# Patient Record
Sex: MALE | Race: WHITE | Employment: OTHER | URBAN - METROPOLITAN AREA
[De-identification: names, ages, dates, MRNs, and addresses within clinical notes are randomized per-mention and may not be internally consistent; named-entity substitution may affect disease eponyms.]

---

## 2017-04-15 ENCOUNTER — HOSPITAL ENCOUNTER (INPATIENT)
Age: 82
LOS: 2 days | Discharge: HOME | DRG: 066 | End: 2017-04-17
Attending: EMERGENCY MEDICINE | Admitting: INTERNAL MEDICINE
Payer: MEDICARE

## 2017-04-15 ENCOUNTER — APPOINTMENT (OUTPATIENT)
Dept: CT IMAGING | Age: 82
DRG: 066 | End: 2017-04-15
Attending: EMERGENCY MEDICINE
Payer: MEDICARE

## 2017-04-15 DIAGNOSIS — I65.23 STENOSIS OF BOTH INTERNAL CAROTID ARTERIES: ICD-10-CM

## 2017-04-15 DIAGNOSIS — I10 ESSENTIAL HYPERTENSION: ICD-10-CM

## 2017-04-15 DIAGNOSIS — R26.9 GAIT DISTURBANCE: ICD-10-CM

## 2017-04-15 DIAGNOSIS — E78.01 FAMILIAL HYPERCHOLESTEROLEMIA: ICD-10-CM

## 2017-04-15 DIAGNOSIS — H49.21 SIXTH NERVE PALSY OF RIGHT EYE: ICD-10-CM

## 2017-04-15 DIAGNOSIS — H53.2 DIPLOPIA: ICD-10-CM

## 2017-04-15 DIAGNOSIS — I63.341 CEREBROVASCULAR ACCIDENT (CVA) DUE TO THROMBOSIS OF RIGHT CEREBELLAR ARTERY (HCC): ICD-10-CM

## 2017-04-15 DIAGNOSIS — G45.9 TRANSIENT CEREBRAL ISCHEMIA, UNSPECIFIED TYPE: Primary | ICD-10-CM

## 2017-04-15 LAB
ALBUMIN SERPL BCP-MCNC: 3.6 G/DL (ref 3.5–5)
ALBUMIN/GLOB SERPL: 0.9 {RATIO} (ref 1.1–2.2)
ALP SERPL-CCNC: 92 U/L (ref 45–117)
ALT SERPL-CCNC: 25 U/L (ref 12–78)
ANION GAP BLD CALC-SCNC: 5 MMOL/L (ref 5–15)
APPEARANCE UR: CLEAR
AST SERPL W P-5'-P-CCNC: 17 U/L (ref 15–37)
BACTERIA URNS QL MICRO: NEGATIVE /HPF
BASOPHILS # BLD AUTO: 0.1 K/UL (ref 0–0.1)
BASOPHILS # BLD: 1 % (ref 0–1)
BILIRUB SERPL-MCNC: 0.6 MG/DL (ref 0.2–1)
BILIRUB UR QL: NEGATIVE
BUN SERPL-MCNC: 23 MG/DL (ref 6–20)
BUN/CREAT SERPL: 18 (ref 12–20)
CALCIUM SERPL-MCNC: 9 MG/DL (ref 8.5–10.1)
CHLORIDE SERPL-SCNC: 110 MMOL/L (ref 97–108)
CK MB CFR SERPL CALC: 2.8 % (ref 0–2.5)
CK MB SERPL-MCNC: 2.2 NG/ML (ref 5–25)
CK SERPL-CCNC: 79 U/L (ref 39–308)
CO2 SERPL-SCNC: 29 MMOL/L (ref 21–32)
COLOR UR: NORMAL
CREAT SERPL-MCNC: 1.28 MG/DL (ref 0.7–1.3)
EOSINOPHIL # BLD: 0.2 K/UL (ref 0–0.4)
EOSINOPHIL NFR BLD: 3 % (ref 0–7)
EPITH CASTS URNS QL MICRO: NORMAL /LPF
ERYTHROCYTE [DISTWIDTH] IN BLOOD BY AUTOMATED COUNT: 13.9 % (ref 11.5–14.5)
GLOBULIN SER CALC-MCNC: 3.9 G/DL (ref 2–4)
GLUCOSE SERPL-MCNC: 102 MG/DL (ref 65–100)
GLUCOSE UR STRIP.AUTO-MCNC: NEGATIVE MG/DL
HCT VFR BLD AUTO: 48.9 % (ref 36.6–50.3)
HGB BLD-MCNC: 16.5 G/DL (ref 12.1–17)
HGB UR QL STRIP: NEGATIVE
HYALINE CASTS URNS QL MICRO: NORMAL /LPF (ref 0–5)
KETONES UR QL STRIP.AUTO: NEGATIVE MG/DL
LEUKOCYTE ESTERASE UR QL STRIP.AUTO: NEGATIVE
LYMPHOCYTES # BLD AUTO: 21 % (ref 12–49)
LYMPHOCYTES # BLD: 1.3 K/UL (ref 0.8–3.5)
MCH RBC QN AUTO: 31.1 PG (ref 26–34)
MCHC RBC AUTO-ENTMCNC: 33.7 G/DL (ref 30–36.5)
MCV RBC AUTO: 92.1 FL (ref 80–99)
MONOCYTES # BLD: 0.5 K/UL (ref 0–1)
MONOCYTES NFR BLD AUTO: 8 % (ref 5–13)
NEUTS SEG # BLD: 4.1 K/UL (ref 1.8–8)
NEUTS SEG NFR BLD AUTO: 67 % (ref 32–75)
NITRITE UR QL STRIP.AUTO: NEGATIVE
PH UR STRIP: 5 [PH] (ref 5–8)
PLATELET # BLD AUTO: 132 K/UL (ref 150–400)
POTASSIUM SERPL-SCNC: 4.3 MMOL/L (ref 3.5–5.1)
PROT SERPL-MCNC: 7.5 G/DL (ref 6.4–8.2)
PROT UR STRIP-MCNC: NEGATIVE MG/DL
RBC # BLD AUTO: 5.31 M/UL (ref 4.1–5.7)
RBC #/AREA URNS HPF: NORMAL /HPF (ref 0–5)
SODIUM SERPL-SCNC: 144 MMOL/L (ref 136–145)
SP GR UR REFRACTOMETRY: 1.01 (ref 1–1.03)
TROPONIN I SERPL-MCNC: <0.04 NG/ML
UA: UC IF INDICATED,UAUC: NORMAL
UROBILINOGEN UR QL STRIP.AUTO: 0.2 EU/DL (ref 0.2–1)
WBC # BLD AUTO: 6.1 K/UL (ref 4.1–11.1)
WBC URNS QL MICRO: NORMAL /HPF (ref 0–4)

## 2017-04-15 PROCEDURE — 70450 CT HEAD/BRAIN W/O DYE: CPT

## 2017-04-15 PROCEDURE — 82550 ASSAY OF CK (CPK): CPT | Performed by: EMERGENCY MEDICINE

## 2017-04-15 PROCEDURE — 84484 ASSAY OF TROPONIN QUANT: CPT | Performed by: EMERGENCY MEDICINE

## 2017-04-15 PROCEDURE — 74011250637 HC RX REV CODE- 250/637: Performed by: INTERNAL MEDICINE

## 2017-04-15 PROCEDURE — 65660000000 HC RM CCU STEPDOWN

## 2017-04-15 PROCEDURE — 94762 N-INVAS EAR/PLS OXIMTRY CONT: CPT

## 2017-04-15 PROCEDURE — 36415 COLL VENOUS BLD VENIPUNCTURE: CPT | Performed by: EMERGENCY MEDICINE

## 2017-04-15 PROCEDURE — 80053 COMPREHEN METABOLIC PANEL: CPT | Performed by: EMERGENCY MEDICINE

## 2017-04-15 PROCEDURE — 85025 COMPLETE CBC W/AUTO DIFF WBC: CPT | Performed by: EMERGENCY MEDICINE

## 2017-04-15 PROCEDURE — 81001 URINALYSIS AUTO W/SCOPE: CPT | Performed by: EMERGENCY MEDICINE

## 2017-04-15 PROCEDURE — 99284 EMERGENCY DEPT VISIT MOD MDM: CPT

## 2017-04-15 PROCEDURE — 93005 ELECTROCARDIOGRAM TRACING: CPT

## 2017-04-15 RX ORDER — ACETAMINOPHEN 650 MG/1
650 SUPPOSITORY RECTAL
Status: DISCONTINUED | OUTPATIENT
Start: 2017-04-15 | End: 2017-04-17 | Stop reason: HOSPADM

## 2017-04-15 RX ORDER — PANTOPRAZOLE SODIUM 40 MG/1
40 TABLET, DELAYED RELEASE ORAL
Status: DISCONTINUED | OUTPATIENT
Start: 2017-04-16 | End: 2017-04-17 | Stop reason: HOSPADM

## 2017-04-15 RX ORDER — LANOLIN ALCOHOL/MO/W.PET/CERES
100 CREAM (GRAM) TOPICAL DAILY
Status: DISCONTINUED | OUTPATIENT
Start: 2017-04-16 | End: 2017-04-17 | Stop reason: HOSPADM

## 2017-04-15 RX ORDER — ATORVASTATIN CALCIUM 20 MG/1
20 TABLET, FILM COATED ORAL DAILY
Status: DISCONTINUED | OUTPATIENT
Start: 2017-04-16 | End: 2017-04-16

## 2017-04-15 RX ORDER — LANOLIN ALCOHOL/MO/W.PET/CERES
1000 CREAM (GRAM) TOPICAL DAILY
Status: DISCONTINUED | OUTPATIENT
Start: 2017-04-16 | End: 2017-04-17 | Stop reason: HOSPADM

## 2017-04-15 RX ORDER — DICYCLOMINE HYDROCHLORIDE 10 MG/1
10 CAPSULE ORAL 2 TIMES DAILY
Status: DISCONTINUED | OUTPATIENT
Start: 2017-04-15 | End: 2017-04-17 | Stop reason: HOSPADM

## 2017-04-15 RX ORDER — GUAIFENESIN 100 MG/5ML
81 LIQUID (ML) ORAL DAILY
Status: DISCONTINUED | OUTPATIENT
Start: 2017-04-15 | End: 2017-04-16

## 2017-04-15 RX ORDER — SODIUM CHLORIDE 0.9 % (FLUSH) 0.9 %
5-10 SYRINGE (ML) INJECTION EVERY 8 HOURS
Status: DISCONTINUED | OUTPATIENT
Start: 2017-04-15 | End: 2017-04-17 | Stop reason: HOSPADM

## 2017-04-15 RX ORDER — SODIUM CHLORIDE 0.9 % (FLUSH) 0.9 %
5-10 SYRINGE (ML) INJECTION AS NEEDED
Status: DISCONTINUED | OUTPATIENT
Start: 2017-04-15 | End: 2017-04-17 | Stop reason: HOSPADM

## 2017-04-15 RX ORDER — ACETAMINOPHEN 325 MG/1
650 TABLET ORAL
Status: DISCONTINUED | OUTPATIENT
Start: 2017-04-15 | End: 2017-04-17 | Stop reason: HOSPADM

## 2017-04-15 RX ADMIN — ASPIRIN 81 MG CHEWABLE TABLET 81 MG: 81 TABLET CHEWABLE at 17:49

## 2017-04-15 RX ADMIN — DICYCLOMINE HYDROCHLORIDE 10 MG: 10 CAPSULE ORAL at 20:23

## 2017-04-15 RX ADMIN — Medication 5 ML: at 22:00

## 2017-04-15 NOTE — ROUTINE PROCESS
TRANSFER - OUT REPORT:    Verbal report given to Eli LOPEZ(name) on Tl Levy  being transferred to Neuro 3116(unit) for routine progression of care       Report consisted of patients Situation, Background, Assessment and   Recommendations(SBAR). Information from the following report(s) SBAR, Kardex, ED Summary, Procedure Summary, Intake/Output, MAR, Recent Results, Med Rec Status and Cardiac Rhythm NSR was reviewed with the receiving nurse. Lines:   Peripheral IV 04/15/17 Left Antecubital (Active)   Site Assessment Clean, dry, & intact 4/15/2017  5:32 PM   Phlebitis Assessment 0 4/15/2017  5:32 PM   Infiltration Assessment 0 4/15/2017  5:32 PM   Dressing Status Clean, dry, & intact 4/15/2017  5:32 PM   Hub Color/Line Status Pink 4/15/2017  5:32 PM        Opportunity for questions and clarification was provided.

## 2017-04-15 NOTE — IP AVS SNAPSHOT
Current Discharge Medication List  
  
START taking these medications Dose & Instructions Dispensing Information Comments Morning Noon Evening Bedtime  
 clopidogrel 75 mg Tab Commonly known as:  PLAVIX Your last dose was: Your next dose is:    
   
   
 Dose:  75 mg Take 1 Tab by mouth daily. Quantity:  30 Tab Refills:  6 CONTINUE these medications which have CHANGED Dose & Instructions Dispensing Information Comments Morning Noon Evening Bedtime  
 atorvastatin 40 mg tablet Commonly known as:  LIPITOR What changed:   
- medication strength 
- how much to take Your last dose was: Your next dose is:    
   
   
 Dose:  40 mg Take 1 Tab by mouth daily. Quantity:  30 Tab Refills:  6 CONTINUE these medications which have NOT CHANGED Dose & Instructions Dispensing Information Comments Morning Noon Evening Bedtime  
 dicyclomine 10 mg capsule Commonly known as:  BENTYL Your last dose was: Your next dose is:    
   
   
 Dose:  10 mg Take 10 mg by mouth two (2) times a day. Refills:  0  
     
   
   
   
  
 FISH OIL 1,000 mg Cap Generic drug:  omega-3 fatty acids-vitamin e Your last dose was: Your next dose is:    
   
   
 Dose:  1 Cap Take 1 Cap by mouth. Refills:  0  
     
   
   
   
  
 multivitamin tablet Commonly known as:  ONE A DAY Your last dose was: Your next dose is:    
   
   
 Dose:  1 Tab Take 1 Tab by mouth daily. Refills:  0  
     
   
   
   
  
 omeprazole 20 mg capsule Commonly known as:  PRILOSEC Your last dose was: Your next dose is:    
   
   
 Dose:  20 mg Take 20 mg by mouth daily. Refills:  0  
     
   
   
   
  
 VITAMIN B-6 100 mg tablet Generic drug:  pyridoxine (vitamin B6) Your last dose was:     
   
Your next dose is:    
   
   
 Dose:  100 mg  
 Take 100 mg by mouth daily. Refills:  0 STOP taking these medications   
 aspirin 81 mg tablet Where to Get Your Medications Information on where to get these meds will be given to you by the nurse or doctor. ! Ask your nurse or doctor about these medications  
  atorvastatin 40 mg tablet  
 clopidogrel 75 mg Tab

## 2017-04-15 NOTE — IP AVS SNAPSHOT
[address] [facility] 
[phone] Patient: Margart Galeazzi MRN: [id number] You are allergic to the following No active allergies Recent Documentation Height Weight BMI Smoking Status 1.829 m 86.6 kg 25.89 kg/m2 Current Every Day Smoker Emergency Contacts Name Discharge Info Relation Home Work Mobile Francisco Boss  Spouse [3] [phone] About your hospitalization You were admitted on:  April 15, 2017 You last received care in the:  [facility] NEUROSCIENCE TELEMETRY You were discharged on:  April 17, 2017 Unit phone number:  [phone] Why you were hospitalized Your primary diagnosis was:  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  
  
  
 
  
  
Providers Seen During Your Hospitalizations Provider Role Specialty Primary office phone Jhonatan Bennett MD Attending Provider Emergency Medicine [phone] Lina Mercado MD Attending Provider Internal Medicine [phone] Your Primary Care Physician (PCP) Primary Care Physician Office Phone Office Fax Marcelino Grimessarah [phone] Follow-up Information Follow up With Details Comments Contact Info Lina Mercado MD In 2 weeks Please schedule an appointment to be seen in 2 weeks Wale Jarrett [facility] 
[phone] Current Discharge Medication List  
  
START taking these medications Dose & Instructions Dispensing Information Comments Morning Noon Evening Bedtime  
 clopidogrel 75 mg Tab Commonly known as:  PLAVIX Your last dose was: Your next dose is:    
   
   
 Dose:  75 mg Take 1 Tab by mouth daily. Quantity:  30 Tab Refills:  6 CONTINUE these medications which have CHANGED Dose & Instructions Dispensing Information Comments Morning Noon Evening Bedtime  
 atorvastatin 40 mg tablet Commonly known as:  LIPITOR What changed:   
- medication strength 
- how much to take Your last dose was: Your next dose is:    
   
   
 Dose:  40 mg Take 1 Tab by mouth daily. Quantity:  30 Tab Refills:  6 CONTINUE these medications which have NOT CHANGED Dose & Instructions Dispensing Information Comments Morning Noon Evening Bedtime  
 dicyclomine 10 mg capsule Commonly known as:  BENTYL Your last dose was: Your next dose is:    
   
   
 Dose:  10 mg Take 10 mg by mouth two (2) times a day. Refills:  0  
     
   
   
   
  
 FISH OIL 1,000 mg Cap Generic drug:  omega-3 fatty acids-vitamin e Your last dose was: Your next dose is:    
   
   
 Dose:  1 Cap Take 1 Cap by mouth. Refills:  0  
     
   
   
   
  
 multivitamin tablet Commonly known as:  ONE A DAY Your last dose was: Your next dose is:    
   
   
 Dose:  1 Tab Take 1 Tab by mouth daily. Refills:  0  
     
   
   
   
  
 omeprazole 20 mg capsule Commonly known as:  PRILOSEC Your last dose was: Your next dose is:    
   
   
 Dose:  20 mg Take 20 mg by mouth daily. Refills:  0  
     
   
   
   
  
 VITAMIN B-6 100 mg tablet Generic drug:  pyridoxine (vitamin B6) Your last dose was: Your next dose is:    
   
   
 Dose:  100 mg Take 100 mg by mouth daily. Refills:  0 STOP taking these medications   
 aspirin 81 mg tablet Where to Get Your Medications Information on where to get these meds will be given to you by the nurse or doctor. ! Ask your nurse or doctor about these medications  
  atorvastatin 40 mg tablet  
 clopidogrel 75 mg Tab Discharge Instructions Adolfo Duong 
[address] [place] [address]. [address] 
[phone] Patient Discharge Instructions Jose Logan / [id number] : 1935 Admitted 4/15/2017 Discharged: 17 Take Home Medications · It is important that you take the medication exactly as they are prescribed. · Keep your medication in the bottles provided by the pharmacist and keep a list of the medication names, dosages, and times to be taken in your wallet. · Do not take other medications without consulting your doctor. What to do at [facility] Recommended diet: Cardiac Diet, Recommended activity: Activity as tolerated, Follow-up with Dr. Bettie Simon in 2 weeks. Call [phone] for your appointment. Information obtained by : 
I understand that if any problems occur once I am at home I am to contact my physician. I understand and acknowledge receipt of the instructions indicated above. Physician's or R.N.'s Signature                                                                  Date/Time Patient or Representative Signature                                                          Date/Time Discharge Orders None [employer] Announcement We are excited to announce that we are making your provider's discharge notes available to you in CX.   You will see these notes when they are completed and signed by the physician that discharged you from your recent hospital stay. If you have any questions or concerns about any information you see in [employer], please call the Health Information Department where you were seen or reach out to your Primary Care Provider for more information about your plan of care. Introducing [facility]! [employer] introduces [employer] patient portal. Now you can access parts of your medical record, email your doctor's office, and request medication refills online. 1. In your internet browser, go to https://[employer]. [employer] 2. Click on the First Time User? Click Here link in the Sign In box. You will see the New Member Sign Up page. 3. Enter your App[employer] Access Code exactly as it appears below. You will not need to use this code after youve completed the sign-up process. If you do not sign up before the expiration date, you must request a new code. · [employer] Access Code: [email or web address] Expires: 7/14/2017 11:46 AM 
 
4. Enter the last four digits of your Social Security Number (xxxx) and Date of Birth (mm/dd/yyyy) as indicated and click Submit. You will be taken to the next sign-up page. 5. Create a App[employer]ify ID. This will be your App[employer] login ID and cannot be changed, so think of one that is secure and easy to remember. 6. Create a [employer] password. You can change your password at any time. 7. Enter your Password Reset Question and Answer. This can be used at a later time if you forget your password. 8. Enter your e-mail address. You will receive e-mail notification when new information is available in [address] 9. Click Sign Up. You can now view and download portions of your medical record. 10. Click the Download Summary menu link to download a portable copy of your medical information.  
 
If you have questions, please visit the Frequently Asked Questions section of the [employer]. Remember, MyChart is NOT to be used for urgent needs. For medical emergencies, dial 911. Now available from your iPhone and Android! General Information Please provide this summary of care documentation to your next provider. Patient Signature:  ____________________________________________________________ Date:  ____________________________________________________________  
  
Nehemiah Anastasia Provider Signature:  ____________________________________________________________ Date:  ____________________________________________________________

## 2017-04-15 NOTE — ED PROVIDER NOTES
HPI Comments: Avel Villa is a 80 y.o. male with PMHx of high cholesterol presenting ambulatory to [facility] c/o dizziness and horizontal double vision since yesterday morning. Pt notes additional symptoms of intermittent slurred speech, some SOB, and left leg numbness and tingling last night which seems to have resolved. He reports that he also has some chronic right arm pain with movement that seems to be worsening. Pt notes that his dizziness is causing him to lean to one side, and is causing him trouble when he walks. He reports that he is not having \"room spinning\" dizziness. Pt notes that is double vision occurs when he has both eyes open, however, if he only has one eye open the double vision is resolved. Pt denies nausea, vomiting, chest pain, lightheadedness, hx of stroke, hx of DM, or hx of seizures. PCP: Kusum Castro MD    There are no other complaints, changes, or physical findings at this time. The history is provided by the patient. No  was used. Past Medical History:   Diagnosis Date    Abdominal pain     Arthritis     shoulders, arms    Cancer ([address]     prostate    Cough     Dyspepsia and other specified disorders of function of stomach     Easy bruising     Fatigue     Frequent urination     GERD (gastroesophageal reflux disease)     Other ill-defined conditions     high cholesterol    Shortness of breath        Past Surgical History:   Procedure Laterality Date    HX HERNIA REPAIR  1996    inguinal R&L    HX HERNIA REPAIR  2013    abd hernia     HX HERNIA REPAIR  2014    Laparoscopic recurrent incisional hernia repair with mesh.   Robot assisted    HX PROSTATECTOMY      HX SMALL BOWEL RESECTION  1996    HX UROLOGICAL      prostate  removed    HX UROLOGICAL      adhesion repair    [place] EGD TRANSORAL BIOPSY SINGLE/MULTIPLE  12/5/2012              Family History:   Problem Relation Age of Onset    Heart Disease Mother     Heart Disease Father  Heart Disease Brother        Social History     Social History    Marital status:      Spouse name: N/A    Number of children: N/A    Years of education: N/A     Occupational History    Not on file. Social History Main Topics    Smoking status: Current Every Day Smoker     Years: 60.00    Smokeless tobacco: Never Used      Comment: smokes pipe daily    Alcohol use Yes      Comment: socially, smokes pipe    Drug use: No    Sexual activity: Not on file     Other Topics Concern    Not on file     Social History Narrative         ALLERGIES: Review of patient's allergies indicates no known allergies. Review of Systems   Constitutional: Negative for chills, diaphoresis, fever and unexpected weight change. HENT: Negative for rhinorrhea and sore throat. Eyes: Positive for visual disturbance (double vision). Negative for pain. Respiratory: Positive for shortness of breath. Negative for wheezing and stridor. Cardiovascular: Negative for chest pain and leg swelling. Gastrointestinal: Negative for abdominal pain, blood in stool, diarrhea, nausea and vomiting. Genitourinary: Negative for difficulty urinating, dysuria and flank pain. Musculoskeletal: Positive for myalgias (right arm). Negative for back pain and neck stiffness. Skin: Negative for rash. Neurological: Positive for dizziness and speech difficulty (slurred). Negative for seizures, syncope, weakness, light-headedness and headaches. Psychiatric/Behavioral: Negative for confusion. Patient Vitals for the past 12 hrs:   Temp Pulse Resp BP SpO2   04/15/17 1552 - 72 19 125/81 98 %   04/15/17 1224 98.3 °F (36.8 °C) 70 18 115/58 96 %            Physical Exam   Constitutional: He is oriented to person, place, and time. He appears well-developed and well-nourished. No distress. HENT:   Nose: Nose normal.   Mouth/Throat: Oropharynx is clear and moist. No oropharyngeal exudate.    Eyes: Conjunctivae and EOM are normal. Pupils are equal, round, and reactive to light. No scleral icterus. Neck: Normal range of motion. Neck supple. No JVD present. Cardiovascular: Normal rate, regular rhythm, normal heart sounds and intact distal pulses. No murmur heard. Pulmonary/Chest: Effort normal and breath sounds normal. No stridor. No respiratory distress. He has no wheezes. He has no rales. Abdominal: Soft. Bowel sounds are normal. He exhibits no distension. There is no tenderness. There is no rebound and no guarding. Musculoskeletal: He exhibits no edema or tenderness. Mild dysmetria with right upper extremity   Neurological: He is alert and oriented to person, place, and time. He has normal strength. He displays no atrophy and no tremor. No cranial nerve deficit or sensory deficit. He exhibits normal muscle tone. He displays a negative Romberg sign. Coordination and gait normal.   Reflex Scores:       Bicep reflexes are 2+ on the right side and 2+ on the left side. Patellar reflexes are 2+ on the right side and 2+ on the left side. Skin: Skin is warm and dry. He is not diaphoretic. Psychiatric: He has a normal mood and affect. Nursing note and vitals reviewed. MDM  Number of Diagnoses or Management Options  Diplopia:   Transient cerebral ischemia, unspecified type:      Amount and/or Complexity of Data Reviewed  Clinical lab tests: ordered and reviewed  Tests in the radiology section of CPT®: ordered and reviewed  Tests in the medicine section of CPT®: ordered and reviewed  Review and summarize past medical records: yes  Discuss the patient with other providers: yes (Hospitalist)  Independent visualization of images, tracings, or specimens: yes    Patient Progress  Patient progress: stable    ED Course       Procedures    EKG interpretation: (Preliminary)  11:29 AM  Rhythm: normal sinus rhythm; and regular .  Rate (approx.): 86; Axis: left axis deviation; ID interval: normal; QRS interval: normal ; ST/T wave: normal; Other findings: left ventricular hypertrophy, and possible left atrial enlargement. CONSULT NOTE:   2:07 PM  Claudia Madsen MD spoke with Dr. Hema Rucker,   Specialty: Hospitalist  Discussed pt's hx, disposition, and available diagnostic and imaging results. Reviewed care plans. Consultant will evaluate pt for admission. Hospitalist requests to examine patient before he will commit to admission. Written by Nichole Brown ED Scribe, as dictated by Claudia Madsen MD.    LABORATORY TESTS:  Recent Results (from the past 12 hour(s))   EKG, 12 LEAD, INITIAL    Collection Time: 04/15/17 11:29 AM   Result Value Ref Range    Ventricular Rate 86 BPM    Atrial Rate 86 BPM    P-R Interval 168 ms    QRS Duration 88 ms    Q-T Interval 370 ms    QTC Calculation (Bezet) 442 ms    Calculated P Axis 64 degrees    Calculated R Axis -33 degrees    Calculated T Axis 51 degrees    Diagnosis       Normal sinus rhythm  Possible Left atrial enlargement  Left axis deviation  Left ventricular hypertrophy  Abnormal ECG  When compared with ECG of 12-JUN-2014 08:13,  No significant change was found     CBC WITH AUTOMATED DIFF    Collection Time: 04/15/17 11:38 AM   Result Value Ref Range    WBC 6.1 4.1 - 11.1 K/uL    RBC 5.31 4.10 - 5.70 M/uL    HGB 16.5 12.1 - 17.0 g/dL    HCT 48.9 36.6 - 50.3 %    MCV 92.1 80.0 - 99.0 FL    MCH 31.1 26.0 - 34.0 PG    MCHC 33.7 30.0 - 36.5 g/dL    RDW 13.9 11.5 - 14.5 %    PLATELET 465 (L) 230 - 400 K/uL    NEUTROPHILS 67 32 - 75 %    LYMPHOCYTES 21 12 - 49 %    MONOCYTES 8 5 - 13 %    EOSINOPHILS 3 0 - 7 %    BASOPHILS 1 0 - 1 %    ABS. NEUTROPHILS 4.1 1.8 - 8.0 K/UL    ABS. LYMPHOCYTES 1.3 0.8 - 3.5 K/UL    ABS. MONOCYTES 0.5 0.0 - 1.0 K/UL    ABS. EOSINOPHILS 0.2 0.0 - 0.4 K/UL    ABS.  BASOPHILS 0.1 0.0 - 0.1 K/UL   CK W/ CKMB & INDEX    Collection Time: 04/15/17 11:38 AM   Result Value Ref Range    CK 79 39 - 308 U/L    CK - MB 2.2 <3.6 NG/ML    CK-MB Index 2.8 (H) 0 - 2.5     METABOLIC PANEL, COMPREHENSIVE    Collection Time: 04/15/17 11:38 AM   Result Value Ref Range    Sodium 144 136 - 145 mmol/L    Potassium 4.3 3.5 - 5.1 mmol/L    Chloride 110 (H) 97 - 108 mmol/L    CO2 29 21 - 32 mmol/L    Anion gap 5 5 - 15 mmol/L    Glucose 102 (H) 65 - 100 mg/dL    BUN 23 (H) 6 - 20 MG/DL    Creatinine 1.28 0.70 - 1.30 MG/DL    BUN/Creatinine ratio 18 12 - 20      GFR est AA >60 >60 ml/min/1.73m2    GFR est non-AA 54 (L) >60 ml/min/1.73m2    Calcium 9.0 8.5 - 10.1 MG/DL    Bilirubin, total 0.6 0.2 - 1.0 MG/DL    ALT (SGPT) 25 12 - 78 U/L    AST (SGOT) 17 15 - 37 U/L    Alk. phosphatase 92 45 - 117 U/L    Protein, total 7.5 6.4 - 8.2 g/dL    Albumin 3.6 3.5 - 5.0 g/dL    Globulin 3.9 2.0 - 4.0 g/dL    A-G Ratio 0.9 (L) 1.1 - 2.2     TROPONIN I    Collection Time: 04/15/17 11:38 AM   Result Value Ref Range    Troponin-I, Qt. <0.04 <0.05 ng/mL   URINALYSIS W/ REFLEX CULTURE    Collection Time: 04/15/17  1:11 PM   Result Value Ref Range    Color YELLOW/STRAW      Appearance CLEAR CLEAR      Specific gravity 1.015 1.003 - 1.030      pH (UA) 5.0 5.0 - 8.0      Protein NEGATIVE  NEG mg/dL    Glucose NEGATIVE  NEG mg/dL    Ketone NEGATIVE  NEG mg/dL    Bilirubin NEGATIVE  NEG      Blood NEGATIVE  NEG      Urobilinogen 0.2 0.2 - 1.0 EU/dL    Nitrites NEGATIVE  NEG      Leukocyte Esterase NEGATIVE  NEG      WBC 0-4 0 - 4 /hpf    RBC 0-5 0 - 5 /hpf    Epithelial cells FEW FEW /lpf    Bacteria NEGATIVE  NEG /hpf    UA:UC IF INDICATED CULTURE NOT INDICATED BY UA RESULT CNI      Hyaline cast 0-2 0 - 5 /lpf       IMAGING RESULTS:    EXAM: CT HEAD WO CONT     INDICATION: dizziness, blurred vision     COMPARISON: None.     TECHNIQUE: Unenhanced CT of the head was performed using 5 mm images. Brain and  bone windows were generated.  CT dose reduction was achieved through use of a  standardized protocol tailored for this examination and automatic exposure  control for dose modulation.      FINDINGS:  There is slight prominence of the ventricles and sulci. No hemorrhage mass or  acute infarction identified. Bony structures are intact.           IMPRESSION  IMPRESSION: No acute abnormality identified     MEDICATIONS GIVEN:  Medications   atorvastatin (LIPITOR) tablet 20 mg (not administered)   cyanocobalamin (VITAMIN B12) tablet 1,000 mcg (not administered)   dicyclomine (BENTYL) capsule 10 mg (not administered)   fish oil-omega-3 fatty acids 340-1,000 mg capsule 1 Cap (not administered)   pantoprazole (PROTONIX) tablet 20 mg (not administered)   pyridoxine (vitamin B6) (VITAMIN B-6) tablet 100 mg (not administered)   sodium chloride (NS) flush 5-10 mL (not administered)   sodium chloride (NS) flush 5-10 mL (not administered)   acetaminophen (TYLENOL) tablet 650 mg (not administered)     Or   acetaminophen (TYLENOL) solution 650 mg (not administered)     Or   acetaminophen (TYLENOL) suppository 650 mg (not administered)   aspirin chewable tablet 81 mg (not administered)       IMPRESSION:  1. Transient cerebral ischemia, unspecified type    2. Diplopia        PLAN:  1. Admit to hospitalist    ADMIT NOTE:  2:07 PM  Patient is being admitted to the hospital by Dr. Bozena Lovell. The results of their tests and reasons for their admission have been discussed with the patient and/or available family. They convey agreement and understanding for the need to be admitted and for their admission diagnosis. This note is prepared by Martin President, acting as Scribe for MD Calli Kim MD: The scribe's documentation has been prepared under my direction and personally reviewed by me in its entirety. I confirm that the note above accurately reflects all work, treatment, procedures, and medical decision making performed by me.

## 2017-04-15 NOTE — H&P
Hospitalist Admission Note    NAME: Gege Thorne   :  1935   MRN:  [id number]     Date/Time:  4/15/2017 3:52 PM    Patient PCP: Diandra Kennedy MD  ________________________________________________________________________    My assessment of this patient's clinical condition and my plan of care is as follows. Assessment / Plan:  Slurred speech, double vision concern for CVA  -aspirin 81 mg  -neurology consult  -permissive HTN, labetalol PRN SBP >220  -MRI ordered as CT head negative  -carotid doppler  -echo  -check lipid panel and HbA1c  -swallow screen  -Speech/OT/PT consults    GERD  Dyspepsia  -protonix, dicyclomine    Hyperlipidemia  -statin        Code Status: full  Surrogate Decision Maker: wife    DVT Prophylaxis: scd  GI Prophylaxis: not indicated    Baseline: independent         Subjective:   CHIEF COMPLAINT: slurred speech, double vision    HISTORY OF PRESENT ILLNESS:     Mark Arriaga is a 80 y.o.  male with history of hyperlipidemia, GERD, prostate cancer who presented with 1 day history of double vision and \"dizziness. \" Patient says the dizziness is more of an imbalance when he walks, he feels like he is leaning towards the left. The double vision is intermittent. Patient also feels like he is having some trouble getting words out and his speech is slow and slurred. We were asked to admit for work up and evaluation of the above problems.      Past Medical History:   Diagnosis Date    Abdominal pain     Arthritis     shoulders, arms    Cancer ([facility] [address]     prostate    Cough     Dyspepsia and other specified disorders of function of stomach     Easy bruising     Fatigue     Frequent urination     GERD (gastroesophageal reflux disease)     Other ill-defined conditions     high cholesterol    Shortness of breath         Past Surgical History:   Procedure Laterality Date    HX HERNIA REPAIR      inguinal R&L    HX HERNIA REPAIR      abd hernia     HX HERNIA REPAIR  2014    Laparoscopic recurrent incisional hernia repair with mesh. Robot assisted    HX PROSTATECTOMY      HX SMALL BOWEL RESECTION  1996    HX UROLOGICAL      prostate  removed    HX UROLOGICAL      adhesion repair    MD EGD TRANSORAL BIOPSY SINGLE/MULTIPLE  12/5/2012            Social History   Substance Use Topics    Smoking status: Current Every Day Smoker     Years: 60.00    Smokeless tobacco: Never Used      Comment: smokes pipe daily    Alcohol use Yes      Comment: socially, smokes pipe        Family History   Problem Relation Age of Onset    Heart Disease Mother     Heart Disease Father     Heart Disease Brother      No Known Allergies     Prior to Admission medications    Medication Sig Start Date End Date Taking? Authorizing Provider   oxyCODONE-acetaminophen (PERCOCET) 5-325 mg per tablet Take 1-2 Tabs by mouth every four (4) hours as needed for Pain. 6/18/14   Luz Young MD   atorvastatin (LIPITOR) 20 mg tablet Take  by mouth daily. Historical Provider   omeprazole (PRILOSEC) 20 mg capsule Take 20 mg by mouth daily. Historical Provider   pyridoxine (VITAMIN B-6) 100 mg tablet Take 100 mg by mouth daily. Historical Provider   dicyclomine (BENTYL) 10 mg capsule Take 10 mg by mouth two (2) times a day. Historical Provider   omega-3 fatty acids-vitamin e (FISH OIL) 1,000 mg cap Take 1 Cap by mouth. Historical Provider   multivitamin (ONE A DAY) tablet Take 1 Tab by mouth daily. Historical Provider   cyanocobalamin (VITAMIN B-12) 1,000 mcg tablet Take 1,000 mcg by mouth daily. Historical Provider   aspirin 81 mg tablet Take 81 mg by mouth daily.       Historical Provider       REVIEW OF SYSTEMS:       Total of 12 systems reviewed as follows:         General: no fever, no chills, no sweats, no generalized weakness, no weight loss, no weight gain, no loss of appetite   Eyes: no blurred vision, no eye pain, no loss of vision, no double vision  ENT: no rhinorrhea, no pharyngitis   Respiratory: no cough, no sputum production, no SOB, no THOMAS, no wheezing, no pleuritic pain   Cardiology: no chest pain, no palpitations, no orthopnea, no PND, no edema, no syncope   Gastrointestinal: no abdominal pain, no N/V, no diarrhea, no dysphagia, no constipation, no bleeding   Genitourinary: no frequency, no urgency, no dysuria, no hematuria, no incontinence   Muskuloskeletal : no arthralgia, no myalgia, no back pain  Hematology: no easy bruising, no nose or gum bleeding, no lymphadenopathy   Dermatological: no rash, no ulceration, no pruritis, no color change / jaundice  Endocrine: no hot flashes, no polydipsia   Neurological: no headache, no dizziness, no confusion, no focal weakness, no paresthesia, no speech difficulties, no memory loss, no gait difficulty  Psychological: no anxiety, no depression, no agitation      Objective:   VITALS:    Patient Vitals for the past 12 hrs:   Temp Pulse Resp BP SpO2   04/15/17 1224 98.3 °F (36.8 °C) 70 18 115/58 96 %         PHYSICAL EXAM:    General:    Alert, cooperative, no distress, appears stated age. HEENT: Atraumatic, anicteric sclerae, pink conjunctivae     No oral ulcers, oral mucosa moist, throat clear, dentition fair  Neck:  Supple, symmetrical  Lungs:   Clear to auscultation bilaterally, no wheezing, rhonchi, or rales. Chest wall:  No tenderness, no accessory muscle use. Heart:   Regular rhythm, no murmur, no edema  Abdomen:   Soft, non-tender, not distended, bowel sounds normal  Extremities: No cyanosis, no clubbing, warm, well perfused, radial pulse 2+  Skin:     Not pale, not jaundiced, no rashes   Psych:  Good insight, not depressed, not anxious or agitated.   Neurologic: Speech slow mildly slurred, some word finding issues, EOMs intact, face symmetric, strength 5/5 in BUE, 5/5 in BLE, sensation grossly intact, alert and oriented x 4.     _______________________________________________________________________  Care Plan discussed with:    Comments   Patient x    Family      RN     Care Manager                    Consultant:      _______________________________________________________________________  Expected  Disposition:   Home with Family x   HH/PT/OT/RN    SNF/LTC    EDILMA    ________________________________________________________________________  TOTAL TIME:  61 Minutes    Critical Care Provided     Minutes non procedure based      Comments     Reviewed previous records   >50% of visit spent in counseling and coordination of care  Discussion with patient and/or family and questions answered       ________________________________________________________________________  Addi Rodriguez MD    Procedures: see electronic medical records for all procedures/Xrays and details which were not copied into this note but were reviewed prior to creation of Plan.     LAB DATA REVIEWED:    Recent Results (from the past 24 hour(s))   EKG, 12 LEAD, INITIAL    Collection Time: 04/15/17 11:29 AM   Result Value Ref Range    Ventricular Rate 86 BPM    Atrial Rate 86 BPM    P-R Interval 168 ms    QRS Duration 88 ms    Q-T Interval 370 ms    QTC Calculation (Bezet) 442 ms    Calculated P Axis 64 degrees    Calculated R Axis -33 degrees    Calculated T Axis 51 degrees    Diagnosis       Normal sinus rhythm  Possible Left atrial enlargement  Left axis deviation  Left ventricular hypertrophy  Abnormal ECG  When compared with ECG of 12-JUN-2014 08:13,  No significant change was found     CBC WITH AUTOMATED DIFF    Collection Time: 04/15/17 11:38 AM   Result Value Ref Range    WBC 6.1 4.1 - 11.1 K/uL    RBC 5.31 4.10 - 5.70 M/uL    HGB 16.5 12.1 - 17.0 g/dL    HCT 48.9 36.6 - 50.3 %    MCV 92.1 80.0 - 99.0 FL    MCH 31.1 26.0 - 34.0 PG    MCHC 33.7 30.0 - 36.5 g/dL    RDW 13.9 11.5 - 14.5 %    PLATELET 029 (L) 556 - 400 K/uL    NEUTROPHILS 67 32 - 75 %    LYMPHOCYTES 21 12 - 49 %    MONOCYTES 8 5 - 13 %    EOSINOPHILS 3 0 - 7 %    BASOPHILS 1 0 - 1 % ABS. NEUTROPHILS 4.1 1.8 - 8.0 K/UL    ABS. LYMPHOCYTES 1.3 0.8 - 3.5 K/UL    ABS. MONOCYTES 0.5 0.0 - 1.0 K/UL    ABS. EOSINOPHILS 0.2 0.0 - 0.4 K/UL    ABS. BASOPHILS 0.1 0.0 - 0.1 K/UL   CK W/ CKMB & INDEX    Collection Time: 04/15/17 11:38 AM   Result Value Ref Range    CK 79 39 - 308 U/L    CK - MB 2.2 <3.6 NG/ML    CK-MB Index 2.8 (H) 0 - 2.5     METABOLIC PANEL, COMPREHENSIVE    Collection Time: 04/15/17 11:38 AM   Result Value Ref Range    Sodium 144 136 - 145 mmol/L    Potassium 4.3 3.5 - 5.1 mmol/L    Chloride 110 (H) 97 - 108 mmol/L    CO2 29 21 - 32 mmol/L    Anion gap 5 5 - 15 mmol/L    Glucose 102 (H) 65 - 100 mg/dL    BUN 23 (H) 6 - 20 MG/DL    Creatinine 1.28 0.70 - 1.30 MG/DL    BUN/Creatinine ratio 18 12 - 20      GFR est AA >60 >60 ml/min/1.73m2    GFR est non-AA 54 (L) >60 ml/min/1.73m2    Calcium 9.0 8.5 - 10.1 MG/DL    Bilirubin, total 0.6 0.2 - 1.0 MG/DL    ALT (SGPT) 25 12 - 78 U/L    AST (SGOT) 17 15 - 37 U/L    Alk.  phosphatase 92 45 - 117 U/L    Protein, total 7.5 6.4 - 8.2 g/dL    Albumin 3.6 3.5 - 5.0 g/dL    Globulin 3.9 2.0 - 4.0 g/dL    A-G Ratio 0.9 (L) 1.1 - 2.2     TROPONIN I    Collection Time: 04/15/17 11:38 AM   Result Value Ref Range    Troponin-I, Qt. <0.04 <0.05 ng/mL   URINALYSIS W/ REFLEX CULTURE    Collection Time: 04/15/17  1:11 PM   Result Value Ref Range    Color YELLOW/STRAW      Appearance CLEAR CLEAR      Specific gravity 1.015 1.003 - 1.030      pH (UA) 5.0 5.0 - 8.0      Protein NEGATIVE  NEG mg/dL    Glucose NEGATIVE  NEG mg/dL    Ketone NEGATIVE  NEG mg/dL    Bilirubin NEGATIVE  NEG      Blood NEGATIVE  NEG      Urobilinogen 0.2 0.2 - 1.0 EU/dL    Nitrites NEGATIVE  NEG      Leukocyte Esterase NEGATIVE  NEG      WBC 0-4 0 - 4 /hpf    RBC 0-5 0 - 5 /hpf    Epithelial cells FEW FEW /lpf    Bacteria NEGATIVE  NEG /hpf    UA:UC IF INDICATED CULTURE NOT INDICATED BY UA RESULT CNI      Hyaline cast 0-2 0 - 5 /lpf

## 2017-04-16 ENCOUNTER — APPOINTMENT (OUTPATIENT)
Dept: CT IMAGING | Age: 82
DRG: 066 | End: 2017-04-16
Attending: PSYCHIATRY & NEUROLOGY
Payer: MEDICARE

## 2017-04-16 ENCOUNTER — APPOINTMENT (OUTPATIENT)
Dept: MRI IMAGING | Age: 82
DRG: 066 | End: 2017-04-16
Attending: INTERNAL MEDICINE
Payer: MEDICARE

## 2017-04-16 PROBLEM — H49.21 SIXTH NERVE PALSY OF RIGHT EYE: Status: ACTIVE | Noted: 2017-04-16

## 2017-04-16 PROBLEM — R26.9 GAIT DISTURBANCE: Status: ACTIVE | Noted: 2017-04-16

## 2017-04-16 PROBLEM — I63.9 CVA (CEREBRAL VASCULAR ACCIDENT) (HCC): Status: ACTIVE | Noted: 2017-04-16

## 2017-04-16 PROBLEM — C61 PROSTATE CARCINOMA (HCC): Status: ACTIVE | Noted: 2017-04-16

## 2017-04-16 PROBLEM — E78.5 HYPERLIPIDEMIA: Status: ACTIVE | Noted: 2017-04-16

## 2017-04-16 PROBLEM — I10 ESSENTIAL HYPERTENSION: Status: ACTIVE | Noted: 2017-04-16

## 2017-04-16 PROBLEM — I63.341 CEREBROVASCULAR ACCIDENT (CVA) DUE TO THROMBOSIS OF RIGHT CEREBELLAR ARTERY (HCC): Status: ACTIVE | Noted: 2017-04-16

## 2017-04-16 PROBLEM — G45.9 TIA (TRANSIENT ISCHEMIC ATTACK): Status: RESOLVED | Noted: 2017-04-15 | Resolved: 2017-04-16

## 2017-04-16 LAB
ATRIAL RATE: 86 BPM
CALCULATED P AXIS, ECG09: 64 DEGREES
CALCULATED R AXIS, ECG10: -33 DEGREES
CALCULATED T AXIS, ECG11: 51 DEGREES
CHOLEST SERPL-MCNC: 156 MG/DL
DIAGNOSIS, 93000: NORMAL
EST. AVERAGE GLUCOSE BLD GHB EST-MCNC: 108 MG/DL
FOLATE SERPL-MCNC: 30.5 NG/ML (ref 5–21)
HBA1C MFR BLD: 5.4 % (ref 4.2–6.3)
HCYS SERPL-SCNC: 8.3 UMOL/L (ref 3.7–13.9)
HDLC SERPL-MCNC: 42 MG/DL
HDLC SERPL: 3.7 {RATIO} (ref 0–5)
LDLC SERPL CALC-MCNC: 85.2 MG/DL (ref 0–100)
LIPID PROFILE,FLP: NORMAL
P-R INTERVAL, ECG05: 168 MS
Q-T INTERVAL, ECG07: 370 MS
QRS DURATION, ECG06: 88 MS
QTC CALCULATION (BEZET), ECG08: 442 MS
TRIGL SERPL-MCNC: 144 MG/DL (ref ?–150)
VENTRICULAR RATE, ECG03: 86 BPM
VLDLC SERPL CALC-MCNC: 28.8 MG/DL

## 2017-04-16 PROCEDURE — 70450 CT HEAD/BRAIN W/O DYE: CPT

## 2017-04-16 PROCEDURE — 82607 VITAMIN B-12: CPT | Performed by: PSYCHIATRY & NEUROLOGY

## 2017-04-16 PROCEDURE — 36415 COLL VENOUS BLD VENIPUNCTURE: CPT | Performed by: INTERNAL MEDICINE

## 2017-04-16 PROCEDURE — 74011250637 HC RX REV CODE- 250/637: Performed by: INTERNAL MEDICINE

## 2017-04-16 PROCEDURE — 65660000000 HC RM CCU STEPDOWN

## 2017-04-16 PROCEDURE — 70551 MRI BRAIN STEM W/O DYE: CPT

## 2017-04-16 PROCEDURE — 82746 ASSAY OF FOLIC ACID SERUM: CPT | Performed by: PSYCHIATRY & NEUROLOGY

## 2017-04-16 PROCEDURE — 74011250636 HC RX REV CODE- 250/636: Performed by: PSYCHIATRY & NEUROLOGY

## 2017-04-16 PROCEDURE — 80061 LIPID PANEL: CPT | Performed by: INTERNAL MEDICINE

## 2017-04-16 PROCEDURE — 83090 ASSAY OF HOMOCYSTEINE: CPT | Performed by: PSYCHIATRY & NEUROLOGY

## 2017-04-16 PROCEDURE — 83036 HEMOGLOBIN GLYCOSYLATED A1C: CPT | Performed by: INTERNAL MEDICINE

## 2017-04-16 RX ORDER — ATORVASTATIN CALCIUM 40 MG/1
40 TABLET, FILM COATED ORAL DAILY
Status: DISCONTINUED | OUTPATIENT
Start: 2017-04-17 | End: 2017-04-17 | Stop reason: HOSPADM

## 2017-04-16 RX ORDER — CLOPIDOGREL BISULFATE 75 MG/1
75 TABLET ORAL DAILY
Status: DISCONTINUED | OUTPATIENT
Start: 2017-04-16 | End: 2017-04-17 | Stop reason: HOSPADM

## 2017-04-16 RX ORDER — SODIUM CHLORIDE AND POTASSIUM CHLORIDE .9; .15 G/100ML; G/100ML
SOLUTION INTRAVENOUS CONTINUOUS
Status: DISCONTINUED | OUTPATIENT
Start: 2017-04-16 | End: 2017-04-17 | Stop reason: HOSPADM

## 2017-04-16 RX ADMIN — DICYCLOMINE HYDROCHLORIDE 10 MG: 10 CAPSULE ORAL at 10:17

## 2017-04-16 RX ADMIN — CYANOCOBALAMIN TAB 500 MCG 1000 MCG: 500 TAB at 10:17

## 2017-04-16 RX ADMIN — ASPIRIN 81 MG CHEWABLE TABLET 81 MG: 81 TABLET CHEWABLE at 10:17

## 2017-04-16 RX ADMIN — Medication 10 ML: at 03:07

## 2017-04-16 RX ADMIN — OMEGA-3 FATTY ACIDS CAP DELAYED RELEASE 1000 MG 1 CAPSULE: 1000 CAPSULE DELAYED RELEASE at 10:17

## 2017-04-16 RX ADMIN — SODIUM CHLORIDE AND POTASSIUM CHLORIDE: 9; 1.49 INJECTION, SOLUTION INTRAVENOUS at 15:50

## 2017-04-16 RX ADMIN — Medication 100 MG: at 10:17

## 2017-04-16 RX ADMIN — CLOPIDOGREL BISULFATE 75 MG: 75 TABLET, FILM COATED ORAL at 13:20

## 2017-04-16 RX ADMIN — DICYCLOMINE HYDROCHLORIDE 10 MG: 10 CAPSULE ORAL at 17:40

## 2017-04-16 RX ADMIN — Medication 10 ML: at 15:50

## 2017-04-16 RX ADMIN — PANTOPRAZOLE SODIUM 40 MG: 40 TABLET, DELAYED RELEASE ORAL at 10:17

## 2017-04-16 RX ADMIN — ATORVASTATIN CALCIUM 20 MG: 20 TABLET, FILM COATED ORAL at 10:17

## 2017-04-16 NOTE — PROGRESS NOTES
Acute worsening of vision. More double vision. Patient light headed. Vitals stable. Cardiac rhythm sinus. Exam mostly unchanged    Assessment  1. Stroke   May be extending will order head ct   Start IV fluids  Neurochecks q30 min x4    30 minutes spent on exam and chart.  I  Critically ill with chance for deterioration

## 2017-04-16 NOTE — PROGRESS NOTES
PROGRESS NOTE    NAME:  Simon Putnam   :   1935   MRN:   [id number]     Date/Time:  2017 10:00 AM  Subjective:   History:  Chart reviewed and patient seen and examined and D/W his nurse and his family this AM and all events noted. He was admitted yesterday with a day history of unsteady gait, double vision and slurred speech with Head CT negative. He still has speech difficulty and remains unsteady. His vision remains a little blurry w/o true double vision. He has no other neurologic c/o. He denies palpitations or cardio/respiratory c/o. He denies GI/ c/o. There are no other c/o on complete ROS.       Medications reviewed:  Current Facility-Administered Medications   Medication Dose Route Frequency    atorvastatin (LIPITOR) tablet 20 mg  20 mg Oral DAILY    cyanocobalamin (VITAMIN B12) tablet 1,000 mcg  1,000 mcg Oral DAILY    dicyclomine (BENTYL) capsule 10 mg  10 mg Oral BID    fish oil-omega-3 fatty acids 340-1,000 mg capsule 1 Cap  1 Cap Oral DAILY    pantoprazole (PROTONIX) tablet 40 mg  40 mg Oral ACB    pyridoxine (vitamin B6) (VITAMIN B-6) tablet 100 mg  100 mg Oral DAILY    sodium chloride (NS) flush 5-10 mL  5-10 mL IntraVENous Q8H    sodium chloride (NS) flush 5-10 mL  5-10 mL IntraVENous PRN    acetaminophen (TYLENOL) tablet 650 mg  650 mg Oral Q4H PRN    Or    acetaminophen (TYLENOL) solution 650 mg  650 mg Per NG tube Q4H PRN    Or    acetaminophen (TYLENOL) suppository 650 mg  650 mg Rectal Q4H PRN    aspirin chewable tablet 81 mg  81 mg Oral DAILY        Objective:   Vitals:  Visit Vitals    /70 (BP 1 Location: Right arm, BP Patient Position: At rest)    Pulse 62    Temp 97.9 °F (36.6 °C)    Resp 18    Ht 6' (1.829 m)    Wt 86.6 kg (190 lb 14.7 oz)    SpO2 95%    BMI 25.89 kg/m2      O2 Device: Room air Temp (24hrs), Av °F (36.7 °C), Min:97.7 °F (36.5 °C), Max:98.3 °F (36.8 °C)      Last 24hr Input/Output:    Intake/Output Summary (Last 24 hours) at 04/16/17 1000  Last data filed at 04/15/17 1305   Gross per 24 hour   Intake                0 ml   Output              200 ml   Net             -200 ml        PHYSICAL EXAM:  General:     Alert, cooperative, no distress, appears stated age. Head:    Normocephalic, without obvious abnormality, atraumatic. Eyes:    Conjunctivae/corneas clear. PERRLA  Nose:   Nares normal. No drainage or sinus tenderness. Throat:     Lips, mucosa, and tongue normal.  No Thrush  Neck:   Supple, symmetrical,  no adenopathy, thyroid: non tender     no carotid bruit and no JVD. Back:     Symmetric,  No CVA tenderness. Lungs:    Clear to auscultation bilaterally. No Wheezing or Rhonchi. No rales. Heart:    Regular rate and rhythm,  no murmur, rub or gallop. Abdomen:    Soft, non-tender. Not distended. Bowel sounds normal. No masses  Extremities:  Extremities normal, atraumatic, No cyanosis. No edema. No clubbing  Lymph nodes:  Cervical, supraclavicular normal.  Neurologic:  Normal strength, Alert and oriented X 3.        Lab Data Reviewed:    Recent Results (from the past 24 hour(s))   EKG, 12 LEAD, INITIAL    Collection Time: 04/15/17 11:29 AM   Result Value Ref Range    Ventricular Rate 86 BPM    Atrial Rate 86 BPM    P-R Interval 168 ms    QRS Duration 88 ms    Q-T Interval 370 ms    QTC Calculation (Bezet) 442 ms    Calculated P Axis 64 degrees    Calculated R Axis -33 degrees    Calculated T Axis 51 degrees    Diagnosis       Normal sinus rhythm  Possible Left atrial enlargement  Left axis deviation  Left ventricular hypertrophy  Abnormal ECG  When compared with ECG of 12-JUN-2014 08:13,  No significant change was found     CBC WITH AUTOMATED DIFF    Collection Time: 04/15/17 11:38 AM   Result Value Ref Range    WBC 6.1 4.1 - 11.1 K/uL    RBC 5.31 4.10 - 5.70 M/uL    HGB 16.5 12.1 - 17.0 g/dL    HCT 48.9 36.6 - 50.3 %    MCV 92.1 80.0 - 99.0 FL    MCH 31.1 26.0 - 34.0 PG    MCHC 33.7 30.0 - 36.5 g/dL    RDW 13.9 11.5 - 14.5 %    PLATELET 184 (L) 664 - 400 K/uL    NEUTROPHILS 67 32 - 75 %    LYMPHOCYTES 21 12 - 49 %    MONOCYTES 8 5 - 13 %    EOSINOPHILS 3 0 - 7 %    BASOPHILS 1 0 - 1 %    ABS. NEUTROPHILS 4.1 1.8 - 8.0 K/UL    ABS. LYMPHOCYTES 1.3 0.8 - 3.5 K/UL    ABS. MONOCYTES 0.5 0.0 - 1.0 K/UL    ABS. EOSINOPHILS 0.2 0.0 - 0.4 K/UL    ABS. BASOPHILS 0.1 0.0 - 0.1 K/UL   CK W/ CKMB & INDEX    Collection Time: 04/15/17 11:38 AM   Result Value Ref Range    CK 79 39 - 308 U/L    CK - MB 2.2 <3.6 NG/ML    CK-MB Index 2.8 (H) 0 - 2.5     METABOLIC PANEL, COMPREHENSIVE    Collection Time: 04/15/17 11:38 AM   Result Value Ref Range    Sodium 144 136 - 145 mmol/L    Potassium 4.3 3.5 - 5.1 mmol/L    Chloride 110 (H) 97 - 108 mmol/L    CO2 29 21 - 32 mmol/L    Anion gap 5 5 - 15 mmol/L    Glucose 102 (H) 65 - 100 mg/dL    BUN 23 (H) 6 - 20 MG/DL    Creatinine 1.28 0.70 - 1.30 MG/DL    BUN/Creatinine ratio 18 12 - 20      GFR est AA >60 >60 ml/min/1.73m2    GFR est non-AA 54 (L) >60 ml/min/1.73m2    Calcium 9.0 8.5 - 10.1 MG/DL    Bilirubin, total 0.6 0.2 - 1.0 MG/DL    ALT (SGPT) 25 12 - 78 U/L    AST (SGOT) 17 15 - 37 U/L    Alk.  phosphatase 92 45 - 117 U/L    Protein, total 7.5 6.4 - 8.2 g/dL    Albumin 3.6 3.5 - 5.0 g/dL    Globulin 3.9 2.0 - 4.0 g/dL    A-G Ratio 0.9 (L) 1.1 - 2.2     TROPONIN I    Collection Time: 04/15/17 11:38 AM   Result Value Ref Range    Troponin-I, Qt. <0.04 <0.05 ng/mL   URINALYSIS W/ REFLEX CULTURE    Collection Time: 04/15/17  1:11 PM   Result Value Ref Range    Color YELLOW/STRAW      Appearance CLEAR CLEAR      Specific gravity 1.015 1.003 - 1.030      pH (UA) 5.0 5.0 - 8.0      Protein NEGATIVE  NEG mg/dL    Glucose NEGATIVE  NEG mg/dL    Ketone NEGATIVE  NEG mg/dL    Bilirubin NEGATIVE  NEG      Blood NEGATIVE  NEG      Urobilinogen 0.2 0.2 - 1.0 EU/dL    Nitrites NEGATIVE  NEG      Leukocyte Esterase NEGATIVE  NEG      WBC 0-4 0 - 4 /hpf    RBC 0-5 0 - 5 /hpf    Epithelial cells FEW FEW /lpf Bacteria NEGATIVE  NEG /hpf    UA:UC IF INDICATED CULTURE NOT INDICATED BY UA RESULT CNI      Hyaline cast 0-2 0 - 5 /lpf   LIPID PANEL    Collection Time: 04/16/17  3:03 AM   Result Value Ref Range    LIPID PROFILE          Cholesterol, total 156 <200 MG/DL    Triglyceride 144 <150 MG/DL    HDL Cholesterol 42 MG/DL    LDL, calculated 85.2 0 - 100 MG/DL    VLDL, calculated 28.8 MG/DL    CHOL/HDL Ratio 3.7 0 - 5.0     HEMOGLOBIN A1C WITH EAG    Collection Time: 04/16/17  3:03 AM   Result Value Ref Range    Hemoglobin A1c 5.4 4.2 - 6.3 %    Est. average glucose 108 mg/dL         Assessment/Plan:     Principal Problem:    CVA (cerebral vascular accident) ([facility] (4/16/2017)      Overview: Acute Dorsal BENIGNO    Active Problems:    Hyperlipidemia (4/16/2017)      Prostate carcinoma ([facility] (4/16/2017)       ___________________________________________________  PLAN:    1.  ASA for CVA and since on ASA when this occurred will add Plavix  2. MRI head to define if CVA (+ R dorsal Benigno small acute infarct)  3. Carotid Dopplers  4.  Echocardiogram  5. Continue Lipitor for Hyperlipidemia  6. OT/ PT evaluation and treatment  7.   Speech eval for his slurred speech    35 minutes spent in direct care of this patient today        ___________________________________________________    Attending Physician: Emma Ramirez MD

## 2017-04-16 NOTE — PROGRESS NOTES
PT note:    Orders received and acknowledged. Chart reviewed and spoke with nursing. Patient currently off the floor for MRI. Will continue to follow.      Adithya Vail, PT, DPT

## 2017-04-16 NOTE — PROGRESS NOTES
Bedside and Verbal shift change report given to [address] [place] [address] (oncoming nurse) by [facility] RN (offgoing nurse). Report included the following information SBAR, Kardex, MAR and Recent Results.     Zone Phone: [phone]        Significant changes during shift: Had a complaint of worsening of slurring of speech and double vision performed neuro checks X 30 minutes for two hours and had a repeat CT            Patient Information     Gege Thorne  80 y.o.  4/15/2017 11:23 AM by Cornelio Goddard MD. Gege Thorne was admitted from Home     Problem List          Patient Active Problem List     Diagnosis Date Noted    TIA (transient ischemic attack) 04/15/2017    Incisional hernia 09/25/2013           Past Medical History:   Diagnosis Date    Abdominal pain      Arthritis       shoulders, arms    Cancer ([address]       prostate    Cough      Dyspepsia and other specified disorders of function of stomach      Easy bruising      Fatigue      Frequent urination      GERD (gastroesophageal reflux disease)      Other ill-defined conditions       high cholesterol    Shortness of breath              Core Measures:     CVA: Yes Yes  CHF:No No  PNA:No No     Activity Status:     OOB to Chair Yes  Ambulated this shift Yes   Bed Rest No     Supplemental O2: (If Applicable)     NC No  NRB No  Venti-mask No  On 0 Liters/min        LINES AND DRAINS:     PIV only     DVT prophylaxis:     DVT prophylaxis Med- No  DVT prophylaxis SCD or JAY- Yes      Wounds: (If Applicable)     Wounds- No     Location      Patient Safety:     Falls Score Total Score: 1  Safety Level_______  Bed Alarm On? No  Sitter?  No     Plan for upcoming shift: continue to do neuro checks, PT/OT consults, ECHO tomorrow am            Discharge Plan: No      Active Consults:  IP CONSULT TO NEUROLOGY  IP CONSULT TO PRIMARY CARE PROVIDER

## 2017-04-16 NOTE — ROUTINE PROCESS
* No surgery found *  Bedside and Verbal shift change report given to [address] (oncoming nurse) by Genaro Wall RN (offgoing nurse). Report included the following information SBAR, Kardex, MAR and Recent Results. Zone Phone:   [phone]      Significant changes during shift:  Family request MRA with MRI        Patient Information    Disha Edward  80 y.o.  4/15/2017 11:23 AM by Daina Rico MD. Disha Edward was admitted from Home    Problem List    Patient Active Problem List    Diagnosis Date Noted    TIA (transient ischemic attack) 04/15/2017    Incisional hernia 09/25/2013     Past Medical History:   Diagnosis Date    Abdominal pain     Arthritis     shoulders, arms    Cancer ([facility] [address]     prostate    Cough     Dyspepsia and other specified disorders of function of stomach     Easy bruising     Fatigue     Frequent urination     GERD (gastroesophageal reflux disease)     Other ill-defined conditions     high cholesterol    Shortness of breath          Core Measures:    CVA: Yes Yes  CHF:No No  PNA:No No    Activity Status:    OOB to Chair Yes  Ambulated this shift Yes   Bed Rest No    Supplemental O2: (If Applicable)    NC No  NRB No  Venti-mask No  On 0 Liters/min      LINES AND DRAINS:    PIV only    DVT prophylaxis:    DVT prophylaxis Med- No  DVT prophylaxis SCD or JAY- Yes     Wounds: (If Applicable)    Wounds- No    Location     Patient Safety:    Falls Score Total Score: 1  Safety Level_______  Bed Alarm On? No  Sitter?  No    Plan for upcoming shift: Neuro consult, MRI        Discharge Plan: No     Active Consults:  IP CONSULT TO NEUROLOGY  IP CONSULT TO PRIMARY CARE PROVIDER

## 2017-04-16 NOTE — CONSULTS
IP CONSULT TO NEUROLOGY  Consult performed by: Monique Gonzalez  Consult ordered by: [facility], [address] HISTORY AND PHYSICAL    Name Devaughn Ordoñez Age 80 y.o. MRN [id number]  1935     Consulting Physician: Vern Dyer MD      Chief Complaint:  Vision change     This is a 80 y.o. right handed male with medical history of hyperlipidemia. Developed slurred speech and double vision on 4/15/2017 that did not resolve or improve. His double vision does not fluctuate and is characterized more as blurring. Slurred speech does not affect swallowing and he is not aphasic. Onset was noted in the morning of the  and it did not fluctuate and there were no modifying factors noted. Other symptoms are poor balance    I have personally reviewed the chart   I have personally reviewed available imaging   I have the reviewed the available laboratory results. Assessment and Plan   1. Stroke  Pontine stroke  · Goal systolic blood pressure 767 or below  · Imaging: MRI, carotid dopplers, transthoracic echocardiogram  · Labs:B12 and folate, HGBA1C, homocysteine, TSH and lipid profile   · Consults: speech therapy, physical therapy and occupational therapy. · Start:75mg Plavix daily No need for aspirin unless he had a recent stent  · Educated on stroke risk factors, treatment and prevention    2. Hyperlipidemia  Increase atorvastatin to 40mg    3. Dysarthria  Speech therapy    4. Gait disturbance  Physical therapy    5. Hemisensory loss  Occupational therapy    6. Diplopia (right 6th nerve)  Occupational therapy should resolve with time    Patient Allergies    Review of patient's allergies indicates no known allergies.      Current Facility-Administered Medications   Medication Dose Route Frequency    clopidogrel (PLAVIX) tablet 75 mg  75 mg Oral DAILY    atorvastatin (LIPITOR) tablet 20 mg  20 mg Oral DAILY    cyanocobalamin (VITAMIN B12) tablet 1,000 mcg  1,000 mcg Oral DAILY    dicyclomine (BENTYL) capsule 10 mg  10 mg Oral BID    fish oil-omega-3 fatty acids 340-1,000 mg capsule 1 Cap  1 Cap Oral DAILY    pantoprazole (PROTONIX) tablet 40 mg  40 mg Oral ACB    pyridoxine (vitamin B6) (VITAMIN B-6) tablet 100 mg  100 mg Oral DAILY    sodium chloride (NS) flush 5-10 mL  5-10 mL IntraVENous Q8H    sodium chloride (NS) flush 5-10 mL  5-10 mL IntraVENous PRN    acetaminophen (TYLENOL) tablet 650 mg  650 mg Oral Q4H PRN    Or    acetaminophen (TYLENOL) solution 650 mg  650 mg Per NG tube Q4H PRN    Or    acetaminophen (TYLENOL) suppository 650 mg  650 mg Rectal Q4H PRN    aspirin chewable tablet 81 mg  81 mg Oral DAILY       Past Medical History:   Diagnosis Date    Abdominal pain     Arthritis     shoulders, arms    Cancer (HCC)     prostate    Cough     Dyspepsia and other specified disorders of function of stomach     Easy bruising     Fatigue     Frequent urination     GERD (gastroesophageal reflux disease)     Other ill-defined conditions     high cholesterol    Shortness of breath        Social History   Substance Use Topics    Smoking status: Current Every Day Smoker     Years: 60.00    Smokeless tobacco: Never Used      Comment: smokes pipe daily    Alcohol use Yes      Comment: socially, smokes pipe       Family History   Problem Relation Age of Onset    Heart Disease Mother     Heart Disease Father     Heart Disease Brother      Review of Systems   Constitutional: Negative for chills and fever. HENT: Negative for ear pain. Eyes: Positive for blurred vision. Negative for pain and discharge. Respiratory: Negative for cough and hemoptysis. Cardiovascular: Negative for chest pain and claudication. Gastrointestinal: Negative for constipation and diarrhea. Genitourinary: Negative for flank pain and hematuria. Musculoskeletal: Negative for back pain and myalgias. Skin: Negative for itching and rash.    Neurological: Positive for sensory change and speech change. Negative for headaches. Endo/Heme/Allergies: Negative for environmental allergies. Does not bruise/bleed easily. Psychiatric/Behavioral: Negative for depression and hallucinations. Visit Vitals    /70 (BP 1 Location: Right arm, BP Patient Position: At rest)    Pulse 62    Temp 97.9 °F (36.6 °C)    Resp 18    Ht 6' (1.829 m)    Wt 190 lb 14.7 oz (86.6 kg)    SpO2 95%    BMI 25.89 kg/m2      Physical Exam   Constitutional: He is oriented to person, place, and time and well-developed, well-nourished, and in no distress. HENT:   Head: Normocephalic and atraumatic. Eyes: Conjunctivae and EOM are normal. Pupils are equal, round, and reactive to light. Neck: Neck supple. No JVD present. Carotid bruit is not present. No thyromegaly present. Cardiovascular: Normal rate, regular rhythm and normal heart sounds. Pulmonary/Chest: Effort normal and breath sounds normal. No respiratory distress. He has no wheezes. He has no rales. Abdominal: Soft. Bowel sounds are normal. He exhibits no distension. There is no tenderness. Neurological: He is alert and oriented to person, place, and time. He has normal strength. Reflex Scores:       Tricep reflexes are 2+ on the right side. Bicep reflexes are 2+ on the right side and 2+ on the left side. Brachioradialis reflexes are 2+ on the right side and 2+ on the left side. Patellar reflexes are 2+ on the right side and 2+ on the left side. Achilles reflexes are 1+ on the right side and 1+ on the left side. Right sixth nerve palsy  Right hemisensory loss of the face  Left hemisensory loss body   Skin: No rash noted. No erythema. Nursing note and vitals reviewed.     Imaging    MRI Results (most recent):    Results from Hospital Encounter encounter on 04/15/17   MRI BRAIN WO CONT   Narrative HISTORY:  Slurred speech and double vision     COMPARISON:  None    TECHNIQUE:  MR imaging of the brain was performed with sagittal T1, axial T1,  T2, FLAIR, GRE, DWI/ADC, coronal T2.    FINDINGS:      The ventricles and cisterns are of normal size and configuration. There are no  extra-axial fluid collections, mass lesions or mass effect. There is mild  hyperintensity within the periventricular white matter. There is a small acute  infarcts of the dorsal right ariel. There is no acute or chronic intracranial  hemorrhage. The major intracranial vascular flow-voids are patent. Marrow signal  is normal.         Impression IMPRESSION:  1. Small acute infarct of the dorsal right ariel          CT Results (most recent):    Results from Hospital Encounter encounter on 04/15/17   CT HEAD WO CONT   Narrative EXAM:  CT HEAD WO CONT    INDICATION:   dizziness, blurred vision    COMPARISON: None. TECHNIQUE: Unenhanced CT of the head was performed using 5 mm images. Brain and  bone windows were generated. CT dose reduction was achieved through use of a  standardized protocol tailored for this examination and automatic exposure  control for dose modulation. FINDINGS:  There is slight prominence of the ventricles and sulci. No hemorrhage mass or  acute infarction identified. Bony structures are intact.              Impression IMPRESSION: No acute abnormality identified            Lab Review  Lab Results   Component Value Date/Time    WBC 6.1 04/15/2017 11:38 AM    HCT 48.9 04/15/2017 11:38 AM    HGB 16.5 04/15/2017 11:38 AM    PLATELET 579 81/73/5908 11:38 AM     Lab Results   Component Value Date/Time    Sodium 144 04/15/2017 11:38 AM    Potassium 4.3 04/15/2017 11:38 AM    Chloride 110 04/15/2017 11:38 AM    CO2 29 04/15/2017 11:38 AM    Glucose 102 04/15/2017 11:38 AM    BUN 23 04/15/2017 11:38 AM    Creatinine 1.28 04/15/2017 11:38 AM    Calcium 9.0 04/15/2017 11:38 AM     No results found for: B12LT, FOL, RBCF  Lab Results   Component Value Date/Time    LDL, calculated 85.2 04/16/2017 03:03 AM     Lab Results   Component Value Date/Time Hemoglobin A1c 5.4 04/16/2017 03:03 AM

## 2017-04-16 NOTE — PROGRESS NOTES
[facility] Stroke Education Adolph Garner and Stroke Education provided to patient and the following topics were discussed    1. Patients personal risk factors for stroke are smoking    2. Warning signs of Stroke:        * Sudden numbness or weakness of the face, arm or leg, especially on one side of          The body            * Sudden confusion, trouble speaking or understanding        * Sudden trouble seeing in one or both eyes        * Sudden trouble walking, dizziness, loss of balance or coordination        * Sudden severe headache with no known cause      3. Importance of activation Emergency Medical Services ( 9-1-1 ) immediately if                       experience any warning signs of stroke. 4. Be sure and schedule a follow-up appointment with your primary care doctor or any                  specialists as instructed. 5. You must take medicine every day to treat your risk factors for stroke. Be sure to take your medicines exactly as your doctor tells you: no more, no less. Know what your medicines are for , what they do. Anti-thrombotics /anticoagulants can help prevent strokes. You are taking the following medicine(s)  asa     6. Smoking and second-hand smoke greatly increase your risk of stroke, cardiovascular disease and death.  Smoking history current everyday pipe

## 2017-04-17 VITALS
BODY MASS INDEX: 25.86 KG/M2 | DIASTOLIC BLOOD PRESSURE: 73 MMHG | HEART RATE: 66 BPM | TEMPERATURE: 98 F | HEIGHT: 72 IN | OXYGEN SATURATION: 91 % | WEIGHT: 190.92 LBS | RESPIRATION RATE: 18 BRPM | SYSTOLIC BLOOD PRESSURE: 128 MMHG

## 2017-04-17 LAB
ANION GAP BLD CALC-SCNC: 10 MMOL/L (ref 5–15)
BASOPHILS # BLD AUTO: 0.1 K/UL (ref 0–0.1)
BASOPHILS # BLD: 1 % (ref 0–1)
BUN SERPL-MCNC: 17 MG/DL (ref 6–20)
BUN/CREAT SERPL: 15 (ref 12–20)
CALCIUM SERPL-MCNC: 8.2 MG/DL (ref 8.5–10.1)
CHLORIDE SERPL-SCNC: 109 MMOL/L (ref 97–108)
CO2 SERPL-SCNC: 23 MMOL/L (ref 21–32)
CREAT SERPL-MCNC: 1.15 MG/DL (ref 0.7–1.3)
EOSINOPHIL # BLD: 0.2 K/UL (ref 0–0.4)
EOSINOPHIL NFR BLD: 3 % (ref 0–7)
ERYTHROCYTE [DISTWIDTH] IN BLOOD BY AUTOMATED COUNT: 13.6 % (ref 11.5–14.5)
GLUCOSE SERPL-MCNC: 99 MG/DL (ref 65–100)
HCT VFR BLD AUTO: 45.4 % (ref 36.6–50.3)
HGB BLD-MCNC: 15.3 G/DL (ref 12.1–17)
LYMPHOCYTES # BLD AUTO: 29 % (ref 12–49)
LYMPHOCYTES # BLD: 1.9 K/UL (ref 0.8–3.5)
MCH RBC QN AUTO: 31.2 PG (ref 26–34)
MCHC RBC AUTO-ENTMCNC: 33.7 G/DL (ref 30–36.5)
MCV RBC AUTO: 92.5 FL (ref 80–99)
MONOCYTES # BLD: 0.4 K/UL (ref 0–1)
MONOCYTES NFR BLD AUTO: 7 % (ref 5–13)
NEUTS SEG # BLD: 4.1 K/UL (ref 1.8–8)
NEUTS SEG NFR BLD AUTO: 60 % (ref 32–75)
PLATELET # BLD AUTO: 144 K/UL (ref 150–400)
POTASSIUM SERPL-SCNC: 4.1 MMOL/L (ref 3.5–5.1)
RBC # BLD AUTO: 4.91 M/UL (ref 4.1–5.7)
SODIUM SERPL-SCNC: 142 MMOL/L (ref 136–145)
TSH SERPL DL<=0.05 MIU/L-ACNC: 1.06 UIU/ML (ref 0.36–3.74)
VIT B12 SERPL-MCNC: 1511 PG/ML (ref 211–911)
WBC # BLD AUTO: 6.8 K/UL (ref 4.1–11.1)

## 2017-04-17 PROCEDURE — 93880 EXTRACRANIAL BILAT STUDY: CPT

## 2017-04-17 PROCEDURE — 97165 OT EVAL LOW COMPLEX 30 MIN: CPT | Performed by: OCCUPATIONAL THERAPIST

## 2017-04-17 PROCEDURE — G8987 SELF CARE CURRENT STATUS: HCPCS | Performed by: OCCUPATIONAL THERAPIST

## 2017-04-17 PROCEDURE — 97530 THERAPEUTIC ACTIVITIES: CPT

## 2017-04-17 PROCEDURE — 74011250637 HC RX REV CODE- 250/637: Performed by: PSYCHIATRY & NEUROLOGY

## 2017-04-17 PROCEDURE — 97161 PT EVAL LOW COMPLEX 20 MIN: CPT

## 2017-04-17 PROCEDURE — 92522 EVALUATE SPEECH PRODUCTION: CPT

## 2017-04-17 PROCEDURE — G8989 SELF CARE D/C STATUS: HCPCS | Performed by: OCCUPATIONAL THERAPIST

## 2017-04-17 PROCEDURE — 74011250636 HC RX REV CODE- 250/636: Performed by: PSYCHIATRY & NEUROLOGY

## 2017-04-17 PROCEDURE — 80048 BASIC METABOLIC PNL TOTAL CA: CPT | Performed by: INTERNAL MEDICINE

## 2017-04-17 PROCEDURE — 93306 TTE W/DOPPLER COMPLETE: CPT

## 2017-04-17 PROCEDURE — 85025 COMPLETE CBC W/AUTO DIFF WBC: CPT | Performed by: INTERNAL MEDICINE

## 2017-04-17 PROCEDURE — 74011250637 HC RX REV CODE- 250/637: Performed by: INTERNAL MEDICINE

## 2017-04-17 PROCEDURE — 36415 COLL VENOUS BLD VENIPUNCTURE: CPT | Performed by: INTERNAL MEDICINE

## 2017-04-17 PROCEDURE — G8988 SELF CARE GOAL STATUS: HCPCS | Performed by: OCCUPATIONAL THERAPIST

## 2017-04-17 PROCEDURE — 84443 ASSAY THYROID STIM HORMONE: CPT | Performed by: INTERNAL MEDICINE

## 2017-04-17 RX ORDER — CLOPIDOGREL BISULFATE 75 MG/1
75 TABLET ORAL DAILY
Qty: 30 TAB | Refills: 6 | Status: SHIPPED | OUTPATIENT
Start: 2017-04-17 | End: 2017-10-05 | Stop reason: SDUPTHER

## 2017-04-17 RX ORDER — ATORVASTATIN CALCIUM 40 MG/1
40 TABLET, FILM COATED ORAL DAILY
Qty: 30 TAB | Refills: 6 | Status: SHIPPED | OUTPATIENT
Start: 2017-04-17 | End: 2017-11-03 | Stop reason: SDUPTHER

## 2017-04-17 RX ADMIN — OMEGA-3 FATTY ACIDS CAP DELAYED RELEASE 1000 MG 1 CAPSULE: 1000 CAPSULE DELAYED RELEASE at 08:36

## 2017-04-17 RX ADMIN — CYANOCOBALAMIN TAB 500 MCG 1000 MCG: 500 TAB at 08:36

## 2017-04-17 RX ADMIN — CLOPIDOGREL BISULFATE 75 MG: 75 TABLET, FILM COATED ORAL at 08:36

## 2017-04-17 RX ADMIN — Medication 100 MG: at 08:36

## 2017-04-17 RX ADMIN — Medication 10 ML: at 14:01

## 2017-04-17 RX ADMIN — SODIUM CHLORIDE AND POTASSIUM CHLORIDE: 9; 1.49 INJECTION, SOLUTION INTRAVENOUS at 03:59

## 2017-04-17 RX ADMIN — DICYCLOMINE HYDROCHLORIDE 10 MG: 10 CAPSULE ORAL at 17:52

## 2017-04-17 RX ADMIN — ATORVASTATIN CALCIUM 40 MG: 40 TABLET, FILM COATED ORAL at 08:36

## 2017-04-17 RX ADMIN — DICYCLOMINE HYDROCHLORIDE 10 MG: 10 CAPSULE ORAL at 08:36

## 2017-04-17 RX ADMIN — PANTOPRAZOLE SODIUM 40 MG: 40 TABLET, DELAYED RELEASE ORAL at 08:36

## 2017-04-17 RX ADMIN — Medication 10 ML: at 04:00

## 2017-04-17 NOTE — PROGRESS NOTES
Occupational Therapy EVALUATION/discharge  Patient: Lord Jacobs (69 y.o. male)  Date: 4/17/2017  Primary Diagnosis: TIA (transient ischemic attack)        Precautions: none       ASSESSMENT:   Based on the objective data described below, the patient presents at his independent functional baseline for ADLs and functional mobility. No focal neurological deficits noted with formal testing. Further skilled acute occupational therapy is not indicated at this time. Discharge Recommendations: None  Further Equipment Recommendations for Discharge: none          OBJECTIVE DATA SUMMARY:   HISTORY:   Past Medical History:   Diagnosis Date    Abdominal pain     Arthritis     shoulders, arms    Cancer ([address]     prostate    Cough     Dyspepsia and other specified disorders of function of stomach     Easy bruising     Fatigue     Frequent urination     GERD (gastroesophageal reflux disease)     Other ill-defined conditions     high cholesterol    Shortness of breath      Past Surgical History:   Procedure Laterality Date    HX HERNIA REPAIR  1996    inguinal R&L    HX HERNIA REPAIR  2013    abd hernia     HX HERNIA REPAIR  2014    Laparoscopic recurrent incisional hernia repair with mesh.   Robot assisted    HX PROSTATECTOMY      HX SMALL BOWEL RESECTION  1996    HX UROLOGICAL      prostate  removed    HX UROLOGICAL      adhesion repair    WA EGD TRANSORAL BIOPSY SINGLE/MULTIPLE  12/5/2012            Prior Level of Function/Home Situation: independent with ADLs, ambulation and doing yard work  Expanded or extensive additional review of patient history:     Home Situation  Home Environment: Private residence  # Steps to Enter: 2  Rails to Enter: Yes  Hand Rails : Bilateral  Wheelchair Ramp: No  One/Two Story Residence: Two story  # of Interior Steps: 12  Height of Each Step (in): 5 inches  Interior Rails: Both  Lift Chair Available: No  Living Alone: No  Support Systems: None  Patient Expects to be Discharged to[de-identified] Private residence  Current DME Used/Available at Home: Jordan Moll, straight, Walker, rollator, Walker, rolling, Raised toilet seat  Tub or Shower Type: Shower  [x]  Right hand dominant   []  Left hand dominant    EXAMINATION OF PERFORMANCE DEFICITS:  Cognitive/Behavioral Status:  Neurologic State: Alert  Orientation Level: Oriented X4  Cognition: Appropriate decision making; Appropriate for age attention/concentration; Appropriate safety awareness; Follows commands  Perception: Appears intact  Perseveration: No perseveration noted  Safety/Judgement: Awareness of environment    Hearing: Auditory  Auditory Impairment: None    Vision/Perceptual:    Tracking: Able to track stimulus in all quadrants w/o difficulty    Saccades: Within Defined Limits    Convergence: Normal (within 6 inches from nose)    Visual Fields:  (WNL)  Diplopia: No (no further vertical double vision. )    Acuity: Within Defined Limits    Corrective Lenses: Glasses    Range of Motion:  AROM: Within functional limits (mildly decrased R shoulder ROM 2/2 RTC tear. )                         Strength:  Strength: Within functional limits (mildly decrased in R shoulder 2/2 RTC tear. )                Coordination:  Coordination: Within functional limits  Fine Motor Skills-Upper: Left Intact; Right Intact    Gross Motor Skills-Upper: Left Intact; Right Intact    Tone & Sensation:  Tone: Normal  Sensation: Intact                      Balance:  Sitting: Intact  Standing: Intact    Functional Mobility and Transfers for ADLs:  Bed Mobility:  Scooting: Independent    Transfers:  Sit to Stand: Independent  Stand to Sit: Independent  Bed to Chair: Independent  Toilet Transfer : Independent    ADL Assessment:  Feeding: Independent    Oral Facial Hygiene/Grooming: Independent    Bathing: Independent    Upper Body Dressing: Independent    Lower Body Dressing: Independent    Toileting: Independent            BEFAST education regarding signs and symptoms of CVA deferred 2/2 this being provided by PT today. Functional Measure:  Fugl-Ontiveros Assessment of Motor Recovery after Stroke:     Reflex Activity  Flexors/Biceps/Fingers: Can be elicited  Extensors/Triceps: Can be elicited  Reflex Subtotal: 4    Volitional Movement Within Synergies  Shoulder Retraction: Full  Shoulder Elevation: Full  Shoulder Abduction (90 degrees): Full  Shoulder External Rotation: Full  Elbow Flexion: Full  Forearm Supination: Full  Shoulder Adduction/Internal Rotation: Full  Elbow Extension: Full  Forearm Pronation: Full  Subtotal: 18    Volitional Movement Mixing Synergies  Hand to Lumbar Spine: Full  Shoulder Flexion (0-90 degrees): Full  Pronation-Supination: Full  Subtotal: 6    Volitional Movement With Little or No Synergy  Shoulder Abduction (0-90 degrees): Full  Shoulder Flexion ( degrees): Full  Pronation/Supination: Full  Subtotal : 6    Normal Reflex Activity  Biceps, Triceps, Finger Flexors: Full  Subtotal : 2    Upper Extremity Total   Upper Extremity Total: 36    Wrist  Stability at 15 Degree Dorsiflexion: Full  Repeated Dorsiflexion/ Volar Flexion: Full  Stability at 15 Degree Dorsiflexion: Full  Repeated Dorsiflexion/ Volar Flexion: Full  Circumduction: Full  Wrist Total: 10    Hand  Mass Flexion: Full  Mass Extension: Full  Grasp A: Full  Grasp B: Full  Grasp C: Full  Grasp D: Full  Grasp E: Full  Hand Total: 14    Coordination/Speed  Tremor: None  Dysmetria: None  Time: <1s  Coordination/Speed Total : 6    Total A-D  Total A-D (Motor Function): 66/66       Percentage of impairment CH  0% CI  1-19% CJ  20-39% CK  40-59% CL  60-79% CM  80-99% CN  100%   Fugl-Ontiveros score: 0-66 66 53-65 39-52 26-38 13-25 1-12   0      This is a reliable/valid measure of arm function after a neurological event. It has established value to characterize functional status and for measuring spontaneous and therapy-induced recovery; tests proximal and distal motor functions.  Fugl-Ontiveros Assessment  UE scores recorded between five and 30 days post neurologic event can be used to predict UE recovery at six months post neurologic event. Severe = 0-21 points   Moderately Severe = 22-33 points   Moderate = 34-47 points   Mild = 48-66 points  PERI Garcia, ROSAS Wall, & KENDALL Ba (1992). Measurement of motor recovery after stroke: Outcome assessment and sample size requirements. Stroke, 23, pp. 7773-6185.   --------------------------------------------------------------------------------------------------------------------------------------------------------------------  MCID:  Stroke:   Otilia Vogt et al, 2001; n = 171; mean age 79 (5) years; assessed within 16 (12) days of stroke, Acute Stroke)  FMA Motor Scores from Admission to Discharge   10 point increase in FMA Upper Extremity = 1.5 change in discharge FIM   10 point increase in FMA Lower Extremity = 1.9 change in discharge FIM  MDC:   Stroke:   Tarun Schofield et al, 2008, n = 14, mean age = 59.9 (14.6) years, assessed on average 14 (6.5) months post stroke, Chronic Stroke)   FMA = 5.2 points for the Upper Extremity portion of the assessment       G codes: In compliance with CMSs Claims Based Outcome Reporting, the following G-code set was chosen for this patient based on their primary functional limitation being treated: The outcome measure chosen to determine the severity of the functional limitation was the Fugl-Ontiveros with a score of 66/66 which was correlated with the impairment scale. ?  Self Care:     - CURRENT STATUS: CH - 0% impaired, limited or restricted    - GOAL STATUS: CH - 0% impaired, limited or restricted    - D/C STATUS:  CH - 0% impaired, limited or restricted     Occupational Therapy Evaluation Charge Determination   History Examination Decision-Making   LOW Complexity : Brief history review  LOW Complexity : 1-3 performance deficits relating to physical, cognitive , or psychosocial skils that result in activity limitations and / or participation restrictions  LOW Complexity : No comorbidities that affect functional and no verbal or physical assistance needed to complete eval tasks       Based on the above components, the patient evaluation is determined to be of the following complexity level: LOW   Pain:  Pain Scale 1: Numeric (0 - 10)  Pain Intensity 1: 0            After treatment:   [x]  Patient left in no apparent distress sitting up in chair  []  Patient left in no apparent distress in bed  [x]  Call bell left within reach  [x]  Nursing notified  []  Caregiver present  []  Bed alarm activated    COMMUNICATION/EDUCATION:   Communication/Collaboration:  [x]      Home safety education was provided and the patient/caregiver indicated understanding. [x]      Patient/family have participated as able and agree with findings and recommendations. []      Patient is unable to participate in plan of care at this time.       Obdulio Alejandro, OTR/L  Time Calculation: 25 mins

## 2017-04-17 NOTE — ROUTINE PROCESS
Bedside and Verbal shift change report given to [address] [place] [address] [address] (oncoming nurse) by Austen Olivas RN (offgoing nurse). Report included the following information SBAR, Kardex, MAR and Recent Results.      [facility] Phone: [phone]          Significant changes during shift: Denied blurry vision, this shift until shift change when handoff report was being given at bedside, patient sitting in chair at bedside eating breakfast, reported that he has some blurry vision this morning. It seems this symptom of blurry vision is not continuous. [place]          Patient Information      Dinesh Beavers  80 y.o.  4/15/2017 11:23 AM by Bo Khanna MD. Dinesh Beavers was admitted from [facility]  Problem List              Patient Active Problem List      Diagnosis Date Noted    TIA (transient ischemic attack) 04/15/2017    Incisional hernia 09/25/2013               Past Medical History:   Diagnosis Date    Abdominal pain       Arthritis         shoulders, arms    Cancer ([address]         prostate    Cough       Dyspepsia and other specified disorders of function of stomach       Easy bruising       Fatigue       Frequent urination       GERD (gastroesophageal reflux disease)       Other ill-defined conditions         high cholesterol    Shortness of breath                  Core Measures:      CVA: Yes Yes  CHF:No No  PNA:No No      Activity Status:      OOB to Chair Yes  Ambulated this shift to bathroom  Bed Rest No      Supplemental O2: (If Applicable)      NC No  NRB No  Venti-mask No  On 0 Liters/min          LINES AND DRAINS:      PIV only      DVT prophylaxis:      DVT prophylaxis Med- No  DVT prophylaxis SCD or JAY- Yes       Wounds: (If Applicable)      Wounds- No      Location       Patient Safety:      Falls Score Total Score: 1  Safety Level_______  Bed Alarm On? No  Sitter?  No      Plan for upcoming shift: continue to do neuro checks, PT/OT consults, ECHO, carotid duplex              Discharge Plan: Yes in progress      Active Consults:  IP CONSULT TO NEUROLOGY  IP CONSULT TO PRIMARY CARE PROVIDER

## 2017-04-17 NOTE — PROGRESS NOTES
Pt is an 79 y/o  male admitted for TIA. Pt lives with his spouse in a two story home. Pt has 17-steps to bedroom and two steps to entrance of residence. Pt is independent to include driving. Pt has no previous HH or SNF. Pt has access to rollator and cane. [facility] [employer] [facility]  [facility]. Pt will be transported at discharge by spouse via private vehicle. CM met with pt to complete initial assessment. Pt is alert and oriented to person, place, time and situation. CM will continue to follow to assist with discharge plans as needed. Care Management Interventions  PCP Verified by CM: Yes (Dr. Karri Morel )  Mode of Transport at Discharge:  Other (see comment) (private vehicle )  Transition of Care Consult (CM Consult): Discharge Planning (CM to assist )  Discharge Durable Medical Equipment: No  Health Maintenance Reviewed: Yes  Physical Therapy Consult: Yes  Occupational Therapy Consult: Yes  Speech Therapy Consult: Yes  Current Support Network: Lives with Spouse  Confirm Follow Up Transport: Self  Plan discussed with Pt/Family/Caregiver: Yes  Discharge Location  Discharge Placement: Home    STAN Joyce, [address] [place] [address]   [phone]

## 2017-04-17 NOTE — PROGRESS NOTES
physical Therapy neuro EVALUATION/discharge     Patient: Gege Thorne (89 y.o. male)  Date: 4/17/2017  Primary Diagnosis: TIA (transient ischemic attack)        Precautions:        ASSESSMENT :  Based on the objective data described below, the patient presents with baseline independent functional mobility skills. Patient does have kyphotic, L trunk shift posture at baseline which he states is long standing. He also has residual R shoulder AROM deficits which would likely benefit from referral to OP orthopedic PT ahead given its impact on his UE mobility and ADL's, pending OT evaluation this admission. He is at a low fall risk per all testing including Nicole and FGA and above average age matched scoring for 5x sit<>stand. In addition to BEFAST principle, patient educated on their specifiic CVA/TIA risk factors (including non-modifiable, and modifiable), as well as role of neuroplasticity principles on recovery via activity. Patient educated on CVA/TIA effects to neurological, musculoskeletal systems with metaphor of construction site and detour around in order to emphasize importance of neuroplasticity on recovery in terms that patient is able to easily understand. No further PT needs or concerns; encouraged patient to mobilize at least 3 times daily throughout remainder of admission in order to maintain current strength and functioning with RN notified of such. Skilled physical therapy is not indicated at this time. PLAN :  Discharge Recommendations: Outpatient orthopedic PT  Further Equipment Recommendations for Discharge: none       SUBJECTIVE:   Patient stated I lift that arm with my left one.     OBJECTIVE DATA SUMMARY:   HISTORY:    Past Medical History:   Diagnosis Date    Abdominal pain     Arthritis     shoulders, arms    Cancer ([address]     prostate    Cough     Dyspepsia and other specified disorders of function of stomach     Easy bruising     Fatigue     Frequent urination     GERD (gastroesophageal reflux disease)     Other ill-defined conditions     high cholesterol    Shortness of breath      Past Surgical History:   Procedure Laterality Date    HX HERNIA REPAIR  1996    inguinal R&L    HX HERNIA REPAIR  2013    abd hernia     HX HERNIA REPAIR  2014    Laparoscopic recurrent incisional hernia repair with mesh. Robot assisted    HX PROSTATECTOMY      HX SMALL BOWEL RESECTION  1996    HX UROLOGICAL      prostate  removed    HX UROLOGICAL      adhesion repair    ID EGD TRANSORAL BIOPSY SINGLE/MULTIPLE  12/5/2012          Prior Level of Function/Home Situation: Independent, no falls in the past year, drives, active around the yard. Spouse at home. Personal factors and/or comorbidities impacting plan of care:     Home Situation  Home Environment: Private residence  # Steps to Enter: 2  Rails to Enter: Yes  Hand Rails : Bilateral  Wheelchair Ramp: No  One/Two Story Residence: Two story  # of Interior Steps: 12  Height of Each Step (in): 5 inches  Interior Rails: Both  Lift Chair Available: No  Living Alone: No  Support Systems: None  Patient Expects to be Discharged to[de-identified] Private residence  Current DME Used/Available at Home: [employer] [place], straight, Walker, rollator, Walker, rolling, Raised toilet seat  Tub or Shower Type: Shower    EXAMINATION/PRESENTATION/DECISION MAKING:   Critical Behavior:  Neurologic State: Alert  Orientation Level: Oriented X4  Cognition: Appropriate decision making  Safety/Judgement: Awareness of environment  Hearing: Auditory  Auditory Impairment: None  Range Of Motion:  AROM: Within functional limits (gross dec R shoulder )                       Strength:    Strength:  Within functional limits                    Tone & Sensation:   Tone: Normal              Sensation: Intact               Coordination:  Coordination: Within functional limits  Vision:      Functional Mobility:  Bed Mobility:              Transfers:  Sit to Stand: Independent  Stand to Sit: Independent                       Balance:   Sitting: Intact; Without support  Standing: Intact; Without support  Ambulation/Gait Training:  Distance (ft): 300 Feet (ft)  Assistive Device: Gait belt  Ambulation - Level of Assistance: Independent     Gait Description (WDL): Exceptions to WDL  Gait Abnormalities: Other (kyphotic posture)                                 No concerns. Functional Measure:  Five times sit to stand:    Five Times Sit to Stand: 11 Seconds         Normative averages:  Clients younger than 61years old  £  10 seconds = Normal   Clients older than 61years old £ 14.2 seconds = Normal   Change of ³ 2.3 seconds shows a significant clinical improvement    Stephane HERNANDEZ. Reference values for the five repetition sit to stand test: a descriptive metaanalysis of data from elders. Percept Mot Skills 2006; 103(1):215-222. Functional Gait Assessment:    Gait Level Surface: Normal  Change In Gait Speed: Normal  Gait With Horizontal Head Turns: Mild impairment  Gait With Vertical Head Turns: Mild impairment  Gait And Pivot Turn: Normal  Step Over Obstacle: Mild impairment  Gait With Narrow Base Of Support: Mild impairment  Gait With Eyes Closed: Normal  Ambulating Backwards: Normal  Steps: Mild impairment  Score: 25     Functional Gait Assessment and G-code impairment scale:  Percentage of Impairment CH    0%   CI    1-19% CJ    20-39% CK    40-59% CL    60-79% CM    80-99% CN     100%   Functional Gait  Score 0-30 30 25-29 19-24 13-18 7-12 1-6 0       Maximum Score 30   £ 22/30 classifies fall risk in older adults and predicts unexplained falls in community-dwelling older adults  Age: \"Normal\" score:   Up to 60 >27/30   60-80 >24/30   Over [de-identified] >19/30   JOVAN Hameed. \"Functional Gait Assessment: concurrent discriminative, and predictive validity in community-dwelling older adults. \" Physical Therapy 90 (5) 2010.        Nicole Balance Test:    Sitting to Standing: 3  Standing Unsupported: 4  Sitting with Back Unsupported: 4  Standing to Sitting: 3  Transfers: 3  Standing Unsupported with Eyes Closed: 4  Standing Unsupported with Feet Together: 4  Reach Forward with Outstretched Arm: 2   Object: 4  Turn to Look Over Shoulders: 4  Turn 360 Degrees: 4  Alternate Foot on Step/Stool: 1  Standing Unsupported One Foot in Front: 3  Stand on One Le  Total: 45         56=Maximum possible score;   0-20=High fall risk  21-40=Moderate fall risk   41-56=Low fall risk     Gallardo Balance Test and G-code impairment scale:  Percentage of Impairment CH    0%   CI    1-19% CJ    20-39% CK    40-59% CL    60-79% CM    80-99% CN     100%   Gallardo   Score 0-56 56 45-55 34-44 23-33 12-22 1-11 0     G codes: In compliance with CMSs Claims Based Outcome Reporting, the following G-code set was chosen for this patient based on their primary functional limitation being treated: The outcome measure chosen to determine the severity of the functional limitation was the gallardo with a score of 45/56 which was correlated with the impairment scale. ? Mobility - Walking and Moving Around:     - CURRENT STATUS: CI - 1%-19% impaired, limited or restricted    - GOAL STATUS: CI - 1%-19% impaired, limited or restricted    - D/C STATUS:  CI - 1%-19% impaired, limited or restricted      Pain:  Pain Scale 1: Numeric (0 - 10)  Pain Intensity 1: 0              Activity Tolerance: WNL    Please refer to the flowsheet for vital signs taken during this treatment. After treatment:   [x]         Patient left in no apparent distress sitting up in chair  []         Patient left in no apparent distress in bed  [x]         Call bell left within reach  [x]         Nursing notified  []         Caregiver present  []         Bed alarm activated    COMMUNICATION/EDUCATION:       Patient and/or family was verbally educated on the BE FAST acronym for signs/symptoms of CVA and TIA.  BE FAST was written on patient's communication board  for visual education and reinforcement. All questions answered with patient indicating full understanding. [x]   Fall prevention education was provided and the patient/caregiver indicated understanding. [x]   Patient/family have participated as able and agree with findings and recommendations. []   Patient is unable to participate in plan of care at this time.     Findings and recommendations were discussed with: Registered Nurse    Thank you for this referral.  Courtney Martin, PT , DPT    Time Calculation: 25 mins

## 2017-04-17 NOTE — PROGRESS NOTES
Attempted to see patient for OT evaluation, but he is presently off floor for vascular testing. Will follow back as able for OT evaluation.  Anticipate need for eye patching as a way to manage double vision, follow up with  neuro-opthalmology after discharge and the need for Out patient OT vision specialist.

## 2017-04-17 NOTE — DISCHARGE SUMMARY
Physician Discharge Summary     Patient ID:  Jose Logan  [id number]  80 y.o.  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 He denied any weakness or loss of strength. He was found to have had an acute infarct of his right ariel on MRI. Carotids and an ECHO were normal. He was placed on Plavix and his atorvastatin was increased. His blood pressure remained stable. He was seen by neurology. He is improved and his symptoms have mostly resolved with some residual dysarthria. PCP: Devin Willson MD  Consults: Neurology    Significant Diagnostic Studies:   Transthoracic Echocardiogram    Patient: Carmencita Moncada  MRN: [id number]  [id number]  [facility] #: [de-identified]  : 1935  Age: 80 years  Gender: Male  Height:  Weight:  BSA:  BP: 121 / 81 mmHg  Study date: 2017  Status: Routine  Location: [address] [place] [address] #: [id number]    Allergies: NO KNOWN ALLERGIES    Ordering Physician:  Alex Roman MD  Reading Group:  [facility]  Technologist:  Neel Herrmann  Reading Physician:  Jeffy Palmer MD    SUMMARY:  Left ventricle: Systolic function was normal. Ejection fraction was  estimated to be 65 %. There were no regional wall motion abnormalities. Left atrium: The atrium was mildly dilated. Mitral valve: There was mild regurgitation. Aortic valve: There was mild stenosis. Valve mean gradient was 20 mmHg. Tricuspid valve: There was mild regurgitation. Pulmonary artery systolic  pressure: 35 mmHg. INDICATIONS: TIA/CVA.   MRI BRAIN WO CONT (Accession [id number]) (Order [id number])   Allergies No Known Allergies   Result Information   Status Provider Status      Final result (Exam End: 2017  9:21 AM) Open    Study Result   HISTORY:  Slurred speech and double vision      COMPARISON:  None     TECHNIQUE:  MR imaging of the brain was performed with sagittal T1, axial T1,  T2, FLAIR, GRE, DWI/ADC, coronal T2.     FINDINGS:       The ventricles and cisterns are of normal size and configuration. There are no  extra-axial fluid collections, mass lesions or mass effect. There is mild  hyperintensity within the periventricular white matter. There is a small acute  infarcts of the dorsal right ariel. There is no acute or chronic intracranial  hemorrhage. The major intracranial vascular flow-voids are patent. Marrow signal  is normal.     IMPRESSION  IMPRESSION:  1.   Small acute infarct of the dorsal right ariel        Name: Corrine Haile  MRN: [id number]    Inpatient  : 07 Aug 1935  HIS Order #: [id number]  [address] [facility] Visit #: [id number]  Date: 2017     TYPE OF TEST: Cerebrovascular Duplex     REASON FOR TEST  Stroke     Right Carotid:-             Proximal               Mid                 Distal  cm/s  Systolic  Diastolic  Systolic  Diastolic  Systolic  Diastolic  CCA:    674.7      16.0                            93.0  Bulb:  ECA:     65.0  ICA:     63.0                 47.0                 46.0  ICA/CCA:  0.7     ICA Stenosis: Normal     Right Vertebral:-  Finding: Antegrade  Sys:       38.0  Symone:     Right Subclavian: Normal     Left Carotid:-            Proximal                Mid                 Distal  cm/s  Systolic  Diastolic  Systolic  Diastolic  Systolic  Diastolic  CCA:    384.2      20.0                            96.0  Bulb:  ECA:     82.0  ICA:     46.0                 49.0                 54.0  ICA/CCA:  0.5     ICA Stenosis: Normal     Left Vertebral:-  Finding: Antegrade  Sys:       53.0  Symone:        0.0     Left Subclavian: Normal     INTERPRETATION/FINDINGS  PROCEDURE:  Carotid Duplex Examination using B-mode, color and  spectral Doppler of the extracranial cerebrovascular arteries. 1. No evidence of significant arterial occlusive disease in the  internal carotid arteries. 2. No significant stenosis in the external carotid arteries  bilaterally. 3. Antegrade flow in both vertebral arteries. 4. Normal flow in both subclavian arteries. [unfilled]    @[facility]@      Discharge Exam:  Visit Vitals    /73 (BP 1 Location: Left arm, BP Patient Position: Sitting)    Pulse 66    Temp 98 °F (36.7 °C)    Resp 18    Ht 6' (1.829 m)    Wt 86.6 kg (190 lb 14.7 oz)    SpO2 91%    BMI 25.89 kg/m2     General:  Alert, cooperative, no distress, appears stated age. Head:  Normocephalic, without obvious abnormality, atraumatic. Eyes:  Conjunctivae/corneas clear. PERRL, EOMs intact. Fundi benign   Ears:  Normal TMs and external ear canals both ears. Nose: Nares normal. Septum midline. Mucosa normal. No drainage or sinus tenderness. Throat: Lips, mucosa, and tongue normal. Teeth and gums normal.   Neck: Supple, symmetrical, trachea midline, no adenopathy, thyroid: no enlargement/tenderness/nodules, no carotid bruit and no JVD. Back:   Symmetric, no curvature. ROM normal. No CVA tenderness. Lungs:   Clear to auscultation bilaterally. Chest wall:  No tenderness or deformity. Heart:  Regular rate and rhythm, S1, S2 normal, no murmur, click, rub or gallop. Abdomen:   Soft, non-tender. Bowel sounds normal. No masses,  No organomegaly. Genitalia:  Normal male without lesion, discharge or tenderness. Rectal:  Normal tone, normal prostate, no masses or tenderness  Guaiac negative stool. Extremities: Extremities normal, atraumatic, no cyanosis or edema. Pulses: 2+ and symmetric all extremities. Skin: Skin color, texture, turgor normal. No rashes or lesions   Lymph nodes: Cervical, supraclavicular, and axillary nodes normal.   Neurologic: CNII-XII intact.  Normal strength, sensation and reflexes throughout. Disposition: home    Patient Instructions:   Current Discharge Medication List      START taking these medications    Details   clopidogrel (PLAVIX) 75 mg tab Take 1 Tab by mouth daily. Qty: 30 Tab, Refills: 6         CONTINUE these medications which have CHANGED    Details   atorvastatin (LIPITOR) 40 mg tablet Take 1 Tab by mouth daily. Qty: 30 Tab, Refills: 6         CONTINUE these medications which have NOT CHANGED    Details   omeprazole (PRILOSEC) 20 mg capsule Take 20 mg by mouth daily. pyridoxine (VITAMIN B-6) 100 mg tablet Take 100 mg by mouth daily. dicyclomine (BENTYL) 10 mg capsule Take 10 mg by mouth two (2) times a day. omega-3 fatty acids-vitamin e (FISH OIL) 1,000 mg cap Take 1 Cap by mouth.        multivitamin (ONE A DAY) tablet Take 1 Tab by mouth daily.            STOP taking these medications       aspirin 81 mg tablet Comments:   Reason for Stopping:         cyanocobalamin (VITAMIN B-12) 1,000 mcg tablet Comments:   Reason for Stopping:                 Signed:  Negrita Amador MD  4/17/2017  6:25 PM

## 2017-04-17 NOTE — PROGRESS NOTES
Problem: Motor Speech Impaired (Adult)  Goal: *Acute Goals and Plan of Care (Insert Text)  4/17/2017  Speech path goals:  1. Pt will produce tongue twisters with 90% acc with min cues. 2. Pt will state two motor speech strategies with no cues with 100% acc. SPEECH LANGUAGE PATHOLOGY EVALUATION  Patient: Emani Reeves (89 y.o. 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 He had mild difficulty repeating words of increasing length and complexity. In conversation he had mildly imprecise artic but also complained of difficulty with getting words out which is consistent with verbal apraxia. Pt coughed after drinking from the water pitcher (has a large straw). He tolerated cup sips well. Pt instructed to drink via cup. He reported no difficulty with solids. Pt reported he has word finding which has increased with age. Reported no memory difficulty. .     Patient will benefit from skilled intervention to address the above impairments. Patients rehabilitation potential is considered to be Good  Factors which may influence rehabilitation potential include:   [ ]              None noted  [X]              Mental ability/status  [X]              Medical condition  [X]              Home/family situation and support systems  [X]              Safety awareness  [ ]              Pain tolerance/management  [ ]              Other:        PLAN :  Recommendations and Planned Interventions:  Intensive speech therapy while an inpt. May not need speech follow up at discharge  Frequency/Duration: Patient will be followed by speech-language pathology 3 times a week to address goals. Discharge Recommendations: None       SUBJECTIVE:   Patient stated there were words he just can't get out. OBJECTIVE:       Past Medical History:   Diagnosis Date    Abdominal pain      Arthritis       shoulders, arms    Cancer ([address]       prostate    Cough      Dyspepsia and other specified disorders of function of stomach      Easy bruising      Fatigue      Frequent urination      GERD (gastroesophageal reflux disease)      Other ill-defined conditions       high cholesterol    Shortness of breath       Past Surgical History:   Procedure Laterality Date    HX HERNIA REPAIR   1996     inguinal R&L    HX HERNIA REPAIR   2013     abd hernia     HX HERNIA REPAIR   2014     Laparoscopic recurrent incisional hernia repair with mesh. Robot assisted    HX PROSTATECTOMY        HX SMALL BOWEL RESECTION   1996    HX UROLOGICAL         prostate  removed    HX UROLOGICAL         adhesion repair    OR EGD TRANSORAL BIOPSY SINGLE/MULTIPLE   12/5/2012           Prior Level of Function/Home Situation:   Home Situation  Home Environment: Private residence  # Steps to Enter: 2  One/Two Story Residence: Two story  # of Interior Steps: 5  Height of Each Step (in): 5 inches  Interior Rails: Both  Lift Chair Available: No  Living Alone: No  Support Systems: None  Patient Expects to be Discharged to[de-identified] Private residence  Current DME Used/Available at Home: None  Mental Status:  Neurologic State: Alert  Orientation Level: Oriented X4  Cognition: Appropriate decision making, Appropriate for age attention/concentration, Appropriate safety awareness  Perception: Appears intact  Perseveration: No perseveration noted     Motor Speech:  Oral-Motor Structure/Motor Speech  Labial: No impairment  Lingual: No impairment  Mandible: No impairment  Apraxic Characteristics: None  Dysarthric Characteristics: Imprecise;Blended word boundaries  Intelligibility: No impairment  Overall Impairment Severity: Minimal  Language Comprehension and Expression:   wnl in conversation        Pragmatics:   wnl           G Codes:   In compliance with CMSs Claims Based Outcome Reporting, the following G-code set was chosen for this patient based the use of the NOMS functional outcome to quantify this patient's level of 6 impairment. Using the NOMS, the patient was determined to be at level 6 for motor speech which correlates with the CI= 1-19% level of severity. Based on the objective assessment provided within this note, the current, goal, and discharge g-codes are as follows: Motor   Current  CI= 1-19%   Goal  CI= 1-19%        Pain:  Pain Scale 1: Numeric (0 - 10)        After treatment:   [X]              Patient left in no apparent distress sitting up in chair  [ ]              Patient left in no apparent distress in bed  [X]              Call bell left within reach  [X]              Nursing notified  [ ]              Caregiver present  [ ]              Bed alarm activated      COMMUNICATION/EDUCATION:   The patients plan of care including recommendations and planned interventions was discussed with: Registered Nurse. Patient was educated regarding His deficit(s) of dysarthria as this relates to His diagnosis of CVA. He demonstrated Excellent understanding as evidenced by repeating info back. .  [X]  Patient/family have participated as able in goal setting and plan of care. [ ]  Patient/family agree to work toward stated goals and plan of care. [ ]  Patient understands intent and goals of therapy, but is neutral about his/her participation. [ ]  Patient is unable to participate in goal setting and plan of care.      Thank you for this referral.  Sherri Conde, SLP  Time Calculation: 20 mins

## 2017-04-17 NOTE — PROGRESS NOTES
Initial Nutrition Assessment:    INTERVENTIONS/RECOMMENDATIONS:   · Meals/Snacks: General/healthful diet: Continue Regular diet    ASSESSMENT:   Patient medically noted for stroke, hyperlipidemia, and HTN. Patient reports a good appetite and eating well. He states the food is very good so far. No recent weight loss reported. Menu provided to help with meal selections. Continue current nutrition plan of care. Diet Order: Regular  % Eaten:  No data found. Pertinent Medications: [x]Reviewed []Other: atorvastatin, plavix, vitamin B12, fish oil, Protonix, Vitamin B6  Pertinent Labs: [x]Reviewed []Other:   Food Allergies: [x]None []Other   Last BM: 4/15   [x]Active     []Hyperactive  []Hypoactive       [] Absent BS  Skin:    [x] Intact   [] Incision  [] Breakdown  [] Other:    Anthropometrics:   Height: 6' (182.9 cm) Weight: 86.6 kg (190 lb 14.7 oz)   IBW (%IBW):   ( ) UBW (%UBW):   (  %)   Last Weight Metrics:  Weight Loss Metrics 4/15/2017 7/23/2014 7/2/2014 6/17/2014 6/12/2014 6/2/2014 11/4/2013   Today's Wt 190 lb 14.7 oz 191 lb 188 lb 187 lb 192 lb 4 oz 192 lb 8 oz 192 lb 8 oz   BMI 25.89 kg/m2 25.9 kg/m2 25.49 kg/m2 25.36 kg/m2 26.07 kg/m2 26.1 kg/m2 25.4 kg/m2       BMI: Body mass index is 25.89 kg/(m^2). This BMI is indicative of:   []Underweight    [x]Normal (for patient's age)    []Overweight    [] Obesity   [] Extreme Obesity (BMI>40)     Estimated Nutrition Needs (Based on):   2097 Kcals/day (BMR (1613) x 1. 3AF) , 70 g (-87g (0.8-1.0 g/kg bw)) Protein  Carbohydrate:  At Least 130 g/day  Fluids: 2100 mL/day (1ml/kcal)    Pt expected to meet estimated nutrient needs: [x]Yes []No    NUTRITION DIAGNOSES:   Problem:   (No nutrition diagnosis at this time)      Etiology: related to       Signs/Symptoms: as evidenced by        NUTRITION INTERVENTIONS:  Meals/Snacks: General/healthful diet                  GOAL:   PO intake >70% of meals next 5-7 days    LEARNING NEEDS (Diet, Food/Nutrient-Drug Interaction): [x] None Identified   [] Identified and Education Provided/Documented   [] Identified and Pt declined/was not appropriate     Cultureal, Zoroastrian, OR Ethnic Dietary Needs:    [x] None Identified   [] Identified and Addressed     [x] Interdisciplinary Care Plan Reviewed/Documented    [x] Discharge Planning: Continue Regular diet       MONITORING /EVALUATION:   Food/Nutrient Intake Outcomes:  Total energy intake  Physical Signs/Symptoms Outcomes: Weight/weight change    NUTRITION RISK:    [] High              [] Moderate           [x]  Low  []  Minimal/Uncompromised    PT SEEN FOR:    []  MD Consult: []Calorie Count      []Diabetic Diet Education        []Diet Education     []Electrolyte Management     []General Nutrition Management and Supplements     []Management of Tube Feeding     []TPN Recommendations    [x]  RN Referral:  [x]MST score >=2     []Enteral/Parenteral Nutrition PTA     []Pregnant: Gestational DM or Multigestation     []Pressure Ulcer/Wound Care needs        []  Low BMI  []  DTR Referral       Tabby Thomas  Pager [phone]                 Weekend Pager [phone]

## 2017-04-17 NOTE — PROGRESS NOTES
Chief Complaint: diplopia    Uneventful night. Discussed test results. No more diplopia. Discussed elevated LDL. Apparently was not taking atovastatin regularly. Discussed the importance of a low LDL and stroke prevention. No skilled PT needed    Assesment and Plan    1. Stroke  Pontine stroke  Homocysteine 8.3  LDL  85.2, tsh 1.06\  Carotids no significant disease    2. Hyperlipidemia  Increase atorvastatin to 40mg     3. Dysarthria  Speech therapy     4. Gait disturbance  Improving no skilled care needed     5. Diplopia (right 6th nerve)  improving      Allergies  Review of patient's allergies indicates no known allergies.      Medications  Current Facility-Administered Medications   Medication Dose Route Frequency    clopidogrel (PLAVIX) tablet 75 mg  75 mg Oral DAILY    atorvastatin (LIPITOR) tablet 40 mg  40 mg Oral DAILY    0.9% sodium chloride with KCl 20 mEq/L infusion   IntraVENous CONTINUOUS    cyanocobalamin (VITAMIN B12) tablet 1,000 mcg  1,000 mcg Oral DAILY    dicyclomine (BENTYL) capsule 10 mg  10 mg Oral BID    fish oil-omega-3 fatty acids 340-1,000 mg capsule 1 Cap  1 Cap Oral DAILY    pantoprazole (PROTONIX) tablet 40 mg  40 mg Oral ACB    pyridoxine (vitamin B6) (VITAMIN B-6) tablet 100 mg  100 mg Oral DAILY    sodium chloride (NS) flush 5-10 mL  5-10 mL IntraVENous Q8H    sodium chloride (NS) flush 5-10 mL  5-10 mL IntraVENous PRN    acetaminophen (TYLENOL) tablet 650 mg  650 mg Oral Q4H PRN    Or    acetaminophen (TYLENOL) solution 650 mg  650 mg Per NG tube Q4H PRN    Or    acetaminophen (TYLENOL) suppository 650 mg  650 mg Rectal Q4H PRN        Medical History  Past Medical History:   Diagnosis Date    Abdominal pain     Arthritis     shoulders, arms    Cancer (HCC)     prostate    Cough     Dyspepsia and other specified disorders of function of stomach     Easy bruising     Fatigue     Frequent urination     GERD (gastroesophageal reflux disease)     Other ill-defined conditions     high cholesterol    Shortness of breath      Review of Systems   Constitutional: Negative for chills and fever. HENT: Negative for ear pain. Eyes: . Negative for pain and discharge. Respiratory: Negative for cough and hemoptysis. Cardiovascular: Negative for chest pain and claudication. Gastrointestinal: Negative for constipation and diarrhea. Genitourinary: Negative for flank pain and hematuria. Musculoskeletal: Negative for back pain and myalgias. Skin: Negative for itching and rash. Neurological: Positive for sensory change and speech change. Negative for headaches. Endo/Heme/Allergies: Negative for environmental allergies. Does not bruise/bleed easily. Psychiatric/Behavioral: Negative for depression and hallucinations. Exam:    Visit Vitals    /73 (BP 1 Location: Left arm, BP Patient Position: Sitting)    Pulse 66    Temp 98 °F (36.7 °C)    Resp 18    Ht 6' (1.829 m)    Wt 190 lb 14.7 oz (86.6 kg)    SpO2 91%    BMI 25.89 kg/m2        Physical Exam   Constitutional: He is oriented to person, place, and time and well-developed, well-nourished, and in no distress. HENT:   Head: Normocephalic and atraumatic. Eyes: Conjunctivae and EOM are normal. Pupils are equal, round, and reactive to light. Neck: Neck supple. No JVD present. Carotid bruit is not present. No thyromegaly present. Cardiovascular: Normal rate, regular rhythm and normal heart sounds. Pulmonary/Chest: Effort normal and breath sounds normal. No respiratory distress. He has no wheezes. He has no rales. Abdominal: Soft. Bowel sounds are normal. He exhibits no distension. There is no tenderness. Neurological: He is alert and oriented to person, place, and time. He has normal strength. Tricep reflexes are 2+ on the right side. Bicep reflexes are 2+ on the right side and 2+ on the left side. Brachioradialis reflexes are 2+ on the right side and 2+ on the left side.   Patellar reflexes are 2+ on the right side and 2+ on the left side. Achilles reflexes are 1+ on the right side and 1+ on the left side. Right hemisensory loss of the face  Left hemisensory loss body   Skin: No rash noted. No erythema. Nursing note and vitals reviewed. Imaging    CT Results (most recent):    Results from Hospital Encounter encounter on 04/15/17   CT HEAD WO CONT   Narrative EXAM:  CT HEAD WO CONT    INDICATION:   worsening of presenting symptoms with slurred speech and double  vision    COMPARISON: Earlier same day. TECHNIQUE: Unenhanced CT of the head was performed using 5 mm images. Brain and  bone windows were generated. CT dose reduction was achieved through use of a  standardized protocol tailored for this examination and automatic exposure  control for dose modulation. FINDINGS:  The ventricles and sulci are normal in size, shape and configuration and  midline. There is no significant white matter disease. There is no intracranial  hemorrhage, extra-axial collection, mass, mass effect or midline shift. The  basilar cisterns are open. Known right pontine infarct is not visualized. . The  bone windows demonstrate no abnormalities. The visualized portions of the  paranasal sinuses and mastoid air cells are clear. Impression IMPRESSION:    No interval change        MRI Results (most recent):    Results from Hospital Encounter encounter on 04/15/17   MRI BRAIN WO CONT   Narrative HISTORY:  Slurred speech and double vision     COMPARISON:  None    TECHNIQUE:  MR imaging of the brain was performed with sagittal T1, axial T1,  T2, FLAIR, GRE, DWI/ADC, coronal T2.    FINDINGS:      The ventricles and cisterns are of normal size and configuration. There are no  extra-axial fluid collections, mass lesions or mass effect. There is mild  hyperintensity within the periventricular white matter. There is a small acute  infarcts of the dorsal right ariel.  There is no acute or chronic intracranial  hemorrhage. The major intracranial vascular flow-voids are patent. Marrow signal  is normal.         Impression IMPRESSION:  1. Small acute infarct of the dorsal right ariel          .   Lab Review    Lab Results   Component Value Date/Time    WBC 6.8 04/17/2017 03:57 AM    HCT 45.4 04/17/2017 03:57 AM    HGB 15.3 04/17/2017 03:57 AM    PLATELET 489 17/89/1855 03:57 AM       Lab Results   Component Value Date/Time    Sodium 142 04/17/2017 03:57 AM    Potassium 4.1 04/17/2017 03:57 AM    Chloride 109 04/17/2017 03:57 AM    CO2 23 04/17/2017 03:57 AM    Glucose 99 04/17/2017 03:57 AM    BUN 17 04/17/2017 03:57 AM    Creatinine 1.15 04/17/2017 03:57 AM    Calcium 8.2 04/17/2017 03:57 AM         Lab Results   Component Value Date/Time    Hemoglobin A1c 5.4 04/16/2017 03:03 AM        Lab Results   Component Value Date/Time    Vitamin B12 1511 04/16/2017 03:55 PM    Folate 30.5 04/16/2017 03:55 PM       Lab Results   Component Value Date/Time    Cholesterol, total 156 04/16/2017 03:03 AM    HDL Cholesterol 42 04/16/2017 03:03 AM    LDL, calculated 85.2 04/16/2017 03:03 AM    VLDL, calculated 28.8 04/16/2017 03:03 AM    Triglyceride 144 04/16/2017 03:03 AM    CHOL/HDL Ratio 3.7 04/16/2017 03:03 AM

## 2017-04-17 NOTE — PROGRESS NOTES
PROGRESS NOTE    NAME:  Roni Moser   :   1935   MRN:   [id number]     Date/Time:  2017 18:17 AM  Subjective:   History:  Chart reviewed and patient seen and examined and D/W his nurse and his family this AM and all events noted. He was admitted with a day history of unsteady gait, double vision and slurred speech with Head CT negative. He still has speech difficulty and remains unsteady. His vision remains a little blurry w/o true double vision. He has no other neurologic c/o. He denies palpitations or cardio/respiratory c/o. He denies GI/ c/o. There are no other c/o on complete ROS.       Medications reviewed:  Current Facility-Administered Medications   Medication Dose Route Frequency    clopidogrel (PLAVIX) tablet 75 mg  75 mg Oral DAILY    atorvastatin (LIPITOR) tablet 40 mg  40 mg Oral DAILY    0.9% sodium chloride with KCl 20 mEq/L infusion   IntraVENous CONTINUOUS    cyanocobalamin (VITAMIN B12) tablet 1,000 mcg  1,000 mcg Oral DAILY    dicyclomine (BENTYL) capsule 10 mg  10 mg Oral BID    fish oil-omega-3 fatty acids 340-1,000 mg capsule 1 Cap  1 Cap Oral DAILY    pantoprazole (PROTONIX) tablet 40 mg  40 mg Oral ACB    pyridoxine (vitamin B6) (VITAMIN B-6) tablet 100 mg  100 mg Oral DAILY    sodium chloride (NS) flush 5-10 mL  5-10 mL IntraVENous Q8H    sodium chloride (NS) flush 5-10 mL  5-10 mL IntraVENous PRN    acetaminophen (TYLENOL) tablet 650 mg  650 mg Oral Q4H PRN    Or    acetaminophen (TYLENOL) solution 650 mg  650 mg Per NG tube Q4H PRN    Or    acetaminophen (TYLENOL) suppository 650 mg  650 mg Rectal Q4H PRN        Objective:   Vitals:  Visit Vitals    /69    Pulse 67    Temp 97.5 °F (36.4 °C)    Resp 18    Ht 6' (1.829 m)    Wt 86.6 kg (190 lb 14.7 oz)    SpO2 94%    BMI 25.89 kg/m2      O2 Device: Room air Temp (24hrs), Av.8 °F (36.6 °C), Min:97.5 °F (36.4 °C), Max:98 °F (36.7 °C)      Last 24hr Input/Output:  No intake or output data in the 24 hours ending 04/17/17 0816     PHYSICAL EXAM:  General:     Alert, cooperative, no distress, appears stated age. Head:    Normocephalic, without obvious abnormality, atraumatic. Eyes:    Conjunctivae/corneas clear. PERRLA  Nose:   Nares normal. No drainage or sinus tenderness. Throat:     Lips, mucosa, and tongue normal.  No Thrush  Neck:   Supple, symmetrical,  no adenopathy, thyroid: non tender     no carotid bruit and no JVD. Back:     Symmetric,  No CVA tenderness. Lungs:    Clear to auscultation bilaterally. No Wheezing or Rhonchi. No rales. Heart:    Regular rate and rhythm,  no murmur, rub or gallop. Abdomen:    Soft, non-tender. Not distended. Bowel sounds normal. No masses  Extremities:  Extremities normal, atraumatic, No cyanosis. No edema. No clubbing  Lymph nodes:  Cervical, supraclavicular normal.  Neurologic:  Normal strength, Alert and oriented X 3. Lab Data Reviewed:    Recent Results (from the past 24 hour(s))   HOMOCYST(E)INE, PLASMA    Collection Time: 04/16/17  3:55 PM   Result Value Ref Range    Homocysteine, plasma 8.3 3.7 - 13.9 umol/L   VITAMIN B12    Collection Time: 04/16/17  3:55 PM   Result Value Ref Range    Vitamin B12 1511 (H) 211 - 911 pg/mL   FOLATE    Collection Time: 04/16/17  3:55 PM   Result Value Ref Range    Folate 30.5 (H) 5.0 - 21.0 ng/mL   CBC WITH AUTOMATED DIFF    Collection Time: 04/17/17  3:57 AM   Result Value Ref Range    WBC 6.8 4.1 - 11.1 K/uL    RBC 4.91 4.10 - 5.70 M/uL    HGB 15.3 12.1 - 17.0 g/dL    HCT 45.4 36.6 - 50.3 %    MCV 92.5 80.0 - 99.0 FL    MCH 31.2 26.0 - 34.0 PG    MCHC 33.7 30.0 - 36.5 g/dL    RDW 13.6 11.5 - 14.5 %    PLATELET 045 (L) 258 - 400 K/uL    NEUTROPHILS 60 32 - 75 %    LYMPHOCYTES 29 12 - 49 %    MONOCYTES 7 5 - 13 %    EOSINOPHILS 3 0 - 7 %    BASOPHILS 1 0 - 1 %    ABS. NEUTROPHILS 4.1 1.8 - 8.0 K/UL    ABS. LYMPHOCYTES 1.9 0.8 - 3.5 K/UL    ABS. MONOCYTES 0.4 0.0 - 1.0 K/UL    ABS.  EOSINOPHILS 0.2 0.0 - 0.4 K/UL ABS. BASOPHILS 0.1 0.0 - 0.1 K/UL   METABOLIC PANEL, BASIC    Collection Time: 04/17/17  3:57 AM   Result Value Ref Range    Sodium 142 136 - 145 mmol/L    Potassium 4.1 3.5 - 5.1 mmol/L    Chloride 109 (H) 97 - 108 mmol/L    CO2 23 21 - 32 mmol/L    Anion gap 10 5 - 15 mmol/L    Glucose 99 65 - 100 mg/dL    BUN 17 6 - 20 MG/DL    Creatinine 1.15 0.70 - 1.30 MG/DL    BUN/Creatinine ratio 15 12 - 20      GFR est AA >60 >60 ml/min/1.73m2    GFR est non-AA >60 >60 ml/min/1.73m2    Calcium 8.2 (L) 8.5 - 10.1 MG/DL   TSH 3RD GENERATION    Collection Time: 04/17/17  3:57 AM   Result Value Ref Range    TSH 1.06 0.36 - 3.74 uIU/mL         Assessment/Plan:     Principal Problem:    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 Plavix, increased atorvastatin to 40 mg  2. MRI head+ R dorsal Benigno small acute infarct  3. Carotid Dopplers today  4.  Echocardiogram today  5. Continue Lipitor for Hyperlipidemia  6. OT/ PT evaluation and treatment  7. Speech eval for his slurred speech  8.  Home this evening is stable and tests stable    35 minutes spent in direct care of this patient today        ___________________________________________________    Attending Physician: Nader Wu MD

## 2017-04-17 NOTE — PROCEDURES
[facility]  *** FINAL REPORT ***    Name: Olaf Bourgeois  MRN: [id number]    Inpatient  : 07 Aug 1935  HIS Order #: [id number]  [address] [facility] Visit #: [id number]  Date: 2017    TYPE OF TEST: Cerebrovascular Duplex    REASON FOR TEST  Stroke    Right Carotid:-             Proximal               Mid                 Distal  cm/s  Systolic  Diastolic  Systolic  Diastolic  Systolic  Diastolic  CCA:    568.5      16.0                            93.0  Bulb:  ECA:     65.0  ICA:     63.0                 47.0                 46.0  ICA/CCA:  0.7    ICA Stenosis: Normal    Right Vertebral:-  Finding: Antegrade  Sys:       38.0  Symone:    Right Subclavian: Normal    Left Carotid:-            Proximal                Mid                 Distal  cm/s  Systolic  Diastolic  Systolic  Diastolic  Systolic  Diastolic  CCA:    116.0      20.0                            96.0  Bulb:  ECA:     82.0  ICA:     46.0                 49.0                 54.0  ICA/CCA:  0.5    ICA Stenosis: Normal    Left Vertebral:-  Finding: Antegrade  Sys:       53.0  Symone:        0.0    Left Subclavian: Normal    INTERPRETATION/FINDINGS  PROCEDURE:  Carotid Duplex Examination using B-mode, color and  spectral Doppler of the extracranial cerebrovascular arteries. 1. No evidence of significant arterial occlusive disease in the  internal carotid arteries. 2. No significant stenosis in the external carotid arteries  bilaterally. 3. Antegrade flow in both vertebral arteries. 4. Normal flow in both subclavian arteries. ADDITIONAL COMMENTS    I have personally reviewed the data relevant to the interpretation of  this  study. TECHNOLOGIST: Berna Holstein. SHERITA Blanc, RDMS  Signed: 2017 11:58 AM    PHYSICIAN: Nora Llamas MD  Signed: 2017 08:56 AM

## 2017-04-17 NOTE — PROGRESS NOTES
Discharge instructions/new prescriptions discussed with pt. All questions answered. Tele removed and returned. Discharge NIH 0. Pt currently waiting on his ride home. Oncoming RN notified that IV is still in.

## 2017-04-17 NOTE — PROGRESS NOTES
[facility]  Preliminary Report:  Carotid Duplex Scan    Right:  No plaque noted in the right carotid system. Right ICA velocities suggest 0% diameter reduction. Right vertebral artery flow is antegrade. Left:  No plaque noted in the left carotid system. Left ICA velocities suggest 0% diameter reduction. Left vertebral artery flow is antegrade. Final report to follow.

## 2017-04-17 NOTE — DISCHARGE INSTRUCTIONS
[address]  [phone]      Patient Discharge Instructions    Sofiya Jones / [id number] : 1935    Admitted 4/15/2017 Discharged: 17     Take Home Medications            · It is important that you take the medication exactly as they are prescribed. · Keep your medication in the bottles provided by the pharmacist and keep a list of the medication names, dosages, and times to be taken in your wallet. · Do not take other medications without consulting your doctor. What to do at Home    Recommended diet: Cardiac Diet,     Recommended activity: Activity as tolerated,       Follow-up with Dr. Malcolm Truong in 2 weeks. Call [phone] for your appointment. Information obtained by :  I understand that if any problems occur once I am at home I am to contact my physician. I understand and acknowledge receipt of the instructions indicated above.                                                                                                                                            Physician's or R.N.'s Signature                                                                  Date/Time                                                                                                                                              Patient or Representative Signature                                                          Date/Time

## 2017-04-19 PROBLEM — I65.23 STENOSIS OF BOTH INTERNAL CAROTID ARTERIES: Status: ACTIVE | Noted: 2017-04-19

## 2017-07-13 DIAGNOSIS — R01.1 HEART MURMUR: ICD-10-CM

## 2017-07-13 DIAGNOSIS — E78.5 DYSLIPIDEMIA: ICD-10-CM

## 2017-07-13 PROBLEM — M25.519 SHOULDER PAIN: Status: ACTIVE | Noted: 2017-07-13

## 2017-07-13 PROBLEM — K43.9 VENTRAL HERNIA: Status: ACTIVE | Noted: 2017-07-13

## 2017-07-13 PROBLEM — R00.2 PALPITATION: Status: ACTIVE | Noted: 2017-07-13

## 2017-07-13 PROBLEM — M47.816 DEGENERATIVE ARTHRITIS OF LUMBAR SPINE: Status: ACTIVE | Noted: 2017-07-13

## 2017-07-13 PROBLEM — H26.9 CATARACT: Status: ACTIVE | Noted: 2017-07-13

## 2017-07-13 PROBLEM — Z72.0 TOBACCO USE: Status: ACTIVE | Noted: 2017-07-13

## 2017-07-13 PROBLEM — K21.9 GASTROESOPHAGEAL REFLUX DISEASE: Status: ACTIVE | Noted: 2017-07-13

## 2017-07-13 PROBLEM — C61 PROSTATE CANCER (HCC): Status: ACTIVE | Noted: 2017-07-13

## 2017-07-13 PROBLEM — Z01.818 PREOP EXAMINATION: Status: ACTIVE | Noted: 2017-07-13

## 2017-07-13 PROBLEM — Z85.46 HISTORY OF PROSTATE CANCER: Status: ACTIVE | Noted: 2017-07-13

## 2017-07-13 RX ORDER — UREA 10 %
100 LOTION (ML) TOPICAL DAILY
COMMUNITY

## 2017-07-13 RX ORDER — TRAMADOL HYDROCHLORIDE 50 MG/1
50 TABLET ORAL
COMMUNITY
End: 2018-03-01

## 2017-07-13 RX ORDER — PANTOPRAZOLE SODIUM 20 MG/1
20 TABLET, DELAYED RELEASE ORAL DAILY
COMMUNITY
End: 2017-08-11 | Stop reason: SDUPTHER

## 2017-07-14 ENCOUNTER — OFFICE VISIT (OUTPATIENT)
Dept: INTERNAL MEDICINE CLINIC | Age: 82
End: 2017-07-14

## 2017-07-14 VITALS
SYSTOLIC BLOOD PRESSURE: 144 MMHG | HEIGHT: 72 IN | DIASTOLIC BLOOD PRESSURE: 82 MMHG | BODY MASS INDEX: 26.55 KG/M2 | WEIGHT: 196 LBS

## 2017-07-14 DIAGNOSIS — D49.2 ATYPICAL SQUAMOPROLIFERATIVE SKIN LESION: ICD-10-CM

## 2017-07-14 DIAGNOSIS — Z79.899 ON STATIN THERAPY: ICD-10-CM

## 2017-07-14 DIAGNOSIS — E78.5 DYSLIPIDEMIA: ICD-10-CM

## 2017-07-14 DIAGNOSIS — I10 ESSENTIAL HYPERTENSION: ICD-10-CM

## 2017-07-14 DIAGNOSIS — I63.341 CEREBROVASCULAR ACCIDENT (CVA) DUE TO THROMBOSIS OF RIGHT CEREBELLAR ARTERY (HCC): Primary | ICD-10-CM

## 2017-07-14 LAB
ALBUMIN SERPL-MCNC: 4.2 G/DL (ref 3.9–5.4)
ALKALINE PHOS POC: 105 U/L (ref 38–126)
ALT SERPL-CCNC: 44 U/L (ref 9–52)
AST SERPL-CCNC: 33 U/L (ref 14–36)
BUN BLD-MCNC: 24 MG/DL (ref 9–20)
CALCIUM BLD-MCNC: 9.4 MG/DL (ref 8.4–10.2)
CHLORIDE BLD-SCNC: 103 MMOL/L (ref 98–107)
CHOLEST SERPL-MCNC: 114 MG/DL (ref 0–200)
CK (CPK) POC: 50 U/L (ref 30–135)
CO2 POC: 28 MMOL/L (ref 22–32)
CREAT BLD-MCNC: 1 MG/DL (ref 0.8–1.5)
EGFR (POC): 70.3
GLUCOSE POC: 96 MG/DL (ref 75–110)
HDLC SERPL-MCNC: 47 MG/DL (ref 35–130)
LDL CHOLESTEROL POC: 47.8 MG/DL (ref 0–130)
POTASSIUM SERPL-SCNC: 4.2 MMOL/L (ref 3.6–5)
PROT SERPL-MCNC: 7.4 G/DL (ref 6.3–8.2)
SODIUM SERPL-SCNC: 144 MMOL/L (ref 137–145)
TCHOL/HDL RATIO (POC): 2.4 (ref 0–4)
TOTAL BILIRUBIN POC: 1.7 MG/DL (ref 0.2–1.3)
TRIGL SERPL-MCNC: 96 MG/DL (ref 0–200)
VLDLC SERPL CALC-MCNC: 19.2 MG/DL

## 2017-07-14 RX ORDER — ERYTHROMYCIN 5 MG/G
OINTMENT OPHTHALMIC
Refills: 3 | COMMUNITY
Start: 2017-07-10 | End: 2018-04-20 | Stop reason: ALTCHOICE

## 2017-07-14 NOTE — PATIENT INSTRUCTIONS

## 2017-07-14 NOTE — PROGRESS NOTES
Mino Diaz is a 80 y.o. male presenting for Cerebrovascular Accident (2 mo follow up)      Meds not taking/discontinued: Prilosec    1. Have you been to the ER, urgent care clinic since your last visit? Hospitalized since your last visit? No    2. Have you seen or consulted any other health care providers outside of the [address] [place] [address] since your last visit? Include any pap smears or colon screening.  No       Has question regarding if he should be on fish oil if he is taking statin drug    No refills needed

## 2017-07-14 NOTE — MR AVS SNAPSHOT
Visit Information Date & Time Provider Department Dept. Phone Encounter #  
 7/14/2017  9:00 AM MARLY Bach MD [address] [place] [address] [phone] [id number] Follow-up Instructions Return in about 4 months (around 11/14/2017) for follow up. Upcoming Health Maintenance Date Due DTaP/Tdap/Td series (1 - Tdap) 8/7/1956 ZOSTER VACCINE AGE 60> 8/7/1995 GLAUCOMA SCREENING Q2Y 8/7/2000 Pneumococcal 65+ High/Highest Risk (1 of 2 - PCV13) 8/7/2000 MEDICARE YEARLY EXAM 8/7/2000 INFLUENZA AGE 9 TO ADULT 8/1/2017 Allergies as of 7/14/2017  Review Complete On: 7/14/2017 By: Roscoe Villanueva MD  
 No Known Allergies Current Immunizations  Never Reviewed No immunizations on file. Not reviewed this visit You Were Diagnosed With   
  
 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 
ICD-9-CM: 401.9 Dyslipidemia     ICD-10-CM: E78.5 ICD-9-CM: 272.4 Atypical squamoproliferative skin lesion     ICD-10-CM: D49.2 ICD-9-CM: 239.2 On statin therapy     ICD-10-CM: Z79.899 ICD-9-CM: V58.69 Vitals BP Height(growth percentile) Weight(growth percentile) BMI Smoking Status 144/82 6' (1.829 m) 196 lb (88.9 kg) 26.58 kg/m2 Current Every Day Smoker BMI and BSA Data Body Mass Index Body Surface Area  
 26.58 kg/m 2 2.13 m 2 Preferred Pharmacy Pharmacy Name Phone [facility] [address] [facility] [address] [facility], [place] [address] [phone] Your Updated Medication List  
  
   
This list is accurate as of: 7/14/17 10:07 AM.  Always use your most recent med list.  
  
  
  
  
 atorvastatin 40 mg tablet Commonly known as:  LIPITOR Take 1 Tab by mouth daily. clopidogrel 75 mg Tab Commonly known as:  PLAVIX Take 1 Tab by mouth daily. dicyclomine 10 mg capsule Commonly known as:  BENTYL Take 10 mg by mouth two (2) times a day. erythromycin ophthalmic ointment Commonly known as:  ILOTYCIN  
APPLY ONE APPLICATION IN BOTH EYES NIGHTLY FISH OIL 1,000 mg Cap Generic drug:  omega-3 fatty acids-vitamin e Take 1 Cap by mouth daily. Indications: 1200mg capsule  
  
 multivitamin tablet Commonly known as:  ONE A DAY Take 1 Tab by mouth daily. pantoprazole 20 mg tablet Commonly known as:  PROTONIX Take 20 mg by mouth daily. traMADol 50 mg tablet Commonly known as:  ULTRAM  
Take 50 mg by mouth every six (6) hours as needed for Pain. VITAMIN B-12 100 mcg tablet Generic drug:  cyanocobalamin Take 100 mcg by mouth daily. VITAMIN B-6 100 mg tablet Generic drug:  pyridoxine (vitamin B6) Take 100 mg by mouth daily. We Performed the Following AMB POC CK (CPK) [75133 CPT(R)] AMB POC COMPREHENSIVE METABOLIC PANEL [73202 CPT(R)] AMB POC LIPID PROFILE [14857 CPT(R)] REFERRAL TO PLASTIC SURGERY [REF89 Custom] Comments:  
 Atypical squamoproliferative lesion of right forearm Follow-up Instructions Return in about 4 months (around 11/14/2017) for follow up. Referral Information Referral ID Referred By Referred To  
  
 [id number] [employer] [facility] Campbell Hernandez MD, [address] [facility], [address] Visits Status Start Date End Date 1 New Request 7/14/17 7/14/18 If your referral has a status of pending review or denied, additional information will be sent to support the outcome of this decision. Patient Instructions High Cholesterol: Care Instructions Your Care Instructions Cholesterol is a type of fat in your blood.  It is needed for many body functions, such as making new cells. Cholesterol is made by your body. It also comes from food you eat. High cholesterol means that you have too much of the fat in your blood. This raises your risk of a heart attack and stroke. LDL and HDL are part of your total cholesterol. LDL is the \"bad\" cholesterol. High LDL can raise your risk for heart disease, heart attack, and stroke. HDL is the \"good\" cholesterol. It helps clear bad cholesterol from the body. High HDL is linked with a lower risk of heart disease, heart attack, and stroke. Your cholesterol levels help your doctor find out your risk for having a heart attack or stroke. You and your doctor can talk about whether you need to lower your risk and what treatment is best for you. A heart-healthy lifestyle along with medicines can help lower your cholesterol and your risk. The way you choose to lower your risk will depend on how high your risk is for heart attack and stroke. It will also depend on how you feel about taking medicines. Follow-up care is a key part of your treatment and safety. Be sure to make and go to all appointments, and call your doctor if you are having problems. It's also a good idea to know your test results and keep a list of the medicines you take. How can you care for yourself at home? · Eat a variety of foods every day. Good choices include fruits, vegetables, whole grains (like oatmeal), dried beans and peas, nuts and seeds, soy products (like tofu), and fat-free or low-fat dairy products. · Replace butter, margarine, and hydrogenated or partially hydrogenated oils with olive and canola oils. (Canola oil margarine without trans fat is fine.) · Replace red meat with fish, poultry, and soy protein (like tofu). · Limit processed and packaged foods like chips, crackers, and cookies. · Bake, broil, or steam foods. Don't hooper them. · Be physically active.  Get at least 30 minutes of exercise on most days of the week. Walking is a good choice. You also may want to do other activities, such as running, swimming, cycling, or playing tennis or team sports. · Stay at a healthy weight or lose weight by making the changes in eating and physical activity listed above. Losing just a small amount of weight, even 5 to 10 pounds, can reduce your risk for having a heart attack or stroke. · Do not smoke. When should you call for help? Watch closely for changes in your health, and be sure to contact your doctor if: 
· You need help making lifestyle changes. · You have questions about your medicine. Where can you learn more? Go to http://annaOANDAjaime.info/. Enter O363 in the search box to learn more about \"High Cholesterol: Care Instructions. \" Current as of: April 3, 2017 Content Version: 11.3 © 0925-6283 [employer]. Care instructions adapted under license by [employer] (which disclaims liability or warranty for this information). If you have questions about a medical condition or this instruction, always ask your healthcare professional. [address] any warranty or liability for your use of this information. Introducing [facility]! Julianna Gutierrez introduces [employer] patient portal. Now you can access parts of your medical record, email your doctor's office, and request medication refills online. 1. In your internet browser, go to [email or web address] 2. Click on the First Time User? Click Here link in the Sign In box. You will see the New Member Sign Up page. 3. Enter your [employer] Access Code exactly as it appears below. You will not need to use this code after youve completed the sign-up process. If you do not sign up before the expiration date, you must request a new code. · [employer] Access Code: [id number] Expires: 7/14/2017 11:46 AM 
 
 4. Enter the last four digits of your Social Security Number (xxxx) and Date of Birth (mm/dd/yyyy) as indicated and click Submit. You will be taken to the next sign-up page. 5. Create a [employer] ID. This will be your [employer] login ID and cannot be changed, so think of one that is secure and easy to remember. 6. Create a [employer] password. You can change your password at any time. 7. Enter your Password Reset Question and Answer. This can be used at a later time if you forget your password. 8. Enter your e-mail address. You will receive e-mail notification when new information is available in [address] 9. Click Sign Up. You can now view and download portions of your medical record. 10. Click the Download Summary menu link to download a portable copy of your medical information. If you have questions, please visit the Frequently Asked Questions section of the [employer] website. Remember, [employer] is NOT to be used for urgent needs. For medical emergencies, dial 911. Now available from your iPhone and Android! Please provide this summary of care documentation to your next provider. Your primary care clinician is listed as MARLY Farooq. If you have any questions after today's visit, please call [phone].

## 2017-07-14 NOTE — PROGRESS NOTES
Philly Oakes is a 80 y.o. male and presents with Cerebrovascular Accident (2 mo follow up)  . Subjective:    Mr. Mer Oneal feels well with minor complaints today. He has had no recurring symptoms related to his CVA. He is doing well with his medicine. He notes a skin change on his right forearm that has been present for several weeks. His blood pressure has been stable and we are getting a follow up cholesterol on lipitor today. Review of Systems  Constitutional: negative for fevers, chills, anorexia and weight loss  Eyes:   negative for visual disturbance and irritation  ENT:   negative for tinnitus,sore throat,nasal congestion,ear pains. hoarseness  Respiratory:  negative for cough, hemoptysis, dyspnea,wheezing  CV:   negative for chest pain, palpitations, lower extremity edema  GI:   negative for nausea, vomiting, diarrhea, abdominal pain,melena  Endo:               negative for polyuria,polydipsia,polyphagia,heat intolerance  Genitourinary: negative for frequency, dysuria and hematuria  Integumentary: negative for rash and pruritus  Hematologic:  negative for easy bruising and gum/nose bleeding  Musculoskel: negative for myalgias, arthralgias, back pain, muscle weakness, joint pain  Neurological:  negative for headaches, dizziness, vertigo, memory problems and gait   Behavl/Psych: negative for feelings of anxiety, depression, mood changes    Past Medical History:   Diagnosis Date    Abdominal pain     Arthritis     shoulders, arms    Cancer ([facility] [address]     prostate    Cataract 7/13/2017    Low perioperative risk for elective procedure, no further risk stratification needed    Cough     Degenerative arthritis of lumbar spine 7/13/2017    Dyslipidemia 7/13/2017    Holding livalo secondary to weakness, follow up FLP    Dyspepsia and other specified disorders of function of stomach     Easy bruising     Fatigue     Frequent urination     Gastroesophageal reflux disease 7/13/2017    GERD (gastroesophageal reflux disease)     Heart murmur 7/13/2017    mitral murmur, hx of dyspnea,get ECHO for evaluation    History of prostate cancer 7/13/2017    Other ill-defined conditions     high cholesterol    Palpitation 7/13/2017    Preop examination 7/13/2017    Prostate cancer ([facility] [address] 7/13/2017    Shortness of breath     Shoulder pain 7/13/2017    Tobacco use 7/13/2017    Ventral hernia 7/13/2017    Concern of course is possibility that he could develop a strangulated small bowel wit this ventral hernia, especially now that it is sympotmatic, will refer to surgery for evaluation     Past Surgical History:   Procedure Laterality Date    HX HERNIA REPAIR  1996    inguinal R&L    HX HERNIA REPAIR  2013    abd hernia     HX HERNIA REPAIR  2014    Laparoscopic recurrent incisional hernia repair with mesh. Robot assisted    HX PROSTATECTOMY      HX SMALL BOWEL RESECTION  1996    HX UROLOGICAL      prostate  removed    HX UROLOGICAL      adhesion repair    ID EGD TRANSORAL BIOPSY SINGLE/MULTIPLE  12/5/2012          Social History     Social History    Marital status:      Spouse name: N/A    Number of children: N/A    Years of education: N/A     Social History Main Topics    Smoking status: Current Every Day Smoker     Packs/day: 0.25     Years: 60.00    Smokeless tobacco: Never Used      Comment: smokes pipe daily    Alcohol use Yes      Comment: socially    Drug use: No    Sexual activity: Not Asked     Other Topics Concern    None     Social History Narrative     Family History   Problem Relation Age of Onset    Heart Disease Mother     Heart Disease Father     Heart Disease Brother      Current Outpatient Prescriptions   Medication Sig Dispense Refill    erythromycin (ILOTYCIN) ophthalmic ointment APPLY ONE APPLICATION IN BOTH EYES NIGHTLY  3    traMADol (ULTRAM) 50 mg tablet Take 50 mg by mouth every six (6) hours as needed for Pain.       pantoprazole (PROTONIX) 20 mg tablet Take 20 mg by mouth daily.  cyanocobalamin (VITAMIN B-12) 100 mcg tablet Take 100 mcg by mouth daily.  atorvastatin (LIPITOR) 40 mg tablet Take 1 Tab by mouth daily. 30 Tab 6    clopidogrel (PLAVIX) 75 mg tab Take 1 Tab by mouth daily. 30 Tab 6    pyridoxine (VITAMIN B-6) 100 mg tablet Take 100 mg by mouth daily.  dicyclomine (BENTYL) 10 mg capsule Take 10 mg by mouth two (2) times a day.  omega-3 fatty acids-vitamin e (FISH OIL) 1,000 mg cap Take 1 Cap by mouth daily. Indications: 1200mg capsule      multivitamin (ONE A DAY) tablet Take 1 Tab by mouth daily. No Known Allergies    Objective:  Visit Vitals    /82    Ht 6' (1.829 m)    Wt 196 lb (88.9 kg)    BMI 26.58 kg/m2     Physical Exam:   General appearance - alert, well appearing, and in no distress  Mental status - alert, oriented to person, place, and time  EYE-JANI, EOMI, fundi normal, corneas normal, no foreign bodies  ENT-ENT exam normal, no neck nodes or sinus tenderness  Nose - normal and patent, no erythema, discharge or polyps  Mouth - mucous membranes moist, pharynx normal without lesions  Neck - supple, no significant adenopathy   Chest - clear to auscultation, no wheezes, rales or rhonchi, symmetric air entry   Heart - normal rate, regular rhythm, normal S1, S2, no murmurs, rubs, clicks or gallops   Abdomen - soft, nontender, nondistended, no masses or organomegaly  Lymph- no adenopathy palpable  Ext-peripheral pulses normal, no pedal edema, no clubbing or cyanosis  Skin-Warm and dry.  no hyperpigmentation, vitiligo, right forearm raise pearly lesion, 7mm  Neuro -alert, oriented, normal speech, no focal findings or movement disorder noted  Musculoskeletal- FROM, no bony abnormalities, no point tenderness    Results for orders placed or performed in visit on 07/14/17   AMB POC CK (CPK)   Result Value Ref Range    CK (CPK) (POC)  30 - 135 U/L   AMB POC COMPREHENSIVE METABOLIC PANEL   Result Value Ref Range    GLUCOSE  75 - 110 mg/dL    BUN  9 - 20 mg/dL    Creatinine (POC)  0.8 - 1.5 mg/dL    Sodium (POC)  137 - 145 MMOL/L    Potassium (POC)  3.6 - 5.0 MMOL/L    CHLORIDE  98 - 107 MMOL/L    CO2  22 - 32 MMOL/L    CALCIUM  8.4 - 10.2 mg/dL    TOTAL PROTEIN  6.3 - 8.2 g/dL    ALBUMIN  3.9 - 5.4 g/dL    AST (POC)  14 - 36 U/L    ALT (POC)  9 - 52 U/L    ALKALINE PHOS  38 - 126 U/L    TOTAL BILIRUBIN  0.2 - 1.3 mg/dL    eGFR (POC)     AMB POC LIPID PROFILE   Result Value Ref Range    Cholesterol (POC)  0 - 200 mg/dL    Triglycerides (POC)  0 - 200 mg/dL    HDL Cholesterol (POC)  35 - 130 mg/dL    VLDL (POC)  MG/DL    LDL Cholesterol (POC)  0.0 - 130.0 MG/DL    TChol/HDL Ratio (POC)  0.0 - 4.0       Assessment/Plan:        Problem List Items Addressed This Visit     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 Will review labs. He is currently NOT on fish oil. Patient Instructions        High Cholesterol: Care Instructions  Your Care Instructions  Cholesterol is a type of fat in your blood. It is needed for many body functions, such as making new cells. Cholesterol is made by your body. It also comes from food you eat. High cholesterol means that you have too much of the fat in your blood. This raises your risk of a heart attack and stroke. LDL and HDL are part of your total cholesterol. LDL is the \"bad\" cholesterol. High LDL can raise your risk for heart disease, heart attack, and stroke. HDL is the \"good\" cholesterol. It helps clear bad cholesterol from the body. High HDL is linked with a lower risk of heart disease, heart attack, and stroke. Your cholesterol levels help your doctor find out your risk for having a heart attack or stroke. You and your doctor can talk about whether you need to lower your risk and what treatment is best for you. A heart-healthy lifestyle along with medicines can help lower your cholesterol and your risk. The way you choose to lower your risk will depend on how high your risk is for heart attack and stroke. It will also depend on how you feel about taking medicines. Follow-up care is a key part of your treatment and safety. Be sure to make and go to all appointments, and call your doctor if you are having problems. It's also a good idea to know your test results and keep a list of the medicines you take. How can you care for yourself at home? · Eat a variety of foods every day. Good choices include fruits, vegetables, whole grains (like oatmeal), dried beans and peas, nuts and seeds, soy products (like tofu), and fat-free or low-fat dairy products. · Replace butter, margarine, and hydrogenated or partially hydrogenated oils with olive and canola oils. (Canola oil margarine without trans fat is fine.)  · Replace red meat with fish, poultry, and soy protein (like tofu). · Limit processed and packaged foods like chips, crackers, and cookies. · Bake, broil, or steam foods. Don't hooper them. · Be physically active. Get at least 30 minutes of exercise on most days of the week. Walking is a good choice. You also may want to do other activities, such as running, swimming, cycling, or playing tennis or team sports. · Stay at a healthy weight or lose weight by making the changes in eating and physical activity listed above. Losing just a small amount of weight, even 5 to 10 pounds, can reduce your risk for having a heart attack or stroke. · Do not smoke. When should you call for help?   Watch closely for changes in your health, and be sure to contact your doctor if:  · You need help making lifestyle changes. · You have questions about your medicine. Where can you learn more? Go to http://anna-jaime.info/. Enter E472 in the search box to learn more about \"High Cholesterol: Care Instructions. \"  Current as of: April 3, 2017  Content Version: 11.3  © 4839-0549 [employer]. Care instructions adapted under license by [employer] (which disclaims liability or warranty for this information). If you have questions about a medical condition or this instruction, always ask your healthcare professional. [place] [address] any warranty or liability for your use of this information. Follow-up Disposition:  Return in about 4 months (around 11/14/2017) for follow up. I have reviewed with the patient details of the assessment and plan and all questions were answered. Relevent patient education was performed. The most recent lab findings were reviewed with the patient. An After Visit Summary was printed and given to the patient.       Tram Flores MD

## 2017-10-05 RX ORDER — CLOPIDOGREL BISULFATE 75 MG/1
TABLET ORAL
Qty: 30 TAB | Refills: 5 | Status: SHIPPED | OUTPATIENT
Start: 2017-10-05 | End: 2018-03-27 | Stop reason: SDUPTHER

## 2017-10-09 NOTE — TELEPHONE ENCOUNTER
Requested Prescriptions     Pending Prescriptions Disp Refills    pantoprazole (PROTONIX) 20 mg tablet 90 Tab 3     Sig: Take 1 Tab by mouth daily.        Last Refill: 08/14/2017  Next Appointment: 11/14/2017

## 2017-10-10 RX ORDER — PANTOPRAZOLE SODIUM 20 MG/1
20 TABLET, DELAYED RELEASE ORAL DAILY
Qty: 90 TAB | Refills: 3 | Status: SHIPPED | OUTPATIENT
Start: 2017-10-10 | End: 2018-10-23 | Stop reason: SDUPTHER

## 2017-10-20 ENCOUNTER — OFFICE VISIT (OUTPATIENT)
Dept: FAMILY MEDICINE CLINIC | Age: 82
End: 2017-10-20

## 2017-10-20 VITALS
RESPIRATION RATE: 16 BRPM | SYSTOLIC BLOOD PRESSURE: 122 MMHG | WEIGHT: 195 LBS | HEIGHT: 70 IN | OXYGEN SATURATION: 94 % | HEART RATE: 78 BPM | BODY MASS INDEX: 27.92 KG/M2 | DIASTOLIC BLOOD PRESSURE: 64 MMHG

## 2017-10-20 DIAGNOSIS — I10 ESSENTIAL HYPERTENSION: ICD-10-CM

## 2017-10-20 DIAGNOSIS — Z00.00 MEDICARE ANNUAL WELLNESS VISIT, SUBSEQUENT: Primary | ICD-10-CM

## 2017-10-20 DIAGNOSIS — C61 PROSTATE CARCINOMA (HCC): ICD-10-CM

## 2017-10-20 DIAGNOSIS — I63.341 CEREBROVASCULAR ACCIDENT (CVA) DUE TO THROMBOSIS OF RIGHT CEREBELLAR ARTERY (HCC): ICD-10-CM

## 2017-10-20 NOTE — PROGRESS NOTES
Chief Complaint   Patient presents with    Annual Wellness Visit         HPI:      Rosi Dominguez is a 80 y.o. male. Retired merchandising and sales. Formerly living in [facility]. Now resides in [place]. April 2017:  Dysarthria and left sided weakness with nearly complete resolution. Still has some difficulty with \"S\" sound and with left leg weakness. Surgery: appendix, and multiple hernias       He is treated for hyperlipidemia. Denies side effects from atorvastatin. New Issues:  SAWV    No Known Allergies    Current Outpatient Prescriptions   Medication Sig    pantoprazole (PROTONIX) 20 mg tablet Take 1 Tab by mouth daily.  clopidogrel (PLAVIX) 75 mg tab TAKE 1 TABLET BY MOUTH DAILY    erythromycin (ILOTYCIN) ophthalmic ointment APPLY ONE APPLICATION IN BOTH EYES NIGHTLY    cyanocobalamin (VITAMIN B-12) 100 mcg tablet Take 100 mcg by mouth daily.  pyridoxine (VITAMIN B-6) 100 mg tablet Take 100 mg by mouth daily.  dicyclomine (BENTYL) 10 mg capsule Take 10 mg by mouth two (2) times a day.  omega-3 fatty acids-vitamin e (FISH OIL) 1,000 mg cap Take 1 Cap by mouth daily. Indications: 1200mg capsule    multivitamin (ONE A DAY) tablet Take 1 Tab by mouth daily.  traMADol (ULTRAM) 50 mg tablet Take 50 mg by mouth every six (6) hours as needed for Pain.  atorvastatin (LIPITOR) 40 mg tablet Take 1 Tab by mouth daily. No current facility-administered medications for this visit.         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    Dyslipidemia 7/13/2017    Holding livalo secondary to weakness, follow up FLP    Dyspepsia and other specified disorders of function of stomach     Easy bruising     Fatigue     Frequent urination     Gastroesophageal reflux disease 7/13/2017    GERD (gastroesophageal reflux disease)     Heart murmur 7/13/2017    mitral murmur, hx of dyspnea,get ECHO for evaluation    History of prostate cancer 7/13/2017    Other ill-defined conditions(799.89)     high cholesterol    Palpitation 7/13/2017    Preop examination 7/13/2017    Prostate cancer ([address] 7/13/2017    Shortness of breath     Shoulder pain 7/13/2017    TIA (transient ischemic attack) 04/2017    Tobacco use 7/13/2017    Ventral hernia 7/13/2017    Concern of course is possibility that he could develop a strangulated small bowel wit this ventral hernia, especially now that it is sympotmatic, will refer to surgery for evaluation         ROS:  Denies fever, chills, cough, chest pain, SOB,  nausea, vomiting, or diarrhea. Denies wt loss, wt gain, hemoptysis, hematochezia or melena. Physical Examination:    /64 (BP 1 Location: Left arm, BP Patient Position: Sitting)  Pulse 78  Resp 16  Ht 5' 10\" (1.778 m)  Wt 195 lb (88.5 kg)  SpO2 94%  BMI 27.98 kg/m2    General: Alert and Ox3, Fluent speech  HEENT:  NC/AT, EOMI, OP: clear  Neck:  Supple, no adenopathy, JVD, mass or bruit  Chest:  Clear to Ausculation, without wheezes, rales, rubs or ronchi  Cardiac: RRR  Abdomen:  +BS, soft, nontender without palpable HSM  Extremities:  No cyanosis, clubbing or edema  Neurologic:  Ambulatory without assist, CN 2-12 grossly intact. Moves all extremities. Skin: no rash  Lymphadenopathy: no cervical or supraclavicular nodes      ASSESSMENT AND PLAN:     1. 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 History of mixed hyperlipidemia with good results with Atorvastatin. 3.  SAWV today    No orders of the defined types were placed in this encounter.       Dalton Martinez MD, FACP        ______________________________________________________________________    Penny Chan is a 80 y.o. male and presents for annual Medicare Wellness Visit. Problem List: Reviewed with patient and discussed risk factors. 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    Stenosis of both internal carotid arteries I65.23    Degenerative arthritis of lumbar spine M47.816    Prostate cancer (HCC) C61    Dyslipidemia E78.5    Gastroesophageal reflux disease K21.9    Cataract H26.9    Preop examination Z01.818    Palpitation R00.2    Ventral hernia K43.9    History of prostate cancer Z85.46    Heart murmur R01.1    Shoulder pain M25.519    Tobacco use Z72.0    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 Male Alcohol Screening: On any occasion during the past 3 months, have you had more than 4 drinks containing alcohol? No    Do you average more than 14 drinks per week? No    ROS:  Constitutional: No fever, chills or weight loss  Respiratory: No cough, SOB   CV: No chest pain or Palpitations    Objective:  Visit Vitals    /64 (BP 1 Location: Left arm, BP Patient Position: Sitting)    Pulse 78    Resp 16    Ht 5' 10\" (1.778 m)    Wt 195 lb (88.5 kg)    SpO2 94%    BMI 27.98 kg/m2    Body mass index is 27.98 kg/(m^2).     Assessment of cognitive impairment: Alert and oriented x 3    Depression Screen:   PHQ over the last two weeks 7/14/2017   Little interest or pleasure in doing things Not at all   Feeling down, depressed or hopeless Not at all   Total Score PHQ 2 0       Fall Risk Assessment:    Fall Risk Assessment, last 12 mths 7/14/2017   Able to walk? Yes   Fall in past 12 months? No       Functional Ability:   Does the patient exhibit a steady gait? yes   How long did it take the patient to get up and walk from a sitting position? 12 sec   Is the patient self reliant?  (ie can do own laundry, meals, household chores)  yes     Does the patient handle his/her own medications? yes     Does the patient handle his/her own money? yes     Is the patients home safe (ie good lighting, handrails on stairs and bath, etc.)? yes     Did you notice or did patient express any hearing difficulties? yes     Did you notice or did patient express any vision difficulties? no       Advance Care Planning:   Patient was offered the opportunity to discuss advance care planning:  yes     Does patient have an Advance Directive:  yes   If no, did you provide information on Caring Connections?  no       Plan:      No orders of the defined types were placed in this encounter. Health Maintenance   Topic Date Due    DTaP/Tdap/Td series (1 - Tdap) [birth year]07/1956    ZOSTER VACCINE AGE 60>  06/07/1995    GLAUCOMA SCREENING Q2Y  08/07/2000    MEDICARE YEARLY EXAM  08/07/2000    Pneumococcal 65+ High/Highest Risk (2 of 2 - PPSV23) 12/05/2017    INFLUENZA AGE 9 TO ADULT  Completed       *Patient verbalized understanding and agreement with the plan. A copy of the After Visit Summary with personalized health plan was given to the patient today.

## 2017-10-20 NOTE — ACP (ADVANCE CARE PLANNING)
In the event that he is unable to speak for himself, please contact Karen Mcfadden at [phone].     Delos Barthel

## 2017-10-20 NOTE — MR AVS SNAPSHOT
Visit Information Date & Time Provider Department Dept. Phone Encounter #  
 10/20/2017  3:30 PM Charbel Valerio [id number] Your Appointments 11/14/2017 10:10 AM  
Follow Up with MD Janelle Monroe [address] ([address]) Appt Note: 4M  
 [address] [address] [address] [address] Upcoming Health Maintenance Date Due DTaP/Tdap/Td series (1 - Tdap) 8/7/1956 ZOSTER VACCINE AGE 60> 6/7/1995 GLAUCOMA SCREENING Q2Y 8/7/2000 MEDICARE YEARLY EXAM 8/7/2000 Pneumococcal 65+ High/Highest Risk (2 of 2 - PPSV23) 12/5/2017 Allergies as of 10/20/2017  Review Complete On: 10/20/2017 By: Danny Smith RN No Known Allergies Current Immunizations  Never Reviewed Name Date Influenza High Dose Vaccine PF 10/10/2017 Pneumococcal Conjugate (PCV-13) 10/10/2017 Not reviewed this visit You Were Diagnosed With   
  
 Codes Comments Medicare annual wellness visit, subsequent    -  Primary ICD-10-CM: Z00.00 ICD-9-CM: V70.0 Vitals BP Pulse Resp Height(growth percentile) Weight(growth percentile) SpO2  
 122/64 (BP 1 Location: Left arm, BP Patient Position: Sitting) 78 16 5' 10\" (1.778 m) 195 lb (88.5 kg) 94% BMI Smoking Status 27.98 kg/m2 Current Every Day Smoker Vitals History BMI and BSA Data Body Mass Index Body Surface Area  
 27.98 kg/m 2 2.09 m 2 Preferred Pharmacy [facility]acy Name Phone [id number] [place] [phone] Your Updated Medication List  
  
   
This list is accurate as of: 10/20/17  3:51 PM.  Always use your most recent med list.  
  
  
  
  
 atorvastatin 40 mg tablet Commonly known as:  LIPITOR Take 1 Tab by mouth daily. clopidogrel 75 mg Tab Commonly known as:  PLAVIX TAKE 1 TABLET BY MOUTH DAILY  
  
 dicyclomine 10 mg capsule Commonly known as:  BENTYL Take 10 mg by mouth two (2) times a day. erythromycin ophthalmic ointment Commonly known as:  ILOTYCIN  
APPLY ONE APPLICATION IN BOTH EYES NIGHTLY FISH OIL 1,000 mg Cap Generic drug:  omega-3 fatty acids-vitamin e Take 1 Cap by mouth daily. Indications: 1200mg capsule  
  
 multivitamin tablet Commonly known as:  ONE A DAY Take 1 Tab by mouth daily. pantoprazole 20 mg tablet Commonly known as:  PROTONIX Take 1 Tab by mouth daily. traMADol 50 mg tablet Commonly known as:  ULTRAM  
Take 50 mg by mouth every six (6) hours as needed for Pain. VITAMIN B-12 100 mcg tablet Generic drug:  cyanocobalamin Take 100 mcg by mouth daily. VITAMIN B-6 100 mg tablet Generic drug:  pyridoxine (vitamin B6) Take 100 mg by mouth daily. Patient Instructions If you have any questions regarding [employer], you may call [employer] support at [phone]. Introducing [facility] & HEALTH SERVICES! [facility] introduces [employer] patient portal. Now you can access parts of your medical record, email your doctor's office, and request medication refills online. 1. In your internet browser, go to [email or web address] 2. Click on the First Time User? Click Here link in the Sign In box. You will see the New Member Sign Up page. 3. Enter your [employer] Access Code exactly as it appears below. You will not need to use this code after youve completed the sign-up process. If you do not sign up before the expiration date, you must request a new code. · [employer] Access Code: [email or web address] Expires: 1/18/2018  2:24 PM 
 
4. Enter the last four digits of your Social Security Number (xxxx) and Date of Birth (mm/dd/yyyy) as indicated and click Submit. You will be taken to the next sign-up page. 5. Create a My Visual Brief ID. This will be your My Visual Brief login ID and cannot be changed, so think of one that is secure and easy to remember. 6. Create a My Visual Brief password. You can change your password at any time. 7. Enter your Password Reset Question and Answer. This can be used at a later time if you forget your password. 8. Enter your e-mail address. You will receive e-mail notification when new information is available in [address] 9. Click Sign Up. You can now view and download portions of your medical record. 10. Click the Download Summary menu link to download a portable copy of your medical information. If you have questions, please visit the Frequently Asked Questions section of the My Visual Brief website. Remember, My Visual Brief is NOT to be used for urgent needs. For medical emergencies, dial 911. Now available from your iPhone and Android! Please provide this summary of care documentation to your next provider. Your primary care clinician is listed as [place]. If you have any questions after today's visit, please call [phone].

## 2017-10-20 NOTE — PATIENT INSTRUCTIONS
If you have any questions regarding [employer], you may call [employer] support at [phone].

## 2017-11-03 RX ORDER — ATORVASTATIN CALCIUM 40 MG/1
TABLET, FILM COATED ORAL
Qty: 30 TAB | Refills: 4 | Status: SHIPPED | OUTPATIENT
Start: 2017-11-03 | End: 2018-03-27 | Stop reason: SDUPTHER

## 2018-01-05 ENCOUNTER — TELEPHONE (OUTPATIENT)
Dept: FAMILY MEDICINE CLINIC | Age: 83
End: 2018-01-05

## 2018-01-05 NOTE — TELEPHONE ENCOUNTER
----- Message from Geraldo Paytrail sent at 1/5/2018 11:27 AM EST -----  Regarding: Dr. Edmund Patel  Pt's wife(Mrs. Bassam Cleary) stated pt is having eye surgery on 01/29/18 and requested an appt for a pre op on 01/0918. Pt's wife stated she spoke with the doctor and has agreed to see the pt.   Best contact number [phone](early morning)

## 2018-01-08 RX ORDER — DICYCLOMINE HYDROCHLORIDE 10 MG/1
CAPSULE ORAL
Qty: 360 CAP | Refills: 6 | Status: SHIPPED | OUTPATIENT
Start: 2018-01-08 | End: 2020-05-12

## 2018-01-09 ENCOUNTER — OFFICE VISIT (OUTPATIENT)
Dept: FAMILY MEDICINE CLINIC | Age: 83
End: 2018-01-09

## 2018-01-09 VITALS
HEART RATE: 86 BPM | TEMPERATURE: 98.2 F | OXYGEN SATURATION: 98 % | SYSTOLIC BLOOD PRESSURE: 130 MMHG | WEIGHT: 204 LBS | DIASTOLIC BLOOD PRESSURE: 76 MMHG | RESPIRATION RATE: 16 BRPM | HEIGHT: 70 IN | BODY MASS INDEX: 29.2 KG/M2

## 2018-01-09 DIAGNOSIS — Z01.818 PREOP EXAMINATION: ICD-10-CM

## 2018-01-09 DIAGNOSIS — R00.2 PALPITATION: Primary | ICD-10-CM

## 2018-01-09 DIAGNOSIS — I10 ESSENTIAL HYPERTENSION: ICD-10-CM

## 2018-01-09 NOTE — PROGRESS NOTES
Chief Complaint   Patient presents with    Pre-op Exam         HPI:      Matthew Rankin is a 80 y.o. male. 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. Now resides in [place]. April 2017:  Dysarthria and left sided weakness with nearly complete resolution. Still has some difficulty with \"S\" sound and with left leg weakness. Surgery: appendix, and multiple hernias       He is treated for hyperlipidemia. Denies side effects from atorvastatin. New Issues:  He has a small residual skin cancer which needs resection. Dr Aminata Velazquez has requested a preop medical evaluation prior to removing this lesion. Surgery is scheduled for March 20, 2018. The lesion is on the lateral aspect of the left lower lid    No Known Allergies    Current Outpatient Prescriptions   Medication Sig    VIT A/VIT C/VIT E/ZINC/COPPER (ICAPS AREDS PO) Take  by mouth.  dicyclomine (BENTYL) 10 mg capsule TAKE ONE CAPSULE BY MOUTH 4 TIMES A DAY AS NEEDED    clopidogrel (PLAVIX) 75 mg tab TAKE 1 TABLET BY MOUTH DAILY    erythromycin (ILOTYCIN) ophthalmic ointment APPLY ONE APPLICATION IN BOTH EYES NIGHTLY    cyanocobalamin (VITAMIN B-12) 100 mcg tablet Take 100 mcg by mouth daily.  pyridoxine (VITAMIN B-6) 100 mg tablet Take 100 mg by mouth daily.  omega-3 fatty acids-vitamin e (FISH OIL) 1,000 mg cap Take 1 Cap by mouth daily. Indications: 1200mg capsule    multivitamin (ONE A DAY) tablet Take 1 Tab by mouth daily.  atorvastatin (LIPITOR) 40 mg tablet TAKE 1 TABLET BY MOUTH DAILY    pantoprazole (PROTONIX) 20 mg tablet Take 1 Tab by mouth daily.  traMADol (ULTRAM) 50 mg tablet Take 50 mg by mouth every six (6) hours as needed for Pain. No current facility-administered medications for this visit.         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    Dyslipidemia 7/13/2017    Holding livalo secondary to weakness, follow up FLP    Dyspepsia and other specified disorders of function of stomach     Easy bruising     Fatigue     Frequent urination     Gastroesophageal reflux disease 7/13/2017    GERD (gastroesophageal reflux disease)     Heart murmur 7/13/2017    mitral murmur, hx of dyspnea,get ECHO for evaluation    History of prostate cancer 7/13/2017    Other ill-defined conditions(799.89)     high cholesterol    Palpitation 7/13/2017    Preop examination 7/13/2017    Prostate cancer ([facility] 7/13/2017    Shortness of breath     Shoulder pain 7/13/2017    TIA (transient ischemic attack) 04/2017    Tobacco use 7/13/2017    Ventral hernia 7/13/2017    Concern of course is possibility that he could develop a strangulated small bowel wit this ventral hernia, especially now that it is sympotmatic, will refer to surgery for evaluation         ROS:  Denies fever, chills, cough, chest pain, SOB,  nausea, vomiting, or diarrhea. Denies wt loss, wt gain, hemoptysis, hematochezia or melena. Physical Examination:    /76 (BP 1 Location: Left arm, BP Patient Position: Sitting)  Pulse 86  Temp 98.2 °F (36.8 °C) (Oral)   Resp 16  Ht 5' 10\" (1.778 m)  Wt 204 lb (92.5 kg)  SpO2 98%  BMI 29.27 kg/m2    General: Alert and Ox3, Fluent speech  HEENT:  NC/AT, EOMI, OP: clear        Neck:  Supple, no adenopathy, JVD, mass or bruit  Chest:  Clear to Ausculation, without wheezes, rales, rubs or ronchi  Cardiac: RRR  Abdomen:  +BS, soft, nontender without palpable HSM  Extremities:  No cyanosis, clubbing or edema  Neurologic:  Ambulatory without assist, CN 2-12 grossly intact. Moves all extremities. Skin: no rash  Lymphadenopathy: no cervical or supraclavicular nodes      ASSESSMENT AND PLAN:     1.   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 History of mixed hyperlipidemia with good results with Atorvastatin. 3.  Medically stable for surgery    Orders Placed This Encounter    METABOLIC PANEL, COMPREHENSIVE    AMB POC EKG ROUTINE W/ 12 LEADS, INTER & REP     Order Specific Question:   Reason for Exam:     Answer:   pre-op    VIT A/VIT C/VIT E/ZINC/COPPER (ICAPS AREDS PO)     Sig: Take  by mouth.        Cathryn Whalen MD, [address]

## 2018-01-09 NOTE — MR AVS SNAPSHOT
Visit Information Date & Time Provider Department Dept. Phone Encounter #  
 1/9/2018  9:00 AM Bharati Molina, [address] [phone] [id number] Follow-up Instructions Return if symptoms worsen or fail to improve. Follow-up and Disposition History Your Appointments 4/20/2018 10:00 AM  
ESTABLISHED PATIENT with MD Fern Qureshiemilgkendy [address] ([facility]) Appt Note: 6 mo f/u  
 [address] [facility] [address] [facility] [address] [phone]  
  
   
 [address] [address] Upcoming Health Maintenance Date Due DTaP/Tdap/Td series (1 - Tdap) 8/7/1956 ZOSTER VACCINE AGE 60> 6/7/1995 Pneumococcal 65+ High/Highest Risk (2 of 2 - PPSV23) 12/5/2017 MEDICARE YEARLY EXAM 10/21/2018 GLAUCOMA SCREENING Q2Y 6/19/2019 Allergies as of 1/9/2018  Review Complete On: 1/9/2018 By: Bharati Molina MD  
 No Known Allergies Current Immunizations  Never Reviewed Name Date Influenza High Dose Vaccine PF 10/10/2017 Pneumococcal Conjugate (PCV-13) 10/10/2017 Not reviewed this visit You Were Diagnosed With   
  
 Codes Comments Palpitation    -  Primary ICD-10-CM: R00.2 ICD-9-CM: 785.1 Preop examination     ICD-10-CM: V74.469 ICD-9-CM: V72.84 Essential hypertension     ICD-10-CM: I10 
ICD-9-CM: 401.9 Vitals BP Pulse Temp Resp Height(growth percentile) Weight(growth percentile) 130/76 (BP 1 Location: Left arm, BP Patient Position: Sitting) 86 98.2 °F (36.8 °C) (Oral) 16 5' 10\" (1.778 m) 204 lb (92.5 kg) SpO2 BMI Smoking Status 98% 29.27 kg/m2 Current Every Day Smoker BMI and BSA Data Body Mass Index Body Surface Area  
 29.27 kg/m 2 2.14 m 2 Preferred Pharmacy Pharmacy Name Phone [id number] [address] [address] [place] [phone] Your Updated Medication List  
  
   
 This list is accurate as of: 1/9/18 10:15 AM.  Always use your most recent med list.  
  
  
  
  
 atorvastatin 40 mg tablet Commonly known as:  LIPITOR  
TAKE 1 TABLET BY MOUTH DAILY  
  
 clopidogrel 75 mg Tab Commonly known as:  PLAVIX TAKE 1 TABLET BY MOUTH DAILY  
  
 dicyclomine 10 mg capsule Commonly known as:  BENTYL TAKE ONE CAPSULE BY MOUTH 4 TIMES A DAY AS NEEDED  
  
 erythromycin ophthalmic ointment Commonly known as:  ILOTYCIN  
APPLY ONE APPLICATION IN BOTH EYES NIGHTLY FISH OIL 1,000 mg Cap Generic drug:  omega-3 fatty acids-vitamin e Take 1 Cap by mouth daily. Indications: 1200mg capsule ICAPS AREDS PO Take  by mouth.  
  
 multivitamin tablet Commonly known as:  ONE A DAY Take 1 Tab by mouth daily. pantoprazole 20 mg tablet Commonly known as:  PROTONIX Take 1 Tab by mouth daily. traMADol 50 mg tablet Commonly known as:  ULTRAM  
Take 50 mg by mouth every six (6) hours as needed for Pain. VITAMIN B-12 100 mcg tablet Generic drug:  cyanocobalamin Take 100 mcg by mouth daily. VITAMIN B-6 100 mg tablet Generic drug:  pyridoxine (vitamin B6) Take 100 mg by mouth daily. We Performed the Following AMB POC EKG ROUTINE W/ 12 LEADS, INTER & REP [02805 CPT(R)] METABOLIC PANEL, COMPREHENSIVE [40869 CPT(R)] Follow-up Instructions Return if symptoms worsen or fail to improve. Introducing [facility] & HEALTH SERVICES! Alice Kauffman introduces [employer] patient portal. Now you can access parts of your medical record, email your doctor's office, and request medication refills online. 1. In your internet browser, go to [email or web address] 2. Click on the First Time User? Click Here link in the Sign In box. You will see the New Member Sign Up page. 3. Enter your [employer] Access Code exactly as it appears below. You will not need to use this code after youve completed the sign-up process.  If you do not sign up before the expiration date, you must request a new code. · [employer] Access Code: [email or web address] Expires: 1/18/2018  1:24 PM 
 
4. Enter the last four digits of your Social Security Number (xxxx) and Date of Birth (mm/dd/yyyy) as indicated and click Submit. You will be taken to the next sign-up page. 5. Create a Niwa ID. This will be your Niwa login ID and cannot be changed, so think of one that is secure and easy to remember. 6. Create a Niwa password. You can change your password at any time. 7. Enter your Password Reset Question and Answer. This can be used at a later time if you forget your password. 8. Enter your e-mail address. You will receive e-mail notification when new information is available in [address] 9. Click Sign Up. You can now view and download portions of your medical record. 10. Click the Download Summary menu link to download a portable copy of your medical information. If you have questions, please visit the Frequently Asked Questions section of the [employer] website. Remember, [employer]wa is NOT to be used for urgent needs. For medical emergencies, dial 911. Now available from your iPhone and Android! Please provide this summary of care documentation to your next provider. Your primary care clinician is listed as Andrea Alvarado. If you have any questions after today's visit, please call [phone].

## 2018-01-10 LAB
ALBUMIN SERPL-MCNC: 4.5 G/DL (ref 3.5–4.7)
ALBUMIN/GLOB SERPL: 1.6 {RATIO} (ref 1.2–2.2)
ALP SERPL-CCNC: 105 IU/L (ref 39–117)
ALT SERPL-CCNC: 27 IU/L (ref 0–44)
AST SERPL-CCNC: 24 IU/L (ref 0–40)
BILIRUB SERPL-MCNC: 0.6 MG/DL (ref 0–1.2)
BUN SERPL-MCNC: 18 MG/DL (ref 8–27)
BUN/CREAT SERPL: 15 (ref 10–24)
CALCIUM SERPL-MCNC: 9.9 MG/DL (ref 8.6–10.2)
CHLORIDE SERPL-SCNC: 102 MMOL/L (ref 96–106)
CO2 SERPL-SCNC: 26 MMOL/L (ref 18–29)
CREAT SERPL-MCNC: 1.2 MG/DL (ref 0.76–1.27)
GLOBULIN SER CALC-MCNC: 2.9 G/DL (ref 1.5–4.5)
GLUCOSE SERPL-MCNC: 82 MG/DL (ref 65–99)
INTERPRETATION: NORMAL
POTASSIUM SERPL-SCNC: 4.7 MMOL/L (ref 3.5–5.2)
PROT SERPL-MCNC: 7.4 G/DL (ref 6–8.5)
SODIUM SERPL-SCNC: 145 MMOL/L (ref 134–144)

## 2018-02-15 ENCOUNTER — TELEPHONE (OUTPATIENT)
Dept: FAMILY MEDICINE CLINIC | Age: 83
End: 2018-02-15

## 2018-02-15 NOTE — TELEPHONE ENCOUNTER
Please call [phone]  Re: pt fell hurt hip thinks he may have fracture  Please advise on what to do

## 2018-03-01 ENCOUNTER — OFFICE VISIT (OUTPATIENT)
Dept: FAMILY MEDICINE CLINIC | Age: 83
End: 2018-03-01

## 2018-03-01 VITALS
HEART RATE: 84 BPM | HEIGHT: 70 IN | SYSTOLIC BLOOD PRESSURE: 122 MMHG | RESPIRATION RATE: 21 BRPM | TEMPERATURE: 97.5 F | DIASTOLIC BLOOD PRESSURE: 70 MMHG | WEIGHT: 206 LBS | OXYGEN SATURATION: 95 % | BODY MASS INDEX: 29.49 KG/M2

## 2018-03-01 DIAGNOSIS — R05.9 COUGH: ICD-10-CM

## 2018-03-01 DIAGNOSIS — J40 BRONCHITIS: Primary | ICD-10-CM

## 2018-03-01 RX ORDER — AZITHROMYCIN 250 MG/1
TABLET, FILM COATED ORAL
Qty: 6 TAB | Refills: 0 | Status: SHIPPED | OUTPATIENT
Start: 2018-03-01 | End: 2018-03-06

## 2018-03-01 NOTE — MR AVS SNAPSHOT
[facility] 
 
 
 [address] [address] [phone] Patient: Elias Rondon MRN: [id number] Visit Information Date & Time Provider Department Dept. Phone Encounter #  
 3/1/2018 11:30 AM Kirsty Rosales NP [address] [place] [address] [id number] Your Appointments 3/19/2018  3:00 PM  
ESTABLISHED PATIENT with Dawson Bal MD  
[address] ([facility]) Appt Note: cortisone shot  
 [address]  
[phone]  
  
   
 [address] [address] [facility] [address]  
  
    
 4/20/2018 10:00 AM  
ESTABLISHED PATIENT with Dawson Bal MD  
[place] [address] ([address]) Appt Note: 6 mo f/u  
 [address] [address] [phone]  
  
   
 [address] [address] Upcoming Health Maintenance Date Due DTaP/Tdap/Td series (1 - Tdap) 8/7/1956 ZOSTER VACCINE AGE 60> 6/7/1995 Pneumococcal 65+ High/Highest Risk (2 of 2 - PPSV23) 12/5/2017 MEDICARE YEARLY EXAM 10/21/2018 GLAUCOMA SCREENING Q2Y 6/19/2019 Allergies as of 3/1/2018  Review Complete On: 3/1/2018 By: Kirsty Rosales NP No Known Allergies Current Immunizations  Never Reviewed Name Date Influenza High Dose Vaccine PF 10/10/2017 Pneumococcal Conjugate (PCV-13) 10/10/2017 Not reviewed this visit You Were Diagnosed With   
  
 Codes Comments Bronchitis    -  Primary ICD-10-CM: E34 ICD-9-CM: 121 Cough     ICD-10-CM: R05 ICD-9-CM: 082. 2 Vitals BP Pulse Temp Resp Height(growth percentile) Weight(growth percentile) 122/70 (BP 1 Location: Left arm, BP Patient Position: Sitting) 84 97.5 °F (36.4 °C) (Oral) 21 5' 10\" (1.778 m) 206 lb (93.4 kg) SpO2 BMI Smoking Status 95% 29.56 kg/m2 Current Every Day Smoker Vitals History BMI and BSA Data Body Mass Index Body Surface Area  
 29.56 kg/m 2 2.15 m 2 Preferred Pharmacy Pharmacy Name Phone [facility]/PHARMACY #[id number], [address] [facility] [phone] Your Updated Medication List  
  
   
This list is accurate as of 3/1/18 12:14 PM.  Always use your most recent med list.  
  
  
  
  
 atorvastatin 40 mg tablet Commonly known as:  LIPITOR  
TAKE 1 TABLET BY MOUTH DAILY  
  
 azithromycin 250 mg tablet Commonly known as:  Elza Martinis Take 2 tablets today, then take 1 tablet daily  
  
 clopidogrel 75 mg Tab Commonly known as:  PLAVIX TAKE 1 TABLET BY MOUTH DAILY  
  
 dicyclomine 10 mg capsule Commonly known as:  BENTYL TAKE ONE CAPSULE BY MOUTH 4 TIMES A DAY AS NEEDED  
  
 erythromycin ophthalmic ointment Commonly known as:  ILOTYCIN  
APPLY ONE APPLICATION IN BOTH EYES NIGHTLY FISH OIL 1,000 mg Cap Generic drug:  omega-3 fatty acids-vitamin e Take 1 Cap by mouth daily. Indications: 1200mg capsule ICAPS AREDS PO Take  by mouth.  
  
 multivitamin tablet Commonly known as:  ONE A DAY Take 1 Tab by mouth daily. pantoprazole 20 mg tablet Commonly known as:  PROTONIX Take 1 Tab by mouth daily. VITAMIN B-12 100 mcg tablet Generic drug:  cyanocobalamin Take 100 mcg by mouth daily. VITAMIN B-6 100 mg tablet Generic drug:  pyridoxine (vitamin B6) Take 100 mg by mouth daily. Prescriptions Sent to Pharmacy Refills  
 azithromycin (ZITHROMAX) 250 mg tablet 0 Sig: Take 2 tablets today, then take 1 tablet daily Class: Normal  
 Pharmacy: [facility]/pharmacy #[id number] [address], [address] [facility] Ph #: [phone] Introducing [facility] [place] [facility]! Richi Trevino introduces [employer] patient portal. Now you can access parts of your medical record, email your doctor's office, and request medication refills online.    
 
1. In your internet browser, go to [email or web address] 2. Click on the First Time User? Click Here link in the Sign In box. You will see the New Member Sign Up page. 3. Enter your [employer] Access Code exactly as it appears below. You will not need to use this code after youve completed the sign-up process. If you do not sign up before the expiration date, you must request a new code. · [employer] Access Code: [email or web address] Expires: 5/30/2018 11:17 AM 
 
4. Enter the last four digits of your Social Security Number (xxxx) and Date of Birth (mm/dd/yyyy) as indicated and click Submit. You will be taken to the next sign-up page. 5. Create a V[employer] ID. This will be your [employer] login ID and cannot be changed, so think of one that is secure and easy to remember. 6. Create a [employer] password. You can change your password at any time. 7. Enter your Password Reset Question and Answer. This can be used at a later time if you forget your password. 8. Enter your e-mail address. You will receive e-mail notification when new information is available in [address] 9. Click Sign Up. You can now view and download portions of your medical record. 10. Click the Download Summary menu link to download a portable copy of your medical information. If you have questions, please visit the Frequently Asked Questions section of the [employer] website. Remember, [employer] is NOT to be used for urgent needs. For medical emergencies, dial 911. Now available from your iPhone and Android! Please provide this summary of care documentation to your next provider. Your primary care clinician is listed as Miky Vincent. If you have any questions after today's visit, please call [phone].

## 2018-03-01 NOTE — PATIENT INSTRUCTIONS
Bronchitis: Care Instructions  Your Care Instructions    Bronchitis is inflammation of the bronchial tubes, which carry air to the lungs. The tubes swell and produce mucus, or phlegm. The mucus and inflamed bronchial tubes make you cough. You may have trouble breathing. Most cases of bronchitis are caused by viruses like those that cause colds. Antibiotics usually do not help and they may be harmful. Bronchitis usually develops rapidly and lasts about 2 to 3 weeks in otherwise healthy people. Follow-up care is a key part of your treatment and safety. Be sure to make and go to all appointments, and call your doctor if you are having problems. It's also a good idea to know your test results and keep a list of the medicines you take. How can you care for yourself at home? · Take all medicines exactly as prescribed. Call your doctor if you think you are having a problem with your medicine. · Get some extra rest.  · Take an over-the-counter pain medicine, such as acetaminophen (Tylenol), ibuprofen (Advil, Motrin), or naproxen (Aleve) to reduce fever and relieve body aches. Read and follow all instructions on the label. · Do not take two or more pain medicines at the same time unless the doctor told you to. Many pain medicines have acetaminophen, which is Tylenol. Too much acetaminophen (Tylenol) can be harmful. · Take an over-the-counter cough medicine that contains dextromethorphan to help quiet a dry, hacking cough so that you can sleep. Avoid cough medicines that have more than one active ingredient. Read and follow all instructions on the label. · Breathe moist air from a humidifier, hot shower, or sink filled with hot water. The heat and moisture will thin mucus so you can cough it out. · Do not smoke. Smoking can make bronchitis worse. If you need help quitting, talk to your doctor about stop-smoking programs and medicines. These can increase your chances of quitting for good.   When should you call for help? Call 911 anytime you think you may need emergency care. For example, call if:  ? · You have severe trouble breathing. ?Call your doctor now or seek immediate medical care if:  ? · You have new or worse trouble breathing. ? · You cough up dark brown or bloody mucus (sputum). ? · You have a new or higher fever. ? · You have a new rash. ? Watch closely for changes in your health, and be sure to contact your doctor if:  ? · You cough more deeply or more often, especially if you notice more mucus or a change in the color of your mucus. ? · You are not getting better as expected. Where can you learn more? Go to http://anna-jaime.info/. Enter H333 in the search box to learn more about \"Bronchitis: Care Instructions. \"  Current as of: May 12, 2017  Content Version: 11.4  © 9873-2845 [employer]. Care instructions adapted under license by [employer] (which disclaims liability or warranty for this information). If you have questions about a medical condition or this instruction, always ask your healthcare professional. [address] any warranty or liability for your use of this information.

## 2018-03-01 NOTE — PROGRESS NOTES
Chief Complaint   Patient presents with    Cold Symptoms     sinus pressure, headache, ear pain for the past few weeks. HPI:      Nichole Sánchez is a 80 y.o. male. New Issues:  He has had cold symptoms for the past 6 weeks. Has been complaining of coughing, sinus pressure, headache and ear pain. He has tried Mucinex DM and some allergy pills. He had 2 steroid injections yesterday. No Known Allergies    Current Outpatient Prescriptions   Medication Sig    VIT A/VIT C/VIT E/ZINC/COPPER (ICAPS AREDS PO) Take  by mouth.  dicyclomine (BENTYL) 10 mg capsule TAKE ONE CAPSULE BY MOUTH 4 TIMES A DAY AS NEEDED    atorvastatin (LIPITOR) 40 mg tablet TAKE 1 TABLET BY MOUTH DAILY    pantoprazole (PROTONIX) 20 mg tablet Take 1 Tab by mouth daily.  clopidogrel (PLAVIX) 75 mg tab TAKE 1 TABLET BY MOUTH DAILY    erythromycin (ILOTYCIN) ophthalmic ointment APPLY ONE APPLICATION IN BOTH EYES NIGHTLY    cyanocobalamin (VITAMIN B-12) 100 mcg tablet Take 100 mcg by mouth daily.  pyridoxine (VITAMIN B-6) 100 mg tablet Take 100 mg by mouth daily.  omega-3 fatty acids-vitamin e (FISH OIL) 1,000 mg cap Take 1 Cap by mouth daily. Indications: 1200mg capsule    multivitamin (ONE A DAY) tablet Take 1 Tab by mouth daily. No current facility-administered medications for this visit.         Past Medical History:   Diagnosis Date    Abdominal pain     Arthritis     shoulders, arms    Cancer [facility])     prostate    Cataract 7/13/2017    Low perioperative risk for elective procedure, no further risk stratification needed    Cough     Degenerative arthritis of lumbar spine 7/13/2017    Dyslipidemia 7/13/2017    Holding livalo secondary to weakness, follow up FLP    Dyspepsia and other specified disorders of function of stomach     Easy bruising     Fatigue     Frequent urination     Gastroesophageal reflux disease 7/13/2017    GERD (gastroesophageal reflux disease)     Heart murmur 7/13/2017 mitral murmur, hx of dyspnea,get ECHO for evaluation    History of prostate cancer 7/13/2017    Other ill-defined conditions(799.89)     high cholesterol    Palpitation 7/13/2017    Preop examination 7/13/2017    Prostate cancer ([address] 7/13/2017    Shortness of breath     Shoulder pain 7/13/2017    TIA (transient ischemic attack) 04/2017    Tobacco use 7/13/2017    Ventral hernia 7/13/2017    Concern of course is possibility that he could develop a strangulated small bowel wit this ventral hernia, especially now that it is sympotmatic, will refer to surgery for evaluation       Past Surgical History:   Procedure Laterality Date    HX HERNIA REPAIR  1996    inguinal R&L    HX HERNIA REPAIR  2013    abd hernia     HX HERNIA REPAIR  2014    Laparoscopic recurrent incisional hernia repair with mesh.   Robot assisted    HX PROSTATECTOMY      HX SMALL BOWEL RESECTION  1996    HX UROLOGICAL      prostate  removed    HX UROLOGICAL      adhesion repair    [place] EGD TRANSORAL BIOPSY SINGLE/MULTIPLE  12/5/2012            Social History     Social History    Marital status:      Spouse name: N/A    Number of children: N/A    Years of education: N/A     Social History Main Topics    Smoking status: Current Every Day Smoker     Packs/day: 0.25     Years: 60.00    Smokeless tobacco: Never Used      Comment: smokes pipe daily    Alcohol use Yes      Comment: socially    Drug use: No    Sexual activity: Not Asked     Other Topics Concern     Service Yes    Blood Transfusions No    Caffeine Concern No    Occupational Exposure No    Hobby Hazards No    Sleep Concern No    Stress Concern No    Weight Concern No    Special Diet No    Back Care No    Exercise Yes    Bike Helmet No    Seat Belt Yes    Self-Exams Yes     Social History Narrative       Family History   Problem Relation Age of Onset    Heart Disease Mother     Heart Disease Father     Heart Disease Brother        Above history reviewed. ROS:  Denies fever, chills, POS cough, denies chest pain, SOB,  nausea, vomiting, or diarrhea. Denies wt loss, wt gain, hemoptysis, hematochezia or melena. Physical Examination:    /70 (BP 1 Location: Left arm, BP Patient Position: Sitting)  Pulse 84  Temp 97.5 °F (36.4 °C) (Oral)   Resp 21  Ht 5' 10\" (1.778 m)  Wt 206 lb (93.4 kg)  SpO2 95%  BMI 29.56 kg/m2    General: Alert and Ox3, Fluent speech  HEENT:  PERRLA, EOM intact, TMs, turbinates, pharynx normal.  No thyromegaly. No cervical adenopathy. Neck:  Supple, no adenopathy, JVD, mass or bruit  Chest:  Clear to Ausculation, without wheezes, rales, rubs or ronchi  Cardiac: RRR, murmur  Extremities:  No cyanosis, clubbing or edema  Neurologic:  Ambulatory without assist, CN 2-12 grossly intact. Moves all extremities. Skin: no rash  Lymphadenopathy: no cervical or supraclavicular nodes    ASSESSMENT AND PLAN:     1. Bronchitis  Reviewed self care measures:  Fluids  Nasal Saline  Humidification + menthol petroleum (Vicks.)  Postural drainage  NSAID of choice PRN  Avoid decongestants, too drying and difficult to clear respiratory secretions. - azithromycin (ZITHROMAX) 250 mg tablet; Take 2 tablets today, then take 1 tablet daily  Dispense: 6 Tab; Refill: 0    2. Cough  Continue Mucinex DM  This symptom will take the longest to resolve.       RTC PRN, if not improved next week, will consider CXR    Jessica Purvis NP

## 2018-03-19 ENCOUNTER — OFFICE VISIT (OUTPATIENT)
Dept: FAMILY MEDICINE CLINIC | Age: 83
End: 2018-03-19

## 2018-03-19 VITALS
WEIGHT: 201 LBS | TEMPERATURE: 97 F | BODY MASS INDEX: 28.77 KG/M2 | SYSTOLIC BLOOD PRESSURE: 138 MMHG | OXYGEN SATURATION: 95 % | HEIGHT: 70 IN | DIASTOLIC BLOOD PRESSURE: 70 MMHG | HEART RATE: 97 BPM | RESPIRATION RATE: 16 BRPM

## 2018-03-19 DIAGNOSIS — R05.8 POST-VIRAL COUGH SYNDROME: Primary | ICD-10-CM

## 2018-03-19 RX ORDER — PREDNISONE 10 MG/1
10 TABLET ORAL
Qty: 5 TAB | Refills: 1 | Status: SHIPPED | OUTPATIENT
Start: 2018-03-19 | End: 2018-04-20 | Stop reason: ALTCHOICE

## 2018-03-19 NOTE — MR AVS SNAPSHOT
Judi Burris 
 
 
 [address] 
[phone] Patient: Patricia Fischer MRN: [id number] Visit Information Date & Time Provider Department Dept. Phone Encounter #  
 3/19/2018  3:00 PM [place]ore BurrellLoc [place] [phone] [id number] Follow-up Instructions Return if symptoms worsen or fail to improve. Follow-up and Disposition History Your Appointments 4/20/2018 10:00 AM  
ESTABLISHED PATIENT with MD [address] ([facility]) Appt Note: 6 mo f/u  
 1000 [facility] [address] [facility],5Th Floor [id number] [phone]  
  
   
 1000 [address] [address] Upcoming Health Maintenance Date Due Pneumococcal 65+ High/Highest Risk (2 of 2 - PPSV23) 10/31/2018* MEDICARE YEARLY EXAM 10/21/2018 GLAUCOMA SCREENING Q2Y 6/19/2019 DTaP/Tdap/Td series (2 - Td) 3/19/2028 *Topic was postponed. The date shown is not the original due date. Allergies as of 3/19/2018  Review Complete On: 3/19/2018 By: Kishore Burrell MD  
 No Known Allergies Current Immunizations  Never Reviewed Name Date Influenza High Dose Vaccine PF 10/10/2017 Pneumococcal Conjugate (PCV-13) 10/10/2017 Not reviewed this visit You Were Diagnosed With   
  
 Codes Comments Post-viral cough syndrome    -  Primary ICD-10-CM: D26 ICD-9-CM: 638. 2 Vitals BP Pulse Temp Resp Height(growth percentile) Weight(growth percentile) 138/70 (BP 1 Location: Left arm, BP Patient Position: Sitting) 97 97 °F (36.1 °C) (Temporal) 16 5' 10\" (1.778 m) 201 lb (91.2 kg) SpO2 BMI Smoking Status 95% 28.84 kg/m2 Current Every Day Smoker Vitals History BMI and BSA Data Body Mass Index Body Surface Area  
 28.84 kg/m 2 2.12 m 2 Preferred Pharmacy Pharmacy Name Phone [facility]/PHARMACY #[id number] [facility] [phone] Your Updated Medication List  
  
   
This list is accurate as of 3/19/18  3:34 PM.  Always use your most recent med list.  
  
  
  
  
 atorvastatin 40 mg tablet Commonly known as:  LIPITOR  
TAKE 1 TABLET BY MOUTH DAILY  
  
 clopidogrel 75 mg Tab Commonly known as:  PLAVIX TAKE 1 TABLET BY MOUTH DAILY  
  
 dicyclomine 10 mg capsule Commonly known as:  BENTYL TAKE ONE CAPSULE BY MOUTH 4 TIMES A DAY AS NEEDED  
  
 erythromycin ophthalmic ointment Commonly known as:  ILOTYCIN  
APPLY ONE APPLICATION IN BOTH EYES NIGHTLY FISH OIL 1,000 mg Cap Generic drug:  omega-3 fatty acids-vitamin e Take 1 Cap by mouth daily. Indications: 1200mg capsule ICAPS AREDS PO Take  by mouth.  
  
 multivitamin tablet Commonly known as:  ONE A DAY Take 1 Tab by mouth daily. pantoprazole 20 mg tablet Commonly known as:  PROTONIX Take 1 Tab by mouth daily. predniSONE 10 mg tablet Commonly known as:  Prudence Maxon Take 1 Tab by mouth daily (with breakfast). VITAMIN B-12 100 mcg tablet Generic drug:  cyanocobalamin Take 100 mcg by mouth daily. VITAMIN B-6 100 mg tablet Generic drug:  pyridoxine (vitamin B6) Take 100 mg by mouth daily. Prescriptions Sent to Pharmacy Refills  
 predniSONE (DELTASONE) 10 mg tablet 1 Sig: Take 1 Tab by mouth daily (with breakfast). Class: Normal  
 Pharmacy: [facility]/pharmacy #[id number] [facility] [facility] [place] Ph #: [phone] Route: Oral  
  
Follow-up Instructions Return if symptoms worsen or fail to improve. To-Do List   
 Around 03/21/2018 Imaging:  XR CHEST PA LAT Introducing [address]! [facility] introduces [employer] patient portal. Now you can access parts of your medical record, email your doctor's office, and request medication refills online. 1. In your internet browser, go to [email or web address] 2. Click on the First Time User? Click Here link in the Sign In box. You will see the New Member Sign Up page. 3. Enter your [employer] Access Code exactly as it appears below. You will not need to use this code after youve completed the sign-up process. If you do not sign up before the expiration date, you must request a new code. · [employer] Access Code: [email or web address] Expires: 5/30/2018 12:17 PM 
 
4. Enter the last four digits of your Social Security Number (xxxx) and Date of Birth (mm/dd/yyyy) as indicated and click Submit. You will be taken to the next sign-up page. 5. Create a [employer] techn[employer] ID. This will be your [employer] login ID and cannot be changed, so think of one that is secure and easy to remember. 6. Create a [employer] password. You can change your password at any time. 7. Enter your Password Reset Question and Answer. This can be used at a later time if you forget your password. 8. Enter your e-mail address. You will receive e-mail notification when new information is available in [address] 9. Click Sign Up. You can now view and download portions of your medical record. 10. Click the Download Summary menu link to download a portable copy of your medical information. If you have questions, please visit the Frequently Asked Questions section of the [employer] website. Remember, [employer] is NOT to be used for urgent needs. For medical emergencies, dial 911. Now available from your iPhone and Android! Please provide this summary of care documentation to your next provider. Your primary care clinician is listed as Sunday Amara. If you have any questions after today's visit, please call [phone].

## 2018-03-19 NOTE — PROGRESS NOTES
Chief Complaint   Patient presents with    Cough     chest & nasal congestion         HPI:       is a 80 y.o. male seen 3/1/18 for Bronchitis and treated with Azithromycin and Mucinex. Still coughing mostly in the morning. Some cough in the middle of the day. Notes early am nosebleeds. No fever, chills or rigors. Complains of feeling short of breath. No chest pain, pressure or tightness. Smokes a pipe only. No cigarettes. Complains of bladder leakage since prostatectomy in 1995. Worse with coughing. No Known Allergies    Current Outpatient Prescriptions   Medication Sig    VIT A/VIT C/VIT E/ZINC/COPPER (ICAPS AREDS PO) Take  by mouth.  dicyclomine (BENTYL) 10 mg capsule TAKE ONE CAPSULE BY MOUTH 4 TIMES A DAY AS NEEDED    atorvastatin (LIPITOR) 40 mg tablet TAKE 1 TABLET BY MOUTH DAILY    pantoprazole (PROTONIX) 20 mg tablet Take 1 Tab by mouth daily.  clopidogrel (PLAVIX) 75 mg tab TAKE 1 TABLET BY MOUTH DAILY    cyanocobalamin (VITAMIN B-12) 100 mcg tablet Take 100 mcg by mouth daily.  pyridoxine (VITAMIN B-6) 100 mg tablet Take 100 mg by mouth daily.  omega-3 fatty acids-vitamin e (FISH OIL) 1,000 mg cap Take 1 Cap by mouth daily. Indications: 1200mg capsule    multivitamin (ONE A DAY) tablet Take 1 Tab by mouth daily.  erythromycin (ILOTYCIN) ophthalmic ointment APPLY ONE APPLICATION IN BOTH EYES NIGHTLY     No current facility-administered medications for this visit.         Past Medical History:   Diagnosis Date    Abdominal pain     Arthritis     shoulders, arms    Cancer [facility])     prostate    Cataract 7/13/2017    Low perioperative risk for elective procedure, no further risk stratification needed    Cough     Degenerative arthritis of lumbar spine 7/13/2017    Dyslipidemia 7/13/2017    Holding livalo secondary to weakness, follow up FLP    Dyspepsia and other specified disorders of function of stomach     Easy bruising     Fatigue     Frequent urination     Gastroesophageal reflux disease 7/13/2017    GERD (gastroesophageal reflux disease)     Heart murmur 7/13/2017    mitral murmur, hx of dyspnea,get ECHO for evaluation    History of prostate cancer 7/13/2017    Other ill-defined conditions(799.89)     high cholesterol    Palpitation 7/13/2017    Preop examination 7/13/2017    Prostate cancer ([address] 7/13/2017    Shortness of breath     Shoulder pain 7/13/2017    TIA (transient ischemic attack) 04/2017    Tobacco use 7/13/2017    Ventral hernia 7/13/2017    Concern of course is possibility that he could develop a strangulated small bowel wit this ventral hernia, especially now that it is sympotmatic, will refer to surgery for evaluation         ROS:  Denies fever, chills, cough, chest pain, SOB,  nausea, vomiting, or diarrhea. Denies wt loss, wt gain, hemoptysis, hematochezia or melena. Physical Examination:    /70 (BP 1 Location: Left arm, BP Patient Position: Sitting)  Pulse 97  Temp 97 °F (36.1 °C) (Temporal)   Resp 16  Ht 5' 10\" (1.778 m)  Wt 201 lb (91.2 kg)  SpO2 95%  BMI 28.84 kg/m2    General: Alert and Ox3, Fluent speech  HEENT:  NC/AT, EOMI, OP: clear  Neck:  Supple, no adenopathy, JVD, mass or bruit  Chest:  Clear to Ausculation, without wheezes, rales, rubs or ronchi  Cardiac: RRR  Abdomen:  +BS, soft, nontender without palpable HSM  Extremities:  No cyanosis, clubbing or edema  Neurologic:  Ambulatory without assist, CN 2-12 grossly intact. Moves all extremities. Skin: no rash  Lymphadenopathy: no cervical or supraclavicular nodes      ASSESSMENT AND PLAN:     1. Post viral cough:  Check CXR and start a short prednisone pulse. May use Mucinex if this seems to help  Expect cough to be gone by the first of April. The patient was advised to return to (or contact) the clinic or go to the ER for any ALARM sx such as temp > 101.5, worsening cough, sputum production, confusion or shortness of breath.       No orders of the defined types were placed in this encounter.       Taryn Velasquez MD, [address]

## 2018-03-27 RX ORDER — CLOPIDOGREL BISULFATE 75 MG/1
TABLET ORAL
Qty: 30 TAB | Refills: 6 | Status: SHIPPED | OUTPATIENT
Start: 2018-03-27 | End: 2018-10-29 | Stop reason: SDUPTHER

## 2018-03-27 RX ORDER — ATORVASTATIN CALCIUM 40 MG/1
TABLET, FILM COATED ORAL
Qty: 30 TAB | Refills: 6 | Status: SHIPPED | OUTPATIENT
Start: 2018-03-27 | End: 2018-07-03

## 2018-04-20 ENCOUNTER — OFFICE VISIT (OUTPATIENT)
Dept: FAMILY MEDICINE CLINIC | Age: 83
End: 2018-04-20

## 2018-04-20 VITALS
HEIGHT: 70 IN | DIASTOLIC BLOOD PRESSURE: 68 MMHG | OXYGEN SATURATION: 94 % | WEIGHT: 204.8 LBS | BODY MASS INDEX: 29.32 KG/M2 | RESPIRATION RATE: 16 BRPM | HEART RATE: 82 BPM | SYSTOLIC BLOOD PRESSURE: 120 MMHG

## 2018-04-20 DIAGNOSIS — R06.09 DOE (DYSPNEA ON EXERTION): ICD-10-CM

## 2018-04-20 DIAGNOSIS — C61 PROSTATE CANCER (HCC): ICD-10-CM

## 2018-04-20 DIAGNOSIS — I63.341 CEREBROVASCULAR ACCIDENT (CVA) DUE TO THROMBOSIS OF RIGHT CEREBELLAR ARTERY (HCC): ICD-10-CM

## 2018-04-20 DIAGNOSIS — I10 ESSENTIAL HYPERTENSION: Primary | ICD-10-CM

## 2018-04-20 NOTE — PROGRESS NOTES
Chief Complaint   Patient presents with    Coronary Artery Disease         HPI:       is a 80 y.o. male. Retired merchandising and sales. Formerly living in [facility]. Now resides in [place]. April 2017:  Dysarthria and left sided weakness with nearly complete resolution. Still has some difficulty with \"S\" sound and with left leg weakness. Surgery: appendix, and multiple hernias       He is treated for hyperlipidemia. Denies side effects from atorvastatin. No Known Allergies    Current Outpatient Prescriptions   Medication Sig    clopidogrel (PLAVIX) 75 mg tab TAKE 1 TABLET BY MOUTH DAILY    VIT A/VIT C/VIT E/ZINC/COPPER (ICAPS AREDS PO) Take  by mouth.  dicyclomine (BENTYL) 10 mg capsule TAKE ONE CAPSULE BY MOUTH 4 TIMES A DAY AS NEEDED    pantoprazole (PROTONIX) 20 mg tablet Take 1 Tab by mouth daily.  cyanocobalamin (VITAMIN B-12) 100 mcg tablet Take 100 mcg by mouth daily.  pyridoxine (VITAMIN B-6) 100 mg tablet Take 100 mg by mouth daily.  omega-3 fatty acids-vitamin e (FISH OIL) 1,000 mg cap Take 1 Cap by mouth daily. Indications: 1200mg capsule    multivitamin (ONE A DAY) tablet Take 1 Tab by mouth daily.  atorvastatin (LIPITOR) 40 mg tablet TAKE 1 TABLET BY MOUTH EVERY DAY     No current facility-administered medications for this visit.         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 7/13/2017    Shortness of breath     Shoulder pain 7/13/2017    TIA (transient ischemic attack) 04/2017    Tobacco use 7/13/2017    Ventral hernia 7/13/2017    Concern of course is possibility that he could develop a strangulated small bowel wit this ventral hernia, especially now that it is sympotmatic, will refer to surgery for evaluation         ROS:  Denies fever, chills, cough, chest pain, SOB,  nausea, vomiting, or diarrhea. Denies wt loss, wt gain, hemoptysis, hematochezia or melena. Physical Examination:    /68 (BP 1 Location: Left arm, BP Patient Position: Sitting)  Pulse 82  Resp 16  Ht 5' 10\" (1.778 m)  Wt 204 lb 12.8 oz (92.9 kg)  SpO2 94%  BMI 29.39 kg/m2    General: Alert and Ox3, Fluent speech  HEENT:  NC/AT, EOMI, OP: clear  Neck:  Supple, no adenopathy, JVD, mass or bruit  Chest:  Clear to Ausculation, without wheezes, rales, rubs or ronchi  Cardiac: RRR  Abdomen:  +BS, soft, nontender without palpable HSM  Extremities:  No cyanosis, clubbing or edema  Neurologic:  Ambulatory without assist, CN 2-12 grossly intact. Moves all extremities. Skin: no rash  Lymphadenopathy: no cervical or supraclavicular nodes      ASSESSMENT AND PLAN:     1. 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 History of mixed hyperlipidemia with good results with Atorvastatin. 3.  Still experiencing THOMAS:  Will arrange ECHO and PFT    No orders of the defined types were placed in this encounter.       Laureano Zuñiga MD, [address]

## 2018-04-20 NOTE — PROGRESS NOTES
1. Have you been to the ER, urgent care clinic since your last visit? Hospitalized since your last visit? No    2. Have you seen or consulted any other health care providers outside of the [address] [place] [address] since your last visit? Include any pap smears or colon screening.  No

## 2018-04-20 NOTE — MR AVS SNAPSHOT
Yahaira Seymuor 
 
 
 [address] [facility] [phone] Patient: Joe Brown MRN: [id number] SWQ:[id number] Visit Information Date & Time Provider Department Dept. Phone Encounter #  
 4/20/2018 10:00 AM Arash Stringer MD [address] [place] [address] [id number] Upcoming Health Maintenance Date Due Pneumococcal 65+ High/Highest Risk (2 of 2 - PPSV23) 10/31/2018* MEDICARE YEARLY EXAM 10/21/2018 GLAUCOMA SCREENING Q2Y 6/19/2019 DTaP/Tdap/Td series (2 - Td) 3/19/2028 *Topic was postponed. The date shown is not the original due date. Allergies as of 4/20/2018  Review Complete On: 4/20/2018 By: Arash Stringer MD  
 No Known Allergies Current Immunizations  Never Reviewed Name Date Influenza High Dose Vaccine PF 10/10/2017 Pneumococcal Conjugate (PCV-13) 10/10/2017 Not reviewed this visit You Were Diagnosed With   
  
 Codes Comments Essential hypertension    -  Primary ICD-10-CM: I10 
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 
ICD-9-CM: 786.09 Vitals BP Pulse Resp Height(growth percentile) Weight(growth percentile) SpO2  
 120/68 (BP 1 Location: Left arm, BP Patient Position: Sitting) 82 16 5' 10\" (1.778 m) 204 lb 12.8 oz (92.9 kg) 94% BMI Smoking Status 29.39 kg/m2 Current Every Day Smoker BMI and BSA Data Body Mass Index Body Surface Area  
 29.39 kg/m 2 2.14 m 2 Preferred Pharmacy Pharmacy Name Phone [id number] [phone] Your Updated Medication List  
  
   
This list is accurate as of 4/20/18 11:17 AM.  Always use your most recent med list.  
  
  
  
 atorvastatin 40 mg tablet Commonly known as:  LIPITOR  
TAKE 1 TABLET BY MOUTH EVERY DAY  
  
 clopidogrel 75 mg Tab Commonly known as:  PLAVIX TAKE 1 TABLET BY MOUTH DAILY  
  
 dicyclomine 10 mg capsule Commonly known as:  BENTYL TAKE ONE CAPSULE BY MOUTH 4 TIMES A DAY AS NEEDED FISH OIL 1,000 mg Cap Generic drug:  omega-3 fatty acids-vitamin e Take 1 Cap by mouth daily. Indications: 1200mg capsule ICAPS AREDS PO Take  by mouth.  
  
 multivitamin tablet Commonly known as:  ONE A DAY Take 1 Tab by mouth daily. pantoprazole 20 mg tablet Commonly known as:  PROTONIX Take 1 Tab by mouth daily. VITAMIN B-12 100 mcg tablet Generic drug:  cyanocobalamin Take 100 mcg by mouth daily. VITAMIN B-6 100 mg tablet Generic drug:  pyridoxine (vitamin B6) Take 100 mg by mouth daily. We Performed the Following BNP [id number] CPT(R)] CBC WITH AUTOMATED DIFF [26007 CPT(R)] LIPID PANEL [91592 CPT(R)] METABOLIC PANEL, COMPREHENSIVE [02828 CPT(R)] REFERRAL TO PHYSICAL THERAPY [XEI25 Custom] Comments:  
 Please evaluate patient for strength and balance training. Prefers [facility]. Best contact number [phone] [facility] 3RD GENERATION [00261 CPT(R)] To-Do List   
 04/23/2018 ECHO:  2D ECHO COMPLETE ADULT (TTE) W OR WO CONTR   
  
 04/30/2018 PFT:  PULMONARY FUNCTION TEST Referral Information Referral ID Referred By Referred To  
  
 [id number] Maxine LUKE Not Available Visits Status Start Date End Date 1 New Request 4/20/18 4/20/19 If your referral has a status of pending review or denied, additional information will be sent to support the outcome of this decision. Patient Instructions If you have any questions regarding [employer], you may call [employer] support at [phone]. If you have any questions regarding mychart, you may call [employer] support at [phone]. Introducing [address]! [facility] introduces SolveBoardt patient portal. Now you can access parts of your medical record, email your doctor's office, and request medication refills online. 1. In your internet browser, go to [email or web address] 2. Click on the First Time User? Click Here link in the Sign In box. You will see the New Member Sign Up page. 3. Enter your SpineThera Access Code exactly as it appears below. You will not need to use this code after youve completed the sign-up process. If you do not sign up before the expiration date, you must request a new code. · SpineThera Access Code: [email or web address] Expires: 5/30/2018 12:17 PM 
 
4. Enter the last four digits of your Social Security Number (xxxx) and Date of Birth (mm/dd/yyyy) as indicated and click Submit. You will be taken to the next sign-up page. 5. Create a SpineThera ID. This will be your SpineThera login ID and cannot be changed, so think of one that is secure and easy to remember. 6. Create a SpineThera password. You can change your password at any time. 7. Enter your Password Reset Question and Answer. This can be used at a later time if you forget your password. 8. Enter your e-mail address. You will receive e-mail notification when new information is available in [address] 9. Click Sign Up. You can now view and download portions of your medical record. 10. Click the Download Summary menu link to download a portable copy of your medical information. If you have questions, please visit the Frequently Asked Questions section of the SpineThera website. Remember, SpineThera is NOT to be used for urgent needs. For medical emergencies, dial 911. Now available from your iPhone and Android! Please provide this summary of care documentation to your next provider. Your primary care clinician is listed as Amadeo Norman. If you have any questions after today's visit, please call [phone].

## 2018-04-20 NOTE — PATIENT INSTRUCTIONS
If you have any questions regarding [employer]iate, you may call Netotiate support at [phone]. If you have any questions regarding [employer]iate, you may call Netotiate support at [phone].

## 2018-04-20 NOTE — ACP (ADVANCE CARE PLANNING)
Pt has advance directive at home. Recommended to bring by the clinic for inclusion in chart at patient's convenience.  Discussed 4/20/2018

## 2018-04-21 LAB
ALBUMIN SERPL-MCNC: 4.2 G/DL (ref 3.5–4.7)
ALBUMIN/GLOB SERPL: 1.6 {RATIO} (ref 1.2–2.2)
ALP SERPL-CCNC: 106 IU/L (ref 39–117)
ALT SERPL-CCNC: 35 IU/L (ref 0–44)
AST SERPL-CCNC: 27 IU/L (ref 0–40)
BASOPHILS # BLD AUTO: 0 X10E3/UL (ref 0–0.2)
BASOPHILS NFR BLD AUTO: 0 %
BILIRUB SERPL-MCNC: 0.5 MG/DL (ref 0–1.2)
BNP SERPL-MCNC: 25 PG/ML (ref 0–100)
BUN SERPL-MCNC: 24 MG/DL (ref 8–27)
BUN/CREAT SERPL: 23 (ref 10–24)
CALCIUM SERPL-MCNC: 9.2 MG/DL (ref 8.6–10.2)
CHLORIDE SERPL-SCNC: 102 MMOL/L (ref 96–106)
CHOLEST SERPL-MCNC: 122 MG/DL (ref 100–199)
CO2 SERPL-SCNC: 27 MMOL/L (ref 18–29)
CREAT SERPL-MCNC: 1.06 MG/DL (ref 0.76–1.27)
EOSINOPHIL # BLD AUTO: 0.1 X10E3/UL (ref 0–0.4)
EOSINOPHIL NFR BLD AUTO: 2 %
ERYTHROCYTE [DISTWIDTH] IN BLOOD BY AUTOMATED COUNT: 14.8 % (ref 12.3–15.4)
GFR SERPLBLD CREATININE-BSD FMLA CKD-EPI: 65 ML/MIN/1.73
GFR SERPLBLD CREATININE-BSD FMLA CKD-EPI: 75 ML/MIN/1.73
GLOBULIN SER CALC-MCNC: 2.6 G/DL (ref 1.5–4.5)
GLUCOSE SERPL-MCNC: 84 MG/DL (ref 65–99)
HCT VFR BLD AUTO: 44.7 % (ref 37.5–51)
HDLC SERPL-MCNC: 47 MG/DL
HGB BLD-MCNC: 15 G/DL (ref 13–17.7)
IMM GRANULOCYTES # BLD: 0 X10E3/UL (ref 0–0.1)
IMM GRANULOCYTES NFR BLD: 0 %
LDLC SERPL CALC-MCNC: 52 MG/DL (ref 0–99)
LYMPHOCYTES # BLD AUTO: 1.6 X10E3/UL (ref 0.7–3.1)
LYMPHOCYTES NFR BLD AUTO: 21 %
MCH RBC QN AUTO: 31.1 PG (ref 26.6–33)
MCHC RBC AUTO-ENTMCNC: 33.6 G/DL (ref 31.5–35.7)
MCV RBC AUTO: 93 FL (ref 79–97)
MONOCYTES # BLD AUTO: 0.7 X10E3/UL (ref 0.1–0.9)
MONOCYTES NFR BLD AUTO: 9 %
NEUTROPHILS # BLD AUTO: 5 X10E3/UL (ref 1.4–7)
NEUTROPHILS NFR BLD AUTO: 68 %
PLATELET # BLD AUTO: 181 X10E3/UL (ref 150–379)
POTASSIUM SERPL-SCNC: 4.7 MMOL/L (ref 3.5–5.2)
PROT SERPL-MCNC: 6.8 G/DL (ref 6–8.5)
RBC # BLD AUTO: 4.83 X10E6/UL (ref 4.14–5.8)
SODIUM SERPL-SCNC: 142 MMOL/L (ref 134–144)
TRIGL SERPL-MCNC: 115 MG/DL (ref 0–149)
TSH SERPL DL<=0.005 MIU/L-ACNC: 0.92 UIU/ML (ref 0.45–4.5)
VLDLC SERPL CALC-MCNC: 23 MG/DL (ref 5–40)
WBC # BLD AUTO: 7.4 X10E3/UL (ref 3.4–10.8)

## 2018-05-17 ENCOUNTER — TELEPHONE (OUTPATIENT)
Dept: FAMILY MEDICINE CLINIC | Age: 83
End: 2018-05-17

## 2018-07-03 ENCOUNTER — OFFICE VISIT (OUTPATIENT)
Dept: FAMILY MEDICINE CLINIC | Age: 83
End: 2018-07-03

## 2018-07-03 VITALS
TEMPERATURE: 97.9 F | WEIGHT: 203 LBS | HEIGHT: 70 IN | BODY MASS INDEX: 29.06 KG/M2 | DIASTOLIC BLOOD PRESSURE: 70 MMHG | HEART RATE: 90 BPM | SYSTOLIC BLOOD PRESSURE: 120 MMHG | OXYGEN SATURATION: 96 %

## 2018-07-03 DIAGNOSIS — R30.0 DYSURIA: ICD-10-CM

## 2018-07-03 DIAGNOSIS — J06.9 URI WITH COUGH AND CONGESTION: Primary | ICD-10-CM

## 2018-07-03 DIAGNOSIS — F17.290 PIPE SMOKER: ICD-10-CM

## 2018-07-03 LAB
BILIRUB UR QL STRIP: NEGATIVE
GLUCOSE UR-MCNC: NEGATIVE MG/DL
KETONES P FAST UR STRIP-MCNC: NEGATIVE MG/DL
PH UR STRIP: 6 [PH] (ref 4.6–8)
PROT UR QL STRIP: NEGATIVE
SP GR UR STRIP: 1.01 (ref 1–1.03)
UA UROBILINOGEN AMB POC: NORMAL (ref 0.2–1)
URINALYSIS CLARITY POC: CLEAR
URINALYSIS COLOR POC: YELLOW
URINE BLOOD POC: NORMAL
URINE LEUKOCYTES POC: NORMAL
URINE NITRITES POC: NEGATIVE

## 2018-07-03 RX ORDER — PREDNISONE 10 MG/1
TABLET ORAL
Qty: 21 TAB | Refills: 0 | Status: SHIPPED | OUTPATIENT
Start: 2018-07-03 | End: 2018-08-23

## 2018-07-03 RX ORDER — CIPROFLOXACIN 500 MG/1
500 TABLET ORAL 2 TIMES DAILY
Qty: 20 TAB | Refills: 0 | Status: SHIPPED | OUTPATIENT
Start: 2018-07-03 | End: 2018-07-13

## 2018-07-03 NOTE — PROGRESS NOTES
1. Have you been to the ER, urgent care clinic since your last visit? Hospitalized since your last visit? No    2. Have you seen or consulted any other health care providers outside of the [address] [place] [address] since your last visit? Include any pap smears or colon screening.  Yes When: Dr. Virginia Jarrett 6 weeks ago

## 2018-07-03 NOTE — PATIENT INSTRUCTIONS
Learning About Benefits From Quitting Smoking  How does quitting smoking make you healthier? If you're thinking about quitting smoking, you may have a few reasons to be smoke-free. Your health may be one of them. · When you quit smoking, you lower your risks for cancer, lung disease, heart attack, stroke, blood vessel disease, and blindness from macular degeneration. · When you're smoke-free, you get sick less often, and you heal faster. You are less likely to get colds, flu, bronchitis, and pneumonia. · As a nonsmoker, you may find that your mood is better and you are less stressed. When and how will you feel healthier? Quitting has real health benefits that start from day 1 of being smoke-free. And the longer you stay smoke-free, the healthier you get and the better you feel. The first hours  · After just 20 minutes, your blood pressure and heart rate go down. That means there's less stress on your heart and blood vessels. · Within 12 hours, the level of carbon monoxide in your blood drops back to normal. That makes room for more oxygen. With more oxygen in your body, you may notice that you have more energy than when you smoked. After 2 weeks  · Your lungs start to work better. · Your risk of heart attack starts to drop. After 1 month  · When your lungs are clear, you cough less and breathe deeper, so it's easier to be active. · Your sense of taste and smell return. That means you can enjoy food more than you have since you started smoking. Over the years  · After 1 year, your risk of heart disease is half what it would be if you kept smoking. · After 5 years, your risk of stroke starts to shrink. Within a few years after that, it's about the same as if you'd never smoked. · After 10 years, your risk of dying from lung cancer is cut by about half. And your risk for many other types of cancer is lower too. How would quitting help others in your life?   When you quit smoking, you improve the health of everyone who now breathes in your smoke. · Their heart, lung, and cancer risks drop, much like yours. · They are sick less. For babies and small children, living smoke-free means they're less likely to have ear infections, pneumonia, and bronchitis. · If you're a woman who is or will be pregnant someday, quitting smoking means a healthier . · Children who are close to you are less likely to become adult smokers. Where can you learn more? Go to http://anna-jaime.info/. Enter [id number] [id number] [id number] 11 in the search box to learn more about \"Learning About Benefits From Quitting Smoking. \"  Current as of: 2017  Content Version: 11.4  © 6491-0208 [employer]. Care instructions adapted under license by [employer] (which disclaims liability or warranty for this information). If you have questions about a medical condition or this instruction, always ask your healthcare professional. [place] [address] any warranty or liability for your use of this information. Learning About COPD and Upper Respiratory Infections  What are upper respiratory infections? An upper respiratory infection, also called a URI, is an infection of the nose, sinuses, or throat. Viruses or bacteria can cause URIs. Colds, the flu, and sinusitis are examples of URIs. These infections are spread by coughs, sneezes, and close contact. How do these infections affect COPD? A URI can worsen COPD symptoms, such as having too much mucus in your lungs, coughing, or being short of breath. What can you do to manage most infections at home? · Do not smoke. Avoid secondhand smoke. · Take an over-the-counter pain medicine, such as acetaminophen (Tylenol), ibuprofen (Advil, Motrin), or naproxen (Aleve). Read and follow all instructions on the label. · Be careful when taking over-the-counter cold or flu medicines and Tylenol at the same time.  Many of these medicines have acetaminophen, which is Tylenol. Read the labels to make sure that you are not taking more than the recommended dose. Too much acetaminophen (Tylenol) can be harmful. · If your doctor prescribed antibiotics, take them as directed. Do not stop taking them just because you feel better. You need to take the full course of antibiotics. · Ask your doctor about cough medicines and decongestants. Some doctors recommend these medicines, while others feel that they do not help. · Learn breathing techniques for COPD, such as breathing through pursed lips. These techniques can help you breathe easier. What can you do to prevent these infections? Stay healthy  · Do not smoke. This is the most important step you can take to prevent more damage to your lungs. If you need help quitting, talk to your doctor about stop-smoking programs and medicines. These can increase your chances of quitting for good. · Avoid secondhand smoke, air pollution, and high altitudes. Also avoid cold, dry air and hot, humid air. Stay at home with your windows closed when air pollution is bad. · Get a flu shot every year. · Get a pneumococcal vaccine shot. If you have had one before, ask your doctor whether you need another dose. · If you must be around people with colds or the flu, wash your hands often. Exercise and eat well  · If your doctor recommends it, get more exercise. Walking is a good choice. Bit by bit, increase the amount you walk every day. Try for at least 30 minutes on most days of the week. · Eat regular, well-balanced meals. Eating right keeps your energy levels up and helps your body fight infection. · Get plenty of rest and sleep. Follow-up care is a key part of your treatment and safety. Be sure to make and go to all appointments, and call your doctor if you are having problems. It's also a good idea to know your test results and keep a list of the medicines you take. Where can you learn more?   Go to http://anna-jaime.info/. Enter C871 in the search box to learn more about \"Learning About COPD and Upper Respiratory Infections. \"  Current as of: May 12, 2017  Content Version: 11.4  © 5113-1180 [employer]. Care instructions adapted under license by [employer] (which disclaims liability or warranty for this information). If you have questions about a medical condition or this instruction, always ask your healthcare professional. [address] any warranty or liability for your use of this information. If you have any questions regarding "[employer]t, you may call [employer] support at [phone].

## 2018-07-03 NOTE — PROGRESS NOTES
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 Now resides in [place]. April 2017:  Dysarthria and left sided weakness with nearly complete resolution. Still has some difficulty with \"S\" sound and with left leg weakness. Surgery: appendix, and multiple hernias       He is treated for hyperlipidemia. Denies side effects from atorvastatin. New Issues:  He has been coughing since January. He admits to smoking a pipe 3-4 times per day. He has been taking Claritin and Benadryl. Helps some, but his nose is still stopped. His right nare has been bleeding off and on from the nasal sprays. He has had some burning on urination. History of prostate cancer. No Known Allergies    Current Outpatient Prescriptions   Medication Sig    clopidogrel (PLAVIX) 75 mg tab TAKE 1 TABLET BY MOUTH DAILY    VIT A/VIT C/VIT E/ZINC/COPPER (ICAPS AREDS PO) Take  by mouth.  dicyclomine (BENTYL) 10 mg capsule TAKE ONE CAPSULE BY MOUTH 4 TIMES A DAY AS NEEDED    pantoprazole (PROTONIX) 20 mg tablet Take 1 Tab by mouth daily.  cyanocobalamin (VITAMIN B-12) 100 mcg tablet Take 100 mcg by mouth daily.  pyridoxine (VITAMIN B-6) 100 mg tablet Take 100 mg by mouth daily.  omega-3 fatty acids-vitamin e (FISH OIL) 1,000 mg cap Take 1 Cap by mouth daily. Indications: 1200mg capsule    multivitamin (ONE A DAY) tablet Take 1 Tab by mouth daily. No current facility-administered medications for this visit.         Past Medical History:   Diagnosis Date    Abdominal pain     Arthritis     shoulders, arms    Cancer 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    Dyslipidemia 7/13/2017    Holding livalo secondary to weakness, follow up [address] [facility] [address] [facility]    Dyspepsia and other specified disorders of function of stomach     Easy bruising     Fatigue     Frequent urination     Gastroesophageal reflux disease 7/13/2017    GERD (gastroesophageal reflux disease)     Heart murmur 7/13/2017    mitral murmur, hx of dyspnea,get ECHO for evaluation    History of prostate cancer 7/13/2017    Other ill-defined conditions(799.89)     high cholesterol    Palpitation 7/13/2017    Preop examination 7/13/2017    Prostate cancer ([address] 7/13/2017    Shortness of breath     Shoulder pain 7/13/2017    TIA (transient ischemic attack) 04/2017    Tobacco use 7/13/2017    Ventral hernia 7/13/2017    Concern of course is possibility that he could develop a strangulated small bowel wit this ventral hernia, especially now that it is sympotmatic, will refer to surgery for evaluation       Past Surgical History:   Procedure Laterality Date    HX HERNIA REPAIR  1996    inguinal R&L    HX HERNIA REPAIR  2013    abd hernia     HX HERNIA REPAIR  2014    Laparoscopic recurrent incisional hernia repair with mesh.   Robot assisted    HX PROSTATECTOMY      HX SMALL BOWEL RESECTION  1996    HX UROLOGICAL      prostate  removed    HX UROLOGICAL      adhesion repair    [place] EGD TRANSORAL BIOPSY SINGLE/MULTIPLE  12/5/2012            Social History     Social History    Marital status:      Spouse name: N/A    Number of children: N/A    Years of education: N/A     Social History Main Topics    Smoking status: Current Every Day Smoker     Packs/day: 0.25     Years: 60.00    Smokeless tobacco: Never Used      Comment: smokes pipe daily    Alcohol use Yes      Comment: socially    Drug use: No    Sexual activity: Not on file     Other Topics Concern     Service Yes    Blood Transfusions No    Caffeine Concern No    Occupational Exposure No    Hobby Hazards No    Sleep Concern No    Stress Concern No    Weight Concern No    Special Diet No    Back Care No    Exercise Yes    Bike Helmet No    Seat Belt Yes    Self-Exams Yes     Social History Narrative       Family History   Problem Relation Age of Onset    Heart Disease Mother     Heart Disease Father     Heart Disease Brother        Above history reviewed. ROS:  Denies fever, chills, cough, chest pain, SOB,  nausea, vomiting, or diarrhea. Denies wt loss, wt gain, hemoptysis, hematochezia or melena. Physical Examination:    /70 (BP 1 Location: Left arm, BP Patient Position: Sitting)  Pulse 90  Temp 97.9 °F (36.6 °C) (Temporal)   Ht 5' 10\" (1.778 m)  Wt 203 lb (92.1 kg)  SpO2 96%  BMI 29.13 kg/m2    General: Alert and Ox3, Fluent speech  HEENT:  PERRLA, EOM intact, TMs, turbinates beefy red, pharynx normal.  No thyromegaly. No cervical adenopathy. Neck:  Supple, no adenopathy, JVD, mass or bruit  Chest:  Clear to Ausculation, without wheezes, rales, rubs or ronchi  Cardiac: RRR  Extremities:  No cyanosis, clubbing or edema  Neurologic:  Ambulatory without assist, CN 2-12 grossly intact. Moves all extremities. Skin: no rash  Lymphadenopathy: no cervical or supraclavicular nodes    Results for orders placed or performed in visit on 07/03/18   AMB POC URINALYSIS DIP STICK MANUAL W/ MICRO   Result Value Ref Range    Color (UA POC) Yellow Yellow    Clarity (UA POC) Clear Clear    Glucose (UA POC) Negative Negative    Bilirubin (UA POC) Negative Negative    Ketones (UA POC) Negative Negative    Specific gravity (UA POC) 1.010 1.001 - 1.035    Blood (UA POC) Trace Negative    pH (UA POC) 6.0 4.6 - 8.0    Protein (UA POC) Negative Negative    Urobilinogen (UA POC) 0.2 mg/dL 0.2 - 1    Nitrites (UA POC) Negative Negative    Leukocyte esterase (UA POC) 2+ Negative      ASSESSMENT AND PLAN:     1. URI with cough and congestion  Encouraged patient to quit smoking.   Last PFTs and CXR were done in March  Reviewed self care measures:  Fluids  Nasal Saline  Humidification + menthol petroleum (Vicks.)  Postural drainage  NSAID of choice PRN  Avoid decongestants, too drying and difficult to clear respiratory secretions. 2. Dysuria  Urine concerning for bacterial UTI  Treating with Cipro  - AMB POC URINALYSIS DIP STICK MANUAL W/ MICRO    3.  Pipe smoker  Encouraged cessation     RTC PRN    Kasey Gosselin, NP

## 2018-07-03 NOTE — MR AVS SNAPSHOT
[address] [address] [phone] Patient: Susy Ferguson MRN: [id number] SBU:[birth year] Visit Information Date & Time Provider Department Dept. Phone Encounter #  
 7/3/2018  7:30 AM Bre Cornell NP Charbel Posada [id number] Follow-up Instructions Return if symptoms worsen or fail to improve. Your Appointments 8/24/2018 10:00 AM  
ESTABLISHED PATIENT with MD Michelle Solorzano 38 ([facility]) Appt Note: PREOP CATARACT SURG DR. Orlando Razo  
 [address] [place] [address],[address] [address] [phone]  
  
   
 [address] [place] [address],[facility] Floor [address]  
  
    
 10/25/2018 10:30 AM  
ESTABLISHED PATIENT with MD Michelle Solorzano 38 ([facility]) Appt Note: 6 mo f/u  
 [address] [place] [address],[address] [place] [address] [id number] [phone] Upcoming Health Maintenance Date Due Pneumococcal 65+ High/Highest Risk (2 of 2 - PPSV23) 10/31/2018* Influenza Age 5 to Adult 8/1/2018 MEDICARE YEARLY EXAM 10/21/2018 GLAUCOMA SCREENING Q2Y 6/19/2019 DTaP/Tdap/Td series (2 - Td) 3/19/2028 *Topic was postponed. The date shown is not the original due date. Allergies as of 7/3/2018  Review Complete On: 7/3/2018 By: Bre Cornell NP No Known Allergies Current Immunizations  Never Reviewed Name Date Influenza High Dose Vaccine PF 10/10/2017 Pneumococcal Conjugate (PCV-13) 10/10/2017 Not reviewed this visit You Were Diagnosed With   
  
 Codes Comments URI with cough and congestion    -  Primary ICD-10-CM: J06.9 ICD-9-CM: 465.9 Dysuria     ICD-10-CM: R30.0 ICD-9-CM: 788.1 Pipe smoker     ICD-10-CM: F17.290 ICD-9-CM: 305.1 Vitals BP Pulse Temp Height(growth percentile) 120/70 (BP 1 Location: Left arm, BP Patient Position: Sitting) 90 97.9 °F (36.6 °C) (Temporal) 5' 10\" (1.778 m) Weight(growth percentile) SpO2 BMI Smoking Status 203 lb (92.1 kg) 96% 29.13 kg/m2 Current Every Day Smoker BMI and BSA Data Body Mass Index Body Surface Area  
 29.13 kg/m 2 2.13 m 2 Preferred Pharmacy Pharmacy Name Phone [facility]/PHARMACY #[id number] Medina [facility] [address] [phone] Your Updated Medication List  
  
   
This list is accurate as of 7/3/18  8:03 AM.  Always use your most recent med list.  
  
  
  
  
 ciprofloxacin HCl 500 mg tablet Commonly known as:  CIPRO Take 1 Tab by mouth two (2) times a day for 10 days. clopidogrel 75 mg Tab Commonly known as:  PLAVIX TAKE 1 TABLET BY MOUTH DAILY  
  
 dicyclomine 10 mg capsule Commonly known as:  BENTYL TAKE ONE CAPSULE BY MOUTH 4 TIMES A DAY AS NEEDED FISH OIL 1,000 mg Cap Generic drug:  omega-3 fatty acids-vitamin e Take 1 Cap by mouth daily. Indications: 1200mg capsule ICAPS AREDS PO Take  by mouth.  
  
 multivitamin tablet Commonly known as:  ONE A DAY Take 1 Tab by mouth daily. pantoprazole 20 mg tablet Commonly known as:  PROTONIX Take 1 Tab by mouth daily. predniSONE 10 mg dose pack Commonly known as:  STERAPRED DS See administration instruction per 10mg dose pack VITAMIN B-12 100 mcg tablet Generic drug:  cyanocobalamin Take 100 mcg by mouth daily. VITAMIN B-6 100 mg tablet Generic drug:  pyridoxine (vitamin B6) Take 100 mg by mouth daily. Prescriptions Sent to Pharmacy Refills  
 predniSONE (STERAPRED DS) 10 mg dose pack 0 Sig: See administration instruction per 10mg dose pack Class: Normal  
 Pharmacy: [facility]/pharmacy #[id number] [place] [address] [address] Ph #: [phone]  
 ciprofloxacin HCl (CIPRO) 500 mg tablet 0 Sig: Take 1 Tab by mouth two (2) times a day for 10 days.   
 Class: Normal  
 Pharmacy: [facility]/pharmacy #[id number] - Karlynn Brittle [address]  #: [phone] Route: Oral  
  
We Performed the Following AMB POC URINALYSIS DIP STICK MANUAL W/ MICRO [73383 CPT(R)] CULTURE, URINE [id number] CPT(R)] Follow-up Instructions Return if symptoms worsen or fail to improve. Patient Instructions Learning About Benefits From Quitting Smoking How does quitting smoking make you healthier? If you're thinking about quitting smoking, you may have a few reasons to be smoke-free. Your health may be one of them. · When you quit smoking, you lower your risks for cancer, lung disease, heart attack, stroke, blood vessel disease, and blindness from macular degeneration. · When you're smoke-free, you get sick less often, and you heal faster. You are less likely to get colds, flu, bronchitis, and pneumonia. · As a nonsmoker, you may find that your mood is better and you are less stressed. When and how will you feel healthier? Quitting has real health benefits that start from day 1 of being smoke-free. And the longer you stay smoke-free, the healthier you get and the better you feel. The first hours · After just 20 minutes, your blood pressure and heart rate go down. That means there's less stress on your heart and blood vessels. · Within 12 hours, the level of carbon monoxide in your blood drops back to normal. That makes room for more oxygen. With more oxygen in your body, you may notice that you have more energy than when you smoked. After 2 weeks · Your lungs start to work better. · Your risk of heart attack starts to drop. After 1 month · When your lungs are clear, you cough less and breathe deeper, so it's easier to be active. · Your sense of taste and smell return. That means you can enjoy food more than you have since you started smoking. Over the years · After 1 year, your risk of heart disease is half what it would be if you kept smoking. · After 5 years, your risk of stroke starts to shrink. Within a few years after that, it's about the same as if you'd never smoked. · After 10 years, your risk of dying from lung cancer is cut by about half. And your risk for many other types of cancer is lower too. How would quitting help others in your life? When you quit smoking, you improve the health of everyone who now breathes in your smoke. · Their heart, lung, and cancer risks drop, much like yours. · They are sick less. For babies and small children, living smoke-free means they're less likely to have ear infections, pneumonia, and bronchitis. · If you're a woman who is or will be pregnant someday, quitting smoking means a healthier . · Children who are close to you are less likely to become adult smokers. Where can you learn more? Go to http://annaEZ2CADjaime.info/. Enter [id number] [id number] in the search box to learn more about \"Learning About Benefits From Quitting Smoking. \" Current as of: 2017 Content Version: 11.4 © 4305-2428 [employer]aces [employer]. Care instructions adapted under license by [facility] (which disclaims liability or warranty for this information). If you have questions about a medical condition or this instruction, always ask your healthcare professional. [place] [address] any warranty or liability for your use of this information. Learning About COPD and Upper Respiratory Infections What are upper respiratory infections? An upper respiratory infection, also called a URI, is an infection of the nose, sinuses, or throat. Viruses or bacteria can cause URIs. Colds, the flu, and sinusitis are examples of URIs. These infections are spread by coughs, sneezes, and close contact. How do these infections affect COPD? A URI can worsen COPD symptoms, such as having too much mucus in your lungs, coughing, or being short of breath. What can you do to manage most infections at home? · Do not smoke. Avoid secondhand smoke. · Take an over-the-counter pain medicine, such as acetaminophen (Tylenol), ibuprofen (Advil, Motrin), or naproxen (Aleve). Read and follow all instructions on the label. · Be careful when taking over-the-counter cold or flu medicines and Tylenol at the same time. Many of these medicines have acetaminophen, which is Tylenol. Read the labels to make sure that you are not taking more than the recommended dose. Too much acetaminophen (Tylenol) can be harmful. · If your doctor prescribed antibiotics, take them as directed. Do not stop taking them just because you feel better. You need to take the full course of antibiotics. · Ask your doctor about cough medicines and decongestants. Some doctors recommend these medicines, while others feel that they do not help. · Learn breathing techniques for COPD, such as breathing through pursed lips. These techniques can help you breathe easier. What can you do to prevent these infections? Stay healthy · Do not smoke. This is the most important step you can take to prevent more damage to your lungs. If you need help quitting, talk to your doctor about stop-smoking programs and medicines. These can increase your chances of quitting for good. · Avoid secondhand smoke, air pollution, and high altitudes. Also avoid cold, dry air and hot, humid air. Stay at home with your windows closed when air pollution is bad. · Get a flu shot every year. · Get a pneumococcal vaccine shot. If you have had one before, ask your doctor whether you need another dose. · If you must be around people with colds or the flu, wash your hands often. Exercise and eat well · If your doctor recommends it, get more exercise. Walking is a good choice. Bit by bit, increase the amount you walk every day. Try for at least 30 minutes on most days of the week. · Eat regular, well-balanced meals. Eating right keeps your energy levels up and helps your body fight infection. · Get plenty of rest and sleep. Follow-up care is a key part of your treatment and safety. Be sure to make and go to all appointments, and call your doctor if you are having problems. It's also a good idea to know your test results and keep a list of the medicines you take. Where can you learn more? Go to http://anna-jaime.info/. Enter S306 in the search box to learn more about \"Learning About COPD and Upper Respiratory Infections. \" Current as of: May 12, 2017 Content Version: 11.4 © 5913-1542 reMail. Care instructions adapted under license by [employer] (which disclaims liability or warranty for this information). If you have questions about a medical condition or this instruction, always ask your healthcare professional. [address] any warranty or liability for your use of this information. If you have any questions regarding [employer], you may call [employer] support at [phone]. Introducing [facility] & HEALTH SERVICES! [facility] introduces [employer] patient portal. Now you can access parts of your medical record, email your doctor's office, and request medication refills online. 1. In your internet browser, go to [email or web address] 2. Click on the First Time User? Click Here link in the Sign In box. You will see the New Member Sign Up page. 3. Enter your C[employer] Access Code exactly as it appears below. You will not need to use this code after youve completed the sign-up process. If you do not sign up before the expiration date, you must request a new code. · C[employer] Access Code: [email or web address] Expires: 10/1/2018  8:02 AM 
 
4. Enter the last four digits of your Social Security Number (xxxx) and Date of Birth (mm/dd/yyyy) as indicated and click Submit.  You will be taken to the next sign-up page. 5. Create a [employer] ID. This will be your [employer] login ID and cannot be changed, so think of one that is secure and easy to remember. 6. Create a [employer] password. You can change your password at any time. 7. Enter your Password Reset Question and Answer. This can be used at a later time if you forget your password. 8. Enter your e-mail address. You will receive e-mail notification when new information is available in [address]. 9. Click Sign Up. You can now view and download portions of your medical record. 10. Click the Download Summary menu link to download a portable copy of your medical information. If you have questions, please visit the Frequently Asked Questions section of the [employer] website. Remember, [employer] is NOT to be used for urgent needs. For medical emergencies, dial 911. Now available from your iPhone and Android! Please provide this summary of care documentation to your next provider. Your primary care clinician is listed as Jaya Burt. If you have any questions after today's visit, please call [phone].

## 2018-07-06 LAB — BACTERIA UR CULT: ABNORMAL

## 2018-08-23 ENCOUNTER — OFFICE VISIT (OUTPATIENT)
Dept: FAMILY MEDICINE CLINIC | Age: 83
End: 2018-08-23

## 2018-08-23 VITALS
OXYGEN SATURATION: 94 % | SYSTOLIC BLOOD PRESSURE: 115 MMHG | DIASTOLIC BLOOD PRESSURE: 72 MMHG | HEART RATE: 106 BPM | WEIGHT: 204 LBS | HEIGHT: 70 IN | RESPIRATION RATE: 19 BRPM | BODY MASS INDEX: 29.2 KG/M2 | TEMPERATURE: 98.1 F

## 2018-08-23 DIAGNOSIS — I10 ESSENTIAL HYPERTENSION: ICD-10-CM

## 2018-08-23 DIAGNOSIS — Z01.818 PRE-OP EXAM: Primary | ICD-10-CM

## 2018-08-23 RX ORDER — CLOTRIMAZOLE AND BETAMETHASONE DIPROPIONATE 10; .64 MG/G; MG/G
CREAM TOPICAL
Qty: 45 G | Refills: 1 | Status: SHIPPED | OUTPATIENT
Start: 2018-08-23 | End: 2019-02-15

## 2018-08-23 NOTE — MR AVS SNAPSHOT
Arsenio Hannah 
 
 
 [address] [address] [phone] Patient: Luana Ott MRN: [id number] VIVI:[birth year] Visit Information Date & Time Provider Department Dept. Phone Encounter #  
 8/23/2018  1:30 PM Myra Taylor MD [address] [place] [address] [id number] Follow-up Instructions Return in about 2 weeks (around 9/6/2018). Follow-up and Disposition History Your Appointments 10/25/2018 10:30 AM  
ESTABLISHED PATIENT with MD Michelle Gray 38 ([facility]) Appt Note: 6 mo f/u  
 [address] [address] [phone]  
  
   
 [address] [place] [address],[address] [address] Upcoming Health Maintenance Date Due Pneumococcal 65+ High/Highest Risk (2 of 2 - PPSV23) 10/31/2018* Influenza Age 5 to Adult 11/7/2018* MEDICARE YEARLY EXAM 10/21/2018 GLAUCOMA SCREENING Q2Y 6/19/2019 DTaP/Tdap/Td series (2 - Td) 3/19/2028 *Topic was postponed. The date shown is not the original due date. Allergies as of 8/23/2018  Review Complete On: 8/23/2018 By: Bebe Adjutant No Known Allergies Current Immunizations  Never Reviewed Name Date Influenza High Dose Vaccine PF 10/10/2017 Pneumococcal Conjugate (PCV-13) 10/10/2017 Not reviewed this visit You Were Diagnosed With   
  
 Codes Comments Pre-op exam    -  Primary ICD-10-CM: N05.463 ICD-9-CM: V72.84 Essential hypertension     ICD-10-CM: I10 
ICD-9-CM: 401.9 Vitals BP Pulse Temp Resp Height(growth percentile) Weight(growth percentile) 115/72 (BP 1 Location: Left arm, BP Patient Position: Sitting) (!) 106 98.1 °F (36.7 °C) (Oral) 19 5' 10\" (1.778 m) 204 lb (92.5 kg) SpO2 BMI Smoking Status 94% 29.27 kg/m2 Current Every Day Smoker Vitals History BMI and BSA Data  Body Mass Index Body Surface Area  
 29.27 kg/m 2 2.14 m 2  
  
  
 Preferred Pharmacy Pharmacy Name Phone [facility]/PHARMACY #[id number] [phone] Your Updated Medication List  
  
   
This list is accurate as of 8/23/18  2:27 PM.  Always use your most recent med list.  
  
  
  
  
 clopidogrel 75 mg Tab Commonly known as:  PLAVIX TAKE 1 TABLET BY MOUTH DAILY  
  
 clotrimazole-betamethasone topical cream  
Commonly known as:  LOTRISONE  
AAA BID  
  
 dicyclomine 10 mg capsule Commonly known as:  BENTYL TAKE ONE CAPSULE BY MOUTH 4 TIMES A DAY AS NEEDED FISH OIL 1,000 mg Cap Generic drug:  omega-3 fatty acids-vitamin e Take 1 Cap by mouth daily. Indications: 1200mg capsule ICAPS AREDS PO Take  by mouth.  
  
 multivitamin tablet Commonly known as:  ONE A DAY Take 1 Tab by mouth daily. pantoprazole 20 mg tablet Commonly known as:  PROTONIX Take 1 Tab by mouth daily. VITAMIN B-12 100 mcg tablet Generic drug:  cyanocobalamin Take 100 mcg by mouth daily. VITAMIN B-6 100 mg tablet Generic drug:  pyridoxine (vitamin B6) Take 100 mg by mouth daily. Prescriptions Sent to Pharmacy Refills  
 clotrimazole-betamethasone (LOTRISONE) topical cream 1 Sig: AAA BID Class: Normal  
 Pharmacy: [facility]/pharmacy #[id number] [address] Ph #: [phone] We Performed the Following CBC WITH AUTOMATED DIFF [98513 CPT(R)] METABOLIC PANEL, COMPREHENSIVE [84711 CPT(R)] TSH 3RD GENERATION [63691 CPT(R)] Follow-up Instructions Return in about 2 weeks (around 9/6/2018). Patient Instructions If you have any questions regarding [employer], you may call [employer] support at [phone]. Introducing [facility] & HEALTH SERVICES! [facility] introduces [employer] patient portal. Now you can access parts of your medical record, email your doctor's office, and request medication refills online. 1. In your internet browser, go to [email or web address] 2. Click on the First Time User? Click Here link in the Sign In box. You will see the New Member Sign Up page. 3. Enter your [employer] Access Code exactly as it appears below. You will not need to use this code after youve completed the sign-up process. If you do not sign up before the expiration date, you must request a new code. · [employer] Access Code: [email or web address] Expires: 10/1/2018  8:02 AM 
 
4. Enter the last four digits of your Social Security Number (xxxx) and Date of Birth (mm/dd/yyyy) as indicated and click Submit. You will be taken to the next sign-up page. 5. Create a Slant[employer] ID. This will be your [employer] login ID and cannot be changed, so think of one that is secure and easy to remember. 6. Create a [employer] password. You can change your password at any time. 7. Enter your Password Reset Question and Answer. This can be used at a later time if you forget your password. 8. Enter your e-mail address. You will receive e-mail notification when new information is available in [address] 9. Click Sign Up. You can now view and download portions of your medical record. 10. Click the Download Summary menu link to download a portable copy of your medical information. If you have questions, please visit the Frequently Asked Questions section of the [employer] website. Remember, [employer] is NOT to be used for urgent needs. For medical emergencies, dial 911. Now available from your iPhone and Android! Please provide this summary of care documentation to your next provider. Your primary care clinician is listed as Reilly Johnson. If you have any questions after today's visit, please call [phone].

## 2018-08-23 NOTE — PROGRESS NOTES
Chief Complaint   Patient presents with    Pre-op Exam     Right eye cataract surgery with Dr. Phoenix Sorensen. Fax # [phone]         HPI:      Marli Ballard is a 80 y.o. male complains of diminished vision OD and is here today as part of a preop medical evaluation requested by Dr Yoli Fulton. Retired [employer].  Formerly living in [facility]. Moni resides in [place].       April 2017:  Dysarthria and left sided weakness with nearly complete resolution. Riri Lynn has some difficulty with \"S\" sound and with left leg weakness.       Surgery: appendix, and multiple hernias       He is treated for hyperlipidemia.  Denies side effects from atorvastatin. No Known Allergies    Current Outpatient Prescriptions   Medication Sig    clotrimazole-betamethasone (LOTRISONE) topical cream AAA BID    clopidogrel (PLAVIX) 75 mg tab TAKE 1 TABLET BY MOUTH DAILY    VIT A/VIT C/VIT E/ZINC/COPPER (ICAPS AREDS PO) Take  by mouth.  dicyclomine (BENTYL) 10 mg capsule TAKE ONE CAPSULE BY MOUTH 4 TIMES A DAY AS NEEDED    pantoprazole (PROTONIX) 20 mg tablet Take 1 Tab by mouth daily.  cyanocobalamin (VITAMIN B-12) 100 mcg tablet Take 100 mcg by mouth daily.  pyridoxine (VITAMIN B-6) 100 mg tablet Take 100 mg by mouth daily.  omega-3 fatty acids-vitamin e (FISH OIL) 1,000 mg cap Take 1 Cap by mouth daily. Indications: 1200mg capsule    multivitamin (ONE A DAY) tablet Take 1 Tab by mouth daily. No current facility-administered medications for this visit.         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    Dyslipidemia 7/13/2017    Holding livalo secondary to weakness, follow up FLP    Dyspepsia and other specified disorders of function of stomach  Easy bruising     Fatigue     Frequent urination     Gastroesophageal reflux disease 7/13/2017    GERD (gastroesophageal reflux disease)     Heart murmur 7/13/2017    mitral murmur, hx of dyspnea,get ECHO for evaluation    History of prostate cancer 7/13/2017    Other ill-defined conditions(799.89)     high cholesterol    Palpitation 7/13/2017    Preop examination 7/13/2017    Prostate cancer ([address] 7/13/2017    Shortness of breath     Shoulder pain 7/13/2017    TIA (transient ischemic attack) 04/2017    Tobacco use 7/13/2017    Ventral hernia 7/13/2017    Concern of course is possibility that he could develop a strangulated small bowel wit this ventral hernia, especially now that it is sympotmatic, will refer to surgery for evaluation         ROS:  Denies fever, chills, cough, chest pain, SOB,  nausea, vomiting, or diarrhea. Denies wt loss, wt gain, hemoptysis, hematochezia or melena. Physical Examination:    /72 (BP 1 Location: Left arm, BP Patient Position: Sitting)  Pulse (!) 106  Temp 98.1 °F (36.7 °C) (Oral)   Resp 19  Ht 5' 10\" (1.778 m)  Wt 204 lb (92.5 kg)  SpO2 94%  BMI 29.27 kg/m2    General: Alert and Ox3, Fluent speech  HEENT:  NC/AT, EOMI, OP: clear  Neck:  Supple, no adenopathy, JVD, mass or bruit  Chest:  Clear to Ausculation, without wheezes, rales, rubs or ronchi  Cardiac: RRR  Abdomen:  +BS, soft, nontender without palpable HSM  Extremities:  No cyanosis, clubbing or edema  Neurologic:  Ambulatory without assist, CN 2-12 grossly intact. Moves all extremities. Skin: rash on dorsal aspect of prox right forearm  Lymphadenopathy: no cervical or supraclavicular nodes      ASSESSMENT AND PLAN:     1. Medically stable for surgery  2. HTN is well controlled  3. Labs today  4. Remote CVA-continue plavix  5.   Trial of Lotrisone to right forearm lesion--if not better in 3 weeks, RTC for shave bx    Orders Placed This Encounter    TSH 3RD GENERATION    METABOLIC PANEL, COMPREHENSIVE    CBC WITH AUTOMATED DIFF    clotrimazole-betamethasone (LOTRISONE) topical cream     Sig: AAA BID     Dispense:  45 g     Refill:  1       Zachary Rea MD, [place]

## 2018-08-24 LAB
ALBUMIN SERPL-MCNC: 4.5 G/DL (ref 3.5–4.7)
ALBUMIN/GLOB SERPL: 1.9 {RATIO} (ref 1.2–2.2)
ALP SERPL-CCNC: 113 IU/L (ref 39–117)
ALT SERPL-CCNC: 25 IU/L (ref 0–44)
AST SERPL-CCNC: 25 IU/L (ref 0–40)
BASOPHILS # BLD AUTO: 0 X10E3/UL (ref 0–0.2)
BASOPHILS NFR BLD AUTO: 0 %
BILIRUB SERPL-MCNC: 0.6 MG/DL (ref 0–1.2)
BUN SERPL-MCNC: 15 MG/DL (ref 8–27)
BUN/CREAT SERPL: 11 (ref 10–24)
CALCIUM SERPL-MCNC: 9.7 MG/DL (ref 8.6–10.2)
CHLORIDE SERPL-SCNC: 101 MMOL/L (ref 96–106)
CO2 SERPL-SCNC: 26 MMOL/L (ref 20–29)
CREAT SERPL-MCNC: 1.31 MG/DL (ref 0.76–1.27)
EOSINOPHIL # BLD AUTO: 0 X10E3/UL (ref 0–0.4)
EOSINOPHIL NFR BLD AUTO: 0 %
ERYTHROCYTE [DISTWIDTH] IN BLOOD BY AUTOMATED COUNT: 14.1 % (ref 12.3–15.4)
GLOBULIN SER CALC-MCNC: 2.4 G/DL (ref 1.5–4.5)
GLUCOSE SERPL-MCNC: 215 MG/DL (ref 65–99)
HCT VFR BLD AUTO: 45.9 % (ref 37.5–51)
HGB BLD-MCNC: 15 G/DL (ref 13–17.7)
IMM GRANULOCYTES # BLD: 0 X10E3/UL (ref 0–0.1)
IMM GRANULOCYTES NFR BLD: 0 %
LYMPHOCYTES # BLD AUTO: 0.6 X10E3/UL (ref 0.7–3.1)
LYMPHOCYTES NFR BLD AUTO: 8 %
MCH RBC QN AUTO: 30.3 PG (ref 26.6–33)
MCHC RBC AUTO-ENTMCNC: 32.7 G/DL (ref 31.5–35.7)
MCV RBC AUTO: 93 FL (ref 79–97)
MONOCYTES # BLD AUTO: 0.1 X10E3/UL (ref 0.1–0.9)
MONOCYTES NFR BLD AUTO: 2 %
NEUTROPHILS # BLD AUTO: 7.2 X10E3/UL (ref 1.4–7)
NEUTROPHILS NFR BLD AUTO: 90 %
PLATELET # BLD AUTO: 183 X10E3/UL (ref 150–379)
POTASSIUM SERPL-SCNC: 4.4 MMOL/L (ref 3.5–5.2)
PROT SERPL-MCNC: 6.9 G/DL (ref 6–8.5)
RBC # BLD AUTO: 4.95 X10E6/UL (ref 4.14–5.8)
SODIUM SERPL-SCNC: 143 MMOL/L (ref 134–144)
TSH SERPL DL<=0.005 MIU/L-ACNC: 0.67 UIU/ML (ref 0.45–4.5)
WBC # BLD AUTO: 8 X10E3/UL (ref 3.4–10.8)

## 2018-08-28 ENCOUNTER — OFFICE VISIT (OUTPATIENT)
Dept: FAMILY MEDICINE CLINIC | Age: 83
End: 2018-08-28

## 2018-08-28 VITALS
RESPIRATION RATE: 14 BRPM | DIASTOLIC BLOOD PRESSURE: 84 MMHG | TEMPERATURE: 97 F | OXYGEN SATURATION: 96 % | WEIGHT: 200.8 LBS | BODY MASS INDEX: 28.75 KG/M2 | SYSTOLIC BLOOD PRESSURE: 126 MMHG | HEIGHT: 70 IN | HEART RATE: 71 BPM

## 2018-08-28 DIAGNOSIS — R79.89 LOW TSH LEVEL: ICD-10-CM

## 2018-08-28 DIAGNOSIS — E11.9 CONTROLLED TYPE 2 DIABETES MELLITUS WITHOUT COMPLICATION, WITHOUT LONG-TERM CURRENT USE OF INSULIN (HCC): Primary | ICD-10-CM

## 2018-08-28 RX ORDER — OXYBUTYNIN CHLORIDE 10 MG/1
10 TABLET, EXTENDED RELEASE ORAL DAILY
COMMUNITY
End: 2019-02-20 | Stop reason: ALTCHOICE

## 2018-08-28 NOTE — PATIENT INSTRUCTIONS
Learning About Diabetes Food Guidelines  Your Care Instructions    Meal planning is important to manage diabetes. It helps keep your blood sugar at a target level (which you set with your doctor). You don't have to eat special foods. You can eat what your family eats, including sweets once in a while. But you do have to pay attention to how often you eat and how much you eat of certain foods. You may want to work with a dietitian or a certified diabetes educator (CDE) to help you plan meals and snacks. A dietitian or CDE can also help you lose weight if that is one of your goals. What should you know about eating carbs? Managing the amount of carbohydrate (carbs) you eat is an important part of healthy meals when you have diabetes. Carbohydrate is found in many foods. · Learn which foods have carbs. And learn the amounts of carbs in different foods. ¨ Bread, cereal, pasta, and rice have about 15 grams of carbs in a serving. A serving is 1 slice of bread (1 ounce), ½ cup of cooked cereal, or 1/3 cup of cooked pasta or rice. ¨ Fruits have 15 grams of carbs in a serving. A serving is 1 small fresh fruit, such as an apple or orange; ½ of a banana; ½ cup of cooked or canned fruit; ½ cup of fruit juice; 1 cup of melon or raspberries; or 2 tablespoons of dried fruit. ¨ Milk and no-sugar-added yogurt have 15 grams of carbs in a serving. A serving is 1 cup of milk or 2/3 cup of no-sugar-added yogurt. ¨ Starchy vegetables have 15 grams of carbs in a serving. A serving is ½ cup of mashed potatoes or sweet potato; 1 cup winter squash; ½ of a small baked potato; ½ cup of cooked beans; or ½ cup cooked corn or green peas. · Learn how much carbs to eat each day and at each meal. A dietitian or CDE can teach you how to keep track of the amount of carbs you eat. This is called carbohydrate counting. · If you are not sure how to count carbohydrate grams, use the Plate Method to plan meals.  It is a good, quick way to make sure that you have a balanced meal. It also helps you spread carbs throughout the day. ¨ Divide your plate by types of foods. Put non-starchy vegetables on half the plate, meat or other protein food on one-quarter of the plate, and a grain or starchy vegetable in the final quarter of the plate. To this you can add a small piece of fruit and 1 cup of milk or yogurt, depending on how many carbs you are supposed to eat at a meal.  · Try to eat about the same amount of carbs at each meal. Do not \"save up\" your daily allowance of carbs to eat at one meal.  · Proteins have very little or no carbs per serving. Examples of proteins are beef, chicken, turkey, fish, eggs, tofu, cheese, cottage cheese, and peanut butter. A serving size of meat is 3 ounces, which is about the size of a deck of cards. Examples of meat substitute serving sizes (equal to 1 ounce of meat) are 1/4 cup of cottage cheese, 1 egg, 1 tablespoon of peanut butter, and ½ cup of tofu. How can you eat out and still eat healthy? · Learn to estimate the serving sizes of foods that have carbohydrate. If you measure food at home, it will be easier to estimate the amount in a serving of restaurant food. · If the meal you order has too much carbohydrate (such as potatoes, corn, or baked beans), ask to have a low-carbohydrate food instead. Ask for a salad or green vegetables. · If you use insulin, check your blood sugar before and after eating out to help you plan how much to eat in the future. · If you eat more carbohydrate at a meal than you had planned, take a walk or do other exercise. This will help lower your blood sugar. What else should you know? · Limit saturated fat, such as the fat from meat and dairy products. This is a healthy choice because people who have diabetes are at higher risk of heart disease. So choose lean cuts of meat and nonfat or low-fat dairy products.  Use olive or canola oil instead of butter or shortening when cooking. · Don't skip meals. Your blood sugar may drop too low if you skip meals and take insulin or certain medicines for diabetes. · Check with your doctor before you drink alcohol. Alcohol can cause your blood sugar to drop too low. Alcohol can also cause a bad reaction if you take certain diabetes medicines. Follow-up care is a key part of your treatment and safety. Be sure to make and go to all appointments, and call your doctor if you are having problems. It's also a good idea to know your test results and keep a list of the medicines you take. Where can you learn more? Go to http://annaAriesojaime.info/. Enter H518 in the search box to learn more about \"Learning About Diabetes Food Guidelines. \"  Current as of: December 7, 2017  Content Version: 11.7  © 8315-8025 [employer]. Care instructions adapted under license by [employer] (which disclaims liability or warranty for this information). If you have questions about a medical condition or this instruction, always ask your healthcare professional. [place] [address] any warranty or liability for your use of this information. Learning About Foot and [address] your loved one's feet and keeping them clean and soft can help prevent cracks and infection in the skin. This is especially important for people who have diabetes. Keeping toenails trimmed-and polished if that's what the person likes-also helps the person feel well-groomed. If the person you care for has diabetes or has foot problems, such as bad bunions and corns, think about taking them to see a podiatrist. This is a doctor who specializes in the care of the feet. Sometimes a podiatrist will come to the home if the person can't go out for visits. Try to take the person for salon pedicures if that is what they want.  It's a chance to get out and see people and continue a favorite activity. You can do basic nail care at home. Usually all you need to do is keep the nails clean and at a safe length. How do you trim someone's toenails? Try to trim the person's nails every week. Or check the nails each week to see if they need to be trimmed. It's easiest to trim nails after the person has had a shower or foot bath. It makes the nails softer and easier to trim. Start by gathering your supplies. You will need toenail clippers and a nail file. You may also need nail polish and nail polish remover. To trim the nails:  1. Wash and dry your hands. You don't need to wear gloves. 2. Use nail polish remover to take off any polish. 3. Hold the person's foot and toe steady with one hand while you trim the nail with your other hand. Trim the nails straight across. Leave the nails a little longer at the corners so that the sharp ends don't cut into the skin. 4. Keep the nails no longer than the tip of the toes. 5. Let the nails dry if they are still damp and soft. 6. Use a nail file to gently smooth the edges of the nails, especially at the corners. They may be sharp after the nails are cut straight. 7. Apply nail polish, if the person wants it. If the person's nails are thick and discolored, it may be safest to have a podiatrist cut them. What else do you need to know? When you're caring for someone's nails, it is important to remember not to trim or cut the cuticles. A minor cut in a cuticle could lead to an infection. Wash the feet daily in the shower or bath or in a basin made for washing feet. It's extra important to wash the feet carefully if the person has diabetes. After washing the feet, dry gently. Put lotion on the feet, especially on the heels. But don't put it between the toes. If the person doesn't have diabetes and you see signs of athlete's foot (such as dry, cracking, or itchy skin between the toes), you can try an over-the-counter medicine.  These medicines can kill the fungus that causes athlete's foot. If the problem doesn't go away, talk to the person's doctor. Look every day for cuts or signs of infection, such as pain, swelling, redness, or warmth. If you see any of these signs-especially in someone who has diabetes-call the doctor. Where can you learn more? Go to http://anna-jaime.info/. Enter A726 in the search box to learn more about \"Learning About Foot and Toenail Care. \"  Current as of: October 6, 2017  Content Version: 11.7  © 9803-9964 [employer]. Care instructions adapted under license by [employer] (which disclaims liability or warranty for this information). If you have questions about a medical condition or this instruction, always ask your healthcare professional. [address] any warranty or liability for your use of this information. Diabetes and Alcohol: Care Instructions  Your Care Instructions    People who have diabetes need to be more careful with alcohol. Before you drink, consider a few things: Is your diabetes well controlled? Do you know how drinking alcohol can affect you? Do you have high blood pressure, nerve damage, or eye problems from your diabetes? If you take insulin or another medicine for diabetes, drinking alcohol may cause low blood sugar. This could cause dangerous low blood sugar levels. Too much alcohol can also affect your ability to know your blood sugar is low and to treat it. Drinking alcohol can make you lightheaded at first and drowsy as you drink more, both of which may be similar to the symptoms of low blood sugar. Drinking a lot of alcohol over a long period of time can damage your liver (cirrhosis). If this happens, your body may lose its natural response to protect itself from low blood sugar. If you are controlling your diabetes and do not have other health issues, it may be okay to have a drink once in a while.  Learning how alcohol affects your body can help you make the right choices. Follow-up care is a key part of your treatment and safety. Be sure to make and go to all appointments, and call your doctor if you are having problems. It's also a good idea to know your test results and keep a list of the medicines you take. How can you care for yourself at home? If you drink  · Work with your doctor or other diabetes expert to find what is best for you. Make sure you know whether it is safe to drink if you are taking insulin or another medicine for diabetes. · In general, limit alcohol to 1 drink a day with a meal if you are a woman. If you are a man, limit alcohol to 2 drinks a day with a meal. The following is considered a standard drink:  ¨ One 12-ounce bottle of beer or wine cooler  ¨ One 5-ounce glass of wine  ¨ One mixed drink with 1.5 ounces of 80-proof hard liquor, such as gin, whiskey, or rum  · Choose alcoholic drinks wisely. With hard alcohol, use sugar-free mixers, such as diet tonic, water, or club soda. Pick drinks that have less alcohol, including light beer or dry wine. Or add club soda to wine to dilute it. Also remember that most alcoholic drinks have a lot of calories. · When you drink, check your blood sugar before you go to bed. Have a snack before bed so your blood sugar does not drop while you sleep. When not to drink  · Never drink on an empty stomach. If you do drink alcohol, drink it only with a meal or snack. Having as little as 2 drinks on an empty stomach could lead to low blood sugar. · Do not drink alcohol if you have problems recognizing the signs of low blood sugar until they become severe. · Do not drink alcohol after you exercise. The exercise itself lowers blood sugar. · Do not drink if you have nerve damage. Drinking can make it worse and increase the pain, numbness, and other symptoms. · Do not drink if you have high blood pressure. · Do not drink if you have diabetic eye disease.   · Do not drink if you have high triglycerides, a type of fat in your blood. Drinking can raise triglycerides. · Do not drink if you are trying to lose weight. Alcohol provides empty calories that do not give you any nutrients. · Do not drink and drive. The effects of alcohol are greater if you have low blood sugar. When should you call for help? Call 911 anytime you think you may need emergency care. For example, call if:    · You passed out (lost consciousness).     · You are confused or cannot think clearly.     · Your blood sugar is very high or very low.    Watch closely for changes in your health, and be sure to contact your doctor if:    · Your blood sugar stays outside the level your doctor set for you.     · You have any problems. Where can you learn more? Go to http://anna-jaime.info/. Enter T236 in the search box to learn more about \"Diabetes and Alcohol: Care Instructions. \"  Current as of: December 7, 2017  Content Version: 11.7  © 7867-3151 [employer]. Care instructions adapted under license by [employer] (which disclaims liability or warranty for this information). If you have questions about a medical condition or this instruction, always ask your healthcare professional. [address] any warranty or liability for your use of this information.

## 2018-08-28 NOTE — PROGRESS NOTES
Chief Complaint   Patient presents with    Diabetes     New Dx. HPI:      Estella Poon is a 80 y.o. male seen last week for a preop and incidentally noted to have a glucose of 215 and a TSH of 0.67. He has been fatigued and in retrospect noted frequent urination and increased thirst.  He has no prior history of T2DM. He has been eating a lot of carbs and concentrated sweets. No Known Allergies    Current Outpatient Prescriptions   Medication Sig    oxybutynin chloride XL (DITROPAN XL) 10 mg CR tablet Take 10 mg by mouth daily.  clotrimazole-betamethasone (LOTRISONE) topical cream AAA BID    clopidogrel (PLAVIX) 75 mg tab TAKE 1 TABLET BY MOUTH DAILY    VIT A/VIT C/VIT E/ZINC/COPPER (ICAPS AREDS PO) Take  by mouth.  dicyclomine (BENTYL) 10 mg capsule TAKE ONE CAPSULE BY MOUTH 4 TIMES A DAY AS NEEDED    pantoprazole (PROTONIX) 20 mg tablet Take 1 Tab by mouth daily.  cyanocobalamin (VITAMIN B-12) 100 mcg tablet Take 100 mcg by mouth daily.  pyridoxine (VITAMIN B-6) 100 mg tablet Take 100 mg by mouth daily.  omega-3 fatty acids-vitamin e (FISH OIL) 1,000 mg cap Take 1 Cap by mouth daily. Indications: 1200mg capsule    multivitamin (ONE A DAY) tablet Take 1 Tab by mouth daily. No current facility-administered medications for this visit.         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    Dyslipidemia 7/13/2017    Holding livalo secondary to weakness, follow up FLP    Dyspepsia and other specified disorders of function of stomach     Easy bruising     Fatigue     Frequent urination     Gastroesophageal reflux disease 7/13/2017    GERD (gastroesophageal reflux disease)     Heart murmur 7/13/2017    mitral murmur, hx of dyspnea,get ECHO for evaluation    History of prostate cancer 7/13/2017    Other ill-defined conditions(799.89)     high cholesterol    Palpitation 7/13/2017    Preop examination 7/13/2017    Prostate cancer ([facility] [address] 7/13/2017    Shortness of breath     Shoulder pain 7/13/2017    TIA (transient ischemic attack) 04/2017    Tobacco use 7/13/2017    Ventral hernia 7/13/2017    Concern of course is possibility that he could develop a strangulated small bowel wit this ventral hernia, especially now that it is sympotmatic, will refer to surgery for evaluation         ROS:  Denies fever, chills, cough, chest pain, SOB,  nausea, vomiting, or diarrhea. Denies wt loss, wt gain, hemoptysis, hematochezia or melena. Physical Examination:    /84 (BP 1 Location: Left arm, BP Patient Position: Sitting)  Pulse 71  Temp 97 °F (36.1 °C) (Temporal)   Resp 14  Ht 5' 10\" (1.778 m)  Wt 200 lb 12.8 oz (91.1 kg)  SpO2 96%  BMI 28.81 kg/m2    General: Alert and Ox3, Fluent speech  HEENT:  NC/AT, EOMI, OP: clear  Neck:  Supple, no adenopathy, JVD, mass or bruit  Chest:  Clear to Ausculation, without wheezes, rales, rubs or ronchi  Cardiac: RRR  Abdomen:  +BS, soft, nontender without palpable HSM  Extremities:  No cyanosis, clubbing or edema  Neurologic:  Ambulatory without assist, CN 2-12 grossly intact. Moves all extremities. Skin: no rash  Lymphadenopathy: no cervical or supraclavicular nodes      ASSESSMENT AND PLAN:     1. T2DM:  Needs labs today. Counseling and dietary advice. Evaluate need for Metformin once labs are back  2. Subclinical Hyperthyroidism? Checking T4 and TSH    Orders Placed This Encounter    HEMOGLOBIN A1C WITH EAG    MICROALBUMIN, UR, RAND W/ MICROALB/CREAT RATIO    T4 (THYROXINE)    TSH 3RD GENERATION    oxybutynin chloride XL (DITROPAN XL) 10 mg CR tablet     Sig: Take 10 mg by mouth daily.        Yousif Webb MD, [address]

## 2018-08-28 NOTE — MR AVS SNAPSHOT
[address] [place] [address] 
[phone] Patient: Jeanne Zhang MRN: [id number] SALVATORE:[birth year] Visit Information Date & Time Provider Department Dept. Phone Encounter #  
 8/28/2018  9:00 AM Shirley EricksonServ[facility] [phone] [id number] Follow-up Instructions Return in about 4 weeks (around 9/25/2018). Your Appointments 10/25/2018 10:30 AM  
ESTABLISHED PATIENT with MD Michelle Lopez 38 ([facility]) Appt Note: 6 mo f/u  
 1000 [facility] [address] [facility],5[address] [id number] [phone]  
  
   
 [address] [address]  
  
    
 2/21/2019  8:30 AM  
Any with GRISELDA Lozada [address] [place] [address]) Appt Note: 6 mo f/u  
 [address] [facility] [id number] 1405 [facility] 9449 [facility] [address] Upcoming Health Maintenance Date Due Pneumococcal 65+ High/Highest Risk (2 of 2 - PPSV23) 10/31/2018* Influenza Age 5 to Adult 11/7/2018* MEDICARE YEARLY EXAM 10/21/2018 GLAUCOMA SCREENING Q2Y 6/19/2019 DTaP/Tdap/Td series (2 - Td) 3/19/2028 *Topic was postponed. The date shown is not the original due date. Allergies as of 8/28/2018  Review Complete On: 8/28/2018 By: Gomez Peabody, LPN No Known Allergies Current Immunizations  Never Reviewed Name Date Influenza High Dose Vaccine PF 10/10/2017 Pneumococcal Conjugate (PCV-13) 10/10/2017 Not reviewed this visit You Were Diagnosed With   
  
 Codes Comments Controlled type 2 diabetes mellitus without complication, without long-term current use of insulin ([facility] [address]    -  Primary ICD-10-CM: E11.9 ICD-9-CM: 250.00 Low TSH level     ICD-10-CM: R94.6 ICD-9-CM: 794.5 Vitals BP Pulse Temp Resp Height(growth percentile) Weight(growth percentile) 126/84 (BP 1 Location: Left arm, BP Patient Position: Sitting) 71 97 °F (36.1 °C) (Temporal) 14 5' 10\" (1.778 m) 200 lb 12.8 oz (91.1 kg) SpO2 BMI Smoking Status 96% 28.81 kg/m2 Current Every Day Smoker BMI and BSA Data Body Mass Index Body Surface Area  
 28.81 kg/m 2 2.12 m 2 Preferred Pharmacy Pharmacy Name Phone [id number], [address] [place] [phone] Your Updated Medication List  
  
   
This list is accurate as of 8/28/18  9:42 AM.  Always use your most recent med list.  
  
  
  
  
 clopidogrel 75 mg Tab Commonly known as:  PLAVIX TAKE 1 TABLET BY MOUTH DAILY  
  
 clotrimazole-betamethasone topical cream  
Commonly known as:  LOTRISONE  
AAA BID  
  
 dicyclomine 10 mg capsule Commonly known as:  BENTYL TAKE ONE CAPSULE BY MOUTH 4 TIMES A DAY AS NEEDED FISH OIL 1,000 mg Cap Generic drug:  omega-3 fatty acids-vitamin e Take 1 Cap by mouth daily. Indications: 1200mg capsule ICAPS AREDS PO Take  by mouth.  
  
 multivitamin tablet Commonly known as:  ONE A DAY Take 1 Tab by mouth daily. oxybutynin chloride XL 10 mg CR tablet Commonly known as:  DITROPAN XL Take 10 mg by mouth daily. pantoprazole 20 mg tablet Commonly known as:  PROTONIX Take 1 Tab by mouth daily. VITAMIN B-12 100 mcg tablet Generic drug:  cyanocobalamin Take 100 mcg by mouth daily. VITAMIN B-6 100 mg tablet Generic drug:  pyridoxine (vitamin B6) Take 100 mg by mouth daily. We Performed the Following HEMOGLOBIN A1C WITH EAG [21801 CPT(R)] MICROALBUMIN, UR, RAND W/ MICROALB/CREAT RATIO [id number] CPT(R)] T4 (THYROXINE) [id number] CPT(R)] TSH 3RD GENERATION [46800 CPT(R)] Follow-up Instructions Return in about 4 weeks (around 9/25/2018). Patient Instructions Learning About Diabetes Food Guidelines Your Care Instructions Meal planning is important to manage diabetes. It helps keep your blood sugar at a target level (which you set with your doctor). You don't have to eat special foods. You can eat what your family eats, including sweets once in a while. But you do have to pay attention to how often you eat and how much you eat of certain foods. You may want to work with a dietitian or a certified diabetes educator (CDE) to help you plan meals and snacks. A dietitian or CDE can also help you lose weight if that is one of your goals. What should you know about eating carbs? Managing the amount of carbohydrate (carbs) you eat is an important part of healthy meals when you have diabetes. Carbohydrate is found in many foods. · Learn which foods have carbs. And learn the amounts of carbs in different foods. ¨ Bread, cereal, pasta, and rice have about 15 grams of carbs in a serving. A serving is 1 slice of bread (1 ounce), ½ cup of cooked cereal, or 1/3 cup of cooked pasta or rice. ¨ Fruits have 15 grams of carbs in a serving. A serving is 1 small fresh fruit, such as an apple or orange; ½ of a banana; ½ cup of cooked or canned fruit; ½ cup of fruit juice; 1 cup of melon or raspberries; or 2 tablespoons of dried fruit. ¨ Milk and no-sugar-added yogurt have 15 grams of carbs in a serving. A serving is 1 cup of milk or 2/3 cup of no-sugar-added yogurt. ¨ Starchy vegetables have 15 grams of carbs in a serving. A serving is ½ cup of mashed potatoes or sweet potato; 1 cup winter squash; ½ of a small baked potato; ½ cup of cooked beans; or ½ cup cooked corn or green peas. · Learn how much carbs to eat each day and at each meal. A dietitian or CDE can teach you how to keep track of the amount of carbs you eat. This is called carbohydrate counting.  
· If you are not sure how to count carbohydrate grams, use the Plate Method to plan meals. It is a good, quick way to make sure that you have a balanced meal. It also helps you spread carbs throughout the day. ¨ Divide your plate by types of foods. Put non-starchy vegetables on half the plate, meat or other protein food on one-quarter of the plate, and a grain or starchy vegetable in the final quarter of the plate. To this you can add a small piece of fruit and 1 cup of milk or yogurt, depending on how many carbs you are supposed to eat at a meal. 
· Try to eat about the same amount of carbs at each meal. Do not \"save up\" your daily allowance of carbs to eat at one meal. 
· Proteins have very little or no carbs per serving. Examples of proteins are beef, chicken, turkey, fish, eggs, tofu, cheese, cottage cheese, and peanut butter. A serving size of meat is 3 ounces, which is about the size of a deck of cards. Examples of meat substitute serving sizes (equal to 1 ounce of meat) are 1/4 cup of cottage cheese, 1 egg, 1 tablespoon of peanut butter, and ½ cup of tofu. How can you eat out and still eat healthy? · Learn to estimate the serving sizes of foods that have carbohydrate. If you measure food at home, it will be easier to estimate the amount in a serving of restaurant food. · If the meal you order has too much carbohydrate (such as potatoes, corn, or baked beans), ask to have a low-carbohydrate food instead. Ask for a salad or green vegetables. · If you use insulin, check your blood sugar before and after eating out to help you plan how much to eat in the future. · If you eat more carbohydrate at a meal than you had planned, take a walk or do other exercise. This will help lower your blood sugar. What else should you know? · Limit saturated fat, such as the fat from meat and dairy products. This is a healthy choice because people who have diabetes are at higher risk of heart disease.  So choose lean cuts of meat and nonfat or low-fat dairy products. Use olive or canola oil instead of butter or shortening when cooking. · Don't skip meals. Your blood sugar may drop too low if you skip meals and take insulin or certain medicines for diabetes. · Check with your doctor before you drink alcohol. Alcohol can cause your blood sugar to drop too low. Alcohol can also cause a bad reaction if you take certain diabetes medicines. Follow-up care is a key part of your treatment and safety. Be sure to make and go to all appointments, and call your doctor if you are having problems. It's also a good idea to know your test results and keep a list of the medicines you take. Where can you learn more? Go to http://annaNubleer Mediajaime.info/. Enter B930 in the search box to learn more about \"Learning About Diabetes Food Guidelines. \" Current as of: December 7, 2017 Content Version: 11.7 © 1042-6803 Toywheel. Care instructions adapted under license by [employer] (which disclaims liability or warranty for this information). If you have questions about a medical condition or this instruction, always ask your healthcare professional. [address] any warranty or liability for your use of this information. Learning About Foot and Toenail Care Learning About Foot and Toenail Care Checking your loved one's feet and keeping them clean and soft can help prevent cracks and infection in the skin. This is especially important for people who have diabetes. Keeping toenails trimmed-and polished if that's what the person likes-also helps the person feel well-groomed. If the person you care for has diabetes or has foot problems, such as bad bunions and corns, think about taking them to see a podiatrist. This is a doctor who specializes in the care of the feet. Sometimes a podiatrist will come to the home if the person can't go out for visits. Try to take the person for salon pedicures if that is what they want. It's a chance to get out and see people and continue a favorite activity. You can do basic nail care at home. Usually all you need to do is keep the nails clean and at a safe length. How do you trim someone's toenails? Try to trim the person's nails every week. Or check the nails each week to see if they need to be trimmed. It's easiest to trim nails after the person has had a shower or foot bath. It makes the nails softer and easier to trim. Start by gathering your supplies. You will need toenail clippers and a nail file. You may also need nail polish and nail polish remover. To trim the nails: 
1. Wash and dry your hands. You don't need to wear gloves. 2. Use nail polish remover to take off any polish. 3. Hold the person's foot and toe steady with one hand while you trim the nail with your other hand. Trim the nails straight across. Leave the nails a little longer at the corners so that the sharp ends don't cut into the skin. 4. Keep the nails no longer than the tip of the toes. 5. Let the nails dry if they are still damp and soft. 6. Use a nail file to gently smooth the edges of the nails, especially at the corners. They may be sharp after the nails are cut straight. 7. Apply nail polish, if the person wants it. If the person's nails are thick and discolored, it may be safest to have a podiatrist cut them. What else do you need to know? When you're caring for someone's nails, it is important to remember not to trim or cut the cuticles. A minor cut in a cuticle could lead to an infection. Wash the feet daily in the shower or bath or in a basin made for washing feet. It's extra important to wash the feet carefully if the person has diabetes. After washing the feet, dry gently. Put lotion on the feet, especially on the heels. But don't put it between the toes.  
If the person doesn't have diabetes and you see signs of athlete's foot (such as dry, cracking, or itchy skin between the toes), you can try an over-the-counter medicine. These medicines can kill the fungus that causes athlete's foot. If the problem doesn't go away, talk to the person's doctor. Look every day for cuts or signs of infection, such as pain, swelling, redness, or warmth. If you see any of these signs-especially in someone who has diabetes-call the doctor. Where can you learn more? Go to http://anna-jaime.info/. Enter A726 in the search box to learn more about \"Learning About Foot and Toenail Care. \" Current as of: October 6, 2017 Content Version: 11.7 © 2078-0751 Tervela. Care instructions adapted under license by [employer] (which disclaims liability or warranty for this information). If you have questions about a medical condition or this instruction, always ask your healthcare professional. [place] [address] any warranty or liability for your use of this information. Diabetes and Alcohol: Care Instructions Your Care Instructions People who have diabetes need to be more careful with alcohol. Before you drink, consider a few things: Is your diabetes well controlled? Do you know how drinking alcohol can affect you? Do you have high blood pressure, nerve damage, or eye problems from your diabetes? If you take insulin or another medicine for diabetes, drinking alcohol may cause low blood sugar. This could cause dangerous low blood sugar levels. Too much alcohol can also affect your ability to know your blood sugar is low and to treat it. Drinking alcohol can make you lightheaded at first and drowsy as you drink more, both of which may be similar to the symptoms of low blood sugar. Drinking a lot of alcohol over a long period of time can damage your liver (cirrhosis). If this happens, your body may lose its natural response to protect itself from low blood sugar. If you are controlling your diabetes and do not have other health issues, it may be okay to have a drink once in a while. Learning how alcohol affects your body can help you make the right choices. Follow-up care is a key part of your treatment and safety. Be sure to make and go to all appointments, and call your doctor if you are having problems. It's also a good idea to know your test results and keep a list of the medicines you take. How can you care for yourself at home? If you drink · Work with your doctor or other diabetes expert to find what is best for you. Make sure you know whether it is safe to drink if you are taking insulin or another medicine for diabetes. · In general, limit alcohol to 1 drink a day with a meal if you are a woman. If you are a man, limit alcohol to 2 drinks a day with a meal. The following is considered a standard drink: 
¨ One 12-ounce bottle of beer or wine cooler ¨ One 5-ounce glass of wine ¨ One mixed drink with 1.5 ounces of 80-proof hard liquor, such as gin, whiskey, or rum · Choose alcoholic drinks wisely. With hard alcohol, use sugar-free mixers, such as diet tonic, water, or club soda. Pick drinks that have less alcohol, including light beer or dry wine. Or add club soda to wine to dilute it. Also remember that most alcoholic drinks have a lot of calories. · When you drink, check your blood sugar before you go to bed. Have a snack before bed so your blood sugar does not drop while you sleep. When not to drink · Never drink on an empty stomach. If you do drink alcohol, drink it only with a meal or snack. Having as little as 2 drinks on an empty stomach could lead to low blood sugar. · Do not drink alcohol if you have problems recognizing the signs of low blood sugar until they become severe. · Do not drink alcohol after you exercise. The exercise itself lowers blood sugar. · Do not drink if you have nerve damage.  Drinking can make it worse and increase the pain, numbness, and other symptoms. · Do not drink if you have high blood pressure. · Do not drink if you have diabetic eye disease. · Do not drink if you have high triglycerides, a type of fat in your blood. Drinking can raise triglycerides. · Do not drink if you are trying to lose weight. Alcohol provides empty calories that do not give you any nutrients. · Do not drink and drive. The effects of alcohol are greater if you have low blood sugar. When should you call for help? Call 911 anytime you think you may need emergency care. For example, call if: 
  · You passed out (lost consciousness).  
  · You are confused or cannot think clearly.  
  · Your blood sugar is very high or very low.  
 Watch closely for changes in your health, and be sure to contact your doctor if: 
  · Your blood sugar stays outside the level your doctor set for you.  
  · You have any problems. Where can you learn more? Go to http://anna-jaime.info/. Enter T236 in the search box to learn more about \"Diabetes and Alcohol: Care Instructions. \" Current as of: December 7, 2017 Content Version: 11.7 © 6696-7319 ZigaVite. Care instructions adapted under license by [employer]eroTaxi (which disclaims liability or warranty for this information). If you have questions about a medical condition or this instruction, always ask your healthcare professional. [address] any warranty or liability for your use of this information. Introducing [facility]! [facility] introduces Smallknot patient portal. Now you can access parts of your medical record, email your doctor's office, and request medication refills online. 1. In your internet browser, go to [email or web address] 2. Click on the First Time User? Click Here link in the Sign In box. You will see the New Member Sign Up page. 3. Enter your DriveK Access Code exactly as it appears below. You will not need to use this code after youve completed the sign-up process. If you do not sign up before the expiration date, you must request a new code. · DriveK Access Code: [email or web address] Expires: 10/1/2018  8:02 AM 
 
4. Enter the last four digits of your Social Security Number (xxxx) and Date of Birth (mm/dd/yyyy) as indicated and click Submit. You will be taken to the next sign-up page. 5. Create a DriveK ID. This will be your DriveK login ID and cannot be changed, so think of one that is secure and easy to remember. 6. Create a DriveK password. You can change your password at any time. 7. Enter your Password Reset Question and Answer. This can be used at a later time if you forget your password. 8. Enter your e-mail address. You will receive e-mail notification when new information is available in [address]. 9. Click Sign Up. You can now view and download portions of your medical record. 10. Click the Download Summary menu link to download a portable copy of your medical information. If you have questions, please visit the Frequently Asked Questions section of the DriveK website. Remember, DriveK is NOT to be used for urgent needs. For medical emergencies, dial 911. Now available from your iPhone and Android! Please provide this summary of care documentation to your next provider. Your primary care clinician is listed as Stephen Manrique. If you have any questions after today's visit, please call [phone].

## 2018-08-28 NOTE — PROGRESS NOTES
1. Have you been to the ER, urgent care clinic since your last visit? Hospitalized since your last visit? No    2. Have you seen or consulted any other health care providers outside of the [address] [place] [address] since your last visit? Include any pap smears or colon screening.  Urologist Dr Handy Stringer

## 2018-08-29 LAB
ALBUMIN/CREAT UR: <8.4 MG/G CREAT (ref 0–30)
CREAT UR-MCNC: 35.7 MG/DL
EST. AVERAGE GLUCOSE BLD GHB EST-MCNC: 123 MG/DL
HBA1C MFR BLD: 5.9 % (ref 4.8–5.6)
MICROALBUMIN UR-MCNC: <3 UG/ML
T4 SERPL-MCNC: 6.6 UG/DL (ref 4.5–12)
TSH SERPL DL<=0.005 MIU/L-ACNC: 1.46 UIU/ML (ref 0.45–4.5)

## 2018-10-12 NOTE — ED NOTES
Bedside and Verbal shift change report given to [address] Juan (oncoming nurse) by Artem Bravo (offgoing nurse). Report included the following information SBAR, Kardex, ED Summary, Procedure Summary, Intake/Output, MAR, Recent Results, Med Rec Status and Cardiac Rhythm NSR. Patient is resting comfortably, wife at bedside. Patient reports that he is not dizzy at the moment. Awaiting hospitalist for admission. Patient provided a ginger ale. Patient is resting comfortably, bed in the lowest position, side rails raised, call bell in hand, lights dim. Instructed patient to not get up without assistance, and to ring the call bell for any questions or concerns. Updated patient on the plan of care.
MD at bedside at this time.
Patient disconnected and ambulated to the restroom, steady gait. Reports minor dizziness.
Patient states that he can not provide a urine sample at this time. Pillow given and call bell within reach.  Spouse at bedside
Spoke with Philly Billings RN on Neuro, will call back once a bed assignment is made
Upon assuming care of the patient there was no previously documented NIH completed by the offgoing nurse, current NIH is a 3. Spoke with Shannon Guerra RN on Neuro to inform her of this. Patient has a right shoulder injury from two years ago, which allows for right arm drift. Patient also had some double that is intermittent, in addition to slurred speech with certain words.
12-Oct-2018 00:41

## 2018-10-23 RX ORDER — PANTOPRAZOLE SODIUM 20 MG/1
20 TABLET, DELAYED RELEASE ORAL DAILY
Qty: 90 TAB | Refills: 3 | Status: SHIPPED | OUTPATIENT
Start: 2018-10-23 | End: 2019-11-01 | Stop reason: SDUPTHER

## 2018-10-29 RX ORDER — CLOPIDOGREL BISULFATE 75 MG/1
TABLET ORAL
Qty: 30 TAB | Refills: 6 | Status: SHIPPED | OUTPATIENT
Start: 2018-10-29 | End: 2019-05-18 | Stop reason: SDUPTHER

## 2018-10-29 RX ORDER — ATORVASTATIN CALCIUM 40 MG/1
TABLET, FILM COATED ORAL
Qty: 30 TAB | Refills: 6 | Status: SHIPPED | OUTPATIENT
Start: 2018-10-29 | End: 2019-02-10

## 2019-02-10 PROBLEM — J44.9 COPD WITH HYPOXIA (HCC): Status: ACTIVE | Noted: 2019-02-10

## 2019-02-10 PROBLEM — R53.1 GENERALIZED WEAKNESS: Status: ACTIVE | Noted: 2019-02-10

## 2019-02-10 PROBLEM — R09.02 COPD WITH HYPOXIA (HCC): Status: ACTIVE | Noted: 2019-02-10

## 2019-02-10 PROBLEM — R53.1 WEAKNESS GENERALIZED: Status: ACTIVE | Noted: 2019-02-10

## 2019-02-10 PROBLEM — Z86.73 HISTORY OF CVA (CEREBROVASCULAR ACCIDENT): Status: ACTIVE | Noted: 2019-02-10

## 2019-02-10 PROBLEM — R50.9 ACUTE FEBRILE ILLNESS: Status: ACTIVE | Noted: 2019-02-10

## 2019-02-13 ENCOUNTER — HOME HEALTH ADMISSION (OUTPATIENT)
Dept: HOME HEALTH SERVICES | Facility: HOME HEALTH | Age: 84
End: 2019-02-13

## 2019-02-14 PROBLEM — R91.1 NODULE OF UPPER LOBE OF LEFT LUNG: Status: ACTIVE | Noted: 2019-02-01

## 2019-02-16 ENCOUNTER — HOME CARE VISIT (OUTPATIENT)
Dept: HOME HEALTH SERVICES | Facility: HOME HEALTH | Age: 84
End: 2019-02-16

## 2019-02-20 PROBLEM — N32.81 OAB (OVERACTIVE BLADDER): Status: ACTIVE | Noted: 2019-02-20

## 2019-05-20 RX ORDER — CLOPIDOGREL BISULFATE 75 MG/1
TABLET ORAL
Qty: 30 TAB | Refills: 6 | Status: SHIPPED | OUTPATIENT
Start: 2019-05-20 | End: 2020-01-13

## 2019-05-20 RX ORDER — ATORVASTATIN CALCIUM 40 MG/1
TABLET, FILM COATED ORAL
Qty: 30 TAB | Refills: 6 | Status: SHIPPED | OUTPATIENT
Start: 2019-05-20 | End: 2020-01-13

## 2019-08-22 PROBLEM — C61 PROSTATE CANCER (HCC): Status: RESOLVED | Noted: 2017-07-13 | Resolved: 2019-08-22

## 2019-08-22 PROBLEM — C61 PROSTATE CARCINOMA (HCC): Status: RESOLVED | Noted: 2017-04-16 | Resolved: 2019-08-22

## 2019-09-25 PROBLEM — Z01.818 PREOP EXAMINATION: Status: RESOLVED | Noted: 2017-07-13 | Resolved: 2019-09-25

## 2019-11-01 RX ORDER — PANTOPRAZOLE SODIUM 20 MG/1
TABLET, DELAYED RELEASE ORAL
Qty: 90 TAB | Refills: 3 | Status: SHIPPED | OUTPATIENT
Start: 2019-11-01 | End: 2020-10-02

## 2019-11-22 ENCOUNTER — HOSPITAL ENCOUNTER (OUTPATIENT)
Dept: PET IMAGING | Age: 84
Discharge: HOME | End: 2019-11-22
Attending: INTERNAL MEDICINE
Payer: MEDICARE

## 2019-11-22 VITALS — WEIGHT: 207 LBS | BODY MASS INDEX: 28.04 KG/M2 | HEIGHT: 72 IN

## 2019-11-22 DIAGNOSIS — R91.1 NODULE OF UPPER LOBE OF LEFT LUNG: ICD-10-CM

## 2019-11-22 PROCEDURE — A9552 F18 FDG: HCPCS

## 2019-11-22 RX ORDER — SODIUM CHLORIDE 0.9 % (FLUSH) 0.9 %
10 SYRINGE (ML) INJECTION
Status: COMPLETED | OUTPATIENT
Start: 2019-11-22 | End: 2019-11-22

## 2019-11-22 RX ADMIN — Medication 10 ML: at 13:49

## 2020-01-13 RX ORDER — ATORVASTATIN CALCIUM 40 MG/1
TABLET, FILM COATED ORAL
Qty: 90 TAB | Refills: 2 | Status: SHIPPED | OUTPATIENT
Start: 2020-01-13 | End: 2020-11-13

## 2020-01-13 RX ORDER — CLOPIDOGREL BISULFATE 75 MG/1
TABLET ORAL
Qty: 90 TAB | Refills: 2 | Status: SHIPPED | OUTPATIENT
Start: 2020-01-13 | End: 2020-11-13

## 2020-07-14 PROBLEM — A41.9 SEPSIS (HCC): Status: ACTIVE | Noted: 2020-07-14

## 2020-07-17 ENCOUNTER — PATIENT OUTREACH (OUTPATIENT)
Dept: CASE MANAGEMENT | Age: 85
End: 2020-07-17

## 2020-07-30 ENCOUNTER — OFFICE VISIT (OUTPATIENT)
Dept: CARDIOLOGY CLINIC | Age: 85
End: 2020-07-30

## 2020-07-30 VITALS
BODY MASS INDEX: 27.63 KG/M2 | DIASTOLIC BLOOD PRESSURE: 82 MMHG | WEIGHT: 204 LBS | HEART RATE: 96 BPM | OXYGEN SATURATION: 96 % | SYSTOLIC BLOOD PRESSURE: 118 MMHG | RESPIRATION RATE: 18 BRPM | HEIGHT: 72 IN | TEMPERATURE: 97.5 F

## 2020-07-30 DIAGNOSIS — I10 ESSENTIAL HYPERTENSION: ICD-10-CM

## 2020-07-30 DIAGNOSIS — M47.816 OSTEOARTHRITIS OF LUMBAR SPINE, UNSPECIFIED SPINAL OSTEOARTHRITIS COMPLICATION STATUS: ICD-10-CM

## 2020-07-30 DIAGNOSIS — R06.09 DOE (DYSPNEA ON EXERTION): ICD-10-CM

## 2020-07-30 DIAGNOSIS — E78.5 DYSLIPIDEMIA: ICD-10-CM

## 2020-07-30 DIAGNOSIS — A41.9 SEPSIS, DUE TO UNSPECIFIED ORGANISM, UNSPECIFIED WHETHER ACUTE ORGAN DYSFUNCTION PRESENT (HCC): ICD-10-CM

## 2020-07-30 DIAGNOSIS — R09.02 COPD WITH HYPOXIA (HCC): ICD-10-CM

## 2020-07-30 DIAGNOSIS — K21.9 GASTROESOPHAGEAL REFLUX DISEASE, ESOPHAGITIS PRESENCE NOT SPECIFIED: ICD-10-CM

## 2020-07-30 DIAGNOSIS — Z86.73 HISTORY OF CVA (CEREBROVASCULAR ACCIDENT): ICD-10-CM

## 2020-07-30 DIAGNOSIS — R06.09 DOE (DYSPNEA ON EXERTION): Primary | ICD-10-CM

## 2020-07-30 DIAGNOSIS — J44.9 COPD WITH HYPOXIA (HCC): ICD-10-CM

## 2020-07-30 NOTE — PROGRESS NOTES
Cassaundra Primrose is a 80 y.o. male is here for cardiac evaluation, referred by Dr. Charla Rodrigez of [place] [address]. Joce Hay Associated diaphoresis, fatigue. S/p recent hospitalization at [facility] 7/14-7/16/29 with sepsis. Hx hypertension, dyslipidemia, COPD/tobacco, DJD, GERD, OAB, prior TIA/CVA (on plavix). No prior hx known CAD, CHF. EKG with sinus tachy 101, LVH, LAE, NSST. Prior cardiac testing:  ECHO 7/24/20:  · LV: Normal cavity size and systolic function (ejection fraction normal). Mild concentric hypertrophy. Estimated left ventricular ejection fraction is 65 - 70%. Visually measured ejection fraction. Mild (grade 1) left ventricular diastolic dysfunction. · AV: Moderate aortic valve sclerosis with stenosis. Aortic valve leaflet calcification present. Aortic valve mean gradient is 16.2 mmHg. Aortic valve area is 1.8 cm2. Mild aortic valve stenosis is present. Mild aortic valve regurgitation is present. · MV: Mild-to-moderate mitral valve stenosis is present. · TV: Mild tricuspid valve regurgitation is present. CHEST CT 7/14/20:   1. Stable appearance of the previously described pleural-based nodular density  in the anterior aspect of the left upper lobe. Evidence of minimal bullous  change in the right apical region. 2. 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 3. Presence of a few prominent mediastinal lymph nodes as previously described. 4. Presence of a small hiatal hernia. The patient denies chest pain, orthopnea, PND, LE edema, palpitations, syncope, presyncope.        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/13/2017    Dyslipidemia 07/13/2017    Gastroesophageal reflux disease 07/13/2017    Cataract 07/13/2017    Palpitation 07/13/2017    Ventral hernia 07/13/2017    History of prostate cancer 07/13/2017    Heart murmur 07/13/2017    Shoulder pain 07/13/2017    Tobacco use 07/13/2017    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 ([address] 04/16/2017    Gait disturbance 04/16/2017    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    Essential hypertension 04/16/2017    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    Incisional hernia 09/25/2013      Irena Rivera MD  Past Medical History:   Diagnosis Date    Arthritis     shoulders, arms    Cataract 7/13/2017    Low perioperative risk for elective procedure, no further risk stratification needed    Degenerative arthritis of lumbar spine 7/13/2017    Dyslipidemia 7/13/2017    Holding livalo secondary to weakness, follow up FLP    Easy bruising     Fatigue     Gastroesophageal reflux disease 7/13/2017    GERD (gastroesophageal reflux disease)     Heart murmur 7/13/2017    mitral murmur, hx of dyspnea,get ECHO for evaluation    History of prostate cancer 7/13/2017    Nodule of upper lobe of left lung 02/2019    Palpitation 7/13/2017    Prostate cancer ([address] 7/13/2017    Shoulder pain 7/13/2017    TIA (transient ischemic attack) 04/2017    Tobacco use 7/13/2017    Ventral hernia 7/13/2017    Concern of course is possibility that he could develop a strangulated small bowel wit this ventral hernia, especially now that it is sympotmatic, will refer to surgery for evaluation      Past Surgical History:   Procedure Laterality Date    HX CATARACT REMOVAL Bilateral     HX HERNIA REPAIR  1996    inguinal R&L    HX HERNIA REPAIR  2013    abd hernia     HX HERNIA REPAIR  2014    Laparoscopic recurrent incisional hernia repair with mesh.   Robot assisted    HX PROSTATECTOMY      HX SMALL BOWEL RESECTION  1996    HX UROLOGICAL      prostate  removed    HX UROLOGICAL      adhesion repair    IL EGD TRANSORAL BIOPSY SINGLE/MULTIPLE  12/5/2012          No Known Allergies   Family History   Problem Relation Age of Onset    Heart Disease Mother     Heart Disease Father     Heart Disease Brother       Social History     Socioeconomic History    Marital status:      Spouse name: Not on file    Number of children: Not on file    Years of education: Not on file    Highest education level: Not on file   Occupational History    Not on file   Social Needs    Financial resource strain: Not on file    Food insecurity     Worry: Not on file     Inability: Not on file    Transportation needs     Medical: Not on file     Non-medical: Not on file   Tobacco Use    Smoking status: Current Every Day Smoker     Packs/day: 0.25     Years: 60.00     Pack years: 15.00    Smokeless tobacco: Never Used    Tobacco comment: smokes pipe daily   Substance and Sexual Activity    Alcohol use: Yes     Frequency: 2-4 times a month     Drinks per session: 1 or 2     Binge frequency: Never     Comment: socially    Drug use: No    Sexual activity: Not Currently   Lifestyle    Physical activity     Days per week: Not on file     Minutes per session: Not on file    Stress: Not on file   Relationships    Social connections     Talks on phone: Not on file     Gets together: Not on file     Attends Voodoo service: Not on file     Active member of club or organization: Not on file     Attends meetings of clubs or organizations: Not on file     Relationship status: Not on file    Intimate partner violence     Fear of current or ex partner: Not on file     Emotionally abused: Not on file     Physically abused: Not on file     Forced sexual activity: Not on file Other Topics Concern     Service Yes    Blood Transfusions No    Caffeine Concern No    Occupational Exposure No    Hobby Hazards No    Sleep Concern No    Stress Concern No    Weight Concern No    Special Diet No    Back Care No    Exercise Yes    Bike Helmet No    Seat Belt Yes    Self-Exams Yes   Social History Narrative    Not on file      Current Outpatient Medications   Medication Sig    trospium (SANCTURA) 20 mg tablet Take 1 Tab by mouth two (2) times a day. Indications: bladder    clopidogrel (PLAVIX) 75 mg tab TAKE 1 TABLET BY MOUTH EVERY DAY    atorvastatin (LIPITOR) 40 mg tablet TAKE 1 TABLET BY MOUTH EVERY DAY    pantoprazole (PROTONIX) 20 mg tablet TAKE 1 TABLET BY MOUTH EVERY DAY    albuterol (PROVENTIL HFA, VENTOLIN HFA, PROAIR HFA) 90 mcg/actuation inhaler Take 2 Puffs by inhalation every four (4) hours as needed for Wheezing.  VIT A/VIT C/VIT E/ZINC/COPPER (ICAPS AREDS PO) Take  by mouth.  cyanocobalamin (VITAMIN B-12) 100 mcg tablet Take 100 mcg by mouth daily.  pyridoxine (VITAMIN B-6) 100 mg tablet Take 100 mg by mouth daily.  omega-3 fatty acids-vitamin e (FISH OIL) 1,000 mg cap Take 1 Cap by mouth daily. Indications: 1200mg capsule    multivitamin (ONE A DAY) tablet Take 1 Tab by mouth daily. No current facility-administered medications for this visit. Review of Symptoms:    CONST  No weight change. No fever, chills, sweats    ENT No visual changes, URI sx, sore throat    CV  See HPI   RESP  No cough, or sputum, wheezing. Also see HPI   GI  No abdominal pain or change in bowel habits. No heartburn or dysphagia. No melena or rectal bleeding.   No dysuria, urgency, frequency, hematuria   MSKEL  No joint pain, swelling. No muscle pain. SKIN  No rash or lesions. NEURO  No headache, syncope, or seizure. No weakness, loss of sensation, or paresthesias. PSYCH  No low mood or depression  No anxiety.     HE/LYMPH  No easy bruising, abnormal bleeding, or enlarged glands. Physical ExamPhysical Exam:    There were no vitals taken for this visit. Gen: NAD  HEENT:  PERRL, throat clear  Neck: no adenopathy, no thyromegaly, no JVD   Heart:  Regular,Nl S1S2,  no murmur, gallop or rub. Lungs:  clear  Abdomen:   Soft, non-tender, bowel sounds are active.    Extremities:  No edema  Pulse: symmetric  Neuro: A&O times 3, No focal neuro deficits    Cardiographics    ECG: from 7/14/20--sinus 101, LAE, LVH, NSST      Labs:   Lab Results   Component Value Date/Time    Sodium 139 07/20/2020 12:00 AM    Sodium 140 07/15/2020 05:50 AM    Sodium 136 07/14/2020 09:41 AM    Sodium 145 (H) 05/17/2019 11:08 AM    Sodium 141 02/13/2019 05:09 AM    Potassium 5.0 07/20/2020 12:00 AM    Potassium 4.0 07/15/2020 05:50 AM    Potassium 3.9 07/14/2020 09:41 AM    Potassium 4.4 05/17/2019 11:08 AM    Potassium 3.7 02/13/2019 05:09 AM    Chloride 99 07/20/2020 12:00 AM    Chloride 103 07/15/2020 05:50 AM    Chloride 100 07/14/2020 09:41 AM    Chloride 105 05/17/2019 11:08 AM    Chloride 105 02/13/2019 05:09 AM    CO2 26 07/20/2020 12:00 AM    CO2 28 07/15/2020 05:50 AM    CO2 27 07/14/2020 09:41 AM    CO2 26 05/17/2019 11:08 AM    CO2 28 02/13/2019 05:09 AM    Anion gap 9 07/15/2020 05:50 AM    Anion gap 9 07/14/2020 09:41 AM    Anion gap 8 02/13/2019 05:09 AM    Anion gap 10 02/12/2019 05:10 AM    Anion gap 11 02/11/2019 06:31 AM    Glucose 94 07/20/2020 12:00 AM    Glucose 101 (H) 07/15/2020 05:50 AM    Glucose 140 (H) 07/14/2020 09:41 AM    Glucose 93 05/17/2019 11:08 AM    Glucose 89 02/13/2019 05:09 AM    BUN 19 07/20/2020 12:00 AM    BUN 22 (H) 07/15/2020 05:50 AM    BUN 23 (H) 07/14/2020 09:41 AM    BUN 20 11/05/2019 08:35 AM    BUN 16 05/17/2019 11:08 AM    Creatinine 1.38 (H) 07/20/2020 12:00 AM    Creatinine 1.30 07/15/2020 05:50 AM    Creatinine 1.55 (H) 07/14/2020 09:41 AM    Creatinine 1.32 (H) 11/05/2019 08:35 AM    Creatinine 1.17 05/17/2019 11:08 AM    BUN/Creatinine ratio 14 07/20/2020 12:00 AM    BUN/Creatinine ratio 17 07/15/2020 05:50 AM    BUN/Creatinine ratio 15 07/14/2020 09:41 AM    BUN/Creatinine ratio 14 05/17/2019 11:08 AM    BUN/Creatinine ratio 12 02/13/2019 05:09 AM    GFR est AA 54 (L) 07/20/2020 12:00 AM    GFR est AA >60 07/15/2020 05:50 AM    GFR est AA 52 (L) 07/14/2020 09:41 AM    GFR est AA >60 11/05/2019 08:35 AM    GFR est AA 66 05/17/2019 11:08 AM    GFR est non-AA 47 (L) 07/20/2020 12:00 AM    GFR est non-AA 53 (L) 07/15/2020 05:50 AM    GFR est non-AA 43 (L) 07/14/2020 09:41 AM    GFR est non-AA 52 (L) 11/05/2019 08:35 AM    GFR est non-AA 57 (L) 05/17/2019 11:08 AM    Calcium 9.3 07/20/2020 12:00 AM    Calcium 8.2 (L) 07/15/2020 05:50 AM    Calcium 8.7 07/14/2020 09:41 AM    Calcium 9.5 05/17/2019 11:08 AM    Calcium 8.7 02/13/2019 05:09 AM    Bilirubin, total 0.7 07/20/2020 12:00 AM    Bilirubin, total 1.1 (H) 07/14/2020 09:41 AM    Bilirubin, total 0.7 05/17/2019 11:08 AM    Bilirubin, total 1.0 02/10/2019 04:54 PM    Bilirubin, total 0.7 10/29/2018 11:39 AM    Alk. phosphatase 111 07/20/2020 12:00 AM    Alk. phosphatase 92 07/14/2020 09:41 AM    Alk. phosphatase 96 05/17/2019 11:08 AM    Alk. phosphatase 96 02/10/2019 04:54 PM    Alk.  phosphatase 108 10/29/2018 11:39 AM    Protein, total 6.7 07/20/2020 12:00 AM    Protein, total 7.1 07/14/2020 09:41 AM    Protein, total 6.5 05/17/2019 11:08 AM    Protein, total 7.1 02/10/2019 04:54 PM    Protein, total 6.6 10/29/2018 11:39 AM    Albumin 3.9 07/20/2020 12:00 AM    Albumin 3.4 (L) 07/14/2020 09:41 AM    Albumin 4.2 05/17/2019 11:08 AM    Albumin 3.8 02/10/2019 04:54 PM    Albumin 4.5 10/29/2018 11:39 AM    Globulin 3.7 07/14/2020 09:41 AM    Globulin 3.3 02/10/2019 04:54 PM    Globulin 3.9 04/15/2017 11:38 AM    A-G Ratio 1.4 07/20/2020 12:00 AM    A-G Ratio 0.9 (L) 07/14/2020 09:41 AM    A-G Ratio 1.8 05/17/2019 11:08 AM    A-G Ratio 1.2 02/10/2019 04:54 PM    A-G Ratio 2.1 10/29/2018 11:39 AM    ALT (SGPT) 45 (H) 07/20/2020 12:00 AM    ALT (SGPT) 23 07/14/2020 09:41 AM    ALT (SGPT) 20 05/17/2019 11:08 AM    ALT (SGPT) 30 02/10/2019 04:54 PM    ALT (SGPT) 23 10/29/2018 11:39 AM     Lab Results   Component Value Date/Time    CK 79 04/15/2017 11:38 AM     Lab Results   Component Value Date/Time    Cholesterol, total 107 05/17/2019 11:08 AM    Cholesterol, total 122 04/20/2018 11:17 AM    Cholesterol, total 156 04/16/2017 03:03 AM    HDL Cholesterol 42 05/17/2019 11:08 AM    HDL Cholesterol 47 04/20/2018 11:17 AM    HDL Cholesterol 42 04/16/2017 03:03 AM    LDL, calculated 49 05/17/2019 11:08 AM    LDL, calculated 52 04/20/2018 11:17 AM    LDL, calculated 85.2 04/16/2017 03:03 AM    Triglyceride 78 05/17/2019 11:08 AM    Triglyceride 115 04/20/2018 11:17 AM    Triglyceride 144 04/16/2017 03:03 AM    CHOL/HDL Ratio 3.7 04/16/2017 03:03 AM     No results found for this or any previous visit.     Assessment:         Patient Active Problem List    Diagnosis Date Noted    Acute febrile illness 02/10/2019     Priority: 1 - One    Weakness generalized 02/10/2019     Priority: 2 - Two    COPD with hypoxia ([facility] 02/10/2019     Priority: 3 - Three    Sepsis ([facility] 07/14/2020    OAB (overactive bladder) 02/20/2019    History of CVA (cerebrovascular accident) 02/10/2019    Generalized weakness 02/10/2019    Nodule of upper lobe of left lung 02/01/2019    Atypical squamoproliferative skin lesion 07/14/2017    On statin therapy 07/14/2017    Degenerative arthritis of lumbar spine 07/13/2017    Dyslipidemia 07/13/2017    Gastroesophageal reflux disease 07/13/2017    Cataract 07/13/2017    Palpitation 07/13/2017    Ventral hernia 07/13/2017    History of prostate cancer 07/13/2017    Heart murmur 07/13/2017    Shoulder pain 07/13/2017    Tobacco use 07/13/2017    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 [place] Associated diaphoresis, fatigue. S/p recent hospitalization at [facility] 7/14-7/16/29 with sepsis. Hx hypertension, dyslipidemia, COPD/tobacco, DJD, GERD, OAB, prior TIA/CVA (on plavix). No prior hx known CAD, CHF. EKG with sinus tachy 101, LVH, LAE, NSST. Prior cardiac testing:  ECHO 7/24/20:  · LV: Normal cavity size and systolic function (ejection fraction normal). Mild concentric hypertrophy. Estimated left ventricular ejection fraction is 65 - 70%. Visually measured ejection fraction. Mild (grade 1) left ventricular diastolic dysfunction. · AV: Moderate aortic valve sclerosis with stenosis. Aortic valve leaflet calcification present. Aortic valve mean gradient is 16.2 mmHg. Aortic valve area is 1.8 cm2. Mild aortic valve stenosis is present. Mild aortic valve regurgitation is present. · MV: Mild-to-moderate mitral valve stenosis is present. · TV: Mild tricuspid valve regurgitation is present. CHEST CT 7/14/20:   1. Stable appearance of the previously described pleural-based nodular density  in the anterior aspect of the left upper lobe. Evidence of minimal bullous  change in the right apical region. 2. 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 3. Presence of a few prominent mediastinal lymph nodes as previously described. 4. Presence of a small hiatal hernia.      Plan:     Echo and CT, recent hospitalization reviewed/discussed  [place] MPI--r/o myocardial ischemia (THOMAS, diaph, fatigue, multiple CV risks, unable to walk on treadmill)  Continue current meds/rx  F/u with PCP as planned    Romina Vasquez MD

## 2020-07-30 NOTE — PROGRESS NOTES
Verified patient with two patient identifiers. Medications reviewed/approved by Dr. Vernell Ireland. 1. Have you been to the ER, urgent care clinic since your last visit? Hospitalized since your last visit? new pt.    2. Have you seen or consulted any other health care providers outside of the [address] [place] [address] since your last visit? Include any pap smears or colon screening.  New pt

## 2020-10-02 RX ORDER — PANTOPRAZOLE SODIUM 20 MG/1
TABLET, DELAYED RELEASE ORAL
Qty: 90 TAB | Refills: 3 | Status: SHIPPED | OUTPATIENT
Start: 2020-10-02 | End: 2021-09-20

## 2020-11-14 RX ORDER — CLOPIDOGREL BISULFATE 75 MG/1
TABLET ORAL
Qty: 90 TAB | Refills: 2 | Status: SHIPPED | OUTPATIENT
Start: 2020-11-14 | End: 2021-08-07 | Stop reason: SDUPTHER

## 2020-11-14 RX ORDER — ATORVASTATIN CALCIUM 40 MG/1
TABLET, FILM COATED ORAL
Qty: 90 TAB | Refills: 2 | Status: SHIPPED | OUTPATIENT
Start: 2020-11-14 | End: 2021-08-07 | Stop reason: SDUPTHER

## 2021-01-25 ENCOUNTER — OFFICE VISIT (OUTPATIENT)
Dept: CARDIOLOGY CLINIC | Age: 86
End: 2021-01-25
Payer: MEDICARE

## 2021-01-25 VITALS
DIASTOLIC BLOOD PRESSURE: 64 MMHG | WEIGHT: 208 LBS | HEIGHT: 72 IN | RESPIRATION RATE: 16 BRPM | HEART RATE: 89 BPM | BODY MASS INDEX: 28.17 KG/M2 | SYSTOLIC BLOOD PRESSURE: 120 MMHG | OXYGEN SATURATION: 98 % | TEMPERATURE: 98 F

## 2021-01-25 DIAGNOSIS — Z86.73 HISTORY OF CVA (CEREBROVASCULAR ACCIDENT): ICD-10-CM

## 2021-01-25 DIAGNOSIS — I63.341 CEREBROVASCULAR ACCIDENT (CVA) DUE TO THROMBOSIS OF RIGHT CEREBELLAR ARTERY (HCC): ICD-10-CM

## 2021-01-25 DIAGNOSIS — I10 ESSENTIAL HYPERTENSION: ICD-10-CM

## 2021-01-25 DIAGNOSIS — R09.02 COPD WITH HYPOXIA (HCC): ICD-10-CM

## 2021-01-25 DIAGNOSIS — R06.09 DOE (DYSPNEA ON EXERTION): Primary | ICD-10-CM

## 2021-01-25 DIAGNOSIS — E78.5 DYSLIPIDEMIA: ICD-10-CM

## 2021-01-25 DIAGNOSIS — J44.9 COPD WITH HYPOXIA (HCC): ICD-10-CM

## 2021-01-25 PROCEDURE — 1101F PT FALLS ASSESS-DOCD LE1/YR: CPT | Performed by: INTERNAL MEDICINE

## 2021-01-25 PROCEDURE — G8419 CALC BMI OUT NRM PARAM NOF/U: HCPCS | Performed by: INTERNAL MEDICINE

## 2021-01-25 PROCEDURE — G8432 DEP SCR NOT DOC, RNG: HCPCS | Performed by: INTERNAL MEDICINE

## 2021-01-25 PROCEDURE — 99214 OFFICE O/P EST MOD 30 MIN: CPT | Performed by: INTERNAL MEDICINE

## 2021-01-25 PROCEDURE — G8536 NO DOC ELDER MAL SCRN: HCPCS | Performed by: INTERNAL MEDICINE

## 2021-01-25 PROCEDURE — G8427 DOCREV CUR MEDS BY ELIG CLIN: HCPCS | Performed by: INTERNAL MEDICINE

## 2021-01-25 PROCEDURE — G8752 SYS BP LESS 140: HCPCS | Performed by: INTERNAL MEDICINE

## 2021-01-25 PROCEDURE — G8754 DIAS BP LESS 90: HCPCS | Performed by: INTERNAL MEDICINE

## 2021-01-25 NOTE — PROGRESS NOTES
Lashell Valentine is a 80 y.o. male is here for routine f/u.referred by Dr. Gilmar Lui of [place] [id number]. Alex Hernando Associated diaphoresis, fatigue. S/p recent hospitalization at \[facility]\"" 7/14-7/16/29 with sepsis. Hx hypertension, dyslipidemia, COPD/tobacco, DJD, GERD, OAB, prior TIA/CVA (on plavix). No prior hx known CAD, CHF. EKG with sinus tachy 101, LVH, LAE, NSST. Prior cardiac testing:  ECHO 7/24/20:  · LV: Normal cavity size and systolic function (ejection fraction normal). Mild concentric hypertrophy. Estimated left ventricular ejection fraction is 65 - 70%. Visually measured ejection fraction. Mild (grade 1) left ventricular diastolic dysfunction. · AV: Moderate aortic valve sclerosis with stenosis. Aortic valve leaflet calcification present. Aortic valve mean gradient is 16.2 mmHg. Aortic valve area is 1.8 cm2. Mild aortic valve stenosis is present. Mild aortic valve regurgitation is present. · MV: Mild-to-moderate mitral valve stenosis is present. · TV: Mild tricuspid valve regurgitation is present. CHEST CT 7/14/20:   1. Stable appearance of the previously described pleural-based nodular density  in the anterior aspect of the left upper lobe. Evidence of minimal bullous  change in the right apical region. 2. 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 3. Presence of a few prominent mediastinal lymph nodes as previously described. 4. Presence of a small hiatal hernia.     Stable THOMAS, COPD. Recent OV with PCP. Some edema, improves at night. Occ chest tightness, no actual pain. The patient denies chest pain, orthopnea, PND,  palpitations, syncope, presyncope.     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/13/2017    Dyslipidemia 07/13/2017    Gastroesophageal reflux disease 07/13/2017    Cataract 07/13/2017    Palpitation 07/13/2017    Ventral hernia 07/13/2017    History of prostate cancer 07/13/2017    Heart murmur 07/13/2017    Shoulder pain 07/13/2017    Tobacco use 07/13/2017    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 ([address] 04/16/2017    Gait disturbance 04/16/2017    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    Essential hypertension 04/16/2017    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    Incisional hernia 09/25/2013      Salazar Anthony MD  Past Medical History:   Diagnosis Date    Arthritis     shoulders, arms    Cataract 7/13/2017    Low perioperative risk for elective procedure, no further risk stratification needed    Degenerative arthritis of lumbar spine 7/13/2017    Dyslipidemia 7/13/2017    Holding livalo secondary to weakness, follow up FLP    Easy bruising     Fatigue     Gastroesophageal reflux disease 7/13/2017    GERD (gastroesophageal reflux disease)     Heart murmur 7/13/2017    mitral murmur, hx of dyspnea,get ECHO for evaluation    History of prostate cancer 7/13/2017    Nodule of upper lobe of left lung 02/2019    Palpitation 7/13/2017    Prostate cancer ([address] 7/13/2017    Shoulder pain 7/13/2017    TIA (transient ischemic attack) 04/2017    Tobacco use 7/13/2017    Ventral hernia 7/13/2017    Concern of course is possibility that he could develop a strangulated small bowel wit this ventral hernia, especially now that it is sympotmatic, will refer to surgery for evaluation      Past Surgical History:   Procedure Laterality Date    HX CATARACT REMOVAL Bilateral     HX HERNIA REPAIR  1996    inguinal R&L    HX HERNIA REPAIR  2013    abd hernia     HX HERNIA REPAIR  2014    Laparoscopic recurrent incisional hernia repair with mesh.   Robot assisted    HX PROSTATECTOMY      HX SMALL BOWEL RESECTION  1996    HX UROLOGICAL      prostate  removed    HX UROLOGICAL      adhesion repair    CT EGD TRANSORAL BIOPSY SINGLE/MULTIPLE  12/5/2012          No Known Allergies   Family History   Problem Relation Age of Onset    Heart Disease Mother     Heart Disease Father     Heart Disease Brother       Social History     Socioeconomic History    Marital status:      Spouse name: Not on file    Number of children: 0    Years of education: 15    Highest education level: Some college, no degree   Occupational History    Not on file   Social Needs    Financial resource strain: Not hard at all   [place] insecurity     Worry: Never true     Inability: Never true    Transportation needs     Medical: No     Non-medical: No   Tobacco Use    Smoking status: Current Every Day Smoker     Packs/day: 0.25     Years: 60.00     Pack years: 15.00     Types: Pipe    Smokeless tobacco: Never Used    Tobacco comment: smokes pipe daily   Substance and Sexual Activity    Alcohol use: Yes     Frequency: 2-4 times a month     Drinks per session: 1 or 2     Binge frequency: Never     Comment: socially    Drug use: No    Sexual activity: Not Currently   Lifestyle    Physical activity     Days per week: 2 days     Minutes per session: 30 min    Stress: Not at all   Relationships    Social connections     Talks on phone: Not on file     Gets together: Not on file     Attends Synagogue service: Not on file     Active member of club or organization: Not on file     Attends meetings of clubs or organizations: Not on file     Relationship status: Not on file    Intimate partner violence     Fear of current or ex partner: Not on file     Emotionally abused: Not on file Physically abused: Not on file     Forced sexual activity: Not on file   Other Topics Concern     Service Yes    Blood Transfusions No    Caffeine Concern No    Occupational Exposure No    Hobby Hazards No    Sleep Concern No    Stress Concern No    Weight Concern No    Special Diet No    Back Care No    Exercise Yes    Bike Helmet No    Seat Belt Yes    Self-Exams Yes   Social History Narrative    Not on file      Current Outpatient Medications   Medication Sig    fluticasone propion-salmeteroL (ADVAIR/WIXELA) 250-50 mcg/dose diskus inhaler Take 1 Puff by inhalation every twelve (12) hours.  clopidogreL (PLAVIX) 75 mg tab TAKE 1 TABLET BY MOUTH EVERY DAY    atorvastatin (LIPITOR) 40 mg tablet TAKE 1 TABLET BY MOUTH EVERY DAY    pantoprazole (PROTONIX) 20 mg tablet TAKE 1 TABLET BY MOUTH EVERY DAY    trospium (SANCTURA) 20 mg tablet Take 1 Tab by mouth two (2) times a day. Indications: bladder    albuterol (PROVENTIL HFA, VENTOLIN HFA, PROAIR HFA) 90 mcg/actuation inhaler Take 2 Puffs by inhalation every four (4) hours as needed for Wheezing.  VIT A/VIT C/VIT E/ZINC/COPPER (ICAPS AREDS PO) Take  by mouth.  cyanocobalamin (VITAMIN B-12) 100 mcg tablet Take 100 mcg by mouth daily.  pyridoxine (VITAMIN B-6) 100 mg tablet Take 100 mg by mouth daily.  omega-3 fatty acids-vitamin e (FISH OIL) 1,000 mg cap Take 1 Cap by mouth daily. Indications: 1200mg capsule    multivitamin (ONE A DAY) tablet Take 1 Tab by mouth daily. No current facility-administered medications for this visit. Review of Symptoms:    CONST  No weight change. No fever, chills, sweats    ENT No visual changes, URI sx, sore throat    CV  See HPI   RESP  No cough, or sputum, wheezing. Also see HPI   GI  No abdominal pain or change in bowel habits. No heartburn or dysphagia. No melena or rectal bleeding.   No dysuria, urgency, frequency, hematuria   MSKEL  No joint pain, swelling.    No muscle pain.    SKIN  No rash or lesions. NEURO  No headache, syncope, or seizure. No weakness, loss of sensation, or paresthesias. PSYCH  No low mood or depression  No anxiety. HE/LYMPH  No easy bruising, abnormal bleeding, or enlarged glands. Physical ExamPhysical Exam:    Visit Vitals  /64 (BP 1 Location: Left arm, BP Patient Position: Sitting)   Pulse 89   Temp 98 °F (36.7 °C) (Temporal)   Resp 16   Ht 6' (1.829 m)   Wt 208 lb (94.3 kg)   SpO2 98%   BMI 28.21 kg/m²     Gen: NAD  HEENT:  PERRL, throat clear  Neck: no adenopathy, no thyromegaly, no JVD   Heart:  Regular,Nl S1S2,  no murmur, gallop or rub. Lungs:  clear  Abdomen:   Soft, non-tender, bowel sounds are active.    Extremities:  No edema  Pulse: symmetric  Neuro: A&O times 3, No focal neuro deficits      Labs:   Lab Results   Component Value Date/Time    Sodium 139 07/20/2020 12:00 AM    Sodium 140 07/15/2020 05:50 AM    Sodium 136 07/14/2020 09:41 AM    Sodium 145 (H) 05/17/2019 11:08 AM    Sodium 141 02/13/2019 05:09 AM    Potassium 5.0 07/20/2020 12:00 AM    Potassium 4.0 07/15/2020 05:50 AM    Potassium 3.9 07/14/2020 09:41 AM    Potassium 4.4 05/17/2019 11:08 AM    Potassium 3.7 02/13/2019 05:09 AM    Chloride 99 07/20/2020 12:00 AM    Chloride 103 07/15/2020 05:50 AM    Chloride 100 07/14/2020 09:41 AM    Chloride 105 05/17/2019 11:08 AM    Chloride 105 02/13/2019 05:09 AM    CO2 26 07/20/2020 12:00 AM    CO2 28 07/15/2020 05:50 AM    CO2 27 07/14/2020 09:41 AM    CO2 26 05/17/2019 11:08 AM    CO2 28 02/13/2019 05:09 AM    Anion gap 9 07/15/2020 05:50 AM    Anion gap 9 07/14/2020 09:41 AM    Anion gap 8 02/13/2019 05:09 AM    Anion gap 10 02/12/2019 05:10 AM    Anion gap 11 02/11/2019 06:31 AM    Glucose 94 07/20/2020 12:00 AM    Glucose 101 (H) 07/15/2020 05:50 AM    Glucose 140 (H) 07/14/2020 09:41 AM    Glucose 93 05/17/2019 11:08 AM    Glucose 89 02/13/2019 05:09 AM    BUN 19 07/20/2020 12:00 AM    BUN 22 (H) 07/15/2020 05:50 AM    BUN 23 (H) 07/14/2020 09:41 AM    BUN 20 11/05/2019 08:35 AM    BUN 16 05/17/2019 11:08 AM    Creatinine 1.38 (H) 07/20/2020 12:00 AM    Creatinine 1.30 07/15/2020 05:50 AM    Creatinine 1.55 (H) 07/14/2020 09:41 AM    Creatinine 1.32 (H) 11/05/2019 08:35 AM    Creatinine 1.17 05/17/2019 11:08 AM    BUN/Creatinine ratio 14 07/20/2020 12:00 AM    BUN/Creatinine ratio 17 07/15/2020 05:50 AM    BUN/Creatinine ratio 15 07/14/2020 09:41 AM    BUN/Creatinine ratio 14 05/17/2019 11:08 AM    BUN/Creatinine ratio 12 02/13/2019 05:09 AM    GFR est AA 54 (L) 07/20/2020 12:00 AM    GFR est AA >60 07/15/2020 05:50 AM    GFR est AA 52 (L) 07/14/2020 09:41 AM    GFR est AA >60 11/05/2019 08:35 AM    GFR est AA 66 05/17/2019 11:08 AM    GFR est non-AA 47 (L) 07/20/2020 12:00 AM    GFR est non-AA 53 (L) 07/15/2020 05:50 AM    GFR est non-AA 43 (L) 07/14/2020 09:41 AM    GFR est non-AA 52 (L) 11/05/2019 08:35 AM    GFR est non-AA 57 (L) 05/17/2019 11:08 AM    Calcium 9.3 07/20/2020 12:00 AM    Calcium 8.2 (L) 07/15/2020 05:50 AM    Calcium 8.7 07/14/2020 09:41 AM    Calcium 9.5 05/17/2019 11:08 AM    Calcium 8.7 02/13/2019 05:09 AM    Bilirubin, total 0.7 07/20/2020 12:00 AM    Bilirubin, total 1.1 (H) 07/14/2020 09:41 AM    Bilirubin, total 0.7 05/17/2019 11:08 AM    Bilirubin, total 1.0 02/10/2019 04:54 PM    Bilirubin, total 0.7 10/29/2018 11:39 AM    Alk. phosphatase 111 07/20/2020 12:00 AM    Alk. phosphatase 92 07/14/2020 09:41 AM    Alk. phosphatase 96 05/17/2019 11:08 AM    Alk. phosphatase 96 02/10/2019 04:54 PM    Alk.  phosphatase 108 10/29/2018 11:39 AM    Protein, total 6.7 07/20/2020 12:00 AM    Protein, total 7.1 07/14/2020 09:41 AM    Protein, total 6.5 05/17/2019 11:08 AM    Protein, total 7.1 02/10/2019 04:54 PM    Protein, total 6.6 10/29/2018 11:39 AM    Albumin 3.9 07/20/2020 12:00 AM    Albumin 3.4 (L) 07/14/2020 09:41 AM    Albumin 4.2 05/17/2019 11:08 AM    Albumin 3.8 02/10/2019 04:54 PM    Albumin 4.5 10/29/2018 11:39 AM    Globulin 3.7 07/14/2020 09:41 AM    Globulin 3.3 02/10/2019 04:54 PM    Globulin 3.9 04/15/2017 11:38 AM    A-G Ratio 1.4 07/20/2020 12:00 AM    A-G Ratio 0.9 (L) 07/14/2020 09:41 AM    A-G Ratio 1.8 05/17/2019 11:08 AM    A-G Ratio 1.2 02/10/2019 04:54 PM    A-G Ratio 2.1 10/29/2018 11:39 AM    ALT (SGPT) 45 (H) 07/20/2020 12:00 AM    ALT (SGPT) 23 07/14/2020 09:41 AM    ALT (SGPT) 20 05/17/2019 11:08 AM    ALT (SGPT) 30 02/10/2019 04:54 PM    ALT (SGPT) 23 10/29/2018 11:39 AM     Lab Results   Component Value Date/Time    CK 79 04/15/2017 11:38 AM     Lab Results   Component Value Date/Time    Cholesterol, total 107 05/17/2019 11:08 AM    Cholesterol, total 122 04/20/2018 11:17 AM    Cholesterol, total 156 04/16/2017 03:03 AM    HDL Cholesterol 42 05/17/2019 11:08 AM    HDL Cholesterol 47 04/20/2018 11:17 AM    HDL Cholesterol 42 04/16/2017 03:03 AM    LDL, calculated 49 05/17/2019 11:08 AM    LDL, calculated 52 04/20/2018 11:17 AM    LDL, calculated 85.2 04/16/2017 03:03 AM    Triglyceride 78 05/17/2019 11:08 AM    Triglyceride 115 04/20/2018 11:17 AM    Triglyceride 144 04/16/2017 03:03 AM    CHOL/HDL Ratio 3.7 04/16/2017 03:03 AM     No results found for this or any previous visit.     Assessment:         Patient Active Problem List    Diagnosis Date Noted    Acute febrile illness 02/10/2019     Priority: 1 - One    Weakness generalized 02/10/2019     Priority: 2 - Two    COPD with hypoxia ([facility] 02/10/2019     Priority: 3 - Three    Sepsis ([facility] [address] 07/14/2020    OAB (overactive bladder) 02/20/2019    History of CVA (cerebrovascular accident) 02/10/2019    Generalized weakness 02/10/2019    Nodule of upper lobe of left lung 02/01/2019    Atypical squamoproliferative skin lesion 07/14/2017    On statin therapy 07/14/2017    Degenerative arthritis of lumbar spine 07/13/2017    Dyslipidemia 07/13/2017    Gastroesophageal reflux disease 07/13/2017    Cataract 07/13/2017    Palpitation 07/13/2017    Ventral hernia 07/13/2017    History of prostate cancer 07/13/2017    Heart murmur 07/13/2017    Shoulder pain 07/13/2017    Tobacco use 07/13/2017    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 Danuta Perry of [place] [address]. [place] Associated diaphoresis, fatigue. S/p recent hospitalization at [facility] 7/14-7/16/29 with sepsis. Hx hypertension, dyslipidemia, COPD/tobacco, DJD, GERD, OAB, prior TIA/CVA (on plavix). No prior hx known CAD, CHF. EKG with sinus tachy 101, LVH, LAE, NSST. Prior cardiac testing:  ECHO 7/24/20:  · LV: Normal cavity size and systolic function (ejection fraction normal). Mild concentric hypertrophy. Estimated left ventricular ejection fraction is 65 - 70%. Visually measured ejection fraction. Mild (grade 1) left ventricular diastolic dysfunction. · AV: Moderate aortic valve sclerosis with stenosis. Aortic valve leaflet calcification present. Aortic valve mean gradient is 16.2 mmHg. Aortic valve area is 1.8 cm2. Mild aortic valve stenosis is present. Mild aortic valve regurgitation is present. · MV: Mild-to-moderate mitral valve stenosis is present. · TV: Mild tricuspid valve regurgitation is present. CHEST CT 7/14/20:   1. Stable appearance of the previously described pleural-based nodular density  in the anterior aspect of the left upper lobe. Evidence of minimal bullous  change in the right apical region. 2. 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 3. Presence of a few prominent mediastinal lymph nodes as previously described. 4. Presence of a small hiatal hernia.       Stable THOMAS, COPD. Recent OV with PCP. Some edema, improves at night.  Occ chest tightness, no actual pain. The patient denies chest pain, orthopnea, PND,  palpitations, syncope, presyncope.      Plan:     Ashlee Bates MPI--r/o ischemia--THOMAS, chest tightness, multiple CV risks  Elevate legs, limit Na, comp stockings  Continue current meds/rx  Fu with PCP as planned  RTC 6 mos, sooner michael Hassan MD

## 2021-01-25 NOTE — PROGRESS NOTES
Chief Complaint   Patient presents with    Heart Murmur     6 mth follow up     Hypertension    Cholesterol Problem     Verified patient with two patient identifiers. Medications reviewed/approved by Dr. Holly Sue. 1. Have you been to the ER, urgent care clinic since your last visit? Hospitalized since your last visit?no    2. Have you seen or consulted any other health care providers outside of the [address] [place] [address] since your last visit? Include any pap smears or colon screening.  no

## 2021-02-19 ENCOUNTER — IMMUNIZATION (OUTPATIENT)
Dept: PEDIATRICS CLINIC | Age: 86
End: 2021-02-19
Payer: MEDICARE

## 2021-02-19 DIAGNOSIS — Z23 ENCOUNTER FOR IMMUNIZATION: Primary | ICD-10-CM

## 2021-02-19 PROCEDURE — 91301 COVID-19, MRNA, LNP-S, PF, 100MCG/0.5ML DOSE(MODERNA): CPT | Performed by: FAMILY MEDICINE

## 2021-02-19 PROCEDURE — 0011A COVID-19, MRNA, LNP-S, PF, 100MCG/0.5ML DOSE(MODERNA): CPT | Performed by: FAMILY MEDICINE

## 2021-02-24 ENCOUNTER — TELEPHONE (OUTPATIENT)
Dept: CARDIOLOGY CLINIC | Age: 86
End: 2021-02-24

## 2021-02-24 NOTE — TELEPHONE ENCOUNTER
----- Message from Elbert Kearns MD sent at 2/24/2021  2:02 PM EST -----  Regarding: Eva Schwab MPI  Advise stress nuclear scan normal--no blockages or ischemia, normal LV pumping function. RTC 6 mos.   Thanks [facility]

## 2021-03-19 ENCOUNTER — IMMUNIZATION (OUTPATIENT)
Dept: PEDIATRICS CLINIC | Age: 86
End: 2021-03-19
Payer: MEDICARE

## 2021-03-19 DIAGNOSIS — Z23 ENCOUNTER FOR IMMUNIZATION: Primary | ICD-10-CM

## 2021-03-19 PROCEDURE — 91301 COVID-19, MRNA, LNP-S, PF, 100MCG/0.5ML DOSE(MODERNA): CPT | Performed by: FAMILY MEDICINE

## 2021-03-19 PROCEDURE — 0012A COVID-19, MRNA, LNP-S, PF, 100MCG/0.5ML DOSE(MODERNA): CPT | Performed by: FAMILY MEDICINE

## 2021-07-28 ENCOUNTER — OFFICE VISIT (OUTPATIENT)
Dept: CARDIOLOGY CLINIC | Age: 86
End: 2021-07-28
Payer: MEDICARE

## 2021-07-28 VITALS
OXYGEN SATURATION: 97 % | HEIGHT: 72 IN | BODY MASS INDEX: 28.04 KG/M2 | HEART RATE: 94 BPM | WEIGHT: 207 LBS | RESPIRATION RATE: 17 BRPM | DIASTOLIC BLOOD PRESSURE: 82 MMHG | SYSTOLIC BLOOD PRESSURE: 130 MMHG | TEMPERATURE: 97.1 F

## 2021-07-28 DIAGNOSIS — I38 MILD VALVULAR HEART DISEASE: ICD-10-CM

## 2021-07-28 DIAGNOSIS — E78.5 DYSLIPIDEMIA: ICD-10-CM

## 2021-07-28 DIAGNOSIS — I10 ESSENTIAL HYPERTENSION: Primary | ICD-10-CM

## 2021-07-28 DIAGNOSIS — R06.09 DOE (DYSPNEA ON EXERTION): ICD-10-CM

## 2021-07-28 DIAGNOSIS — Z86.73 HISTORY OF CVA (CEREBROVASCULAR ACCIDENT): ICD-10-CM

## 2021-07-28 DIAGNOSIS — R09.02 COPD WITH HYPOXIA (HCC): ICD-10-CM

## 2021-07-28 DIAGNOSIS — J44.9 COPD WITH HYPOXIA (HCC): ICD-10-CM

## 2021-07-28 PROCEDURE — G8752 SYS BP LESS 140: HCPCS | Performed by: INTERNAL MEDICINE

## 2021-07-28 PROCEDURE — G8536 NO DOC ELDER MAL SCRN: HCPCS | Performed by: INTERNAL MEDICINE

## 2021-07-28 PROCEDURE — 1101F PT FALLS ASSESS-DOCD LE1/YR: CPT | Performed by: INTERNAL MEDICINE

## 2021-07-28 PROCEDURE — G8427 DOCREV CUR MEDS BY ELIG CLIN: HCPCS | Performed by: INTERNAL MEDICINE

## 2021-07-28 PROCEDURE — G8432 DEP SCR NOT DOC, RNG: HCPCS | Performed by: INTERNAL MEDICINE

## 2021-07-28 PROCEDURE — 99214 OFFICE O/P EST MOD 30 MIN: CPT | Performed by: INTERNAL MEDICINE

## 2021-07-28 PROCEDURE — G8419 CALC BMI OUT NRM PARAM NOF/U: HCPCS | Performed by: INTERNAL MEDICINE

## 2021-07-28 PROCEDURE — G8754 DIAS BP LESS 90: HCPCS | Performed by: INTERNAL MEDICINE

## 2021-07-28 PROCEDURE — 93000 ELECTROCARDIOGRAM COMPLETE: CPT | Performed by: INTERNAL MEDICINE

## 2021-07-28 NOTE — PROGRESS NOTES
Identified pt with two pt identifiers(name and ). Reviewed record in preparation for visit and have obtained necessary documentation. Chief Complaint   Patient presents with    Cerebrovascular Accident     6mo f/u    Hypertension    Breathing Problem      Vitals:    21 1022   BP: 130/82   Pulse: 94   Resp: 17   Temp: 97.1 °F (36.2 °C)   TempSrc: Temporal   SpO2: 97%   Weight: 207 lb (93.9 kg)   Height: 6' (1.829 m)   PainSc:   0 - No pain       Health Maintenance Review: Patient reminded of \"due or due soon\" health maintenance. I have asked the patient to contact his/her primary care provider (PCP) for follow-up on his/her health maintenance. Coordination of Care Questionnaire:  :   1) Have you been to an emergency room, urgent care, or hospitalized since your last visit? If yes, where when, and reason for visit? no       2. Have seen or consulted any other health care provider since your last visit? If yes, where when, and reason for visit? NO      Patient is accompanied by self I have received verbal consent from [facility] to discuss any/all medical information while they are present in the room.

## 2021-07-28 NOTE — PROGRESS NOTES
Zahra Rhodes is a 80 y.o. male is here for routine f/u. Stable THOMAS, COPD sx. Fatigue, occasional chest tightness.   S/p prior hospitalization at [facility] 7/14-7/16/29 with sepsis.  Hx hypertension, dyslipidemia, COPD/tobacco, DJD, GERD, OAB, prior TIA/CVA (on plavix).  No prior hx known CAD, CHF.  EKG with sinus tachy 101, LVH, LAE, NSST. Prior cardiac testing:  ECHO 7/24/20:  · LV: Normal cavity size and systolic function (ejection fraction normal). Mild concentric hypertrophy. Estimated left ventricular ejection fraction is 65 - 70%. Visually measured ejection fraction. Mild (grade 1) left ventricular diastolic dysfunction. · AV: Moderate aortic valve sclerosis with stenosis. Aortic valve leaflet calcification present. Aortic valve mean gradient is 16.2 mmHg. Aortic valve area is 1.8 cm2. Mild aortic valve stenosis is present. Mild aortic valve regurgitation is present. · MV: Mild-to-moderate mitral valve stenosis is present. · TV: Mild tricuspid valve regurgitation is present. CHEST CT 7/14/20:   1. Stable appearance of the previously described pleural-based nodular density  in the anterior aspect of the left upper lobe. Evidence of minimal bullous  change in the right apical region. 2. 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 3. Presence of a few prominent mediastinal lymph nodes as previously described. 4. Presence of a small hiatal hernia.     Seen here 1/21--Lexiscan MPI done on 2/26/21 with normal perfusion--no infarct/ischemia, LVEF 66%.      The patient denies chest pain orthopnea, PND, LE edema, palpitations, syncope, presyncope or fatigue.        Patient Active Problem List    Diagnosis Date Noted    Acute febrile illness 02/10/2019    Weakness generalized 02/10/2019    COPD with hypoxia ([address] 02/10/2019    Mild valvular heart disease 07/28/2021    Sepsis ([facility] 07/14/2020    OAB (overactive bladder) 02/20/2019    History of CVA (cerebrovascular accident) 02/10/2019    Generalized weakness 02/10/2019    Nodule of upper lobe of left lung 02/01/2019    Atypical squamoproliferative skin lesion 07/14/2017    On statin therapy 07/14/2017    Degenerative arthritis of lumbar spine 07/13/2017    Dyslipidemia 07/13/2017    Gastroesophageal reflux disease 07/13/2017    Cataract 07/13/2017    Palpitation 07/13/2017    Ventral hernia 07/13/2017    History of prostate cancer 07/13/2017    Heart murmur 07/13/2017    Shoulder pain 07/13/2017    Tobacco use 07/13/2017    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 ([address] 04/16/2017    Gait disturbance 04/16/2017    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    Essential hypertension 04/16/2017    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    Incisional hernia 09/25/2013      Anjali Dunne MD  Past Medical History:   Diagnosis Date    Arthritis     shoulders, arms    Cataract 7/13/2017    Low perioperative risk for elective procedure, no further risk stratification needed    Degenerative arthritis of lumbar spine 7/13/2017    Dyslipidemia 7/13/2017    Holding livalo secondary to weakness, follow up FLP    Easy bruising     Fatigue     Gastroesophageal reflux disease 7/13/2017    GERD (gastroesophageal reflux disease)     Heart murmur 7/13/2017    mitral murmur, hx of dyspnea,get ECHO for evaluation    History of prostate cancer 7/13/2017    Mild valvular heart disease 7/28/2021    Nodule of upper lobe of left lung 02/2019    Palpitation 7/13/2017    Prostate cancer ([address] 7/13/2017    Shoulder pain 7/13/2017    TIA (transient ischemic attack) 04/2017    Tobacco use 7/13/2017    Ventral hernia 7/13/2017    Concern of course is possibility that he could develop a strangulated small bowel wit this ventral hernia, especially now that it is sympotmatic, will refer to surgery for evaluation      Past Surgical History:   Procedure Laterality Date    HX CATARACT REMOVAL Bilateral     HX HERNIA REPAIR  1996    inguinal R&L    HX HERNIA REPAIR  2013    abd hernia     HX HERNIA REPAIR  2014    Laparoscopic recurrent incisional hernia repair with mesh. Robot assisted    HX PROSTATECTOMY      HX SMALL BOWEL RESECTION  1996    HX UROLOGICAL      prostate  removed    HX UROLOGICAL      adhesion repair    [place] EGD TRANSORAL BIOPSY SINGLE/MULTIPLE  12/5/2012          No Known Allergies   Family History   Problem Relation Age of Onset    Heart Disease Mother     Heart Disease Father     Heart Disease Brother       Social History     Socioeconomic History    Marital status:      Spouse name: Not on file    Number of children: 0    Years of education: 15    Highest education level: Some college, no degree   Occupational History    Not on file   Tobacco Use    Smoking status: Current Every Day Smoker     Packs/day: 0.25     Years: 60.00     Pack years: 15.00     Types: Pipe    Smokeless tobacco: Never Used    Tobacco comment: smokes pipe daily   Vaping Use    Vaping Use: Never used   Substance and Sexual Activity    Alcohol use: Yes     Comment: socially    Drug use: No    Sexual activity: Not Currently   Other Topics Concern     Service Yes    Blood Transfusions No    Caffeine Concern No    Occupational Exposure No    Hobby Hazards No    Sleep Concern No    Stress Concern No    Weight Concern No    Special Diet No    Back Care No    Exercise Yes    Bike Helmet No    Seat Belt Yes    Self-Exams Yes   Social History Narrative    Not on file     Social Determinants of Health     Financial Resource Strain: Low Risk     Difficulty of Paying Living Expenses: Not hard at all   Food Insecurity: No Food Insecurity    Worried About Running Out of Food in the Last Year: Never true    Ovi of Food in the Last Year: Never true   Transportation Needs: No Transportation Needs    Lack of Transportation (Medical):  No    Lack of Transportation (Non-Medical): No   Physical Activity: Insufficiently Active    Days of Exercise per Week: 2 days    Minutes of Exercise per Session: 30 min   Stress: No Stress Concern Present    Feeling of Stress : Not at all   Social Connections:     Frequency of Communication with Friends and Family:     Frequency of Social Gatherings with Friends and Family:     Attends [employer] Services:     Active Member of Clubs or Organizations:     Attends Club or Organization Meetings:     Marital Status:    Intimate Partner Violence:     Fear of Current or Ex-Partner:     Emotionally Abused:     Physically Abused:     Sexually Abused:       Current Outpatient Medications   Medication Sig    Ubaldo Deep Run 250-50 mcg/dose diskus inhaler TAKE 1 PUFF BY INHALATION EVERY TWELVE HOURS.  trospium (SANCTURA) 20 mg tablet TAKE 1 TAB BY MOUTH TWO (2) TIMES A DAY. INDICATIONS: BLADDER    clopidogreL (PLAVIX) 75 mg tab TAKE 1 TABLET BY MOUTH EVERY DAY    atorvastatin (LIPITOR) 40 mg tablet TAKE 1 TABLET BY MOUTH EVERY DAY    pantoprazole (PROTONIX) 20 mg tablet TAKE 1 TABLET BY MOUTH EVERY DAY    albuterol (PROVENTIL HFA, VENTOLIN HFA, PROAIR HFA) 90 mcg/actuation inhaler Take 2 Puffs by inhalation every four (4) hours as needed for Wheezing.  VIT A/VIT C/VIT E/ZINC/COPPER (ICAPS AREDS PO) Take  by mouth.  cyanocobalamin (VITAMIN B-12) 100 mcg tablet Take 100 mcg by mouth daily.  pyridoxine (VITAMIN B-6) 100 mg tablet Take 100 mg by mouth daily.  omega-3 fatty acids-vitamin e (FISH OIL) 1,000 mg cap Take 1 Cap by mouth daily. Indications: 1200mg capsule    multivitamin (ONE A DAY) tablet Take 1 Tab by mouth daily. No current facility-administered medications for this visit. Review of Symptoms:    CONST  No weight change. No fever, chills, sweats    ENT No visual changes, URI sx, sore throat    CV  See HPI   RESP  No cough, or sputum, wheezing.  Also see HPI   GI  No abdominal pain or change in bowel habits. No heartburn or dysphagia. No melena or rectal bleeding.   No dysuria, urgency, frequency, hematuria   MSKEL  No joint pain, swelling. No muscle pain. SKIN  No rash or lesions. NEURO  No headache, syncope, or seizure. No weakness, loss of sensation, or paresthesias. PSYCH  No low mood or depression  No anxiety. HE/LYMPH  No easy bruising, abnormal bleeding, or enlarged glands. Physical ExamPhysical Exam:    Visit Vitals  /82   Pulse 94   Temp 97.1 °F (36.2 °C) (Temporal)   Resp 17   Ht 6' (1.829 m)   Wt 207 lb (93.9 kg)   SpO2 97%   BMI 28.07 kg/m²     Gen: NAD  HEENT:  PERRL, throat clear  Neck: no adenopathy, no thyromegaly, no JVD   Heart:  Regular,Nl S1S2,  II.VI murmur, no gallop or rub. Lungs:  clear  Abdomen:   Soft, non-tender, bowel sounds are active.    Extremities:  No edema  Pulse: symmetric  Neuro: A&O times 3, No focal neuro deficits    Cardiographics    ECG: NSR, RSR', LAE, no acute changes      Labs:   Lab Results   Component Value Date/Time    Sodium 139 07/20/2020 12:00 AM    Sodium 140 07/15/2020 05:50 AM    Sodium 136 07/14/2020 09:41 AM    Sodium 145 (H) 05/17/2019 11:08 AM    Sodium 141 02/13/2019 05:09 AM    Potassium 5.0 07/20/2020 12:00 AM    Potassium 4.0 07/15/2020 05:50 AM    Potassium 3.9 07/14/2020 09:41 AM    Potassium 4.4 05/17/2019 11:08 AM    Potassium 3.7 02/13/2019 05:09 AM    Chloride 99 07/20/2020 12:00 AM    Chloride 103 07/15/2020 05:50 AM    Chloride 100 07/14/2020 09:41 AM    Chloride 105 05/17/2019 11:08 AM    Chloride 105 02/13/2019 05:09 AM    CO2 26 07/20/2020 12:00 AM    CO2 28 07/15/2020 05:50 AM    CO2 27 07/14/2020 09:41 AM    CO2 26 05/17/2019 11:08 AM    CO2 28 02/13/2019 05:09 AM    Anion gap 9 07/15/2020 05:50 AM    Anion gap 9 07/14/2020 09:41 AM    Anion gap 8 02/13/2019 05:09 AM    Anion gap 10 02/12/2019 05:10 AM    Anion gap 11 02/11/2019 06:31 AM    Glucose 94 07/20/2020 12:00 AM    Glucose 101 (H) 07/15/2020 05:50 AM    Glucose 140 (H) 07/14/2020 09:41 AM    Glucose 93 05/17/2019 11:08 AM    Glucose 89 02/13/2019 05:09 AM    BUN 19 07/20/2020 12:00 AM    BUN 22 (H) 07/15/2020 05:50 AM    BUN 23 (H) 07/14/2020 09:41 AM    BUN 20 11/05/2019 08:35 AM    BUN 16 05/17/2019 11:08 AM    Creatinine 1.38 (H) 07/20/2020 12:00 AM    Creatinine 1.30 07/15/2020 05:50 AM    Creatinine 1.55 (H) 07/14/2020 09:41 AM    Creatinine 1.32 (H) 11/05/2019 08:35 AM    Creatinine 1.17 05/17/2019 11:08 AM    BUN/Creatinine ratio 14 07/20/2020 12:00 AM    BUN/Creatinine ratio 17 07/15/2020 05:50 AM    BUN/Creatinine ratio 15 07/14/2020 09:41 AM    BUN/Creatinine ratio 14 05/17/2019 11:08 AM    BUN/Creatinine ratio 12 02/13/2019 05:09 AM    GFR est AA 54 (L) 07/20/2020 12:00 AM    GFR est AA >60 07/15/2020 05:50 AM    GFR est AA 52 (L) 07/14/2020 09:41 AM    GFR est AA >60 11/05/2019 08:35 AM    GFR est AA 66 05/17/2019 11:08 AM    GFR est non-AA 47 (L) 07/20/2020 12:00 AM    GFR est non-AA 53 (L) 07/15/2020 05:50 AM    GFR est non-AA 43 (L) 07/14/2020 09:41 AM    GFR est non-AA 52 (L) 11/05/2019 08:35 AM    GFR est non-AA 57 (L) 05/17/2019 11:08 AM    Calcium 9.3 07/20/2020 12:00 AM    Calcium 8.2 (L) 07/15/2020 05:50 AM    Calcium 8.7 07/14/2020 09:41 AM    Calcium 9.5 05/17/2019 11:08 AM    Calcium 8.7 02/13/2019 05:09 AM    Bilirubin, total 0.7 07/20/2020 12:00 AM    Bilirubin, total 1.1 (H) 07/14/2020 09:41 AM    Bilirubin, total 0.7 05/17/2019 11:08 AM    Bilirubin, total 1.0 02/10/2019 04:54 PM    Bilirubin, total 0.7 10/29/2018 11:39 AM    Alk. phosphatase 111 07/20/2020 12:00 AM    Alk. phosphatase 92 07/14/2020 09:41 AM    Alk. phosphatase 96 05/17/2019 11:08 AM    Alk. phosphatase 96 02/10/2019 04:54 PM    Alk.  phosphatase 108 10/29/2018 11:39 AM    Protein, total 6.7 07/20/2020 12:00 AM    Protein, total 7.1 07/14/2020 09:41 AM    Protein, total 6.5 05/17/2019 11:08 AM    Protein, total 7.1 02/10/2019 04:54 PM Protein, total 6.6 10/29/2018 11:39 AM    Albumin 3.9 07/20/2020 12:00 AM    Albumin 3.4 (L) 07/14/2020 09:41 AM    Albumin 4.2 05/17/2019 11:08 AM    Albumin 3.8 02/10/2019 04:54 PM    Albumin 4.5 10/29/2018 11:39 AM    Globulin 3.7 07/14/2020 09:41 AM    Globulin 3.3 02/10/2019 04:54 PM    Globulin 3.9 04/15/2017 11:38 AM    A-G Ratio 1.4 07/20/2020 12:00 AM    A-G Ratio 0.9 (L) 07/14/2020 09:41 AM    A-G Ratio 1.8 05/17/2019 11:08 AM    A-G Ratio 1.2 02/10/2019 04:54 PM    A-G Ratio 2.1 10/29/2018 11:39 AM    ALT (SGPT) 45 (H) 07/20/2020 12:00 AM    ALT (SGPT) 23 07/14/2020 09:41 AM    ALT (SGPT) 20 05/17/2019 11:08 AM    ALT (SGPT) 30 02/10/2019 04:54 PM    ALT (SGPT) 23 10/29/2018 11:39 AM     Lab Results   Component Value Date/Time    CK 79 04/15/2017 11:38 AM     Lab Results   Component Value Date/Time    Cholesterol, total 107 05/17/2019 11:08 AM    Cholesterol, total 122 04/20/2018 11:17 AM    Cholesterol, total 156 04/16/2017 03:03 AM    HDL Cholesterol 42 05/17/2019 11:08 AM    HDL Cholesterol 47 04/20/2018 11:17 AM    HDL Cholesterol 42 04/16/2017 03:03 AM    LDL, calculated 49 05/17/2019 11:08 AM    LDL, calculated 52 04/20/2018 11:17 AM    LDL, calculated 85.2 04/16/2017 03:03 AM    Triglyceride 78 05/17/2019 11:08 AM    Triglyceride 115 04/20/2018 11:17 AM    Triglyceride 144 04/16/2017 03:03 AM    CHOL/HDL Ratio 3.7 04/16/2017 03:03 AM     No results found for this or any previous visit.     Assessment:         Patient Active Problem List    Diagnosis Date Noted    Acute febrile illness 02/10/2019    Weakness generalized 02/10/2019    COPD with hypoxia ([facility] [address] 02/10/2019    Mild valvular heart disease 07/28/2021    Sepsis ([facility] [address] 07/14/2020    OAB (overactive bladder) 02/20/2019    History of CVA (cerebrovascular accident) 02/10/2019    Generalized weakness 02/10/2019    Nodule of upper lobe of left lung 02/01/2019    Atypical squamoproliferative skin lesion 07/14/2017    On statin therapy 07/14/2017    Degenerative arthritis of lumbar spine 07/13/2017    Dyslipidemia 07/13/2017    Gastroesophageal reflux disease 07/13/2017    Cataract 07/13/2017    Palpitation 07/13/2017    Ventral hernia 07/13/2017    History of prostate cancer 07/13/2017    Heart murmur 07/13/2017    Shoulder pain 07/13/2017    Tobacco use 07/13/2017    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 Fatigue, occasional chest tightness.   S/p prior hospitalization at [facility] 7/14-7/16/29 with sepsis.  Hx hypertension, dyslipidemia, COPD/tobacco, DJD, GERD, OAB, prior TIA/CVA (on plavix).  No prior hx known CAD, CHF.  EKG with sinus tachy 101, LVH, LAE, NSST. Prior cardiac testing:  ECHO 7/24/20:  · LV: Normal cavity size and systolic function (ejection fraction normal). Mild concentric hypertrophy. Estimated left ventricular ejection fraction is 65 - 70%. Visually measured ejection fraction. Mild (grade 1) left ventricular diastolic dysfunction. · AV: Moderate aortic valve sclerosis with stenosis. Aortic valve leaflet calcification present. Aortic valve mean gradient is 16.2 mmHg. Aortic valve area is 1.8 cm2. Mild aortic valve stenosis is present. Mild aortic valve regurgitation is present. · MV: Mild-to-moderate mitral valve stenosis is present. · TV: Mild tricuspid valve regurgitation is present. CHEST CT 7/14/20:   1. Stable appearance of the previously described pleural-based nodular density  in the anterior aspect of the left upper lobe. Evidence of minimal bullous  change in the right apical region. 2. 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   3. Presence of a few prominent mediastinal lymph nodes as previously described. 4. Presence of a small hiatal hernia.     Seen here 1/21--Lexiscan MPI done on 2/26/21 with normal perfusion--no infarct/ischemia, LVEF 66%     Plan:     Stable from CV standpoint with no adverse cardiac symptoms. Repeat Echo/doppler (recheck valves)--call w/ results   Lipids and labs followed by PCP. Continue current meds/rx  RTC in 6 months.     Alyssa Wall MD

## 2021-08-05 ENCOUNTER — HOSPITAL ENCOUNTER (OUTPATIENT)
Dept: ULTRASOUND IMAGING | Age: 86
Discharge: HOME | End: 2021-08-05
Attending: INTERNAL MEDICINE
Payer: MEDICARE

## 2021-08-05 DIAGNOSIS — R09.02 COPD WITH HYPOXIA (HCC): ICD-10-CM

## 2021-08-05 DIAGNOSIS — I38 MILD VALVULAR HEART DISEASE: ICD-10-CM

## 2021-08-05 DIAGNOSIS — R06.09 DOE (DYSPNEA ON EXERTION): ICD-10-CM

## 2021-08-05 DIAGNOSIS — J44.9 COPD WITH HYPOXIA (HCC): ICD-10-CM

## 2021-08-05 LAB
AV R PG: 52.12 MMHG
ECHO AO ROOT DIAM: 3.17 CM
ECHO AR MAX VEL PISA: 360.84 CM/S
ECHO AV AREA PEAK VELOCITY: 1.49 CM2
ECHO AV AREA VTI: 1.51 CM2
ECHO AV MEAN GRADIENT: 25.52 MMHG
ECHO AV PEAK GRADIENT: 44.65 MMHG
ECHO AV PEAK VELOCITY: 334.09 CM/S
ECHO AV REGURGITANT PHT: 422.35 MS
ECHO AV VTI: 64.74 CM
ECHO EST RA PRESSURE: 10 MMHG
ECHO LA AREA 4C: 25.48 CM2
ECHO LA MAJOR AXIS: 4.24 CM
ECHO LA VOL 2C: 75.84 ML (ref 18–58)
ECHO LA VOL 4C: 81.77 ML (ref 18–58)
ECHO LA VOL BP: 85.25 ML (ref 18–58)
ECHO LV E' SEPTAL VELOCITY: 7.39 CM/S
ECHO LV INTERNAL DIMENSION DIASTOLIC: 3.31 CM (ref 4.2–5.9)
ECHO LV INTERNAL DIMENSION SYSTOLIC: 2.24 CM
ECHO LV IVSD: 1.24 CM (ref 0.6–1)
ECHO LV MASS 2D: 132.8 G (ref 88–224)
ECHO LV POSTERIOR WALL DIASTOLIC: 1.25 CM (ref 0.6–1)
ECHO LVOT CARDIAC OUTPUT: 19.6 LITER/MINUTE
ECHO LVOT DIAM: 2.26 CM
ECHO LVOT PEAK GRADIENT: 6.23 MMHG
ECHO LVOT PEAK VELOCITY: 124.79 CM/S
ECHO LVOT SV: 97.9 ML
ECHO LVOT VTI: 24.47 CM
ECHO MV A VELOCITY: 151.73 CM/S
ECHO MV AREA PHT: 2.74 CM2
ECHO MV AREA VTI: 2.57 CM2
ECHO MV E DECELERATION TIME (DT): 258.82 MS
ECHO MV E VELOCITY: 85.99 CM/S
ECHO MV E/A RATIO: 0.57
ECHO MV E/E' SEPTAL: 11.64
ECHO MV MAX VELOCITY: 184.52 CM/S
ECHO MV MEAN GRADIENT: 4.42 MMHG
ECHO MV PEAK GRADIENT: 13.62 MMHG
ECHO MV PRESSURE HALF TIME (PHT): 80.2 MS
ECHO MV VTI: 38.03 CM
ECHO RIGHT VENTRICULAR SYSTOLIC PRESSURE (RVSP): 37.28 MMHG
ECHO TV REGURGITANT MAX VELOCITY: 261.17 CM/S
ECHO TV REGURGITANT PEAK GRADIENT: 27.28 MMHG
LVOT MG: 3.12 MMHG

## 2021-08-05 PROCEDURE — 93306 TTE W/DOPPLER COMPLETE: CPT | Performed by: INTERNAL MEDICINE

## 2021-08-05 PROCEDURE — 93306 TTE W/DOPPLER COMPLETE: CPT

## 2021-08-07 RX ORDER — ATORVASTATIN CALCIUM 40 MG/1
TABLET, FILM COATED ORAL
Qty: 90 TABLET | Refills: 2 | Status: SHIPPED | OUTPATIENT
Start: 2021-08-07 | End: 2022-05-12

## 2021-08-07 RX ORDER — CLOPIDOGREL BISULFATE 75 MG/1
TABLET ORAL
Qty: 90 TABLET | Refills: 2 | Status: SHIPPED | OUTPATIENT
Start: 2021-08-07 | End: 2022-05-12

## 2021-09-20 RX ORDER — PANTOPRAZOLE SODIUM 20 MG/1
TABLET, DELAYED RELEASE ORAL
Qty: 90 TABLET | Refills: 3 | Status: SHIPPED | OUTPATIENT
Start: 2021-09-20 | End: 2021-09-23 | Stop reason: SDUPTHER

## 2021-09-20 NOTE — PROGRESS NOTES
Mr. Sofia Guadalupe is a 80 y.o. male who is here for evaluation of   Chief Complaint   Patient presents with    Hypertension     routine check up    Injection     flu shot   . ASSESSMENT AND PLAN:    1. Medicare annual wellness visit, subsequent  2. Essential hypertension  He is at goal  3. History of prostate cancer  4. Type 2 diabetes mellitus with hyperglycemia, without long-term current use of insulin (HCC)  Needs labs today      Orders Placed This Encounter    [place] IMMUNIZ ADMIN,1 SINGLE/COMB VAC/TOXOID    FLU (FLUAD QUAD INFLUENZA VACCINE,QUAD,ADJUVANTED)    CBC WITH AUTOMATED DIFF     Standing Status:   Future     Standing Expiration Date:   11/1/2021    LIPID PANEL     Standing Status:   Future     Standing Expiration Date:   56/9/4338    METABOLIC PANEL, COMPREHENSIVE     Standing Status:   Future     Standing Expiration Date:   11/1/2021    TSH 3RD GENERATION     Standing Status:   Future     Standing Expiration Date:   11/1/2021    PROSTATE SPECIFIC AG     Standing Status:   Future     Standing Expiration Date:   11/1/2021    HEMOGLOBIN A1C WITH EAG     Standing Status:   Future     Standing Expiration Date:   11/1/2021    pantoprazole (PROTONIX) 20 mg tablet     Sig: Take 1 Tablet by mouth daily. Dispense:  90 Tablet     Refill:  5           HPI   to [employer] and [employer].  Formerly living in [facility]. Moni resides in [place].     TIA:  No recent sx.  Clopidogrel  Lipids:  Atorvastatin.  No side effects  LAURIE lung nodule:  Followed since 2019 with CT and PET  History of prostate cancer  History of tobacco use and COPD      ROS:  Denies  fever, chills, cough, chest pain, SOB,  nausea, vomiting, or diarrhea. Denies wt loss, wt gain, hemoptysis, hematochezia or melena.     Physical Examination:    Visit Vitals  /60 (BP 1 Location: Right arm, BP Patient Position: Sitting, BP Cuff Size: Adult long)   Pulse 96   Temp 97.8 °F (36.6 °C) (Temporal)   Resp 16   Ht 6' (1.829 m)   Wt 210 lb 12.8 oz (95.6 kg)   SpO2 96%   BMI 28.59 kg/m²      General:  Alert, cooperative, no distress. Head:  Normocephalic, without obvious abnormality, atraumatic. Eyes:  Conjunctivae/corneas clear. Pupils equal, round, reactive to light. Extraocular movements intact. Lungs:   Clear to auscultation bilaterally. Chest wall:  No tenderness or deformity. Cardiac:  RRR   Abdomen:   Soft, non-tender. Bowel sounds normal. No masses. No organomegaly. Extremities: Extremities normal, atraumatic, no cyanosis or edema. Pulses: 2+ and symmetric all extremities. Skin: Skin color, texture, turgor normal. No rashes or lesions. Lymph nodes: Cervical, supraclavicular, and axillary nodes normal.   Neurologic: CNII-XII intact. Normal strength, sensation, and reflexes throughout. On this date 09/23/2021 I have spent 30 minutes reviewing previous notes, test results and face to face with the patient discussing the diagnosis and importance of compliance with the treatment plan as well as documenting on the day of the visit. Chacha Smith MD FACP    (signed electronically) on 9/23/2021 at 8:27 AM            ______________________________________________________________________    Prabhjot Mail is a 80 y.o. male and presents for annual Medicare Wellness Visit. Problem List: Reviewed with patient and discussed risk factors.     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9    Weakness generalized R53.1    History of CVA (cerebrovascular accident) Z80.78    COPD with hypoxia (HCC) J44.9, R09.02    Generalized weakness R53.1    Nodule of upper lobe of left lung R91.1    OAB (overactive bladder) N32.81    Sepsis (HCC) A41.9    Mild valvular heart disease I38       Current medical providers:  Patient Care Team:  Ramon Calle MD as PCP - General (Internal Medicine)  Ramon Calle MD as PCP [facility]  Phill Young MD as Consulting Provider (Urology)    [facility], Medications/Allergies: reviewed, on chart. Male Alcohol Screening: On any occasion during the past 3 months, have you had more than 4 drinks containing alcohol? No    Do you average more than 14 drinks per week? No    Objective:  Visit Vitals  /60 (BP 1 Location: Right arm, BP Patient Position: Sitting, BP Cuff Size: Adult long)   Pulse 96   Temp 97.8 °F (36.6 °C) (Temporal)   Resp 16   Ht 6' (1.829 m)   Wt 210 lb 12.8 oz (95.6 kg)   SpO2 96%   BMI 28.59 kg/m²    Body mass index is 28.59 kg/m². Assessment of cognitive impairment: Alert and oriented x 3    Depression Screen:   3 most recent PHQ Screens 9/23/2021   Little interest or pleasure in doing things Not at all   Feeling down, depressed, irritable, or hopeless Not at all   Total Score PHQ 2 0       Fall Risk Assessment:    Fall Risk Assessment, last 12 mths 9/23/2021   Able to walk? Yes   Fall in past 12 months? 0   Do you feel unsteady? 0   Are you worried about falling 0   Is TUG test greater than 12 seconds? -   Is the gait abnormal? -   Number of falls in past 12 months -   Fall with injury? -       Functional Ability:   Does the patient exhibit a steady gait? yes   How long did it take the patient to get up and walk from a sitting position?  12 sec   Is the patient self reliant?  (ie can do own laundry, meals, household chores)  yes     Does the patient handle his/her own medications? yes     Does the patient handle his/her own money? yes     Is the patients home safe (ie good lighting, handrails on stairs and bath, etc.)? yes     Did you notice or did patient express any hearing difficulties? yes     Did you notice or did patient express any vision difficulties? no       Advance Care Planning:   Patient was offered the opportunity to discuss advance care planning:  yes     Does patient have an Advance Directive:  yes   If no, did you provide information on Caring Connections?  no       Plan:      Orders Placed This Encounter    [place] IMMUNIZ ADMIN,1 SINGLE/COMB VAC/TOXOID    FLU (FLUAD QUAD INFLUENZA VACCINE,QUAD,ADJUVANTED)    CBC WITH AUTOMATED DIFF    LIPID PANEL    METABOLIC PANEL, COMPREHENSIVE    TSH 3RD GENERATION    PROSTATE SPECIFIC AG    HEMOGLOBIN A1C WITH EAG    pantoprazole (PROTONIX) 20 mg tablet       Health Maintenance   Topic Date Due    Eye Exam Retinal or Dilated  Never done    MICROALBUMIN Q1  08/28/2019    Lipid Screen  05/17/2020    Medicare Yearly Exam  05/13/2021    DTaP/Tdap/Td series (2 - Td or Tdap) 03/19/2028    Shingrix Vaccine Age 50>  Completed    Flu Vaccine  Completed    COVID-19 Vaccine  Completed    Pneumococcal 65+ years  Completed       *Patient verbalized understanding and agreement with the plan. A copy of the After Visit Summary with personalized health plan was given to the patient today.

## 2021-09-23 ENCOUNTER — OFFICE VISIT (OUTPATIENT)
Dept: FAMILY MEDICINE CLINIC | Age: 86
End: 2021-09-23
Payer: MEDICARE

## 2021-09-23 VITALS
HEIGHT: 72 IN | HEART RATE: 96 BPM | DIASTOLIC BLOOD PRESSURE: 60 MMHG | TEMPERATURE: 97.8 F | SYSTOLIC BLOOD PRESSURE: 125 MMHG | RESPIRATION RATE: 16 BRPM | WEIGHT: 210.8 LBS | BODY MASS INDEX: 28.55 KG/M2 | OXYGEN SATURATION: 96 %

## 2021-09-23 DIAGNOSIS — E11.65 TYPE 2 DIABETES MELLITUS WITH HYPERGLYCEMIA, WITHOUT LONG-TERM CURRENT USE OF INSULIN (HCC): ICD-10-CM

## 2021-09-23 DIAGNOSIS — Z23 ENCOUNTER FOR IMMUNIZATION: ICD-10-CM

## 2021-09-23 DIAGNOSIS — Z85.46 HISTORY OF PROSTATE CANCER: ICD-10-CM

## 2021-09-23 DIAGNOSIS — I10 ESSENTIAL HYPERTENSION: ICD-10-CM

## 2021-09-23 DIAGNOSIS — Z00.00 MEDICARE ANNUAL WELLNESS VISIT, SUBSEQUENT: Primary | ICD-10-CM

## 2021-09-23 PROCEDURE — G0008 ADMIN INFLUENZA VIRUS VAC: HCPCS | Performed by: INTERNAL MEDICINE

## 2021-09-23 PROCEDURE — 99214 OFFICE O/P EST MOD 30 MIN: CPT | Performed by: INTERNAL MEDICINE

## 2021-09-23 PROCEDURE — 90694 VACC AIIV4 NO PRSRV 0.5ML IM: CPT | Performed by: INTERNAL MEDICINE

## 2021-09-23 PROCEDURE — G0439 PPPS, SUBSEQ VISIT: HCPCS | Performed by: INTERNAL MEDICINE

## 2021-09-23 RX ORDER — PANTOPRAZOLE SODIUM 20 MG/1
20 TABLET, DELAYED RELEASE ORAL DAILY
Qty: 90 TABLET | Refills: 5 | Status: SHIPPED | OUTPATIENT
Start: 2021-09-23 | End: 2022-09-29

## 2021-09-23 NOTE — PROGRESS NOTES
Chief Complaint   Patient presents with    Hypertension     routine check up    Injection     flu shot     3 most recent PHQ Screens 9/23/2021   Little interest or pleasure in doing things Not at all   Feeling down, depressed, irritable, or hopeless Not at all   Total Score PHQ 2 0     Learning Assessment 9/23/2021   PRIMARY LEARNER Patient   HIGHEST LEVEL OF EDUCATION - PRIMARY LEARNER  -   BARRIERS PRIMARY LEARNER -   CO-LEARNER CAREGIVER -   PRIMARY LANGUAGE ENGLISH   LEARNER PREFERENCE PRIMARY READING   ANSWERED BY patient   RELATIONSHIP SELF     Fall Risk Assessment, last 12 mths 9/23/2021   Able to walk? Yes   Fall in past 12 months? 0   Do you feel unsteady? 0   Are you worried about falling 0   Is TUG test greater than 12 seconds? -   Is the gait abnormal? -   Number of falls in past 12 months -   Fall with injury? -     Abuse Screening Questionnaire 9/23/2021   Do you ever feel afraid of your partner? N   Are you in a relationship with someone who physically or mentally threatens you? N   Is it safe for you to go home? Y     ADL Assessment 9/23/2021   Feeding yourself No Help Needed   Getting from bed to chair No Help Needed   Getting dressed No Help Needed   Bathing or showering No Help Needed   Walk across the room (includes cane/walker) No Help Needed   Using the telphone No Help Needed   Taking your medications No Help Needed   Preparing meals No Help Needed   Managing money (expenses/bills) No Help Needed   Moderately strenuous housework (laundry) No Help Needed   Shopping for personal items (toiletries/medicines) No Help Needed   Shopping for groceries No Help Needed   Driving No Help Needed   Climbing a flight of stairs No Help Needed   Getting to places beyond walking distances No Help Needed     1. Have you been to the ER, urgent care clinic since your last visit? Hospitalized since your last visit? No    2.  Have you seen or consulted any other health care providers outside of the [facility] since your last visit? Include any pap smears or colon screening. No      Chief Complaint   Patient presents with    Hypertension     routine check up    Injection     flu shot         Visit Vitals  /60 (BP 1 Location: Right arm, BP Patient Position: Sitting, BP Cuff Size: Adult long)   Pulse 96   Temp 97.8 °F (36.6 °C) (Temporal)   Resp 16   Ht 6' (1.829 m)   Wt 210 lb 12.8 oz (95.6 kg)   SpO2 96%   BMI 28.59 kg/m²       Pain Scale: 6/10  Pain Location: Back    Carlee Gutierrez is a 80 y.o. male presenting for/with:    Hypertension (routine check up) and Injection (flu shot)      Symptom review:    NO  Fever   NO  Shaking chills  NO  Cough  NO  Body aches  NO  Coughing up blood  NO  Chest congestion  NO  Chest pain  NO  Shortness of breath  NO  Profound Loss of smell/taste  NO  Nausea/Vomiting   NO  Loose stool/Diarrhea  NO  any skin issues    Patient Risk Factors Reviewed as follows:  NO  have you been in Close contact with confirmed COVID19 patient   NO  History of recent travel to affected geographical areas within the past 14 days  NO  COPD  NO  Active Cancer/Leukemia/Lymphoma/Chemotherapy  NO  Oral steroid use  NO  Pregnant  NO  Diabetes Mellitus  NO  Heart disease  NO  Asthma  NO Health care worker at home  NO Health care worker  NO Is there a Pregnant Woman in the home  NO Dialysis pt in the home   NO a large number of people living in the home  Recent Travel Screening and Travel History documentation     Travel Screening     Question   Response    In the last month, have you been in contact with someone who was confirmed or suspected to have Coronavirus / COVID-19? No / Unsure    Have you had a COVID-19 viral test in the last 14 days? No    Do you have any of the following new or worsening symptoms? None of these    Have you traveled internationally or domestically in the last month?   No      Travel History   Travel since 08/23/21     No documented travel since 08/23/21 After obtaining consent, and per orders of Dr. Brad Wood , injection of Fluad (right deltoid) given by Dominguez Sales. Patient instructed to remain in clinic for 20 minutes afterwards, and to report any adverse reaction to me immediately.

## 2021-09-24 LAB
ALBUMIN SERPL-MCNC: 3.9 G/DL (ref 3.5–5)
ALBUMIN/GLOB SERPL: 1.2 {RATIO} (ref 1.1–2.2)
ALP SERPL-CCNC: 98 U/L (ref 45–117)
ALT SERPL-CCNC: 30 U/L (ref 12–78)
ANION GAP SERPL CALC-SCNC: 4 MMOL/L (ref 5–15)
AST SERPL-CCNC: 19 U/L (ref 15–37)
BASOPHILS # BLD: 0.1 K/UL (ref 0–0.1)
BASOPHILS NFR BLD: 1 % (ref 0–1)
BILIRUB SERPL-MCNC: 1 MG/DL (ref 0.2–1)
BUN SERPL-MCNC: 22 MG/DL (ref 6–20)
BUN/CREAT SERPL: 17 (ref 12–20)
CALCIUM SERPL-MCNC: 9.9 MG/DL (ref 8.5–10.1)
CHLORIDE SERPL-SCNC: 107 MMOL/L (ref 97–108)
CHOLEST SERPL-MCNC: 116 MG/DL
CO2 SERPL-SCNC: 29 MMOL/L (ref 21–32)
CREAT SERPL-MCNC: 1.3 MG/DL (ref 0.7–1.3)
DIFFERENTIAL METHOD BLD: NORMAL
EOSINOPHIL # BLD: 0.1 K/UL (ref 0–0.4)
EOSINOPHIL NFR BLD: 2 % (ref 0–7)
ERYTHROCYTE [DISTWIDTH] IN BLOOD BY AUTOMATED COUNT: 13.7 % (ref 11.5–14.5)
EST. AVERAGE GLUCOSE BLD GHB EST-MCNC: 111 MG/DL
GLOBULIN SER CALC-MCNC: 3.2 G/DL (ref 2–4)
GLUCOSE SERPL-MCNC: 90 MG/DL (ref 65–100)
HBA1C MFR BLD: 5.5 % (ref 4–5.6)
HCT VFR BLD AUTO: 47.8 % (ref 36.6–50.3)
HDLC SERPL-MCNC: 47 MG/DL
HDLC SERPL: 2.5 {RATIO} (ref 0–5)
HGB BLD-MCNC: 15.6 G/DL (ref 12.1–17)
IMM GRANULOCYTES # BLD AUTO: 0 K/UL (ref 0–0.04)
IMM GRANULOCYTES NFR BLD AUTO: 0 % (ref 0–0.5)
LDLC SERPL CALC-MCNC: 48.6 MG/DL (ref 0–100)
LYMPHOCYTES # BLD: 1.2 K/UL (ref 0.8–3.5)
LYMPHOCYTES NFR BLD: 18 % (ref 12–49)
MCH RBC QN AUTO: 30.7 PG (ref 26–34)
MCHC RBC AUTO-ENTMCNC: 32.6 G/DL (ref 30–36.5)
MCV RBC AUTO: 94.1 FL (ref 80–99)
MONOCYTES # BLD: 0.6 K/UL (ref 0–1)
MONOCYTES NFR BLD: 8 % (ref 5–13)
NEUTS SEG # BLD: 4.7 K/UL (ref 1.8–8)
NEUTS SEG NFR BLD: 71 % (ref 32–75)
NRBC # BLD: 0 K/UL (ref 0–0.01)
NRBC BLD-RTO: 0 PER 100 WBC
PLATELET # BLD AUTO: 171 K/UL (ref 150–400)
PMV BLD AUTO: 12.3 FL (ref 8.9–12.9)
POTASSIUM SERPL-SCNC: 4.3 MMOL/L (ref 3.5–5.1)
PROT SERPL-MCNC: 7.1 G/DL (ref 6.4–8.2)
PSA SERPL-MCNC: 0 NG/ML (ref 0.01–4)
RBC # BLD AUTO: 5.08 M/UL (ref 4.1–5.7)
SODIUM SERPL-SCNC: 140 MMOL/L (ref 136–145)
TRIGL SERPL-MCNC: 102 MG/DL (ref ?–150)
TSH SERPL DL<=0.05 MIU/L-ACNC: 1.07 UIU/ML (ref 0.36–3.74)
VLDLC SERPL CALC-MCNC: 20.4 MG/DL
WBC # BLD AUTO: 6.8 K/UL (ref 4.1–11.1)

## 2021-10-15 DIAGNOSIS — R93.89 ABNORMAL CXR: Primary | ICD-10-CM

## 2021-10-19 ENCOUNTER — HOSPITAL ENCOUNTER (OUTPATIENT)
Dept: GENERAL RADIOLOGY | Age: 86
Discharge: HOME | End: 2021-10-19
Payer: MEDICARE

## 2021-10-19 DIAGNOSIS — R93.89 ABNORMAL CXR: ICD-10-CM

## 2021-10-19 PROCEDURE — 71046 X-RAY EXAM CHEST 2 VIEWS: CPT

## 2022-01-28 PROBLEM — E11.9 TYPE 2 DIABETES MELLITUS (HCC): Status: ACTIVE | Noted: 2022-01-28

## 2022-03-18 PROBLEM — Z79.899 ON STATIN THERAPY: Status: ACTIVE | Noted: 2017-07-14

## 2022-03-18 PROBLEM — Z86.73 HISTORY OF CVA (CEREBROVASCULAR ACCIDENT): Status: ACTIVE | Noted: 2019-02-10

## 2022-03-18 PROBLEM — Z85.46 HISTORY OF PROSTATE CANCER: Status: ACTIVE | Noted: 2017-07-13

## 2022-03-18 PROBLEM — N32.81 OAB (OVERACTIVE BLADDER): Status: ACTIVE | Noted: 2019-02-20

## 2022-03-18 PROBLEM — R00.2 PALPITATION: Status: ACTIVE | Noted: 2017-07-13

## 2022-03-19 PROBLEM — R01.1 HEART MURMUR: Status: ACTIVE | Noted: 2017-07-13

## 2022-03-19 PROBLEM — R53.1 GENERALIZED WEAKNESS: Status: ACTIVE | Noted: 2019-02-10

## 2022-03-19 PROBLEM — R09.02 COPD WITH HYPOXIA (HCC): Status: ACTIVE | Noted: 2019-02-10

## 2022-03-19 PROBLEM — D49.2 ATYPICAL SQUAMOPROLIFERATIVE SKIN LESION: Status: ACTIVE | Noted: 2017-07-14

## 2022-03-19 PROBLEM — K21.9 GASTROESOPHAGEAL REFLUX DISEASE: Status: ACTIVE | Noted: 2017-07-13

## 2022-03-19 PROBLEM — E78.5 DYSLIPIDEMIA: Status: ACTIVE | Noted: 2017-07-13

## 2022-03-19 PROBLEM — I38 MILD VALVULAR HEART DISEASE: Status: ACTIVE | Noted: 2021-07-28

## 2022-03-19 PROBLEM — Z72.0 TOBACCO USE: Status: ACTIVE | Noted: 2017-07-13

## 2022-03-19 PROBLEM — K43.9 VENTRAL HERNIA: Status: ACTIVE | Noted: 2017-07-13

## 2022-03-19 PROBLEM — M47.816 DEGENERATIVE ARTHRITIS OF LUMBAR SPINE: Status: ACTIVE | Noted: 2017-07-13

## 2022-03-19 PROBLEM — H26.9 CATARACT: Status: ACTIVE | Noted: 2017-07-13

## 2022-03-19 PROBLEM — E11.9 TYPE 2 DIABETES MELLITUS (HCC): Status: ACTIVE | Noted: 2022-01-28

## 2022-03-19 PROBLEM — R26.9 GAIT DISTURBANCE: Status: ACTIVE | Noted: 2017-04-16

## 2022-03-19 PROBLEM — J44.9 COPD WITH HYPOXIA (HCC): Status: ACTIVE | Noted: 2019-02-10

## 2022-03-19 PROBLEM — I10 ESSENTIAL HYPERTENSION: Status: ACTIVE | Noted: 2017-04-16

## 2022-03-19 PROBLEM — I63.341 CEREBROVASCULAR ACCIDENT (CVA) DUE TO THROMBOSIS OF RIGHT CEREBELLAR ARTERY (HCC): Status: ACTIVE | Noted: 2017-04-16

## 2022-03-19 PROBLEM — R91.1 NODULE OF UPPER LOBE OF LEFT LUNG: Status: ACTIVE | Noted: 2019-02-01

## 2022-03-20 PROBLEM — H49.21 SIXTH NERVE PALSY OF RIGHT EYE: Status: ACTIVE | Noted: 2017-04-16

## 2022-03-20 PROBLEM — R50.9 ACUTE FEBRILE ILLNESS: Status: ACTIVE | Noted: 2019-02-10

## 2022-03-20 PROBLEM — M25.519 SHOULDER PAIN: Status: ACTIVE | Noted: 2017-07-13

## 2022-03-20 PROBLEM — A41.9 SEPSIS (HCC): Status: ACTIVE | Noted: 2020-07-14

## 2022-03-20 PROBLEM — R53.1 WEAKNESS GENERALIZED: Status: ACTIVE | Noted: 2019-02-10

## 2022-04-25 DIAGNOSIS — J44.9 COPD WITH HYPOXIA (HCC): ICD-10-CM

## 2022-04-25 DIAGNOSIS — R09.02 COPD WITH HYPOXIA (HCC): ICD-10-CM

## 2022-04-25 RX ORDER — FLUTICASONE PROPIONATE AND SALMETEROL 250; 50 UG/1; UG/1
1 POWDER RESPIRATORY (INHALATION) 2 TIMES DAILY
Qty: 1 EACH | Refills: 5 | Status: SHIPPED | OUTPATIENT
Start: 2022-04-25

## 2022-05-12 RX ORDER — CLOPIDOGREL BISULFATE 75 MG/1
TABLET ORAL
Qty: 90 TABLET | Refills: 2 | Status: SHIPPED | OUTPATIENT
Start: 2022-05-12

## 2022-05-12 RX ORDER — ATORVASTATIN CALCIUM 40 MG/1
TABLET, FILM COATED ORAL
Qty: 90 TABLET | Refills: 2 | Status: SHIPPED | OUTPATIENT
Start: 2022-05-12

## 2022-06-16 ENCOUNTER — TRANSCRIBE ORDER (OUTPATIENT)
Dept: REGISTRATION | Age: 87
End: 2022-06-16

## 2022-06-16 ENCOUNTER — HOSPITAL ENCOUNTER (OUTPATIENT)
Dept: GENERAL RADIOLOGY | Age: 87
Discharge: HOME | End: 2022-06-16
Payer: MEDICARE

## 2022-06-16 DIAGNOSIS — M25.512 LEFT SHOULDER PAIN: Primary | ICD-10-CM

## 2022-06-16 DIAGNOSIS — M25.512 LEFT SHOULDER PAIN: ICD-10-CM

## 2022-06-16 PROCEDURE — 73030 X-RAY EXAM OF SHOULDER: CPT

## 2022-06-27 ENCOUNTER — OFFICE VISIT (OUTPATIENT)
Dept: FAMILY MEDICINE CLINIC | Age: 87
End: 2022-06-27
Payer: MEDICARE

## 2022-06-27 DIAGNOSIS — R09.02 COPD WITH HYPOXIA (HCC): ICD-10-CM

## 2022-06-27 DIAGNOSIS — J44.9 COPD WITH HYPOXIA (HCC): ICD-10-CM

## 2022-06-27 DIAGNOSIS — E11.65 TYPE 2 DIABETES MELLITUS WITH HYPERGLYCEMIA, WITHOUT LONG-TERM CURRENT USE OF INSULIN (HCC): ICD-10-CM

## 2022-06-27 DIAGNOSIS — I27.20 PULMONARY HYPERTENSION, UNSPECIFIED (HCC): ICD-10-CM

## 2022-06-27 DIAGNOSIS — R35.0 URINARY FREQUENCY: Primary | ICD-10-CM

## 2022-06-27 PROBLEM — Z96.1 PSEUDOPHAKIA OF BOTH EYES: Status: ACTIVE | Noted: 2020-05-14

## 2022-06-27 PROBLEM — H04.129 TEAR FILM INSUFFICIENCY: Status: ACTIVE | Noted: 2022-06-27

## 2022-06-27 PROBLEM — H35.3190 NONEXUDATIVE AGE-RELATED MACULAR DEGENERATION: Status: ACTIVE | Noted: 2022-06-27

## 2022-06-27 LAB
BILIRUB UR QL STRIP: NEGATIVE
GLUCOSE UR-MCNC: NEGATIVE MG/DL
KETONES P FAST UR STRIP-MCNC: NEGATIVE MG/DL
PH UR STRIP: 5.5 [PH] (ref 4.6–8)
PROT UR QL STRIP: NORMAL
SP GR UR STRIP: 1 (ref 1–1.03)
UA UROBILINOGEN AMB POC: NORMAL (ref 0.2–1)
URINALYSIS CLARITY POC: NORMAL
URINALYSIS COLOR POC: NORMAL
URINE BLOOD POC: NORMAL
URINE LEUKOCYTES POC: NORMAL
URINE NITRITES POC: NEGATIVE

## 2022-06-27 PROCEDURE — 1101F PT FALLS ASSESS-DOCD LE1/YR: CPT | Performed by: INTERNAL MEDICINE

## 2022-06-27 PROCEDURE — G8432 DEP SCR NOT DOC, RNG: HCPCS | Performed by: INTERNAL MEDICINE

## 2022-06-27 PROCEDURE — G8417 CALC BMI ABV UP PARAM F/U: HCPCS | Performed by: INTERNAL MEDICINE

## 2022-06-27 PROCEDURE — 81000 URINALYSIS NONAUTO W/SCOPE: CPT | Performed by: INTERNAL MEDICINE

## 2022-06-27 PROCEDURE — G8536 NO DOC ELDER MAL SCRN: HCPCS | Performed by: INTERNAL MEDICINE

## 2022-06-27 PROCEDURE — G8427 DOCREV CUR MEDS BY ELIG CLIN: HCPCS | Performed by: INTERNAL MEDICINE

## 2022-06-27 PROCEDURE — 99213 OFFICE O/P EST LOW 20 MIN: CPT | Performed by: INTERNAL MEDICINE

## 2022-06-27 PROCEDURE — 1123F ACP DISCUSS/DSCN MKR DOCD: CPT | Performed by: INTERNAL MEDICINE

## 2022-06-27 RX ORDER — NITROFURANTOIN (MACROCRYSTALS) 100 MG/1
100 CAPSULE ORAL
Qty: 10 CAPSULE | Refills: 0 | Status: SHIPPED | OUTPATIENT
Start: 2022-06-27 | End: 2022-07-07

## 2022-06-27 NOTE — PROGRESS NOTES
Chief Complaint   Patient presents with    Bladder Infection     Results for orders placed or performed in visit on 06/27/22   AMB POC URINALYSIS DIP STICK MANUAL W/ MICRO   Result Value Ref Range    Color (UA POC)      Clarity (UA POC) Cloudy     Glucose (UA POC) Negative Negative    Bilirubin (UA POC) Negative Negative    Ketones (UA POC) Negative Negative    Specific gravity (UA POC) 1.005 1.001 - 1.035    Blood (UA POC) 3+ Negative    pH (UA POC) 5.5 4.6 - 8.0    Protein (UA POC) 3+ Negative    Urobilinogen (UA POC) 0.2 mg/dL 0.2 - 1    Nitrites (UA POC) Negative Negative    Leukocyte esterase (UA POC) 3+ Negative

## 2022-06-27 NOTE — PROGRESS NOTES
Mr. Dontrell Beaver is a 80 y.o. male who is here for evaluation of No chief complaint on file. .       ASSESSMENT AND PLAN:    1. Urinary frequency  Initiate Macrobid. Culture pending  - AMB POC URINALYSIS DIP STICK MANUAL W/ MICRO  - CULTURE, URINE; Future    2. Pulmonary hypertension, unspecified ([facility]  stable  3. Type 2 diabetes mellitus with hyperglycemia, without long-term current use of insulin ([facility])  Will check A1c next visit    4. COPD with hypoxia ([facility]  Currently not complaining of any respiratory distress. Orders Placed This Encounter    CULTURE, URINE     Standing Status:   Future     Standing Expiration Date:   6/27/2023    AMB POC URINALYSIS DIP STICK MANUAL W/ MICRO           HPI 72-year-old gentleman with COPD, hyperglycemia and mild pulmonary hypertension arrives today with complaints of urinary frequency without fever, chills or flank pain. ROS:  Denies  fever, chills, cough, chest pain, SOB,  nausea, vomiting, or diarrhea. Denies wt loss, wt gain, hemoptysis, hematochezia or melena. Physical Examination:    There were no vitals taken for this visit. General:  Alert, cooperative, no distress. Head:  Normocephalic, without obvious abnormality, atraumatic. Eyes:  Conjunctivae/corneas clear. Pupils equal, round, reactive to light. Extraocular movements intact. Lungs:   Clear to auscultation bilaterally. Chest wall:  No tenderness or deformity. Cardiac:  RRR   Abdomen:   Soft, non-tender. Bowel sounds normal. No masses. No organomegaly. Extremities: Extremities normal, atraumatic, no cyanosis or edema. Pulses: 2+ and symmetric all extremities. Skin: Skin color, texture, turgor normal. No rashes or lesions. Lymph nodes: Cervical, supraclavicular, and axillary nodes normal.   Neurologic: CNII-XII intact. Normal strength, sensation, and reflexes throughout.      On this date 06/27/2022 I have spent 15 minutes reviewing previous notes, test results and face to face with the patient discussing the diagnosis and importance of compliance with the treatment plan as well as documenting on the day of the visit.     Argenis Clements MD FACP    (signed electronically) on 6/27/2022 at 10:47 AM

## 2022-06-30 LAB
BACTERIA SPEC CULT: ABNORMAL
CC UR VC: ABNORMAL
SERVICE CMNT-IMP: ABNORMAL

## 2022-07-17 NOTE — PROGRESS NOTES
Mr. Sharron Jones is a 80 y.o. male who is here for evaluation of   Chief Complaint   Patient presents with    Back Pain     States the pain has been going on for years but feels it is getting worse . .. states he does not walk much    . ASSESSMENT AND PLAN:    1. Difficulty walking  I suspect this is due to spinal stenosis. We will check plain films and then proceed to MRI. May need PT. The event that he has no evidence of spinal stenosis, we would proceed to advanced imaging of the abdomen and pelvis to exclude a lumbosacral plexus lesion or something more worrisome such as a psoas abscess. Reassuringly, he has no fever or spinal tenderness with percussion today  - XR SPINE LUMB MIN 4 V; Future    2. COPD with hypoxia ([address]  Stable at this time    3. History of prostate cancer  Remote prostatectomy. We will check PSA  - PSA, DIAGNOSTIC (PROSTATE SPECIFIC AG); Future    4. Essential hypertension  Currently at goal  - METABOLIC PANEL, COMPREHENSIVE; Future  - CBC WITH AUTOMATED DIFF; Future  - LIPID PANEL;  Future  - AMB POC URINALYSIS DIP STICK MANUAL W/ MICRO    5. Bilateral sciatica  We will schedule advanced imaging after plain films.  - MRI LUMB SPINE WO CONT; Future      Orders Placed This Encounter    XR SPINE LUMB MIN 4 V     Standing Status:   Future     Standing Expiration Date:   8/4/2022     Scheduling Instructions:      [facility]     Order Specific Question:   Reason for Exam     Answer:   Back Pain    MRI LUMB SPINE WO CONT     Standing Status:   Future     Standing Expiration Date:   8/4/2022     Scheduling Instructions:      [facility]    PROSTATE SPECIFIC AG     Standing Status:   Future     Standing Expiration Date:   6/4/8076    METABOLIC PANEL, COMPREHENSIVE     Standing Status:   Future     Standing Expiration Date:   8/4/2022    CBC WITH AUTOMATED DIFF     Standing Status:   Future     Standing Expiration Date:   8/4/2022    LIPID PANEL     Standing Status:   Future     Standing Expiration Date: 8/4/2022    AMB POC URINALYSIS DIP STICK MANUAL W/ MICRO           HPI  80year-old with a 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 For the past several years he has had progressive difficulty with leg weakness and lower back pain. He has never had spine surgery. We have no records of him ever having had spine films or advanced imaging. He is currently not having difficulty with urination. Leg weakness seems to be worsening. He has had a couple of brief inpatient stays at [facility] after presenting with fever and chills with no clear source. Presumably this was either urinary or GI related bacteremia. He denies any night sweats or ongoing problems with chills. ROS:  Denies  fever, chills, cough, chest pain, SOB,  nausea, vomiting, or diarrhea. Denies wt loss, wt gain, hemoptysis, hematochezia or melena. Physical Examination:    Visit Vitals  /74 (BP 1 Location: Left arm, BP Patient Position: Sitting)   Pulse 98   Temp 98.4 °F (36.9 °C) (Temporal)   Wt 198 lb 3.2 oz (89.9 kg)   SpO2 96%   BMI 26.88 kg/m²      General:  Alert, cooperative, no distress. Head:  Normocephalic, without obvious abnormality, atraumatic. Eyes:  Conjunctivae/corneas clear. Pupils equal, round, reactive to light. Extraocular movements intact. Lungs:   Clear to auscultation bilaterally. Chest wall:  No tenderness or deformity. Cardiac:  RRR   Abdomen:   Soft, non-tender. Bowel sounds normal. No masses. No organomegaly. Extremities: Extremities normal, atraumatic, no cyanosis or edema. Pulses: 2+ and symmetric all extremities. Skin: Skin color, texture, turgor normal. No rashes or lesions.    Lymph nodes: Cervical, supraclavicular, and axillary nodes normal.   Neurologic: CNII-XII intact. Normal strength, sensation, and reflexes throughout. Ambulates with a cane. Has a lordotic posture     On this date 07/21/2022 I have spent 30 minutes reviewing previous notes, test results and face to face with the patient discussing the diagnosis and importance of compliance with the treatment plan as well as documenting on the day of the visit.     Gilford Reason MD FACP    (signed electronically) on 7/21/2022 at 12:27 PM

## 2022-07-21 ENCOUNTER — OFFICE VISIT (OUTPATIENT)
Dept: FAMILY MEDICINE CLINIC | Age: 87
End: 2022-07-21
Payer: MEDICARE

## 2022-07-21 VITALS
HEART RATE: 98 BPM | BODY MASS INDEX: 26.88 KG/M2 | WEIGHT: 198.2 LBS | TEMPERATURE: 98.4 F | DIASTOLIC BLOOD PRESSURE: 74 MMHG | SYSTOLIC BLOOD PRESSURE: 122 MMHG | OXYGEN SATURATION: 96 %

## 2022-07-21 DIAGNOSIS — R26.2 DIFFICULTY WALKING: Primary | ICD-10-CM

## 2022-07-21 DIAGNOSIS — M54.31 BILATERAL SCIATICA: ICD-10-CM

## 2022-07-21 DIAGNOSIS — M54.32 BILATERAL SCIATICA: ICD-10-CM

## 2022-07-21 DIAGNOSIS — R09.02 COPD WITH HYPOXIA (HCC): ICD-10-CM

## 2022-07-21 DIAGNOSIS — J44.9 COPD WITH HYPOXIA (HCC): ICD-10-CM

## 2022-07-21 DIAGNOSIS — I10 ESSENTIAL HYPERTENSION: ICD-10-CM

## 2022-07-21 DIAGNOSIS — Z85.46 HISTORY OF PROSTATE CANCER: ICD-10-CM

## 2022-07-21 LAB
BILIRUB UR QL STRIP: NEGATIVE
GLUCOSE UR-MCNC: NEGATIVE MG/DL
KETONES P FAST UR STRIP-MCNC: NEGATIVE MG/DL
PH UR STRIP: 7 [PH] (ref 4.6–8)
PROT UR QL STRIP: NEGATIVE
SP GR UR STRIP: 1.01 (ref 1–1.03)
UA UROBILINOGEN AMB POC: NORMAL (ref 0.2–1)
URINALYSIS CLARITY POC: CLEAR
URINALYSIS COLOR POC: YELLOW
URINE BLOOD POC: NEGATIVE
URINE LEUKOCYTES POC: NEGATIVE
URINE NITRITES POC: NEGATIVE

## 2022-07-21 PROCEDURE — 81000 URINALYSIS NONAUTO W/SCOPE: CPT | Performed by: INTERNAL MEDICINE

## 2022-07-21 PROCEDURE — 99214 OFFICE O/P EST MOD 30 MIN: CPT | Performed by: INTERNAL MEDICINE

## 2022-07-21 NOTE — PROGRESS NOTES
Results for orders placed or performed in visit on 07/21/22   AMB POC URINALYSIS DIP STICK MANUAL W/ MICRO   Result Value Ref Range    Color (UA POC) Yellow     Clarity (UA POC) Clear     Glucose (UA POC) Negative Negative    Bilirubin (UA POC) Negative Negative    Ketones (UA POC) Negative Negative    Specific gravity (UA POC) 1.015 1.001 - 1.035    Blood (UA POC) Negative Negative    pH (UA POC) 7.0 4.6 - 8.0    Protein (UA POC) Negative Negative    Urobilinogen (UA POC) 0.2 mg/dL 0.2 - 1    Nitrites (UA POC) Negative Negative    Leukocyte esterase (UA POC) Negative Negative

## 2022-07-21 NOTE — PROGRESS NOTES
Chung Sandoval is a 80 y.o. male presenting for/with:    Chief Complaint   Patient presents with    Back Pain     States the pain has been going on for years but feels it is getting worse . .. states he does not walk much        Visit Vitals  /74 (BP 1 Location: Left arm, BP Patient Position: Sitting)   Pulse 98   Temp 98.4 °F (36.9 °C) (Temporal)   Wt 198 lb 3.2 oz (89.9 kg)   SpO2 96%   BMI 26.88 kg/m²     Pain Scale: 0 - No pain/10  Pain Location:     1. \"Have you been to the ER, urgent care clinic since your last visit? Hospitalized since your last visit? \" No    2. \"Have you seen or consulted any other health care providers outside of the [address] [place] [address] since your last visit? \" No     3. For patients aged 39-70: Has the patient had a colonoscopy / FIT/ Cologuard? NA - based on age      If the patient is female:    4. For patients aged 41-77: Has the patient had a mammogram within the past 2 years? NA - based on age or sex      11. For patients aged 21-65: Has the patient had a pap smear?  NA - based on age or sex      Symptom review:  NO  Fever   NO  Shaking chills  NO  Cough  NO  Body aches  NO  Coughing up blood  NO  Chest congestion  NO  Chest pain  NO  Shortness of breath  NO  Profound Loss of smell/taste  NO  Nausea/Vomiting   NO  Loose stool/Diarrhea  NO  any skin issues    Patient Risk Factors Reviewed as follows:  NO  have you been in Close contact with confirmed COVID19 patient   NO  History of recent travel to affected geographical areas within the past 14 days  NO  COPD  NO  Active Cancer/Leukemia/Lymphoma/Chemotherapy  NO  Oral steroid use  NO  Pregnant  NO  Diabetes Mellitus  YES  Heart disease  NO  Asthma  NO Health care worker at home  NO Health care worker  NO Is there a Pregnant Woman in the home  NO Dialysis pt in the home   NO a large number of people living in the home    Learning Assessment 9/23/2021   PRIMARY LEARNER Patient   HIGHEST LEVEL OF EDUCATION - PRIMARY LEARNER  - BARRIERS PRIMARY LEARNER -   CO-LEARNER CAREGIVER -   PRIMARY LANGUAGE ENGLISH   LEARNER PREFERENCE PRIMARY READING   ANSWERED BY patient   RELATIONSHIP SELF     Fall Risk Assessment, last 12 mths 7/21/2022   Able to walk? Yes   Fall in past 12 months? 0   Do you feel unsteady? 0   Are you worried about falling 0   Is TUG test greater than 12 seconds? -   Is the gait abnormal? -   Number of falls in past 12 months -   Fall with injury? -       3 most recent PHQ Screens 7/21/2022   Little interest or pleasure in doing things Not at all   Feeling down, depressed, irritable, or hopeless Not at all   Total Score PHQ 2 0     Abuse Screening Questionnaire 7/21/2022   Do you ever feel afraid of your partner? N   Are you in a relationship with someone who physically or mentally threatens you? N   Is it safe for you to go home?  Y       ADL Assessment 7/21/2022   Feeding yourself No Help Needed   Getting from bed to chair No Help Needed   Getting dressed No Help Needed   Bathing or showering No Help Needed   Walk across the room (includes cane/walker) No Help Needed   Using the telphone No Help Needed   Taking your medications No Help Needed   Preparing meals No Help Needed   Managing money (expenses/bills) No Help Needed   Moderately strenuous housework (laundry) No Help Needed   Shopping for personal items (toiletries/medicines) No Help Needed   Shopping for groceries No Help Needed   Driving No Help Needed   Climbing a flight of stairs No Help Needed   Getting to places beyond walking distances No Help Needed      Advance Care Planning 9/23/2021   Patient's Healthcare Decision Maker is: Named in scanned ACP document   Primary Decision Maker Name -   Primary Decision Maker Phone Number -   Primary Decision Maker Relationship to Patient -   Confirm Advance Directive Yes, on file   Does the patient have other document types -

## 2022-07-22 LAB
ALBUMIN SERPL-MCNC: 3.8 G/DL (ref 3.5–5)
ALBUMIN/GLOB SERPL: 1.2 {RATIO} (ref 1.1–2.2)
ALP SERPL-CCNC: 105 U/L (ref 45–117)
ALT SERPL-CCNC: 29 U/L (ref 12–78)
ANION GAP SERPL CALC-SCNC: 4 MMOL/L (ref 5–15)
AST SERPL-CCNC: 19 U/L (ref 15–37)
BASOPHILS # BLD: 0.1 K/UL (ref 0–0.1)
BASOPHILS NFR BLD: 1 % (ref 0–1)
BILIRUB SERPL-MCNC: 1.1 MG/DL (ref 0.2–1)
BUN SERPL-MCNC: 24 MG/DL (ref 6–20)
BUN/CREAT SERPL: 18 (ref 12–20)
CALCIUM SERPL-MCNC: 9.4 MG/DL (ref 8.5–10.1)
CHLORIDE SERPL-SCNC: 106 MMOL/L (ref 97–108)
CHOLEST SERPL-MCNC: 125 MG/DL
CO2 SERPL-SCNC: 32 MMOL/L (ref 21–32)
CREAT SERPL-MCNC: 1.31 MG/DL (ref 0.7–1.3)
DIFFERENTIAL METHOD BLD: NORMAL
EOSINOPHIL # BLD: 0.1 K/UL (ref 0–0.4)
EOSINOPHIL NFR BLD: 1 % (ref 0–7)
ERYTHROCYTE [DISTWIDTH] IN BLOOD BY AUTOMATED COUNT: 14.3 % (ref 11.5–14.5)
GLOBULIN SER CALC-MCNC: 3.3 G/DL (ref 2–4)
GLUCOSE SERPL-MCNC: 86 MG/DL (ref 65–100)
HCT VFR BLD AUTO: 50 % (ref 36.6–50.3)
HDLC SERPL-MCNC: 59 MG/DL
HDLC SERPL: 2.1 {RATIO} (ref 0–5)
HGB BLD-MCNC: 16.2 G/DL (ref 12.1–17)
IMM GRANULOCYTES # BLD AUTO: 0 K/UL (ref 0–0.04)
IMM GRANULOCYTES NFR BLD AUTO: 0 % (ref 0–0.5)
LDLC SERPL CALC-MCNC: 52.4 MG/DL (ref 0–100)
LYMPHOCYTES # BLD: 1.2 K/UL (ref 0.8–3.5)
LYMPHOCYTES NFR BLD: 17 % (ref 12–49)
MCH RBC QN AUTO: 31.4 PG (ref 26–34)
MCHC RBC AUTO-ENTMCNC: 32.4 G/DL (ref 30–36.5)
MCV RBC AUTO: 96.9 FL (ref 80–99)
MONOCYTES # BLD: 0.6 K/UL (ref 0–1)
MONOCYTES NFR BLD: 8 % (ref 5–13)
NEUTS SEG # BLD: 5.4 K/UL (ref 1.8–8)
NEUTS SEG NFR BLD: 73 % (ref 32–75)
NRBC # BLD: 0 K/UL (ref 0–0.01)
NRBC BLD-RTO: 0 PER 100 WBC
PLATELET # BLD AUTO: 165 K/UL (ref 150–400)
PMV BLD AUTO: 12.2 FL (ref 8.9–12.9)
POTASSIUM SERPL-SCNC: 4.1 MMOL/L (ref 3.5–5.1)
PROT SERPL-MCNC: 7.1 G/DL (ref 6.4–8.2)
PSA SERPL-MCNC: 0 NG/ML (ref 0.01–4)
RBC # BLD AUTO: 5.16 M/UL (ref 4.1–5.7)
SODIUM SERPL-SCNC: 142 MMOL/L (ref 136–145)
TRIGL SERPL-MCNC: 68 MG/DL (ref ?–150)
VLDLC SERPL CALC-MCNC: 13.6 MG/DL
WBC # BLD AUTO: 7.4 K/UL (ref 4.1–11.1)

## 2022-07-27 ENCOUNTER — HOSPITAL ENCOUNTER (OUTPATIENT)
Dept: GENERAL RADIOLOGY | Age: 87
Discharge: HOME | End: 2022-07-27
Attending: INTERNAL MEDICINE
Payer: MEDICARE

## 2022-07-27 ENCOUNTER — HOSPITAL ENCOUNTER (OUTPATIENT)
Dept: MRI IMAGING | Age: 87
Discharge: HOME | End: 2022-07-27
Attending: INTERNAL MEDICINE
Payer: MEDICARE

## 2022-07-27 DIAGNOSIS — M54.32 BILATERAL SCIATICA: ICD-10-CM

## 2022-07-27 DIAGNOSIS — R26.2 DIFFICULTY WALKING: ICD-10-CM

## 2022-07-27 DIAGNOSIS — M54.31 BILATERAL SCIATICA: ICD-10-CM

## 2022-07-27 PROCEDURE — 72110 X-RAY EXAM L-2 SPINE 4/>VWS: CPT

## 2022-07-27 PROCEDURE — 72148 MRI LUMBAR SPINE W/O DYE: CPT

## 2022-08-01 ENCOUNTER — TELEPHONE (OUTPATIENT)
Dept: FAMILY MEDICINE CLINIC | Age: 87
End: 2022-08-01

## 2022-08-01 NOTE — TELEPHONE ENCOUNTER
----- Message from Jeni Burch MD sent at 7/31/2022 10:57 AM EDT -----  Regarding: MRI results  Manish Ramirez-   Could you let him know that his MRI and X rays show arthritis which is compressing some of his spinal nerves which is called spinal stenosis. There is no sign of cancer or infection. Not recommending surgery. Physical therapy is the best option for now. Roberto Castorena has an appt with me in Aug and I will go over the results with him in detail at that time.    Thanks  Benito Larson  ----- Message -----  From: Christopher Cabral Results In  Sent: 7/28/2022   9:23 AM EDT  To: Jeni Burch MD

## 2022-08-14 NOTE — PROGRESS NOTES
Chief Complaint   Patient presents with    Back Pain     F/up states pain is about the same . .. states he had a fall last week in the yard no injury but feels his back pain is affecting his walking        ASSESSMENT AND PLAN:    1. Difficulty walking  - REFERRAL TO PHYSICAL THERAPY    2. Bilateral sciatica  Fairly severe spinal stenosis noted on MRI. He would greatly benefit from strength and balance training in addition to gait strengthening. Will refer to physical therapy.  - REFERRAL TO PHYSICAL THERAPY      Orders Placed This Encounter    REFERRAL TO PHYSICAL THERAPY     Referral Priority:   Routine     Referral Type:   PT/OT/ST     Referral Reason:   Specialty Services Required     Referred to Provider:   Rubina Wakefield     Number of Visits Requested:   1       Kasie Skaggs MD, FACP      HPI:        81 yo recently evaluated for difficulty walking-lordotic posture. MRI revealed DJD and spinal stenosis. No Known Allergies    Current Outpatient Medications   Medication Sig    trospium (SANCTURA) 20 mg tablet TAKE 1 TAB BY MOUTH TWO (2) TIMES A DAY. INDICATIONS: BLADDER    atorvastatin (LIPITOR) 40 mg tablet TAKE 1 TABLET BY MOUTH EVERY DAY    clopidogreL (PLAVIX) 75 mg tab TAKE 1 TABLET BY MOUTH EVERY DAY    fluticasone propion-salmeteroL (Wixela Inhub) 250-50 mcg/dose diskus inhaler Take 1 Puff by inhalation two (2) times a day. pantoprazole (PROTONIX) 20 mg tablet Take 1 Tablet by mouth daily. albuterol (PROVENTIL HFA, VENTOLIN HFA, PROAIR HFA) 90 mcg/actuation inhaler Take 2 Puffs by inhalation every four (4) hours as needed for Wheezing. VIT A/VIT C/VIT E/ZINC/COPPER (ICAPS AREDS PO) Take  by mouth.    cyanocobalamin (VITAMIN B12) 100 mcg tablet Take 100 mcg by mouth daily. pyridoxine, vitamin B6, (VITAMIN B-6) 100 mg tablet Take 100 mg by mouth daily. omega-3 fatty acids-vitamin e 1,000 mg cap Take 1 Cap by mouth daily.  Indications: 1200mg capsule    multivitamin (ONE A DAY) tablet Take 1 Tab by mouth daily. No current facility-administered medications for this visit. 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 Denies wt loss, wt gain, hemoptysis, hematochezia or melena. Physical Examination:    Visit Vitals  /72 (BP 1 Location: Left arm, BP Patient Position: Sitting)   Pulse 86   Temp 97.8 °F (36.6 °C) (Temporal)   Resp 18   Ht 6' (1.829 m)   Wt 202 lb (91.6 kg)   SpO2 96%   BMI 27.40 kg/m²      General:  Alert, cooperative, no distress. Head:  Normocephalic, without obvious abnormality, atraumatic. Eyes:  Conjunctivae/corneas clear. Pupils equal, round, reactive to light. Chest wall:  No tenderness or deformity. Extremities: Extremities normal, atraumatic, no cyanosis or edema.    Skin:  No rash   Lymph nodes: Cervical and supraclavicular nodes are normal.   Neurologic: Moves all extremities, fluent speech

## 2022-08-18 ENCOUNTER — OFFICE VISIT (OUTPATIENT)
Dept: FAMILY MEDICINE CLINIC | Age: 87
End: 2022-08-18
Payer: MEDICARE

## 2022-08-18 VITALS
OXYGEN SATURATION: 96 % | HEIGHT: 72 IN | SYSTOLIC BLOOD PRESSURE: 124 MMHG | TEMPERATURE: 97.8 F | DIASTOLIC BLOOD PRESSURE: 72 MMHG | RESPIRATION RATE: 18 BRPM | HEART RATE: 86 BPM | WEIGHT: 202 LBS | BODY MASS INDEX: 27.36 KG/M2

## 2022-08-18 DIAGNOSIS — M54.31 BILATERAL SCIATICA: ICD-10-CM

## 2022-08-18 DIAGNOSIS — M54.32 BILATERAL SCIATICA: ICD-10-CM

## 2022-08-18 DIAGNOSIS — R26.2 DIFFICULTY WALKING: Primary | ICD-10-CM

## 2022-08-18 PROCEDURE — 99213 OFFICE O/P EST LOW 20 MIN: CPT | Performed by: INTERNAL MEDICINE

## 2022-08-18 NOTE — PROGRESS NOTES
Lashell Valentine is a 80 y.o. male presenting for/with:    No chief complaint on file. Visit Vitals  /72 (BP 1 Location: Left arm, BP Patient Position: Sitting)   Pulse 86   Temp 97.8 °F (36.6 °C) (Temporal)   Resp 18   Wt 202 lb (91.6 kg)   SpO2 96%   BMI 27.40 kg/m²     Pain Scale: /10  Pain Location:     1. \"Have you been to the ER, urgent care clinic since your last visit? Hospitalized since your last visit? \" No    2. \"Have you seen or consulted any other health care providers outside of the [address] [place] [address] since your last visit? \" No     3. For patients aged 39-70: Has the patient had a colonoscopy / FIT/ Cologuard? NA - based on age      If the patient is female:    4. For patients aged 41-77: Has the patient had a mammogram within the past 2 years? NA - based on age or sex      11. For patients aged 21-65: Has the patient had a pap smear?  NA - based on age or sex      Symptom review:  NO  Fever   NO  Shaking chills  NO  Cough  NO  Body aches  NO  Coughing up blood  NO  Chest congestion  NO  Chest pain  NO  Shortness of breath  NO  Profound Loss of smell/taste  NO  Nausea/Vomiting   NO  Loose stool/Diarrhea  NO  any skin issues    Patient Risk Factors Reviewed as follows:  NO  have you been in Close contact with confirmed COVID19 patient   NO  History of recent travel to affected geographical areas within the past 14 days  NO  COPD  NO  Active Cancer/Leukemia/Lymphoma/Chemotherapy  NO  Oral steroid use  NO  Pregnant  NO  Diabetes Mellitus  YES  Heart disease  NO  Asthma  NO Health care worker at home  NO Health care worker  NO Is there a Pregnant Woman in the home  NO Dialysis pt in the home   NO a large number of people living in the home    Learning Assessment 9/23/2021   PRIMARY LEARNER Patient   HIGHEST LEVEL OF EDUCATION - PRIMARY LEARNER  -   BARRIERS PRIMARY LEARNER -   CO-LEARNER CAREGIVER -   PRIMARY LANGUAGE ENGLISH   LEARNER PREFERENCE PRIMARY READING   ANSWERED BY patient RELATIONSHIP SELF     Fall Risk Assessment, last 12 mths 8/18/2022   Able to walk? Yes   Fall in past 12 months? 1   Do you feel unsteady? 0   Are you worried about falling 0   Is TUG test greater than 12 seconds? 0   Is the gait abnormal? 1   Number of falls in past 12 months 1   Fall with injury? 0       3 most recent PHQ Screens 8/18/2022   Little interest or pleasure in doing things Not at all   Feeling down, depressed, irritable, or hopeless Not at all   Total Score PHQ 2 0     Abuse Screening Questionnaire 8/18/2022   Do you ever feel afraid of your partner? N   Are you in a relationship with someone who physically or mentally threatens you? -   Is it safe for you to go home?  Y       ADL Assessment 8/18/2022   Feeding yourself No Help Needed   Getting from bed to chair No Help Needed   Getting dressed No Help Needed   Bathing or showering No Help Needed   Walk across the room (includes cane/walker) Help Needed   Using the telphone No Help Needed   Taking your medications No Help Needed   Preparing meals No Help Needed   Managing money (expenses/bills) No Help Needed   Moderately strenuous housework (laundry) No Help Needed   Shopping for personal items (toiletries/medicines) No Help Needed   Shopping for groceries No Help Needed   Driving No Help Needed   Climbing a flight of stairs No Help Needed   Getting to places beyond walking distances No Help Needed      Advance Care Planning 9/23/2021   Patient's Healthcare Decision Maker is: Named in scanned ACP document   Primary Decision Maker Name -   Primary Decision Maker Phone Number -   Primary Decision Maker Relationship to Patient -   Confirm Advance Directive Yes, on file   Does the patient have other document types -

## 2022-09-21 ENCOUNTER — HOSPITAL ENCOUNTER (OUTPATIENT)
Dept: GENERAL RADIOLOGY | Age: 87
Discharge: HOME | End: 2022-09-21
Payer: MEDICARE

## 2022-09-21 ENCOUNTER — TRANSCRIBE ORDER (OUTPATIENT)
Dept: REGISTRATION | Age: 87
End: 2022-09-21

## 2022-09-21 DIAGNOSIS — M79.602 LEFT ARM PAIN: ICD-10-CM

## 2022-09-21 DIAGNOSIS — M79.602 LEFT ARM PAIN: Primary | ICD-10-CM

## 2022-09-21 PROCEDURE — 73060 X-RAY EXAM OF HUMERUS: CPT

## 2022-09-29 RX ORDER — PANTOPRAZOLE SODIUM 20 MG/1
TABLET, DELAYED RELEASE ORAL
Qty: 90 TABLET | Refills: 5 | Status: SHIPPED | OUTPATIENT
Start: 2022-09-29

## 2022-10-18 NOTE — PROGRESS NOTES
Mr. Sixto Eli is a 80 y.o. male who is here for evaluation of   Chief Complaint   Patient presents with    Annual Wellness Visit   . ASSESSMENT AND PLAN:    1. Medicare annual wellness visit, subsequent  2. Bilateral sciatica  Continue walking several times a day. Continue to use 3 wheeled walker. 3. Essential hypertension  At goal  4. COPD with hypoxia ([facility]  Schedule Covid booster in 2 weeks  5. Needs flu shot  - INFLUENZA, FLUAD, (AGE 65 Y+), IM, PF, 0.5 ML    6. Urinary frequency  Stop Sanctura and start Flomax  - tamsulosin (FLOMAX) 0.4 mg capsule; Take 1 Capsule by mouth nightly. Indications: enlarged prostate with urination problem  Dispense: 90 Capsule; Refill: 3      Orders Placed This Encounter    Influenza, FLUAD, (age 72 y+), IM, PF, 0.5 mL    tamsulosin (FLOMAX) 0.4 mg capsule     Sig: Take 1 Capsule by mouth nightly. Indications: enlarged prostate with urination problem     Dispense:  90 Capsule     Refill:  3           HPI  66-year-old gentleman arrives today for subsequent annual wellness visit in addition to review of his COPD, gait disturbance and history of stroke. He was hospitalized earlier this year in July 2022 for sepsis. 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 His COPD symptoms are relatively well controlled with albuterol and Wixela. Has been taking Sanctura for years and asks if there is anything else he could try. He has never attempted Flomax due to cost but was pleased to learn that it was now generic. Flu shot today. COVID booster in 2 weeks    ROS:  Denies  fever, chills, cough, chest pain, SOB,  nausea, vomiting, or diarrhea. Denies wt loss, wt gain, hemoptysis, hematochezia or melena.     Physical Examination:    Visit Vitals  BP 116/62   Pulse 83   Temp 97.1 °F (36.2 °C) (Temporal)   Resp 20   Ht 6' (1.829 m)   Wt 202 lb 3.2 oz (91.7 kg)   SpO2 97%   BMI 27.42 kg/m²      General:  Alert, cooperative, no distress. Head:  Normocephalic, without obvious abnormality, atraumatic. Eyes:  Conjunctivae/corneas clear. Pupils equal, round, reactive to light. Extraocular movements intact. Lungs:   Clear to auscultation bilaterally. Chest wall:  No tenderness or deformity. Cardiac:  RRR   Abdomen:   Soft, non-tender. Bowel sounds normal. No masses. No organomegaly. Extremities: Extremities normal, atraumatic, no cyanosis or edema. Pulses: 2+ and symmetric all extremities. Skin: Skin color, texture, turgor normal. No rashes or lesions. Lymph nodes: Cervical, supraclavicular, and axillary nodes normal.   Neurologic: CNII-XII intact. Normal strength, sensation, and reflexes throughout. Ambulates with wheeled walker. On this date 10/21/2022 I have spent 30 minutes reviewing previous notes, test results and face to face with the patient discussing the diagnosis and importance of compliance with the treatment plan as well as documenting on the day of the visit. Grisel White MD FACP    (signed electronically) on 10/21/2022 at 11:13 AM        ______________________________________________________________________    Jack Jovel is a 80 y.o. male and presents for annual Medicare Wellness Visit. Problem List: Reviewed with patient and discussed risk factors.     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    Dyslipidemia E78.5    Gastroesophageal reflux disease K21.9    Cataract H26.9    Palpitation R00.2    Ventral hernia K43.9    History of prostate cancer Z85.46    Heart murmur R01.1    Shoulder pain M25.519    Tobacco use Z72.0    Atypical squamoproliferative skin lesion D49.2    On statin therapy Z79.899    Acute febrile illness R50.9    Weakness generalized R53.1    History of CVA (cerebrovascular accident) Z86.73    COPD with hypoxia (HCC) J44.9, R09.02    Generalized weakness R53.1    Nodule of upper lobe of left lung R91.1    OAB (overactive bladder) N32.81    Sepsis ([facility]) A41.9    Mild valvular heart disease I38    Type 2 diabetes mellitus E11.9    Pulmonary hypertension, unspecified ([facility]) I27.20    Type 2 diabetes mellitus with hyperglycemia, without long-term current use of insulin ([facility]) E11.65    Tear film insufficiency H04.129    Nonexudative age-related macular degeneration H35.3190    Pseudophakia of both eyes Z96.1       Current medical providers:  Patient Care Team:  Leila Karimi MD as PCP - General (Internal Medicine Physician)  Leila Karimi MD as PCP - [facility] Empaneled Provider  Kay Fofana MD as Consulting Provider (Urology)    [facility], Medications/Allergies: reviewed, on chart. Male Alcohol Screening: On any occasion during the past 3 months, have you had more than 4 drinks containing alcohol? No    Do you average more than 14 drinks per week? No    Objective:  Visit Vitals  /62   Pulse 83   Temp 97.1 °F (36.2 °C) (Temporal)   Resp 20   Ht 6' (1.829 m)   Wt 202 lb 3.2 oz (91.7 kg)   SpO2 97%   BMI 27.42 kg/m²    Body mass index is 27.42 kg/m². Assessment of cognitive impairment: Alert and oriented x 3    Depression Screen:   3 most recent PHQ Screens 10/21/2022   Little interest or pleasure in doing things Not at all   Feeling down, depressed, irritable, or hopeless Not at all   Total Score PHQ 2 0       Fall Risk Assessment:    Fall Risk Assessment, last 12 mths 10/21/2022   Able to walk? Yes   Fall in past 12 months? 0   Do you feel unsteady?  0   Are you worried about falling 0 Is TUG test greater than 12 seconds? -   Is the gait abnormal? -   Number of falls in past 12 months -   Fall with injury? -       Functional Ability:   Does the patient exhibit a steady gait? yes   How long did it take the patient to get up and walk from a sitting position? 12 sec   Is the patient self reliant?  (ie can do own laundry, meals, household chores)  yes     Does the patient handle his/her own medications? yes     Does the patient handle his/her own money? yes     Is the patients home safe (ie good lighting, handrails on stairs and bath, etc.)? yes     Did you notice or did patient express any hearing difficulties? yes     Did you notice or did patient express any vision difficulties? no       Advance Care Planning:   Patient was offered the opportunity to discuss advance care planning:  yes     Does patient have an Advance Directive:  yes   If no, did you provide information on Caring Connections?  no       Plan:      Orders Placed This Encounter    Influenza, FLUAD, (age 72 y+), IM, PF, 0.5 mL    tamsulosin (FLOMAX) 0.4 mg capsule       Health Maintenance   Topic Date Due    Eye Exam Retinal or Dilated  Never done    MICROALBUMIN Q1  08/28/2019    COVID-19 Vaccine (4 - Booster for Moderna series) 12/11/2022    Lipid Screen  07/21/2023    Depression Screen  08/18/2023    Medicare Yearly Exam  10/22/2023    DTaP/Tdap/Td series (2 - Td or Tdap) 03/19/2028    Shingrix Vaccine Age 50>  Completed    Flu Vaccine  Completed    Pneumococcal 65+ years  Completed       *Patient verbalized understanding and agreement with the plan. A copy of the After Visit Summary with personalized health plan was given to the patient today.

## 2022-10-21 ENCOUNTER — OFFICE VISIT (OUTPATIENT)
Dept: FAMILY MEDICINE CLINIC | Age: 87
End: 2022-10-21
Payer: MEDICARE

## 2022-10-21 VITALS
HEART RATE: 83 BPM | BODY MASS INDEX: 27.39 KG/M2 | OXYGEN SATURATION: 97 % | TEMPERATURE: 97.1 F | WEIGHT: 202.2 LBS | HEIGHT: 72 IN | DIASTOLIC BLOOD PRESSURE: 62 MMHG | SYSTOLIC BLOOD PRESSURE: 116 MMHG | RESPIRATION RATE: 20 BRPM

## 2022-10-21 DIAGNOSIS — M54.31 BILATERAL SCIATICA: ICD-10-CM

## 2022-10-21 DIAGNOSIS — R09.02 COPD WITH HYPOXIA (HCC): ICD-10-CM

## 2022-10-21 DIAGNOSIS — M54.32 BILATERAL SCIATICA: ICD-10-CM

## 2022-10-21 DIAGNOSIS — I10 ESSENTIAL HYPERTENSION: ICD-10-CM

## 2022-10-21 DIAGNOSIS — Z23 NEEDS FLU SHOT: ICD-10-CM

## 2022-10-21 DIAGNOSIS — R35.0 URINARY FREQUENCY: ICD-10-CM

## 2022-10-21 DIAGNOSIS — J44.9 COPD WITH HYPOXIA (HCC): ICD-10-CM

## 2022-10-21 DIAGNOSIS — Z00.00 MEDICARE ANNUAL WELLNESS VISIT, SUBSEQUENT: Primary | ICD-10-CM

## 2022-10-21 PROCEDURE — G0008 ADMIN INFLUENZA VIRUS VAC: HCPCS | Performed by: INTERNAL MEDICINE

## 2022-10-21 PROCEDURE — 99214 OFFICE O/P EST MOD 30 MIN: CPT | Performed by: INTERNAL MEDICINE

## 2022-10-21 PROCEDURE — 90694 VACC AIIV4 NO PRSRV 0.5ML IM: CPT | Performed by: INTERNAL MEDICINE

## 2022-10-21 PROCEDURE — G0439 PPPS, SUBSEQ VISIT: HCPCS | Performed by: INTERNAL MEDICINE

## 2022-10-21 RX ORDER — TAMSULOSIN HYDROCHLORIDE 0.4 MG/1
0.4 CAPSULE ORAL
Qty: 90 CAPSULE | Refills: 3 | Status: SHIPPED | OUTPATIENT
Start: 2022-10-21

## 2022-10-21 NOTE — PATIENT INSTRUCTIONS
Vaccine Information Statement    Influenza (Flu) Vaccine (Inactivated or Recombinant): What You Need to Know    Many vaccine information statements are available in Amharic and other languages. See www.immunize.org/vis. Hojas de información sobre vacunas están disponibles en español y en muchos otros idiomas. Visite www.immunize.org/vis. 1. Why get vaccinated? Influenza vaccine can prevent influenza (flu). Flu is a contagious disease that spreads around the [facility] every year, usually between October and May. Anyone can get the flu, but it is more dangerous for some people. Infants and young children, people 72 years and older, pregnant people, and people with certain health conditions or a weakened immune system are at greatest risk of flu complications. Pneumonia, bronchitis, sinus infections, and ear infections are examples of flu-related complications. If you have a medical condition, such as heart disease, cancer, or diabetes, flu can make it worse. Flu can cause fever and chills, sore throat, muscle aches, fatigue, cough, headache, and runny or stuffy nose. Some people may have vomiting and diarrhea, though this is more common in children than adults. In an average year, thousands of people in the [facility] die from flu, and many more are hospitalized. Flu vaccine prevents millions of illnesses and flu-related visits to the doctor each year. 2. Influenza vaccines     CDC recommends everyone 6 months and older get vaccinated every flu season. Children 6 months through 6years of age may need 2 doses during a single flu season. Everyone else needs only 1 dose each flu season. It takes about 2 weeks for protection to develop after vaccination. There are many flu viruses, and they are always changing. Each year a new flu vaccine is made to protect against the influenza viruses believed to be likely to cause disease in the upcoming flu season.  Even when the vaccine doesnt exactly match these viruses, it may still provide some protection. Influenza vaccine does not cause flu. Influenza vaccine may be given at the same time as other vaccines. 3. Talk with your health care provider    Tell your vaccination provider if the person getting the vaccine:  Has had an allergic reaction after a previous dose of influenza vaccine, or has any severe, life-threatening allergies   Has ever had Guillain-Barré Syndrome (also called GBS)    In some cases, your health care provider may decide to postpone influenza vaccination until a future visit. Influenza vaccine can be administered at any time during pregnancy. People who are or will be pregnant during influenza season should receive inactivated influenza vaccine. People with minor illnesses, such as a cold, may be vaccinated. People who are moderately or severely ill should usually wait until they recover before getting influenza vaccine. Your health care provider can give you more information. 4. Risks of a vaccine reaction    Soreness, redness, and swelling where the shot is given, fever, muscle aches, and headache can happen after influenza vaccination. There may be a very small increased risk of Guillain-Barré Syndrome (GBS) after inactivated influenza vaccine (the flu shot). Ardine Gum children who get the flu shot along with pneumococcal vaccine (PCV13) and/or DTaP vaccine at the same time might be slightly more likely to have a seizure caused by fever. Tell your health care provider if a child who is getting flu vaccine has ever had a seizure. People sometimes faint after medical procedures, including vaccination. Tell your provider if you feel dizzy or have vision changes or ringing in the ears. As with any medicine, there is a very remote chance of a vaccine causing a severe allergic reaction, other serious injury, or death. 5. What if there is a serious problem?     An allergic reaction could occur after the vaccinated person leaves the clinic. If you see signs of a severe allergic reaction (hives, swelling of the face and throat, difficulty breathing, a fast heartbeat, dizziness, or weakness), call 9-1-1 and get the person to the nearest hospital.    For other signs that concern you, call your health care provider. Adverse reactions should be reported to the Vaccine Adverse Event Reporting System (VAERS). Your health care provider will usually file this report, or you can do it yourself. Visit the VAERS website at www.vaers. [facility].gov or call 1-946.411.8283. VAERS is only for reporting reactions, and VAERS staff members do not give medical advice. 6. The National Vaccine Injury Compensation Program    The [facility] Vaccine Injury Compensation Program (VICP) is a federal program that was created to compensate people who may have been injured by certain vaccines. Claims regarding alleged injury or death due to vaccination have a time limit for filing, which may be as short as two years. Visit the VICP website at www.[facility].gov/vaccinecompensation or call 2-223.984.5150 to learn about the program and about filing a claim. 7. How can I learn more? Ask your health care provider. Call your local or state health department. Visit the website of the Food and Drug Administration (FDA) for vaccine package inserts and additional information at www.fda.gov/vaccines-blood-biologics/vaccines. Contact the Centers for Disease Control and Prevention (CDC): Call 2-313.533.5542 (1-800-CDC-INFO) or  Visit CDCs influenza website at www.cdc.gov/flu. Vaccine Information Statement   Inactivated Influenza Vaccine   8/6/2021  42 [address] [place] [id number]-43   Department of Health and Human Services  Centers for Disease Control and Prevention    Office Use Only

## 2022-10-21 NOTE — PROGRESS NOTES
Lana Harmon is a 80 y.o. male presenting for/with:    Chief Complaint   Patient presents with    Annual Wellness Visit       Visit Vitals  /62   Pulse 83   Temp 97.1 °F (36.2 °C) (Temporal)   Resp 20   Ht 6' (1.829 m)   Wt 202 lb 3.2 oz (91.7 kg)   SpO2 97%   BMI 27.42 kg/m²     Pain Scale: 0 - No pain/10  Pain Location:     1. \"Have you been to the ER, urgent care clinic since your last visit? Hospitalized since your last visit? \" No    2. \"Have you seen or consulted any other health care providers outside of the [address] [place] [address] since your last visit? \" No     3. For patients aged 39-70: Has the patient had a colonoscopy / FIT/ Cologuard? NA - based on age      If the patient is female:    4. For patients aged 41-77: Has the patient had a mammogram within the past 2 years? NA - based on age or sex      11. For patients aged 21-65: Has the patient had a pap smear? NA - based on age or sex      Symptom review:  Learning Assessment 9/23/2021   PRIMARY LEARNER Patient   HIGHEST LEVEL OF EDUCATION - PRIMARY LEARNER  -   BARRIERS PRIMARY LEARNER -   CO-LEARNER CAREGIVER -   PRIMARY LANGUAGE ENGLISH   LEARNER PREFERENCE PRIMARY READING   ANSWERED BY patient   RELATIONSHIP SELF     Fall Risk Assessment, last 12 mths 10/21/2022   Able to walk? Yes   Fall in past 12 months? 0   Do you feel unsteady? 0   Are you worried about falling 0   Is TUG test greater than 12 seconds? -   Is the gait abnormal? -   Number of falls in past 12 months -   Fall with injury? -       3 most recent PHQ Screens 10/21/2022   Little interest or pleasure in doing things Not at all   Feeling down, depressed, irritable, or hopeless Not at all   Total Score PHQ 2 0     Abuse Screening Questionnaire 10/21/2022   Do you ever feel afraid of your partner? N   Are you in a relationship with someone who physically or mentally threatens you? N   Is it safe for you to go home?  Y       ADL Assessment 10/21/2022   Feeding yourself No Help Needed   Getting from bed to chair No Help Needed   Getting dressed No Help Needed   Bathing or showering No Help Needed   Walk across the room (includes cane/walker) No Help Needed   Using the telphone No Help Needed   Taking your medications No Help Needed   Preparing meals No Help Needed   Managing money (expenses/bills) No Help Needed   Moderately strenuous housework (laundry) No Help Needed   Shopping for personal items (toiletries/medicines) No Help Needed   Shopping for groceries No Help Needed   Driving No Help Needed   Climbing a flight of stairs No Help Needed   Getting to places beyond walking distances No Help Needed      Advance Care Planning 10/21/2022   Patient's Healthcare Decision Maker is: Named in scanned ACP document   Primary Decision Maker Name -   Primary Decision Maker Phone Number -   Primary Decision Maker Relationship to Patient -   Confirm Advance Directive Yes, on file   Does the patient have other document types -

## 2023-01-15 DIAGNOSIS — R09.02 COPD WITH HYPOXIA (HCC): ICD-10-CM

## 2023-01-15 DIAGNOSIS — J44.9 COPD WITH HYPOXIA (HCC): ICD-10-CM

## 2023-01-15 RX ORDER — FLUTICASONE PROPIONATE AND SALMETEROL 250; 50 UG/1; UG/1
POWDER RESPIRATORY (INHALATION)
Qty: 60 EACH | Refills: 5 | Status: SHIPPED | OUTPATIENT
Start: 2023-01-15

## 2023-02-15 RX ORDER — CLOPIDOGREL BISULFATE 75 MG/1
TABLET ORAL
Qty: 90 TABLET | Refills: 2 | Status: SHIPPED | OUTPATIENT
Start: 2023-02-15

## 2023-02-15 RX ORDER — ATORVASTATIN CALCIUM 40 MG/1
TABLET, FILM COATED ORAL
Qty: 90 TABLET | Refills: 2 | Status: SHIPPED | OUTPATIENT
Start: 2023-02-15

## 2023-03-12 NOTE — PROGRESS NOTES
Mr. Lane Gitelman is a 80 y.o. male who is here for evaluation of   Chief Complaint   Patient presents with    Hypertension    Diabetes    COPD     Follow up   . ASSESSMENT AND PLAN:    He may have some scleral icterus today and we will need to check the labs. He was advised to increase his activity particularly when the weather is nicer he should be walking more. Specific goals were provided to him today. 1. COPD with hypoxia ([address]  Crackles in the left lung base posteriorly. We will check chest x-ray  - XR CHEST PA LAT; Future    2. Pulmonary hypertension, unspecified ([address]  Only peripheral manifestation today is lower extremity edema. 3. Bilateral sciatica  Continues to walk with a cane. 4. Essential hypertension  Well-controlled currently. - CBC WITH AUTOMATED DIFF; Future  - METABOLIC PANEL, COMPREHENSIVE; Future      Orders Placed This Encounter    XR CHEST PA LAT     Standing Status:   Future     Standing Expiration Date:   4/16/2023     Scheduling Instructions:      [address]    CBC WITH AUTOMATED DIFF     Standing Status:   Future     Standing Expiration Date:   7/35/3141    METABOLIC PANEL, COMPREHENSIVE     Standing Status:   Future     Standing Expiration Date:   3/30/2023    trospium (SANCTURA) 20 mg tablet     Sig: Take 1 Tablet by mouth Before breakfast and dinner. Dispense:  180 Tablet     Refill:  4       Follow-up and Dispositions    Return in about 3 months (around 6/16/2023). HPI  80-year-old gentleman with a history of COPD, spinal stenosis and history of stroke returns today for interval assessment. His wife notes that he is increasingly sedentary. He has been to physical therapy regarding his spinal stenosis, obtaining guidance with strength and balance training. He is about 5 years status post stroke to the right cerebellar artery without recurrence. COPD has been pretty well controlled with albuterol and Wixela.          ROS:  Denies  fever, chills, cough, chest pain, SOB,  nausea, vomiting, or diarrhea. Denies wt loss, wt gain, hemoptysis, hematochezia or melena. Physical Examination:    Visit Vitals  /64   Pulse 98   Temp 97.5 °F (36.4 °C) (Temporal)   Resp 18   Ht 6' (1.829 m)   Wt 210 lb (95.3 kg)   SpO2 97%   BMI 28.48 kg/m²      General:  Alert, cooperative, no distress. Head:  Normocephalic, without obvious abnormality, atraumatic. Eyes:  Conjunctivae/corneas clear. Pupils equal, round, reactive to light. Extraocular movements intact. Lungs:   Clear to auscultation bilaterally. Left lung base posteriorly. Chest wall:  No tenderness or deformity. Cardiac:  RRR   Abdomen:   Soft, non-tender. Bowel sounds normal. No masses. No organomegaly. Extremities: Extremities normal, atraumatic, no cyanosis or edema. Pulses: 2+ and symmetric all extremities. Skin: Skin color, texture, turgor normal. No rashes or lesions. Lymph nodes: Cervical, supraclavicular, and axillary nodes normal.   Neurologic: CNII-XII intact. Normal strength, sensation, and reflexes throughout.          Jim Calix MD FACP    (signed electronically) on 3/16/2023 at 9:21 AM

## 2023-03-16 ENCOUNTER — OFFICE VISIT (OUTPATIENT)
Dept: FAMILY MEDICINE CLINIC | Age: 88
End: 2023-03-16
Payer: MEDICARE

## 2023-03-16 ENCOUNTER — HOSPITAL ENCOUNTER (OUTPATIENT)
Dept: GENERAL RADIOLOGY | Age: 88
Discharge: HOME | End: 2023-03-16
Payer: MEDICARE

## 2023-03-16 VITALS
BODY MASS INDEX: 28.44 KG/M2 | RESPIRATION RATE: 18 BRPM | HEART RATE: 98 BPM | HEIGHT: 72 IN | TEMPERATURE: 97.5 F | DIASTOLIC BLOOD PRESSURE: 64 MMHG | SYSTOLIC BLOOD PRESSURE: 118 MMHG | OXYGEN SATURATION: 97 % | WEIGHT: 210 LBS

## 2023-03-16 DIAGNOSIS — J44.9 COPD WITH HYPOXIA (HCC): ICD-10-CM

## 2023-03-16 DIAGNOSIS — I27.20 PULMONARY HYPERTENSION, UNSPECIFIED (HCC): ICD-10-CM

## 2023-03-16 DIAGNOSIS — I10 ESSENTIAL HYPERTENSION: ICD-10-CM

## 2023-03-16 DIAGNOSIS — M54.32 BILATERAL SCIATICA: ICD-10-CM

## 2023-03-16 DIAGNOSIS — J44.9 COPD WITH HYPOXIA (HCC): Primary | ICD-10-CM

## 2023-03-16 DIAGNOSIS — R09.02 COPD WITH HYPOXIA (HCC): ICD-10-CM

## 2023-03-16 DIAGNOSIS — M54.31 BILATERAL SCIATICA: ICD-10-CM

## 2023-03-16 DIAGNOSIS — R09.02 COPD WITH HYPOXIA (HCC): Primary | ICD-10-CM

## 2023-03-16 PROCEDURE — G8427 DOCREV CUR MEDS BY ELIG CLIN: HCPCS | Performed by: INTERNAL MEDICINE

## 2023-03-16 PROCEDURE — G8536 NO DOC ELDER MAL SCRN: HCPCS | Performed by: INTERNAL MEDICINE

## 2023-03-16 PROCEDURE — 71046 X-RAY EXAM CHEST 2 VIEWS: CPT

## 2023-03-16 PROCEDURE — 1123F ACP DISCUSS/DSCN MKR DOCD: CPT | Performed by: INTERNAL MEDICINE

## 2023-03-16 PROCEDURE — G8510 SCR DEP NEG, NO PLAN REQD: HCPCS | Performed by: INTERNAL MEDICINE

## 2023-03-16 PROCEDURE — 1101F PT FALLS ASSESS-DOCD LE1/YR: CPT | Performed by: INTERNAL MEDICINE

## 2023-03-16 PROCEDURE — 99214 OFFICE O/P EST MOD 30 MIN: CPT | Performed by: INTERNAL MEDICINE

## 2023-03-16 PROCEDURE — G8417 CALC BMI ABV UP PARAM F/U: HCPCS | Performed by: INTERNAL MEDICINE

## 2023-03-16 RX ORDER — TROSPIUM CHLORIDE 20 MG/1
20 TABLET, FILM COATED ORAL
Qty: 180 TABLET | Refills: 4
Start: 2023-03-16

## 2023-03-16 NOTE — PROGRESS NOTES
Bhumi Powell is a 80 y.o. male presenting for/with:    Chief Complaint   Patient presents with    Hypertension    Diabetes    COPD     Follow up       There were no vitals taken for this visit. Pain Scale: /10  Pain Location:     1. \"Have you been to the ER, urgent care clinic since your last visit? Hospitalized since your last visit? \" No    2. \"Have you seen or consulted any other health care providers outside of the [address] [place] [address] since your last visit? \" No     3. For patients aged 39-70: Has the patient had a colonoscopy / FIT/ Cologuard? NA - based on age      If the patient is female:    4. For patients aged 41-77: Has the patient had a mammogram within the past 2 years? NA - based on age or sex      11. For patients aged 21-65: Has the patient had a pap smear? NA - based on age or sex      Symptom review:  Learning Assessment 9/23/2021   PRIMARY LEARNER Patient   HIGHEST LEVEL OF EDUCATION - PRIMARY LEARNER  -   BARRIERS PRIMARY LEARNER -   CO-LEARNER CAREGIVER -   PRIMARY LANGUAGE ENGLISH   LEARNER PREFERENCE PRIMARY READING   ANSWERED BY patient   RELATIONSHIP SELF     Fall Risk Assessment, last 12 mths 3/16/2023   Able to walk? Yes   Fall in past 12 months? 0   Do you feel unsteady? 0   Are you worried about falling 0   Is TUG test greater than 12 seconds? -   Is the gait abnormal? -   Number of falls in past 12 months -   Fall with injury? -       3 most recent PHQ Screens 3/16/2023   Little interest or pleasure in doing things Not at all   Feeling down, depressed, irritable, or hopeless Not at all   Total Score PHQ 2 0     Abuse Screening Questionnaire 3/16/2023   Do you ever feel afraid of your partner? N   Are you in a relationship with someone who physically or mentally threatens you? N   Is it safe for you to go home?  Y       ADL Assessment 3/16/2023   Feeding yourself No Help Needed   Getting from bed to chair No Help Needed   Getting dressed No Help Needed   Bathing or showering No Help Needed   Walk across the room (includes cane/walker) No Help Needed   Using the telphone No Help Needed   Taking your medications No Help Needed   Preparing meals No Help Needed   Managing money (expenses/bills) No Help Needed   Moderately strenuous housework (laundry) No Help Needed   Shopping for personal items (toiletries/medicines) No Help Needed   Shopping for groceries No Help Needed   Driving No Help Needed   Climbing a flight of stairs No Help Needed   Getting to places beyond walking distances No Help Needed      Advance Care Planning 3/16/2023   Patient's Healthcare Decision Maker is: Named in scanned ACP document   Primary Decision Maker Name -   Primary Decision Maker Phone Number -   Primary Decision Maker Relationship to Patient -   Confirm Advance Directive Yes, on file   Does the patient have other document types -

## 2023-03-17 LAB
ALBUMIN SERPL-MCNC: 3.9 G/DL (ref 3.5–5)
ALBUMIN/GLOB SERPL: 1.4 (ref 1.1–2.2)
ALP SERPL-CCNC: 102 U/L (ref 45–117)
ALT SERPL-CCNC: 25 U/L (ref 12–78)
ANION GAP SERPL CALC-SCNC: 8 MMOL/L (ref 5–15)
AST SERPL-CCNC: 21 U/L (ref 15–37)
BASOPHILS # BLD: 0.1 K/UL (ref 0–0.1)
BASOPHILS NFR BLD: 1 % (ref 0–1)
BILIRUB SERPL-MCNC: 0.8 MG/DL (ref 0.2–1)
BUN SERPL-MCNC: 21 MG/DL (ref 6–20)
BUN/CREAT SERPL: 17 (ref 12–20)
CALCIUM SERPL-MCNC: 9.5 MG/DL (ref 8.5–10.1)
CHLORIDE SERPL-SCNC: 109 MMOL/L (ref 97–108)
CO2 SERPL-SCNC: 25 MMOL/L (ref 21–32)
CREAT SERPL-MCNC: 1.27 MG/DL (ref 0.7–1.3)
DIFFERENTIAL METHOD BLD: ABNORMAL
EOSINOPHIL # BLD: 0.1 K/UL (ref 0–0.4)
EOSINOPHIL NFR BLD: 2 % (ref 0–7)
ERYTHROCYTE [DISTWIDTH] IN BLOOD BY AUTOMATED COUNT: 14.7 % (ref 11.5–14.5)
GLOBULIN SER CALC-MCNC: 2.7 G/DL (ref 2–4)
GLUCOSE SERPL-MCNC: 120 MG/DL (ref 65–100)
HCT VFR BLD AUTO: 48.4 % (ref 36.6–50.3)
HGB BLD-MCNC: 14.9 G/DL (ref 12.1–17)
IMM GRANULOCYTES # BLD AUTO: 0 K/UL (ref 0–0.04)
IMM GRANULOCYTES NFR BLD AUTO: 0 % (ref 0–0.5)
LYMPHOCYTES # BLD: 1.4 K/UL (ref 0.8–3.5)
LYMPHOCYTES NFR BLD: 17 % (ref 12–49)
MCH RBC QN AUTO: 30.2 PG (ref 26–34)
MCHC RBC AUTO-ENTMCNC: 30.8 G/DL (ref 30–36.5)
MCV RBC AUTO: 98.2 FL (ref 80–99)
MONOCYTES # BLD: 0.7 K/UL (ref 0–1)
MONOCYTES NFR BLD: 8 % (ref 5–13)
NEUTS SEG # BLD: 5.8 K/UL (ref 1.8–8)
NEUTS SEG NFR BLD: 72 % (ref 32–75)
NRBC # BLD: 0 K/UL (ref 0–0.01)
NRBC BLD-RTO: 0 PER 100 WBC
PLATELET # BLD AUTO: 193 K/UL (ref 150–400)
PMV BLD AUTO: 12.3 FL (ref 8.9–12.9)
POTASSIUM SERPL-SCNC: 4.3 MMOL/L (ref 3.5–5.1)
PROT SERPL-MCNC: 6.6 G/DL (ref 6.4–8.2)
RBC # BLD AUTO: 4.93 M/UL (ref 4.1–5.7)
SODIUM SERPL-SCNC: 142 MMOL/L (ref 136–145)
WBC # BLD AUTO: 8.1 K/UL (ref 4.1–11.1)

## 2023-03-17 NOTE — PROGRESS NOTES
Attempted to contact patient. No answer. Requested return call. Per Anuj - CXR within normal limits.

## 2023-04-27 ENCOUNTER — OFFICE VISIT (OUTPATIENT)
Dept: FAMILY MEDICINE CLINIC | Age: 88
End: 2023-04-27
Payer: MEDICARE

## 2023-04-27 VITALS
BODY MASS INDEX: 27.9 KG/M2 | WEIGHT: 206 LBS | HEART RATE: 93 BPM | HEIGHT: 72 IN | SYSTOLIC BLOOD PRESSURE: 110 MMHG | DIASTOLIC BLOOD PRESSURE: 80 MMHG | RESPIRATION RATE: 18 BRPM | TEMPERATURE: 97.7 F | OXYGEN SATURATION: 96 %

## 2023-04-27 DIAGNOSIS — M79.602 LEFT ARM PAIN: ICD-10-CM

## 2023-04-27 DIAGNOSIS — E11.65 TYPE 2 DIABETES MELLITUS WITH HYPERGLYCEMIA, WITHOUT LONG-TERM CURRENT USE OF INSULIN (HCC): ICD-10-CM

## 2023-04-27 DIAGNOSIS — R26.2 DIFFICULTY WALKING: ICD-10-CM

## 2023-04-27 DIAGNOSIS — K21.9 GASTROESOPHAGEAL REFLUX DISEASE WITHOUT ESOPHAGITIS: Primary | ICD-10-CM

## 2023-04-27 PROCEDURE — G8427 DOCREV CUR MEDS BY ELIG CLIN: HCPCS | Performed by: INTERNAL MEDICINE

## 2023-04-27 PROCEDURE — G8417 CALC BMI ABV UP PARAM F/U: HCPCS | Performed by: INTERNAL MEDICINE

## 2023-04-27 PROCEDURE — G8510 SCR DEP NEG, NO PLAN REQD: HCPCS | Performed by: INTERNAL MEDICINE

## 2023-04-27 PROCEDURE — 1123F ACP DISCUSS/DSCN MKR DOCD: CPT | Performed by: INTERNAL MEDICINE

## 2023-04-27 PROCEDURE — G8536 NO DOC ELDER MAL SCRN: HCPCS | Performed by: INTERNAL MEDICINE

## 2023-04-27 PROCEDURE — 1101F PT FALLS ASSESS-DOCD LE1/YR: CPT | Performed by: INTERNAL MEDICINE

## 2023-04-27 PROCEDURE — 99214 OFFICE O/P EST MOD 30 MIN: CPT | Performed by: INTERNAL MEDICINE

## 2023-04-27 RX ORDER — LANSOPRAZOLE 30 MG/1
30 CAPSULE, DELAYED RELEASE ORAL
Qty: 60 CAPSULE | Refills: 2 | Status: SHIPPED | OUTPATIENT
Start: 2023-04-27

## 2023-04-27 RX ORDER — FAMOTIDINE 40 MG/1
40 TABLET, FILM COATED ORAL
Qty: 60 TABLET | Refills: 2 | Status: SHIPPED | OUTPATIENT
Start: 2023-04-27

## 2023-04-27 NOTE — PROGRESS NOTES
Mr. Nav Bell is a 80 y.o. male who is here for evaluation of   Chief Complaint   Patient presents with    Indigestion     Reports increased indigestion x 2-3 weeks. Worsening after food intake. Water will also give him indigestion. On protonix 20mg daily, unchanged script for approx 10 years. Denies bloody stools/ vomit. .       ASSESSMENT AND PLAN:    1. Gastroesophageal reflux disease without esophagitis  Trial of Prevacid in the morning and famotidine at bedtime for 6 weeks. If not improved, consider upper GI series or endoscopy  - famotidine (PEPCID) 40 mg tablet; Take 1 Tablet by mouth nightly. Dispense: 60 Tablet; Refill: 2  - lansoprazole (PREVACID) 30 mg capsule; Take 1 Capsule by mouth Daily (before breakfast). Dispense: 60 Capsule; Refill: 2    2. Type 2 diabetes mellitus with hyperglycemia, without long-term current use of insulin (HCC)  We will check labs next visit    3. Left arm pain  Suspect cervical radiculopathy  - REFERRAL TO PHYSICAL THERAPY    4. Difficulty walking  Would benefit from strength and balance training  - REFERRAL TO PHYSICAL THERAPY      Orders Placed This Encounter    REFERRAL TO PHYSICAL THERAPY     Referral Priority:   Routine     Referral Type:   PT/OT/ST     Referral Reason:   Specialty Services Required     Number of Visits Requested:   1    famotidine (PEPCID) 40 mg tablet     Sig: Take 1 Tablet by mouth nightly. Dispense:  60 Tablet     Refill:  2    lansoprazole (PREVACID) 30 mg capsule     Sig: Take 1 Capsule by mouth Daily (before breakfast). Dispense:  60 Capsule     Refill:  2       Follow-up and Dispositions    Return in about 6 weeks (around 6/8/2023). HPI this is an 22-year-old tobacco user who presents today with reflux which seems to be worse over the last week. He currently is on omeprazole 20 mg a day. Also complains of left upper extremity tingling and numbness and pain. Complains of lower extremity weakness.     ROS:  Denies  fever, chills, cough, chest pain, SOB,  nausea, vomiting, or diarrhea. Denies wt loss, wt gain, hemoptysis, hematochezia or melena. Physical Examination:    Visit Vitals  /80 (BP 1 Location: Left upper arm, BP Patient Position: Sitting, BP Cuff Size: Adult long)   Pulse 93   Temp 97.7 °F (36.5 °C) (Oral)   Resp 18   Ht 6' (1.829 m)   Wt 206 lb (93.4 kg)   SpO2 96%   BMI 27.94 kg/m²      General:  Alert, cooperative, no distress. Head:  Normocephalic, without obvious abnormality, atraumatic. Eyes:  Conjunctivae/corneas clear. Pupils equal, round, reactive to light. Extraocular movements intact. Lungs:   Clear to auscultation bilaterally. Chest wall:  No tenderness or deformity. Cardiac:  RRR   Abdomen:   Soft, non-tender. Bowel sounds normal. No masses. No organomegaly. Extremities: Extremities normal, atraumatic, no cyanosis or edema. Pulses: 2+ and symmetric all extremities. Skin: Skin color, texture, turgor normal. No rashes or lesions. Lymph nodes: Cervical, supraclavicular, and axillary nodes normal.   Neurologic: CNII-XII intact. Normal strength, sensation, and reflexes throughout. On this date 04/27/2023 I have spent 30 minutes reviewing previous notes, test results and face to face with the patient discussing the diagnosis and importance of compliance with the treatment plan as well as documenting on the day of the visit.     Gricel Márquez MD FACP    (signed electronically) on 4/27/2023 at 11:36 AM

## 2023-04-27 NOTE — PROGRESS NOTES
Luis Blackwell is a 80 y.o. male presenting for/with:    Chief Complaint   Patient presents with    Indigestion     Reports increased indigestion x 2-3 weeks. Worsening after food intake. Water will also give him indigestion. On protonix 20mg daily, unchanged script for approx 10 years. Denies bloody stools/ vomit. Visit Vitals  /80 (BP 1 Location: Left upper arm, BP Patient Position: Sitting, BP Cuff Size: Adult long)   Pulse 93   Temp 97.7 °F (36.5 °C) (Oral)   Resp 18   Ht 6' (1.829 m)   Wt 206 lb (93.4 kg)   SpO2 96%   BMI 27.94 kg/m²     Pain Scale: 0 - No pain/10  Pain Location:     1. \"Have you been to the ER, urgent care clinic since your last visit? Hospitalized since your last visit? \" No    2. \"Have you seen or consulted any other health care providers outside of the [address] [place] [address] since your last visit? \" No     3. For patients aged 39-70: Has the patient had a colonoscopy / FIT/ Cologuard? NA - based on age      If the patient is female:    4. For patients aged 41-77: Has the patient had a mammogram within the past 2 years? NA - based on age or sex      11. For patients aged 21-65: Has the patient had a pap smear? NA - based on age or sex      Learning Assessment 9/23/2021   PRIMARY LEARNER Patient   HIGHEST LEVEL OF EDUCATION - PRIMARY LEARNER  -   BARRIERS PRIMARY LEARNER -   CO-LEARNER CAREGIVER -   PRIMARY LANGUAGE ENGLISH   LEARNER PREFERENCE PRIMARY READING   ANSWERED BY patient   RELATIONSHIP SELF     Fall Risk Assessment, last 12 mths 4/27/2023   Able to walk? Yes   Fall in past 12 months? 0   Do you feel unsteady? 0   Are you worried about falling 0   Is TUG test greater than 12 seconds? -   Is the gait abnormal? -   Number of falls in past 12 months -   Fall with injury?  -       3 most recent PHQ Screens 4/27/2023   Little interest or pleasure in doing things Not at all   Feeling down, depressed, irritable, or hopeless Not at all   Total Score PHQ 2 0     Abuse Screening Questionnaire 4/27/2023   Do you ever feel afraid of your partner? N   Are you in a relationship with someone who physically or mentally threatens you? N   Is it safe for you to go home?  Y       ADL Assessment 4/27/2023   Feeding yourself No Help Needed   Getting from bed to chair No Help Needed   Getting dressed No Help Needed   Bathing or showering No Help Needed   Walk across the room (includes cane/walker) No Help Needed   Using the telphone No Help Needed   Taking your medications No Help Needed   Preparing meals No Help Needed   Managing money (expenses/bills) No Help Needed   Moderately strenuous housework (laundry) No Help Needed   Shopping for personal items (toiletries/medicines) No Help Needed   Shopping for groceries No Help Needed   Driving No Help Needed   Climbing a flight of stairs No Help Needed   Getting to places beyond walking distances No Help Needed      Advance Care Planning 3/16/2023   Patient's Healthcare Decision Maker is: Named in scanned ACP document   Primary Decision Maker Name -   Primary Decision Maker Phone Number -   Primary Decision Maker Relationship to Patient -   Confirm Advance Directive Yes, on file   Does the patient have other document types -

## 2023-05-16 ENCOUNTER — HOSPITAL ENCOUNTER (OUTPATIENT)
Facility: HOSPITAL | Age: 88
Setting detail: RECURRING SERIES
Discharge: HOME | End: 2023-05-19
Payer: MEDICARE

## 2023-05-16 PROCEDURE — 97161 PT EVAL LOW COMPLEX 20 MIN: CPT

## 2023-05-16 PROCEDURE — 97110 THERAPEUTIC EXERCISES: CPT

## 2023-05-16 NOTE — PROGRESS NOTES
[facility]  [address]  Office [phone]  Fax [phone]       PHYSICAL THERAPY - MEDICARE EVALUATION/PLAN OF CARE NOTE (updated 3/23)      Date: 2023          Patient Name:  Saurabh Warren :  1935   Medical   Diagnosis:  Muscle weakness (generalized) [M62.81]  Pain in left shoulder [M25.512] Treatment Diagnosis:  M62.81  GENERAL MUSCLE WEAKNESS    Referral Source:  Wen Montana MD Provider #:  [id number] NUMBER [id number]                Insurance: Payor: MEDICARE / Plan: MEDICARE PART A AND B / Product Type: *No Product type* /      Patient  verified yes     Visit #   Current  / Total 1 16   Time   In / Out 10:45 11:30   Total Treatment Time 45   Total Timed Codes 45   1:1 Treatment Time 39      MC BC Totals Reminder:  bill using total billable   min of TIMED therapeutic procedures and modalities. 8-22 min = 1 unit; 23-37 min = 2 units; 38-52 min = 3 units;  53-67 min = 4 units; 68-82 min = 5 units           SUBJECTIVE  Pain Level (0-10 scale): denies pain   []constant []intermittent []improving []worsening []no change since onset    Any medication changes, allergies to medications, adverse drug reactions, diagnosis change, or new procedure performed?: [x] No    [] Yes (see summary sheet for update)  Medications: Verified on Patient Summary List    Subjective functional status/changes:     Patient reports that over the past 6 months he has become so weak that he is not wanting to leave to home due to possibility of falling. Last fall he reports was 3 months ago when he was outside in the yard trying to do some work. He said after that he has not been out since. He did however drive himself to  today but needed supervision from staff walking to his car due to unsteadiness.    Onset Date: gradual over past 6 months  Start of Care: 2023  PLOF: performs ADL's independently but did state it is getting

## 2023-05-22 ENCOUNTER — HOSPITAL ENCOUNTER (OUTPATIENT)
Facility: HOSPITAL | Age: 88
Setting detail: RECURRING SERIES
Discharge: HOME | End: 2023-05-25
Payer: MEDICARE

## 2023-05-22 PROCEDURE — 97112 NEUROMUSCULAR REEDUCATION: CPT

## 2023-05-22 PROCEDURE — 97110 THERAPEUTIC EXERCISES: CPT

## 2023-05-22 NOTE — PROGRESS NOTES
PHYSICAL THERAPY - MEDICARE DAILY TREATMENT NOTE (updated 3/23)      Date: 2023          Patient Name:  Monta Spurling :  1935   Medical   Diagnosis:  Muscle weakness (generalized) [M62.81]  Pain in left shoulder [M25.512] Treatment Diagnosis:  M25.512  LEFT SHOULDER PAIN  and M62.81  GENERAL MUSCLE WEAKNESS    Referral Source:  Roxane Huston MD Insurance:   Payor: MEDICARE / Plan: MEDICARE PART A AND B / Product Type: *No Product type* /                     Patient  verified yes     Visit #   Current  / Total 2 16   Time   In / Out 1000 1045   Total Treatment Time 45   Total Timed Codes 45   1:1 Treatment Time 39      MC BC Totals Reminder:  bill using total billable   min of TIMED therapeutic procedures and modalities. 8-22 min = 1 unit; 23-37 min = 2 units; 38-52 min = 3 units; 53-67 min = 4 units; 68-82 min = 5 units            SUBJECTIVE    Pain Level (0-10 scale): 0/10    Any medication changes, allergies to medications, adverse drug reactions, diagnosis change, or new procedure performed?: [x] No    [] Yes (see summary sheet for update)  Medications: Verified on Patient Summary List    Subjective functional status/changes:     My pain switches L side of my back to R side of my back. OBJECTIVE      Therapeutic Procedures: Tx Min Billable or 1:1 Min (if diff from Tx Min) Procedure, Rationale, Specifics   30 30 54897 Therapeutic Exercise (timed):  increase ROM, strength, coordination, balance, and proprioception to improve patient's ability to progress to PLOF and address remaining functional goals.  (see flow sheet as applicable)     Details if applicable:     15 15 77221 Neuromuscular Re-Education (timed):  improve balance, coordination, kinesthetic sense, posture, core stability and proprioception to improve patient's ability to develop conscious control of individual muscles and awareness of position of extremities in order to progress to PLOF and address remaining functional

## 2023-05-25 ENCOUNTER — HOSPITAL ENCOUNTER (OUTPATIENT)
Facility: HOSPITAL | Age: 88
Setting detail: RECURRING SERIES
Discharge: HOME | End: 2023-05-28
Payer: MEDICARE

## 2023-05-25 PROCEDURE — 97110 THERAPEUTIC EXERCISES: CPT

## 2023-05-31 ENCOUNTER — HOSPITAL ENCOUNTER (OUTPATIENT)
Facility: HOSPITAL | Age: 88
Setting detail: RECURRING SERIES
Discharge: HOME | End: 2023-06-03
Payer: MEDICARE

## 2023-05-31 PROCEDURE — 97110 THERAPEUTIC EXERCISES: CPT

## 2023-05-31 NOTE — PROGRESS NOTES
PHYSICAL THERAPY - MEDICARE DAILY TREATMENT NOTE (updated 3/23)      Date: 2023          Patient Name:  Kvng Wen :  1935   Medical   Diagnosis:  Muscle weakness (generalized) [M62.81]  Pain in left shoulder [M25.512] Treatment Diagnosis:  M25.512  LEFT SHOULDER PAIN  and M62.81  GENERAL MUSCLE WEAKNESS    Referral Source:  Chris Luna MD Insurance:   Payor: MEDICARE / Plan: MEDICARE PART A AND B / Product Type: *No Product type* /                     Patient  verified yes     Visit #   Current  / Total 4 16   Time   In / Out 1000 1045   Total Treatment Time 45   Total Timed Codes 45   1:1 Treatment Time 39      MC BC Totals Reminder:  bill using total billable   min of TIMED therapeutic procedures and modalities. 8-22 min = 1 unit; 23-37 min = 2 units; 38-52 min = 3 units; 53-67 min = 4 units; 68-82 min = 5 units            SUBJECTIVE    Pain Level (0-10 scale): 0/10    Any medication changes, allergies to medications, adverse drug reactions, diagnosis change, or new procedure performed?: [x] No    [] Yes (see summary sheet for update)  Medications: Verified on Patient Summary List    Subjective functional status/changes:     I was sore after you! OBJECTIVE      Therapeutic Procedures: Tx Min Billable or 1:1 Min (if diff from Tx Min) Procedure, Rationale, Specifics   45 45 21160 Therapeutic Exercise (timed):  increase ROM, strength, coordination, balance, and proprioception to improve patient's ability to progress to PLOF and address remaining functional goals. (see flow sheet as applicable)     Details if applicable:     45 45    Total Total       [x]  Patient Education billed concurrently with other procedures   [x] Review HEP    [] Progressed/Changed HEP, detail:    [] Other detail:         Pain Level at end of session (0-10 scale): 0/10      Assessment   Patient toelrated treatment well. Patient was able to increase activity and return demonstration when asked to. . patient

## 2023-06-01 ENCOUNTER — APPOINTMENT (OUTPATIENT)
Facility: HOSPITAL | Age: 88
End: 2023-06-01
Payer: MEDICARE

## 2023-06-01 ENCOUNTER — HOSPITAL ENCOUNTER (OUTPATIENT)
Facility: HOSPITAL | Age: 88
Setting detail: OBSERVATION
Discharge: HOME | End: 2023-06-03
Attending: EMERGENCY MEDICINE | Admitting: INTERNAL MEDICINE
Payer: MEDICARE

## 2023-06-01 DIAGNOSIS — N10 ACUTE PYELONEPHRITIS: Primary | ICD-10-CM

## 2023-06-01 DIAGNOSIS — R53.1 GENERAL WEAKNESS: ICD-10-CM

## 2023-06-01 PROBLEM — N39.0 URINARY TRACT INFECTION: Status: ACTIVE | Noted: 2023-06-01

## 2023-06-01 LAB
ALBUMIN SERPL-MCNC: 3.5 G/DL (ref 3.5–5)
ALBUMIN/GLOB SERPL: 1.1 (ref 1.1–2.2)
ALP SERPL-CCNC: 107 U/L (ref 45–117)
ALT SERPL-CCNC: 21 U/L (ref 12–78)
ANION GAP SERPL CALC-SCNC: 11 MMOL/L (ref 5–15)
APPEARANCE UR: CLEAR
AST SERPL-CCNC: 17 U/L (ref 15–37)
BACTERIA URNS QL MICRO: NEGATIVE /HPF
BASOPHILS # BLD: 0.1 K/UL (ref 0–0.1)
BASOPHILS NFR BLD: 0 % (ref 0–1)
BILIRUB SERPL-MCNC: 1.1 MG/DL (ref 0.2–1)
BILIRUB UR QL: NEGATIVE
BUN SERPL-MCNC: 20 MG/DL (ref 6–20)
BUN/CREAT SERPL: 14 (ref 12–20)
CALCIUM SERPL-MCNC: 8.8 MG/DL (ref 8.5–10.1)
CHLORIDE SERPL-SCNC: 102 MMOL/L (ref 97–108)
CO2 SERPL-SCNC: 27 MMOL/L (ref 21–32)
COLOR UR: ABNORMAL
CREAT SERPL-MCNC: 1.41 MG/DL (ref 0.7–1.3)
DIFFERENTIAL METHOD BLD: ABNORMAL
EKG ATRIAL RATE: 104 BPM
EKG DIAGNOSIS: NORMAL
EKG P AXIS: 59 DEGREES
EKG P-R INTERVAL: 162 MS
EKG Q-T INTERVAL: 342 MS
EKG QRS DURATION: 80 MS
EKG QTC CALCULATION (BAZETT): 449 MS
EKG R AXIS: -20 DEGREES
EKG T AXIS: 57 DEGREES
EKG VENTRICULAR RATE: 104 BPM
EOSINOPHIL # BLD: 0.1 K/UL (ref 0–0.4)
EOSINOPHIL NFR BLD: 1 % (ref 0–7)
EPITH CASTS URNS QL MICRO: ABNORMAL /LPF
ERYTHROCYTE [DISTWIDTH] IN BLOOD BY AUTOMATED COUNT: 13.8 % (ref 11.5–14.5)
FLUAV RNA SPEC QL NAA+PROBE: NOT DETECTED
FLUBV RNA SPEC QL NAA+PROBE: NOT DETECTED
GLOBULIN SER CALC-MCNC: 3.2 G/DL (ref 2–4)
GLUCOSE SERPL-MCNC: 139 MG/DL (ref 65–100)
GLUCOSE UR STRIP.AUTO-MCNC: NEGATIVE MG/DL
HCT VFR BLD AUTO: 42.7 % (ref 36.6–50.3)
HGB BLD-MCNC: 14.1 G/DL (ref 12.1–17)
HGB UR QL STRIP: ABNORMAL
IMM GRANULOCYTES # BLD AUTO: 0.1 K/UL (ref 0–0.04)
IMM GRANULOCYTES NFR BLD AUTO: 1 % (ref 0–0.5)
KETONES UR QL STRIP.AUTO: NEGATIVE MG/DL
LACTATE SERPL-SCNC: 0.8 MMOL/L (ref 0.4–2)
LACTATE SERPL-SCNC: 1.6 MMOL/L (ref 0.4–2)
LEUKOCYTE ESTERASE UR QL STRIP.AUTO: ABNORMAL
LYMPHOCYTES # BLD: 0.9 K/UL (ref 0.8–3.5)
LYMPHOCYTES NFR BLD: 7 % (ref 12–49)
MCH RBC QN AUTO: 30.2 PG (ref 26–34)
MCHC RBC AUTO-ENTMCNC: 33 G/DL (ref 30–36.5)
MCV RBC AUTO: 91.4 FL (ref 80–99)
MONOCYTES # BLD: 1 K/UL (ref 0–1)
MONOCYTES NFR BLD: 8 % (ref 5–13)
NEUTS SEG # BLD: 10.2 K/UL (ref 1.8–8)
NEUTS SEG NFR BLD: 83 % (ref 32–75)
NITRITE UR QL STRIP.AUTO: NEGATIVE
NRBC # BLD: 0 K/UL (ref 0–0.01)
NRBC BLD-RTO: 0 PER 100 WBC
NT PRO BNP: 249 PG/ML (ref 0–450)
PH UR STRIP: 6.5 (ref 5–8)
PLATELET # BLD AUTO: 153 K/UL (ref 150–400)
PMV BLD AUTO: 11.3 FL (ref 8.9–12.9)
POTASSIUM SERPL-SCNC: 4.1 MMOL/L (ref 3.5–5.1)
PROT SERPL-MCNC: 6.7 G/DL (ref 6.4–8.2)
PROT UR STRIP-MCNC: NEGATIVE MG/DL
RBC # BLD AUTO: 4.67 M/UL (ref 4.1–5.7)
RBC #/AREA URNS HPF: ABNORMAL /HPF (ref 0–5)
SARS-COV-2 RNA RESP QL NAA+PROBE: NOT DETECTED
SODIUM SERPL-SCNC: 140 MMOL/L (ref 136–145)
SP GR UR REFRACTOMETRY: 1.01 (ref 1–1.03)
TROPONIN I SERPL HS-MCNC: 6 NG/L (ref 0–76)
URINE CULTURE IF INDICATED: ABNORMAL
UROBILINOGEN UR QL STRIP.AUTO: 0.2 EU/DL (ref 0.2–1)
WBC # BLD AUTO: 12.2 K/UL (ref 4.1–11.1)
WBC URNS QL MICRO: >100 /HPF (ref 0–4)

## 2023-06-01 PROCEDURE — 96361 HYDRATE IV INFUSION ADD-ON: CPT

## 2023-06-01 PROCEDURE — G0378 HOSPITAL OBSERVATION PER HR: HCPCS

## 2023-06-01 PROCEDURE — 85025 COMPLETE CBC W/AUTO DIFF WBC: CPT

## 2023-06-01 PROCEDURE — 83880 ASSAY OF NATRIURETIC PEPTIDE: CPT

## 2023-06-01 PROCEDURE — 87086 URINE CULTURE/COLONY COUNT: CPT

## 2023-06-01 PROCEDURE — 2580000003 HC RX 258: Performed by: EMERGENCY MEDICINE

## 2023-06-01 PROCEDURE — 96365 THER/PROPH/DIAG IV INF INIT: CPT

## 2023-06-01 PROCEDURE — 36415 COLL VENOUS BLD VENIPUNCTURE: CPT

## 2023-06-01 PROCEDURE — 99285 EMERGENCY DEPT VISIT HI MDM: CPT

## 2023-06-01 PROCEDURE — 6370000000 HC RX 637 (ALT 250 FOR IP): Performed by: EMERGENCY MEDICINE

## 2023-06-01 PROCEDURE — 80053 COMPREHEN METABOLIC PANEL: CPT

## 2023-06-01 PROCEDURE — 71045 X-RAY EXAM CHEST 1 VIEW: CPT

## 2023-06-01 PROCEDURE — 6360000002 HC RX W HCPCS: Performed by: EMERGENCY MEDICINE

## 2023-06-01 PROCEDURE — 81001 URINALYSIS AUTO W/SCOPE: CPT

## 2023-06-01 PROCEDURE — 2580000003 HC RX 258: Performed by: INTERNAL MEDICINE

## 2023-06-01 PROCEDURE — 87636 SARSCOV2 & INF A&B AMP PRB: CPT

## 2023-06-01 PROCEDURE — 83605 ASSAY OF LACTIC ACID: CPT

## 2023-06-01 PROCEDURE — 96366 THER/PROPH/DIAG IV INF ADDON: CPT

## 2023-06-01 PROCEDURE — 93005 ELECTROCARDIOGRAM TRACING: CPT | Performed by: EMERGENCY MEDICINE

## 2023-06-01 PROCEDURE — 96367 TX/PROPH/DG ADDL SEQ IV INF: CPT

## 2023-06-01 PROCEDURE — 84145 PROCALCITONIN (PCT): CPT

## 2023-06-01 PROCEDURE — 87040 BLOOD CULTURE FOR BACTERIA: CPT

## 2023-06-01 PROCEDURE — 84484 ASSAY OF TROPONIN QUANT: CPT

## 2023-06-01 RX ORDER — SODIUM CHLORIDE, SODIUM LACTATE, POTASSIUM CHLORIDE, AND CALCIUM CHLORIDE .6; .31; .03; .02 G/100ML; G/100ML; G/100ML; G/100ML
30 INJECTION, SOLUTION INTRAVENOUS ONCE
Status: COMPLETED | OUTPATIENT
Start: 2023-06-01 | End: 2023-06-01

## 2023-06-01 RX ORDER — SODIUM CHLORIDE 9 MG/ML
INJECTION, SOLUTION INTRAVENOUS PRN
Status: DISCONTINUED | OUTPATIENT
Start: 2023-06-01 | End: 2023-06-03 | Stop reason: HOSPADM

## 2023-06-01 RX ORDER — ACETAMINOPHEN 325 MG/1
650 TABLET ORAL EVERY 6 HOURS PRN
Status: DISCONTINUED | OUTPATIENT
Start: 2023-06-01 | End: 2023-06-03 | Stop reason: HOSPADM

## 2023-06-01 RX ORDER — ACETAMINOPHEN 650 MG/1
650 SUPPOSITORY RECTAL EVERY 6 HOURS PRN
Status: DISCONTINUED | OUTPATIENT
Start: 2023-06-01 | End: 2023-06-03 | Stop reason: HOSPADM

## 2023-06-01 RX ORDER — SODIUM CHLORIDE 0.9 % (FLUSH) 0.9 %
5-40 SYRINGE (ML) INJECTION PRN
Status: DISCONTINUED | OUTPATIENT
Start: 2023-06-01 | End: 2023-06-03 | Stop reason: HOSPADM

## 2023-06-01 RX ORDER — PANTOPRAZOLE SODIUM 40 MG/1
40 TABLET, DELAYED RELEASE ORAL DAILY
Status: DISCONTINUED | OUTPATIENT
Start: 2023-06-02 | End: 2023-06-03 | Stop reason: HOSPADM

## 2023-06-01 RX ORDER — SODIUM CHLORIDE 0.9 % (FLUSH) 0.9 %
5-40 SYRINGE (ML) INJECTION EVERY 12 HOURS SCHEDULED
Status: DISCONTINUED | OUTPATIENT
Start: 2023-06-01 | End: 2023-06-03 | Stop reason: HOSPADM

## 2023-06-01 RX ORDER — ALBUTEROL SULFATE 2.5 MG/3ML
2.5 SOLUTION RESPIRATORY (INHALATION) EVERY 4 HOURS PRN
Status: DISCONTINUED | OUTPATIENT
Start: 2023-06-01 | End: 2023-06-03 | Stop reason: HOSPADM

## 2023-06-01 RX ORDER — BUDESONIDE 0.5 MG/2ML
0.5 INHALANT ORAL 2 TIMES DAILY
Status: DISCONTINUED | OUTPATIENT
Start: 2023-06-01 | End: 2023-06-01

## 2023-06-01 RX ORDER — ARFORMOTEROL TARTRATE 15 UG/2ML
15 SOLUTION RESPIRATORY (INHALATION) 2 TIMES DAILY
Status: DISCONTINUED | OUTPATIENT
Start: 2023-06-02 | End: 2023-06-03 | Stop reason: HOSPADM

## 2023-06-01 RX ORDER — TAMSULOSIN HYDROCHLORIDE 0.4 MG/1
0.4 CAPSULE ORAL DAILY
Status: DISCONTINUED | OUTPATIENT
Start: 2023-06-02 | End: 2023-06-03 | Stop reason: HOSPADM

## 2023-06-01 RX ORDER — ALBUTEROL SULFATE 90 UG/1
2 AEROSOL, METERED RESPIRATORY (INHALATION) EVERY 4 HOURS PRN
Status: DISCONTINUED | OUTPATIENT
Start: 2023-06-01 | End: 2023-06-01

## 2023-06-01 RX ORDER — SODIUM CHLORIDE 9 MG/ML
INJECTION, SOLUTION INTRAVENOUS CONTINUOUS
Status: DISPENSED | OUTPATIENT
Start: 2023-06-01 | End: 2023-06-03

## 2023-06-01 RX ORDER — ARFORMOTEROL TARTRATE 15 UG/2ML
15 SOLUTION RESPIRATORY (INHALATION) 2 TIMES DAILY
Status: DISCONTINUED | OUTPATIENT
Start: 2023-06-01 | End: 2023-06-01

## 2023-06-01 RX ORDER — ACETAMINOPHEN 500 MG
1000 TABLET ORAL
Status: COMPLETED | OUTPATIENT
Start: 2023-06-01 | End: 2023-06-01

## 2023-06-01 RX ORDER — ONDANSETRON 4 MG/1
4 TABLET, ORALLY DISINTEGRATING ORAL EVERY 8 HOURS PRN
Status: DISCONTINUED | OUTPATIENT
Start: 2023-06-01 | End: 2023-06-03 | Stop reason: HOSPADM

## 2023-06-01 RX ORDER — FLUTICASONE PROPIONATE AND SALMETEROL 250; 50 UG/1; UG/1
1 POWDER RESPIRATORY (INHALATION) 2 TIMES DAILY
Status: DISCONTINUED | OUTPATIENT
Start: 2023-06-01 | End: 2023-06-01

## 2023-06-01 RX ORDER — LANOLIN ALCOHOL/MO/W.PET/CERES
100 CREAM (GRAM) TOPICAL DAILY
Status: DISCONTINUED | OUTPATIENT
Start: 2023-06-02 | End: 2023-06-03 | Stop reason: HOSPADM

## 2023-06-01 RX ORDER — ONDANSETRON 2 MG/ML
4 INJECTION INTRAMUSCULAR; INTRAVENOUS EVERY 6 HOURS PRN
Status: DISCONTINUED | OUTPATIENT
Start: 2023-06-01 | End: 2023-06-03 | Stop reason: HOSPADM

## 2023-06-01 RX ORDER — TROSPIUM CHLORIDE 20 MG/1
20 TABLET, FILM COATED ORAL
Status: DISCONTINUED | OUTPATIENT
Start: 2023-06-02 | End: 2023-06-03 | Stop reason: HOSPADM

## 2023-06-01 RX ORDER — TAMSULOSIN HYDROCHLORIDE 0.4 MG/1
0.4 CAPSULE ORAL DAILY
COMMUNITY

## 2023-06-01 RX ORDER — POLYETHYLENE GLYCOL 3350 17 G/17G
17 POWDER, FOR SOLUTION ORAL DAILY PRN
Status: DISCONTINUED | OUTPATIENT
Start: 2023-06-01 | End: 2023-06-03 | Stop reason: HOSPADM

## 2023-06-01 RX ORDER — BUDESONIDE 0.5 MG/2ML
0.5 INHALANT ORAL 2 TIMES DAILY
Status: DISCONTINUED | OUTPATIENT
Start: 2023-06-02 | End: 2023-06-03 | Stop reason: HOSPADM

## 2023-06-01 RX ORDER — ENOXAPARIN SODIUM 100 MG/ML
40 INJECTION SUBCUTANEOUS DAILY
Status: DISCONTINUED | OUTPATIENT
Start: 2023-06-02 | End: 2023-06-03 | Stop reason: HOSPADM

## 2023-06-01 RX ORDER — CLOPIDOGREL BISULFATE 75 MG/1
75 TABLET ORAL DAILY
Status: DISCONTINUED | OUTPATIENT
Start: 2023-06-02 | End: 2023-06-03 | Stop reason: HOSPADM

## 2023-06-01 RX ORDER — ATORVASTATIN CALCIUM 40 MG/1
40 TABLET, FILM COATED ORAL DAILY
Status: DISCONTINUED | OUTPATIENT
Start: 2023-06-02 | End: 2023-06-03 | Stop reason: HOSPADM

## 2023-06-01 RX ADMIN — SODIUM CHLORIDE: 9 INJECTION, SOLUTION INTRAVENOUS at 22:30

## 2023-06-01 RX ADMIN — ACETAMINOPHEN 1000 MG: 500 TABLET ORAL at 17:16

## 2023-06-01 RX ADMIN — PIPERACILLIN AND TAZOBACTAM 4500 MG: 4; .5 INJECTION, POWDER, FOR SOLUTION INTRAVENOUS at 16:46

## 2023-06-01 RX ADMIN — VANCOMYCIN HYDROCHLORIDE 2000 MG: 1 INJECTION, POWDER, LYOPHILIZED, FOR SOLUTION INTRAVENOUS at 17:22

## 2023-06-01 RX ADMIN — SODIUM CHLORIDE, POTASSIUM CHLORIDE, SODIUM LACTATE AND CALCIUM CHLORIDE 2802 ML: 600; 310; 30; 20 INJECTION, SOLUTION INTRAVENOUS at 16:45

## 2023-06-01 ASSESSMENT — LIFESTYLE VARIABLES
HOW OFTEN DO YOU HAVE A DRINK CONTAINING ALCOHOL: NEVER
HOW MANY STANDARD DRINKS CONTAINING ALCOHOL DO YOU HAVE ON A TYPICAL DAY: PATIENT DOES NOT DRINK

## 2023-06-01 ASSESSMENT — PAIN SCALES - GENERAL: PAINLEVEL_OUTOF10: 0

## 2023-06-01 ASSESSMENT — PAIN - FUNCTIONAL ASSESSMENT: PAIN_FUNCTIONAL_ASSESSMENT: 0-10

## 2023-06-01 NOTE — ED TRIAGE NOTES
Pt arrived by EMS for weakness. Per EMS pt complains of cough and generalized weakness for a week, pt noted with bilateral leg swelling which pt reports is not new (pt has seen his PMD regarding the swelling but no medications prescribed). Pt is currently in physical therapy for decreased mobility due to arthritis.   Pt is awake alert and oriented X 4, pt educated on ER flow

## 2023-06-01 NOTE — ED NOTES
THE DOCUMENTATION COMPLETE? Yes  Is there a current order? No  When does it ?         Vital Signs:  MEWS Score: 2/ Level of Consciousness: Alert (0)    Vitals:    23 1810 23 1820 23 1830 23 1840   BP: 108/62  104/62    Pulse: 88 87 80 82   Resp: 16 17 18 28   Temp:       TempSrc:       SpO2: 96% 94% 96% 96%   Weight:       Height:              Pain 1: 0  Pain Scale 1: 0    REVIEW:    IP UNIT CALLED NOTE IS READY: Yes  IF THERE ARE QUESTIONS, CALL ed AT PHONE # [phone]                    José Miguel Stapleton RN  [phone]

## 2023-06-02 PROBLEM — R09.02 COPD WITH HYPOXIA (HCC): Status: ACTIVE | Noted: 2019-02-10

## 2023-06-02 PROBLEM — R53.1 WEAKNESS GENERALIZED: Status: ACTIVE | Noted: 2019-02-10

## 2023-06-02 PROBLEM — J44.9 COPD WITH HYPOXIA (HCC): Status: ACTIVE | Noted: 2019-02-10

## 2023-06-02 PROBLEM — E78.5 DYSLIPIDEMIA: Status: ACTIVE | Noted: 2017-07-13

## 2023-06-02 PROBLEM — Z86.73 HISTORY OF CVA (CEREBROVASCULAR ACCIDENT): Status: ACTIVE | Noted: 2019-02-10

## 2023-06-02 PROBLEM — I10 ESSENTIAL HYPERTENSION: Status: ACTIVE | Noted: 2017-04-16

## 2023-06-02 PROBLEM — R50.9 ACUTE FEBRILE ILLNESS: Status: ACTIVE | Noted: 2019-02-10

## 2023-06-02 LAB
ANION GAP SERPL CALC-SCNC: 6 MMOL/L (ref 5–15)
BACTERIA SPEC CULT: NORMAL
BASOPHILS # BLD: 0.1 K/UL (ref 0–0.1)
BASOPHILS NFR BLD: 1 % (ref 0–1)
BUN SERPL-MCNC: 17 MG/DL (ref 6–20)
BUN/CREAT SERPL: 13 (ref 12–20)
CALCIUM SERPL-MCNC: 8.7 MG/DL (ref 8.5–10.1)
CHLORIDE SERPL-SCNC: 108 MMOL/L (ref 97–108)
CO2 SERPL-SCNC: 30 MMOL/L (ref 21–32)
CREAT SERPL-MCNC: 1.28 MG/DL (ref 0.7–1.3)
DIFFERENTIAL METHOD BLD: ABNORMAL
EOSINOPHIL # BLD: 0.1 K/UL (ref 0–0.4)
EOSINOPHIL NFR BLD: 2 % (ref 0–7)
ERYTHROCYTE [DISTWIDTH] IN BLOOD BY AUTOMATED COUNT: 14 % (ref 11.5–14.5)
GLUCOSE SERPL-MCNC: 96 MG/DL (ref 65–100)
HCT VFR BLD AUTO: 42.3 % (ref 36.6–50.3)
HGB BLD-MCNC: 13.7 G/DL (ref 12.1–17)
IMM GRANULOCYTES # BLD AUTO: 0 K/UL (ref 0–0.04)
IMM GRANULOCYTES NFR BLD AUTO: 1 % (ref 0–0.5)
LYMPHOCYTES # BLD: 1.2 K/UL (ref 0.8–3.5)
LYMPHOCYTES NFR BLD: 13 % (ref 12–49)
MCH RBC QN AUTO: 30.1 PG (ref 26–34)
MCHC RBC AUTO-ENTMCNC: 32.4 G/DL (ref 30–36.5)
MCV RBC AUTO: 93 FL (ref 80–99)
MONOCYTES # BLD: 0.7 K/UL (ref 0–1)
MONOCYTES NFR BLD: 8 % (ref 5–13)
NEUTS SEG # BLD: 6.7 K/UL (ref 1.8–8)
NEUTS SEG NFR BLD: 75 % (ref 32–75)
NRBC # BLD: 0 K/UL (ref 0–0.01)
NRBC BLD-RTO: 0 PER 100 WBC
PLATELET # BLD AUTO: 137 K/UL (ref 150–400)
PMV BLD AUTO: 12.1 FL (ref 8.9–12.9)
POTASSIUM SERPL-SCNC: 4 MMOL/L (ref 3.5–5.1)
PROCALCITONIN SERPL-MCNC: 0.11 NG/ML
RBC # BLD AUTO: 4.55 M/UL (ref 4.1–5.7)
SERVICE CMNT-IMP: NORMAL
SODIUM SERPL-SCNC: 144 MMOL/L (ref 136–145)
WBC # BLD AUTO: 8.8 K/UL (ref 4.1–11.1)

## 2023-06-02 PROCEDURE — 6360000002 HC RX W HCPCS: Performed by: INTERNAL MEDICINE

## 2023-06-02 PROCEDURE — 96372 THER/PROPH/DIAG INJ SC/IM: CPT

## 2023-06-02 PROCEDURE — 97535 SELF CARE MNGMENT TRAINING: CPT

## 2023-06-02 PROCEDURE — G0378 HOSPITAL OBSERVATION PER HR: HCPCS

## 2023-06-02 PROCEDURE — 2580000003 HC RX 258: Performed by: INTERNAL MEDICINE

## 2023-06-02 PROCEDURE — 94640 AIRWAY INHALATION TREATMENT: CPT

## 2023-06-02 PROCEDURE — 85025 COMPLETE CBC W/AUTO DIFF WBC: CPT

## 2023-06-02 PROCEDURE — 6370000000 HC RX 637 (ALT 250 FOR IP): Performed by: INTERNAL MEDICINE

## 2023-06-02 PROCEDURE — 97530 THERAPEUTIC ACTIVITIES: CPT

## 2023-06-02 PROCEDURE — 36415 COLL VENOUS BLD VENIPUNCTURE: CPT

## 2023-06-02 PROCEDURE — 96367 TX/PROPH/DG ADDL SEQ IV INF: CPT

## 2023-06-02 PROCEDURE — 80048 BASIC METABOLIC PNL TOTAL CA: CPT

## 2023-06-02 PROCEDURE — 97166 OT EVAL MOD COMPLEX 45 MIN: CPT

## 2023-06-02 RX ORDER — LANOLIN ALCOHOL/MO/W.PET/CERES
1000 CREAM (GRAM) TOPICAL DAILY
Status: DISCONTINUED | OUTPATIENT
Start: 2023-06-02 | End: 2023-06-03 | Stop reason: HOSPADM

## 2023-06-02 RX ADMIN — CYANOCOBALAMIN TAB 1000 MCG 1000 MCG: 1000 TAB at 11:29

## 2023-06-02 RX ADMIN — TAMSULOSIN HYDROCHLORIDE 0.4 MG: 0.4 CAPSULE ORAL at 09:53

## 2023-06-02 RX ADMIN — ARFORMOTEROL TARTRATE 15 MCG: 15 SOLUTION RESPIRATORY (INHALATION) at 08:00

## 2023-06-02 RX ADMIN — CEFTRIAXONE SODIUM 1000 MG: 1 INJECTION, POWDER, FOR SOLUTION INTRAMUSCULAR; INTRAVENOUS at 06:36

## 2023-06-02 RX ADMIN — BUDESONIDE 500 MCG: 0.5 SUSPENSION RESPIRATORY (INHALATION) at 20:06

## 2023-06-02 RX ADMIN — TROSPIUM CHLORIDE 20 MG: 20 TABLET, FILM COATED ORAL at 17:02

## 2023-06-02 RX ADMIN — ENOXAPARIN SODIUM 40 MG: 100 INJECTION SUBCUTANEOUS at 10:01

## 2023-06-02 RX ADMIN — SODIUM CHLORIDE: 9 INJECTION, SOLUTION INTRAVENOUS at 13:33

## 2023-06-02 RX ADMIN — CLOPIDOGREL BISULFATE 75 MG: 75 TABLET ORAL at 09:53

## 2023-06-02 RX ADMIN — PYRIDOXINE HCL TAB 50 MG 100 MG: 50 TAB at 09:53

## 2023-06-02 RX ADMIN — PANTOPRAZOLE SODIUM 40 MG: 40 TABLET, DELAYED RELEASE ORAL at 06:36

## 2023-06-02 RX ADMIN — ARFORMOTEROL TARTRATE 15 MCG: 15 SOLUTION RESPIRATORY (INHALATION) at 20:06

## 2023-06-02 RX ADMIN — SODIUM CHLORIDE, PRESERVATIVE FREE 10 ML: 5 INJECTION INTRAVENOUS at 21:21

## 2023-06-02 RX ADMIN — ATORVASTATIN CALCIUM 40 MG: 40 TABLET, FILM COATED ORAL at 09:53

## 2023-06-02 RX ADMIN — BUDESONIDE 500 MCG: 0.5 SUSPENSION RESPIRATORY (INHALATION) at 08:00

## 2023-06-02 RX ADMIN — TROSPIUM CHLORIDE 20 MG: 20 TABLET, FILM COATED ORAL at 06:36

## 2023-06-02 NOTE — PROGRESS NOTES
[facility]  Hospitalist Progress Note    NAME: Rakesh Silver   :  1935   MRN:  [id number]     Total duration of encounter: 1 day      Interim Hospital Summary: 80 y.o. male who presented on 2023 with Urinary tract infection. 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 Alfred Swati Pt presented to ED with generalized weakness and increased urinary frequency x 3 days. Lives in [place] with wife in [place]. Elevated temp and finding c/w UTI in ED. Similar presentations with infections in 2019 and . Has recently been referred to outpt PT for strengthening. Subjective:     Chief Complaint / Reason for Physician Visit  \"Feeling better\".   Discussed with RN   Eating  Getting up to BP  Pt does not feel as weak as he did on admission    Review of Systems:  Symptom Y/N Comments  Symptom Y/N Comments   Fever/Chills n   Chest Pain n    Poor Appetite n   Edema n    Cough n   Abdominal Pain n    Sputum n   Joint Pain n    SOB/OKEEFE n   Pruritis/Rash n    Nausea/vomit n   Tolerating PT/OT y    Diarrhea n   Tolerating Diet y    Constipation n   Other         Current Facility-Administered Medications:     vitamin B-12 (CYANOCOBALAMIN) tablet 1,000 mcg, 1,000 mcg, Oral, Daily, Katelyn Douglas MD, 1,000 mcg at 23 1129    atorvastatin (LIPITOR) tablet 40 mg, 40 mg, Oral, Daily, Melly Mcneil MD, 40 mg at 23 0953    clopidogrel (PLAVIX) tablet 75 mg, 75 mg, Oral, Daily, Melly Mcneil MD, 75 mg at 23 0953    pantoprazole (PROTONIX) tablet 40 mg, 40 mg, Oral, Daily, Elver Raygoza MD, 40 mg at 23

## 2023-06-02 NOTE — H&P
incisional hernia repair with mesh. Robot assisted    HERNIA REPAIR  2013    abd hernia     HERNIA REPAIR  1996    inguinal R&L    PROSTATECTOMY      SMALL INTESTINE SURGERY  1996    UROLOGICAL SURGERY      prostate  removed    UROLOGICAL SURGERY      adhesion repair       Medications Prior to Admission:    Prior to Admission medications    Medication Sig Start Date End Date Taking? Authorizing Provider   tamsulosin (FLOMAX) 0.4 MG capsule Take 1 capsule by mouth daily   Yes Historical Provider, MD   albuterol sulfate HFA (PROVENTIL;VENTOLIN;PROAIR) 108 (90 Base) MCG/ACT inhaler Inhale 2 puffs into the lungs every 4 hours as needed 2/20/19   Ar Automatic Reconciliation   atorvastatin (LIPITOR) 40 MG tablet TAKE 1 TABLET BY MOUTH EVERY DAY 2/15/23   Ar Automatic Reconciliation   clopidogrel (PLAVIX) 75 MG tablet TAKE 1 TABLET BY MOUTH EVERY DAY 2/15/23   Ar Automatic Reconciliation   cyanocobalamin 100 MCG tablet Take 1 tablet by mouth daily    Ar Automatic Reconciliation   fluticasone-salmeterol (ADVAIR) 250-50 MCG/ACT AEPB diskus inhaler INHALE 1 PUFF BY MOUTH TWICE A DAY 1/15/23   Ar Automatic Reconciliation   pantoprazole (PROTONIX) 20 MG tablet TAKE 1 TABLET BY MOUTH EVERY DAY 9/29/22   Ar Automatic Reconciliation   pyridoxine (B-6) 100 MG tablet Take 1 tablet by mouth daily    Ar Automatic Reconciliation   trospium (SANCTURA) 20 MG tablet Take 1 tablet by mouth 2 times daily (before meals) 3/16/23   Ar Automatic Reconciliation       Allergies:  Patient has no known allergies. Social History:   TOBACCO:   reports that he has been smoking. He has never used smokeless tobacco.  ETOH:   reports current alcohol use.       Family History:       Problem Relation Age of Onset    Heart Disease Brother     Heart Disease Father     Heart Disease Mother        Review of systems  General: Generalized weakness eyes: denies change in vision,   Ear, nose, throat: denies change in voice, sinus problems, or  difficulty

## 2023-06-02 NOTE — PROGRESS NOTES
Called pharmacy notified that vitamin b12 not in central pharmacy, pharmacist states they are reviewing strengths reconsidering change.

## 2023-06-02 NOTE — CARE COORDINATION
Care Management Initial Assessment       RUR:N/A PT IN OBS   Readmission? No  1st IM letter given? No  1st  letter given: No     06/02/23 2641   Service Assessment   Patient Orientation Alert and Oriented   Cognition Alert   History Provided By Patient   Primary Caregiver Self   Support Systems Spouse/Significant Other   Patient's Healthcare Decision Maker is: Named in [address] [place] [address]   PCP Verified by CM Yes  Yvan Jenkins MD)   Last Visit to PCP Within last 3 months   Prior Functional Level Independent in ADLs/IADLs   Current Functional Level Independent in ADLs/IADLs   Can patient return to prior living arrangement Yes   Ability to make needs known: Good   Family able to assist with home care needs: Yes   Would you like for me to discuss the discharge plan with any other family members/significant others, and if so, who? Yes  (Wife Willie)   Financial Resources Medicare   Social/Functional History   Lives With Spouse   Type of Home Condo   Discharge Planning   Type of Residence House   Current Services Prior To Admission None   Patient expects to be discharged to: House         Patient lives at home with his wife Willie. He is independent with ADLs/IADLs. Goes to [facility]. Plans to continue doing so after discharge. He states he is going to be discharged tomorrow. Does NOT think he needs home health. He uses a cane and walker. Patient is currently in observation status. Medicare Outpatient Observation Notice provided to Alvarez Mckeon Oral explanation was provided and all questions answered. Signed document placed on the bedside chart to be scanned under the media tab. Copy provided to Alvarez Mckeon. Told Mr. Alvarez to contact  if he has any questions or concerns during his stay.

## 2023-06-02 NOTE — PROGRESS NOTES
Pt admitted from ED to room 124 via stretcher. VSS. Oriented to room and unit. Ambulated to  to void with staff assist. Shuffling unsteady gait. Returned to bed and bed alarm activated. Dr. Jessica Cm made aware via perfect serve of pt's arrival to room.

## 2023-06-02 NOTE — ACP (ADVANCE CARE PLANNING)
Advance Care Planning     General Advance Care Planning (ACP) Conversation    Date of Conversation: 6/1/2023  Conducted with: Patient with Decision Making Capacity    Healthcare Decision Maker:    Secondary Decision Maker: Theo Romo [facility] - [phone]  Click here to complete Healthcare Decision Makers including selection of the Healthcare Decision Maker Relationship (ie \"Primary\"). Today we documented Decision Maker(s) consistent with ACP documents on file. Content/Action Overview:   Has ACP document(s) on file - reflects the patient's care preferences  Reviewed DNR/DNI and patient   treatment goals  N/a    Length of Voluntary ACP Conversation in minutes:  16 minutes    Mckenna

## 2023-06-03 VITALS
BODY MASS INDEX: 28.71 KG/M2 | WEIGHT: 212 LBS | DIASTOLIC BLOOD PRESSURE: 70 MMHG | OXYGEN SATURATION: 97 % | RESPIRATION RATE: 18 BRPM | HEIGHT: 72 IN | SYSTOLIC BLOOD PRESSURE: 122 MMHG | HEART RATE: 84 BPM | TEMPERATURE: 97.6 F

## 2023-06-03 PROCEDURE — G0378 HOSPITAL OBSERVATION PER HR: HCPCS

## 2023-06-03 PROCEDURE — 6360000002 HC RX W HCPCS: Performed by: INTERNAL MEDICINE

## 2023-06-03 PROCEDURE — 6370000000 HC RX 637 (ALT 250 FOR IP): Performed by: INTERNAL MEDICINE

## 2023-06-03 PROCEDURE — 97110 THERAPEUTIC EXERCISES: CPT

## 2023-06-03 PROCEDURE — 97116 GAIT TRAINING THERAPY: CPT

## 2023-06-03 PROCEDURE — 96366 THER/PROPH/DIAG IV INF ADDON: CPT

## 2023-06-03 PROCEDURE — 97161 PT EVAL LOW COMPLEX 20 MIN: CPT

## 2023-06-03 PROCEDURE — 97535 SELF CARE MNGMENT TRAINING: CPT

## 2023-06-03 PROCEDURE — 94760 N-INVAS EAR/PLS OXIMETRY 1: CPT

## 2023-06-03 PROCEDURE — 97530 THERAPEUTIC ACTIVITIES: CPT

## 2023-06-03 PROCEDURE — 2580000003 HC RX 258: Performed by: INTERNAL MEDICINE

## 2023-06-03 PROCEDURE — 96372 THER/PROPH/DIAG INJ SC/IM: CPT

## 2023-06-03 PROCEDURE — 94640 AIRWAY INHALATION TREATMENT: CPT

## 2023-06-03 RX ORDER — CEFUROXIME AXETIL 500 MG/1
500 TABLET ORAL 2 TIMES DAILY
Qty: 10 TABLET | Refills: 0 | Status: SHIPPED | OUTPATIENT
Start: 2023-06-03 | End: 2023-06-08

## 2023-06-03 RX ADMIN — CEFTRIAXONE SODIUM 1000 MG: 1 INJECTION, POWDER, FOR SOLUTION INTRAMUSCULAR; INTRAVENOUS at 06:09

## 2023-06-03 RX ADMIN — CLOPIDOGREL BISULFATE 75 MG: 75 TABLET ORAL at 08:53

## 2023-06-03 RX ADMIN — SODIUM CHLORIDE, PRESERVATIVE FREE 10 ML: 5 INJECTION INTRAVENOUS at 08:54

## 2023-06-03 RX ADMIN — TAMSULOSIN HYDROCHLORIDE 0.4 MG: 0.4 CAPSULE ORAL at 08:53

## 2023-06-03 RX ADMIN — PYRIDOXINE HCL TAB 50 MG 100 MG: 50 TAB at 08:53

## 2023-06-03 RX ADMIN — ALBUTEROL SULFATE 2.5 MG: 2.5 SOLUTION RESPIRATORY (INHALATION) at 00:11

## 2023-06-03 RX ADMIN — ENOXAPARIN SODIUM 40 MG: 100 INJECTION SUBCUTANEOUS at 08:52

## 2023-06-03 RX ADMIN — TROSPIUM CHLORIDE 20 MG: 20 TABLET, FILM COATED ORAL at 06:09

## 2023-06-03 RX ADMIN — CYANOCOBALAMIN TAB 1000 MCG 1000 MCG: 1000 TAB at 08:53

## 2023-06-03 RX ADMIN — PANTOPRAZOLE SODIUM 40 MG: 40 TABLET, DELAYED RELEASE ORAL at 06:09

## 2023-06-03 RX ADMIN — BUDESONIDE 500 MCG: 0.5 SUSPENSION RESPIRATORY (INHALATION) at 08:24

## 2023-06-03 RX ADMIN — SODIUM CHLORIDE: 9 INJECTION, SOLUTION INTRAVENOUS at 02:20

## 2023-06-03 RX ADMIN — ARFORMOTEROL TARTRATE 15 MCG: 15 SOLUTION RESPIRATORY (INHALATION) at 08:24

## 2023-06-03 RX ADMIN — ATORVASTATIN CALCIUM 40 MG: 40 TABLET, FILM COATED ORAL at 08:54

## 2023-06-03 NOTE — PROGRESS NOTES
Discharge Summary    Laila Fishman  :  1935  MRN:  [id number]    ADMIT DATE:  2023  DISCHARGE DATE:  6/3/2023      Discharge instruction reviewed with Patient and wife    Home med's returned N/A    Personal belongings returned Yes    Patient Wheeled to front entrance via wheelchair with Nurse and [facility] [address] [facility].         SIGNED:    Slade Martinez RN

## 2023-06-03 NOTE — DISCHARGE INSTRUCTIONS
HOSPITALIST RECOMMENDATIONS    Complete antibiotic over nexyt 5 days (Ceftin)  Encourage oral fluid intake  Use walker and cane routine to avoid falls  Will order for [facility] with PT/OT this week  Plan follow up with Dr. Fifi Lam in next week as already scheduled  Monitor for temperature elevation    Tiffany Subramanian [place] [address]

## 2023-06-03 NOTE — PLAN OF CARE
Problem: ABCDS Injury Assessment  Goal: Absence of physical injury  Outcome: Progressing     Problem: Safety - Adult  Goal: Free from fall injury  Outcome: Progressing     Problem: Chronic Conditions and Co-morbidities  Goal: Patient's chronic conditions and co-morbidity symptoms are monitored and maintained or improved  Outcome: Progressing     Problem: Musculoskeletal - Adult  Goal: Return mobility to safest level of function  Outcome: Progressing  Goal: Maintain proper alignment of affected body part  Outcome: Progressing     Problem: Genitourinary - Adult  Goal: Absence of urinary retention  Outcome: Progressing     Problem: Infection - Adult  Goal: Absence of infection at discharge  Outcome: Progressing     Problem: Occupational Therapy - Adult  Goal: By Discharge: Performs self-care activities at highest level of function for planned discharge setting. See evaluation for individualized goals. 6/2/2023 1015 by Roland Roy OT  Outcome: Progressing  Note: FUNCTIONAL STATUS PRIOR TO ADMISSION:    Receives Help From: Family, ADL Assistance: Independent,  ,  ,  ,  ,  , Homemaking Assistance: Needs assistance, Ambulation Assistance: Independent,  , Active : Yes     HOME SUPPORT: Patient lived with wife but didn't require assistance. Occupational Therapy Goals:  Initiated 6/2/2023  1. Patient will perform grooming  standing sinkside with Modified [place] within 7 day(s). 2.  Patient will perform upper body dressing with Modified [place] within 7 day(s). 3.  Patient will perform lower body dressing with Modified [place] within 7 day(s). 4.  Patient will perform toilet transfers with Modified [place]  within 7 day(s). 5.  Patient will perform all aspects of toileting with Modified [place] within 7 day(s). 6.  Patient will participate in upper extremity therapeutic exercise/activities with Set-up for 8 minutes within 7 day(s).     7.  Patient will utilize
Problem: ABCDS Injury Assessment  Goal: Absence of physical injury  Outcome: Progressing     Problem: Safety - Adult  Goal: Free from fall injury  Outcome: Progressing     Problem: Chronic Conditions and Co-morbidities  Goal: Patient's chronic conditions and co-morbidity symptoms are monitored and maintained or improved  Outcome: Progressing  Flowsheets  Taken 6/3/2023 0758 by Maxwell Aden RN  Care Plan - Patient's Chronic Conditions and Co-Morbidity Symptoms are Monitored and Maintained or Improved: Monitor and assess patient's chronic conditions and comorbid symptoms for stability, deterioration, or improvement  Taken 6/2/2023 2309 by Ava Appiah RN  Care Plan - Patient's Chronic Conditions and Co-Morbidity Symptoms are Monitored and Maintained or Improved: Monitor and assess patient's chronic conditions and comorbid symptoms for stability, deterioration, or improvement     Problem: Musculoskeletal - Adult  Goal: Return mobility to safest level of function  Outcome: Progressing  Flowsheets  Taken 6/3/2023 0758 by Maxwell Aden RN  Return Mobility to Safest Level of Function:   Assess patient stability and activity tolerance for standing, transferring and ambulating with or without assistive devices   Assist with transfers and ambulation using safe patient handling equipment as needed   Obtain physical therapy/occupational therapy consults as needed  Taken 6/2/2023 2309 by Ava Appiah RN  Return Mobility to Safest Level of Function:   Assess patient stability and activity tolerance for standing, transferring and ambulating with or without assistive devices   Assist with transfers and ambulation using safe patient handling equipment as needed     Problem: Genitourinary - Adult  Goal: Absence of urinary retention  Outcome: Progressing  Flowsheets (Taken 6/3/2023 0758)  Absence of urinary retention:   Assess patients ability to void and empty bladder   Monitor intake/output and perform bladder scan as
Problem: Safety - Adult  Goal: Free from fall injury  Outcome: Progressing     Problem: Chronic Conditions and Co-morbidities  Goal: Patient's chronic conditions and co-morbidity symptoms are monitored and maintained or improved  Outcome: Progressing     Problem: Musculoskeletal - Adult  Goal: Return mobility to safest level of function  Outcome: Progressing  Goal: Maintain proper alignment of affected body part  Outcome: Progressing     Problem: Genitourinary - Adult  Goal: Absence of urinary retention  Outcome: Progressing     Problem: Infection - Adult  Goal: Absence of infection at discharge  Outcome: Progressing
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    Nodule of upper lobe of left lung 02/2019    Palpitation 7/13/2017    Prostate cancer ([address] 7/13/2017    Shoulder pain 7/13/2017    TIA (transient ischemic attack) 04/2017    Tobacco use 7/13/2017    Ventral hernia 7/13/2017    Concern of course is possibility that he could develop a strangulated small bowel wit this ventral hernia, especially now that it is sympotmatic, will refer to surgery for evaluation     Past Surgical History:   Procedure Laterality Date    CATARACT REMOVAL Bilateral     EGD TRANSORAL BIOPSY SINGLE/MULTIPLE  12/5/2012         HERNIA REPAIR  2014    Laparoscopic recurrent incisional hernia repair with mesh.   Robot assisted    HERNIA REPAIR  2013    abd hernia     HERNIA REPAIR  1996    inguinal R&L    PROSTATECTOMY      SMALL INTESTINE SURGERY  1996    UROLOGICAL SURGERY      prostate  removed    UROLOGICAL SURGERY      adhesion repair       Home Situation and Prior Level of Function:   Social/Functional History  Lives With: Spouse  Type of Home: Condo  Home Layout: Two level, Able to Live on Main level with bedroom/bathroom (1 flight)  Home Access: Stairs to enter with rails  Entrance Stairs - Number of Steps: 3 GLEN with rails  Entrance Stairs - Rails: Both  Bathroom Shower/Tub: Walk-in shower, Doors  Bathroom Toilet: Handicap height  Bathroom Equipment: Built-in shower seat, Grab bars in shower  Bathroom Accessibility: Wheelchair accessible (standard)  Home Equipment: [address], [address], quad, Lift chair, Reacher (3 wheeled walker- inside the house nad out in the community)  Has the patient had two or more falls in the past year or any fall with injury in the past year?: Yes (2 falls one in the fornt lawn and 1 in kitchen)  Receives Help From: Family  ADL Assistance:
inpatient rehabilitation; comparison of the responsiveness of the Barthel Index and Functional [place] Measure. Journal of Neurology, Neurosurgery, and Psychiatry, 66(4), 974-368. GUILHERME Aguilar, DIONE Suh, & Dixon Maguire M.A. (2004.) Assessment of post-stroke quality of life in cost-effectiveness studies: The usefulness of the Barthel Index and the EuroQoL-5D. Quality of Life Research, 13, 427-43       Pain Ratin/10   Good    After treatment:   Patient left in no apparent distress sitting up in chair, Call bell within reach, and Bed/ chair alarm activated    COMMUNICATION/EDUCATION:   The patient's plan of care was discussed with: registered nurse      Patient Education  Education Given To: Patient  Education Provided: Role of Therapy;Plan of Care;ADL Adaptive Strategies;Transfer Training; Fall Prevention Strategies  Education Method: Demonstration;Verbal  Barriers to Learning: None  Education Outcome: Verbalized understanding;Continued education needed       Problem: Occupational Therapy - Adult  Goal: By Discharge: Performs self-care activities at highest level of function for planned discharge setting. See evaluation for individualized goals. Outcome: Progressing  Note: FUNCTIONAL STATUS PRIOR TO ADMISSION:    Receives Help From: Family, ADL Assistance: Independent,  ,  ,  ,  ,  , Homemaking Assistance: Needs assistance, Ambulation Assistance: Independent,  , Active : Yes     HOME SUPPORT: Patient lived with wife but didn't require assistance. Occupational Therapy Goals:  Initiated 2023  1. Patient will perform grooming  standing sinkside with Modified [place] within 7 day(s). 2.  Patient will perform upper body dressing with Modified [place] within 7 day(s). 3.  Patient will perform lower body dressing with Modified [place] within 7 day(s). 4.  Patient will perform toilet transfers with Modified [place]  within 7 day(s).   5.  Patient will

## 2023-06-03 NOTE — PROGRESS NOTES
Patient seen for the PT evaluation. Full note to follow. Patient was being seen for outpatient PT prior to admission. He and his wife would prefer and will benefit from [address] when they return home, and then after several weeks of home health he would like to return to outpatient PT for high level balance training.   Thank you

## 2023-06-03 NOTE — PROGRESS NOTES
OCCUPATIONAL THERAPY TREATMENT  Patient: Jeremías Edwards (89 y.o. male)  Date: 6/3/2023  Primary Diagnosis: Urinary tract infection [N39.0]  Acute pyelonephritis [N10]  General weakness [R53.1]       Precautions:                  Chart, occupational therapy assessment, plan of care, and goals were reviewed. ASSESSMENT  Patient continues to benefit from skilled OT services and is progressing towards goals. Pt hoping to go home today. Performed partial wash up with soap and water seated in chair. Had already performed momo-care and had on clean undergarment by his report. Said he doesn't use a walker at home to go into bathroom so tried without and he has a slow shuffle gait, holding onto door handle. OTR told him it was not safe to walk this way. He stood at sink for grooming with supervision. Wife brought in a three wheeled type walker and he used that to come out of bathroom but abandoned during transition to chair. PLAN :  Patient continues to benefit from skilled intervention to address the above impairments. Continue treatment per established plan of care to address goals. Recommend with staff: needs monitoring while up and moving in room. Recommend next OT session: if still here on Monday continue working on ADL independence, endurance and safety    Recommendation for discharge: (in order for the patient to meet his/her long term goals): Outpatient occupational therapy for balance    Other factors to consider for discharge: poor safety awareness, high risk for falls, and concern for safely navigating or managing the home environment    IF patient discharges home will need the following DME: none       SUBJECTIVE:   Patient stated I don't usually use it to go into bathroom.     Ie: mobility aid    OBJECTIVE DATA SUMMARY:   Cognitive/Behavioral Status:   Alert, cooperative       Functional Mobility and Transfers for ADLs:  Bed Mobility:        Transfers:   Transfer Training  Sit to Stand:

## 2023-06-04 LAB
BACTERIA SPEC CULT: NORMAL
BACTERIA SPEC CULT: NORMAL
SERVICE CMNT-IMP: NORMAL
SERVICE CMNT-IMP: NORMAL

## 2023-06-05 ENCOUNTER — TELEPHONE (OUTPATIENT)
Age: 88
End: 2023-06-05

## 2023-06-05 NOTE — CARE COORDINATION
Filiberto Resendez MSW spoke with patient. He would like home health. No preference in company. [facility] couldn't see till next week but [facility] could see sooner. Referral sent to [facility]. Patient also stated he is going to see his specialist in [place] tomorrow.

## 2023-06-05 NOTE — CARE COORDINATION
Hospital PT/OT notes faxed to [facility]; they will be seeing patient.    [phone]

## 2023-06-05 NOTE — CARE COORDINATION
Transition of Care Plan:    RUR: NA Observation  Prior Level of Functioning: Independent  Disposition: Home  If SNF or IPR: Date FOC offered: NA  Date FOC received: NA  Accepting facility: NA  Date authorization started with reference number: NA  Date authorization received and expires: NA  Follow up appointments: 6/8/23  DME needed: NA  Transportation at discharge: POV  IM/IMM Medicare/ letter given: NA  Is patient a  and connected with VA? NA   If yes, was [employer] transfer form completed and VA notified? Caregiver Contact:   Discharge Caregiver contacted prior to discharge? Yes  Care Conference needed?  NA  Barriers to discharge:  None         06/04/23 8974   Services At/After Discharge   Transition of Care Consult (CM Consult) N/A   Services At/After Discharge None  (Patient wants to resume outpatient rehab at [facility] OP rehab)

## 2023-06-05 NOTE — TELEPHONE ENCOUNTER
Care Transitions Initial Follow Up Call    Outreach made within 2 business days of discharge: Yes    Patient: Sherri Coronado Patient : 1935   MRN: [id number]  Reason for Admission: UTI  Discharge Date: 6/3/23       Spoke with: Patient - Kiya Elizabeth    Discharge department/facility: [facility]    TCM Interactive Patient Contact:  Was patient able to fill all prescriptions: Yes  Was patient instructed to bring all medications to the follow-up visit: Yes  Is patient taking all medications as directed in the discharge summary?  Yes  Does patient understand their discharge instructions: Yes  Does patient have questions or concerns that need addressed prior to 7-14 day follow up office visit: no    Scheduled appointment with PCP within 7-14 days    Follow Up  Future Appointments   Date Time Provider Meet [place]   2023 10:30 AM Elysia Scanlon MD [facility] MAIN BS AMB   2023  9:30 AM Elysia Scanlon MD [facility] MAIN BS AMB       KOBI CORONADO, MARELYN

## 2023-06-08 ENCOUNTER — OFFICE VISIT (OUTPATIENT)
Age: 88
End: 2023-06-08

## 2023-06-08 VITALS
BODY MASS INDEX: 28.06 KG/M2 | HEIGHT: 72 IN | RESPIRATION RATE: 22 BRPM | SYSTOLIC BLOOD PRESSURE: 92 MMHG | TEMPERATURE: 98 F | OXYGEN SATURATION: 94 % | HEART RATE: 100 BPM | WEIGHT: 207.2 LBS | DIASTOLIC BLOOD PRESSURE: 58 MMHG

## 2023-06-08 DIAGNOSIS — R26.2 DIFFICULTY IN WALKING, NOT ELSEWHERE CLASSIFIED: ICD-10-CM

## 2023-06-08 DIAGNOSIS — R82.81 PYURIA: Primary | ICD-10-CM

## 2023-06-08 RX ORDER — LANSOPRAZOLE 30 MG/1
CAPSULE, DELAYED RELEASE ORAL
COMMUNITY
Start: 2023-04-27

## 2023-06-08 SDOH — ECONOMIC STABILITY: HOUSING INSECURITY
IN THE LAST 12 MONTHS, WAS THERE A TIME WHEN YOU DID NOT HAVE A STEADY PLACE TO SLEEP OR SLEPT IN A SHELTER (INCLUDING NOW)?: NO

## 2023-06-08 SDOH — ECONOMIC STABILITY: FOOD INSECURITY: WITHIN THE PAST 12 MONTHS, YOU WORRIED THAT YOUR FOOD WOULD RUN OUT BEFORE YOU GOT MONEY TO BUY MORE.: NEVER TRUE

## 2023-06-08 SDOH — ECONOMIC STABILITY: INCOME INSECURITY: HOW HARD IS IT FOR YOU TO PAY FOR THE VERY BASICS LIKE FOOD, HOUSING, MEDICAL CARE, AND HEATING?: NOT HARD AT ALL

## 2023-06-08 SDOH — ECONOMIC STABILITY: FOOD INSECURITY: WITHIN THE PAST 12 MONTHS, THE FOOD YOU BOUGHT JUST DIDN'T LAST AND YOU DIDN'T HAVE MONEY TO GET MORE.: NEVER TRUE

## 2023-06-08 ASSESSMENT — PATIENT HEALTH QUESTIONNAIRE - PHQ9
SUM OF ALL RESPONSES TO PHQ QUESTIONS 1-9: 0
SUM OF ALL RESPONSES TO PHQ QUESTIONS 1-9: 0
SUM OF ALL RESPONSES TO PHQ9 QUESTIONS 1 & 2: 0
SUM OF ALL RESPONSES TO PHQ QUESTIONS 1-9: 0
1. LITTLE INTEREST OR PLEASURE IN DOING THINGS: 0
SUM OF ALL RESPONSES TO PHQ QUESTIONS 1-9: 0
2. FEELING DOWN, DEPRESSED OR HOPELESS: 0

## 2023-06-08 NOTE — PROGRESS NOTES
COLLEEN  [facility]  6/1-6/3  UTI-Ceftin  Urine culture:  no growth    Duration-30 minutes primarily counseling and education in addition to reviewing prior notes and laboratory tests as well as relevant imaging results. Assessment and Plan:     Diagnosis Orders   1. Pyuria        2. Difficulty in walking, not elsewhere classified                Mr. Marisol Mares is a 80 y.o. male who is here for evaluation of   Chief Complaint   Patient presents with    Follow-Up from 5 [facility] visit from [facility] 6/3 D/C C? O UTI    Extremity Weakness     C/O (B)LE weakness. Using rolator for assistance. Frequent/Recurrent UTI     Last dosage of ABX due this evening    Medication Check     States stopped most medications after starting ABX. Endorses taking inhalers sanctura and plavix   . No orders of the defined types were placed in this encounter. No follow-up provider specified. Current Outpatient Medications   Medication Sig Dispense Refill    cefUROXime (CEFTIN) 500 MG tablet Take 1 tablet by mouth 2 times daily for 5 days 10 tablet 0    albuterol sulfate HFA (PROVENTIL;VENTOLIN;PROAIR) 108 (90 Base) MCG/ACT inhaler Inhale 2 puffs into the lungs every 4 hours as needed      clopidogrel (PLAVIX) 75 MG tablet TAKE 1 TABLET BY MOUTH EVERY DAY      trospium (SANCTURA) 20 MG tablet Take 1 tablet by mouth 2 times daily (before meals)      lansoprazole (PREVACID) 30 MG delayed release capsule TAKE 1 CAPSULE BY MOUTH DAILY (BEFORE BREAKFAST).       tamsulosin (FLOMAX) 0.4 MG capsule Take 1 capsule by mouth daily (Patient not taking: Reported on 6/8/2023)      atorvastatin (LIPITOR) 40 MG tablet TAKE 1 TABLET BY MOUTH EVERY DAY (Patient not taking: Reported on 6/8/2023)      cyanocobalamin 100 MCG tablet Take 1 tablet by mouth daily (Patient not taking: Reported on 6/8/2023)      fluticasone-salmeterol (ADVAIR) 250-50 MCG/ACT AEPB diskus inhaler INHALE 1 PUFF BY MOUTH TWICE A DAY (Patient not taking: Reported on 6/8/2023)
months? -   Able to walk? -       ADL ASSESSMENT 6/8/2023   Feeding yourself No Help Needed   Getting from bed to chair No Help Needed   Getting dressed No Help Needed   Bathing or showering No Help Needed   Walk across the room (includes cane/walker) No Help Needed   Using the telphone No Help Needed   Taking your medications No Help Needed   Preparing meals No Help Needed   Managing money (expenses/bills) No Help Needed   Moderately strenuous housework (laundry) No Help Needed   Shopping for personal items (toiletries/medicines) No Help Needed   Shopping for groceries No Help Needed   Driving No Help Needed   Climbing a flight of stairs No Help Needed   Getting to places beyond walking distances No Help Needed       AMB Abuse Screening 6/8/2023   Do you ever feel afraid of your partner? N   Are you in a relationship with someone who physically or mentally threatens you? N   Is it safe for you to go home?  Justino [address]ons 89     The patient has appointed the following active healthcare agents:    Secondary Decision MakerHyla President - Child - [phone]

## 2023-06-22 ENCOUNTER — OFFICE VISIT (OUTPATIENT)
Age: 88
End: 2023-06-22
Payer: MEDICARE

## 2023-06-22 VITALS
HEIGHT: 72 IN | WEIGHT: 209.2 LBS | SYSTOLIC BLOOD PRESSURE: 100 MMHG | OXYGEN SATURATION: 94 % | HEART RATE: 98 BPM | RESPIRATION RATE: 18 BRPM | TEMPERATURE: 97.5 F | DIASTOLIC BLOOD PRESSURE: 64 MMHG | BODY MASS INDEX: 28.33 KG/M2

## 2023-06-22 DIAGNOSIS — J41.1 MUCOPURULENT CHRONIC BRONCHITIS (HCC): ICD-10-CM

## 2023-06-22 DIAGNOSIS — I27.20 PULMONARY HYPERTENSION, UNSPECIFIED (HCC): ICD-10-CM

## 2023-06-22 DIAGNOSIS — R82.81 PYURIA: Primary | ICD-10-CM

## 2023-06-22 DIAGNOSIS — R60.0 LOWER EXTREMITY EDEMA: ICD-10-CM

## 2023-06-22 DIAGNOSIS — R26.2 DIFFICULTY IN WALKING, NOT ELSEWHERE CLASSIFIED: ICD-10-CM

## 2023-06-22 DIAGNOSIS — E11.65 TYPE 2 DIABETES MELLITUS WITH HYPERGLYCEMIA, WITHOUT LONG-TERM CURRENT USE OF INSULIN (HCC): ICD-10-CM

## 2023-06-22 PROCEDURE — G8427 DOCREV CUR MEDS BY ELIG CLIN: HCPCS | Performed by: INTERNAL MEDICINE

## 2023-06-22 PROCEDURE — 4004F PT TOBACCO SCREEN RCVD TLK: CPT | Performed by: INTERNAL MEDICINE

## 2023-06-22 PROCEDURE — 1123F ACP DISCUSS/DSCN MKR DOCD: CPT | Performed by: INTERNAL MEDICINE

## 2023-06-22 PROCEDURE — 3023F SPIROM DOC REV: CPT | Performed by: INTERNAL MEDICINE

## 2023-06-22 PROCEDURE — 99214 OFFICE O/P EST MOD 30 MIN: CPT | Performed by: INTERNAL MEDICINE

## 2023-06-22 PROCEDURE — G8419 CALC BMI OUT NRM PARAM NOF/U: HCPCS | Performed by: INTERNAL MEDICINE

## 2023-06-22 RX ORDER — TORSEMIDE 20 MG/1
20 TABLET ORAL
Qty: 90 TABLET | Refills: 4 | Status: SHIPPED | OUTPATIENT
Start: 2023-06-22

## 2023-06-22 NOTE — PROGRESS NOTES
Angie Martins is a 80 y.o. male presenting for/with:    Chief Complaint   Patient presents with    Other     2 week f/u (Pyuria/extremity weakness)       Vitals:    06/22/23 0926   BP: 100/64   Site: Left Upper Arm   Position: Sitting   Cuff Size: Medium Adult   Pulse: 98   Resp: 18   Temp: 97.5 °F (36.4 °C)   TempSrc: Temporal   SpO2: 94%   Weight: 209 lb 3.2 oz (94.9 kg)   Height: 6' (1.829 m)       Pain Scale: 0 - No pain/10  Pain Location:     1. \"Have you been to the ER, urgent care clinic since your last visit? Hospitalized since your last visit? \" No    2. \"Have you seen or consulted any other health care providers outside of the [address] [place] [address] since your last visit? \" No     3. For patients aged 39-70: Has the patient had a colonoscopy / FIT/ Cologuard? NA - based on age     If the patient is female:    4. For patients aged 41-77: Has the patient had a mammogram within the past 2 years? NA - based on age    11. For patients aged 21-65: Has the patient had a pap smear? NA - based on age      PHQ-9  6/8/2023   Little interest or pleasure in doing things 0   Little interest or pleasure in doing things -   Feeling down, depressed, or hopeless 0   PHQ-2 Score 0   Total Score PHQ 2 -   PHQ-9 Total Score 0       [facility] AMB LEARNING ASSESSMENT 6/8/2023 9/23/2021   PRIMARY LEARNER Patient Patient   PRIMARY LANGUAGE ENGLISH ENGLISH   LEARNER PREFERENCE PRIMARY DEMONSTRATION READING   ANSWERED BY self patient   RELATIONSHIP SELF SELF        Amb Fall Risk Assessment and TUG Test 6/8/2023   Do you feel unsteady or are you worried about falling?  yes   2 or more falls in past year? yes   Fall with injury in past year? no   Fall in past 12 months?  -   Able to walk? -       ADL ASSESSMENT 6/8/2023   Feeding yourself No Help Needed   Getting from bed to chair No Help Needed   Getting dressed No Help Needed   Bathing or showering No Help Needed   Walk across the room (includes cane/walker) No Help Needed   Using the

## 2023-06-22 NOTE — PROGRESS NOTES
Duration-30 minutes primarily counseling and education in addition to reviewing prior notes and laboratory tests as well as relevant imaging results. Assessment and Plan:    1. Pyuria  Clinically resolved    2. Type 2 diabetes mellitus with hyperglycemia, without long-term current use of insulin ([facility])  A1c next visit    3. Mucopurulent chronic bronchitis (HCC)  Breathing is at baseline    4. Pulmonary hypertension, unspecified ([address]  5. Difficulty in walking, not elsewhere classified  - [facility] [place] [address]    6. Lower extremity edema  - torsemide (DEMADEX) 20 MG tablet; Take 1 tablet by mouth daily (with breakfast)  Dispense: 90 tablet; Refill: 4         Mr. Lara Mcginnis is a 80 y.o. male who is here for evaluation of   Chief Complaint   Patient presents with    Other     2 week f/u (Pyuria/extremity weakness)   . Orders Placed This Encounter   Procedures    [facility] [place] [address]     Referral Priority:   Routine     Referral Type:   Physical Therapy     Referral Reason:   Specialty Services Required     Requested Specialty:   Physical Therapist     Number of Visits Requested:   1       No follow-up provider specified. Current Outpatient Medications   Medication Sig Dispense Refill    torsemide (DEMADEX) 20 MG tablet Take 1 tablet by mouth daily (with breakfast) 90 tablet 4    lansoprazole (PREVACID) 30 MG delayed release capsule TAKE 1 CAPSULE BY MOUTH DAILY (BEFORE BREAKFAST).       tamsulosin (FLOMAX) 0.4 MG capsule Take 1 capsule by mouth daily      albuterol sulfate HFA (PROVENTIL;VENTOLIN;PROAIR) 108 (90 Base) MCG/ACT inhaler Inhale 2 puffs into the lungs every 4 hours as needed      atorvastatin (LIPITOR) 40 MG tablet       clopidogrel (PLAVIX) 75 MG tablet TAKE 1 TABLET BY MOUTH EVERY DAY      cyanocobalamin 100 MCG tablet Take 1 tablet by mouth daily      fluticasone-salmeterol (ADVAIR) 250-50 MCG/ACT AEPB

## 2023-07-03 PROBLEM — N39.0 URINARY TRACT INFECTION: Status: RESOLVED | Noted: 2023-06-01 | Resolved: 2023-07-03

## 2023-07-20 ENCOUNTER — OFFICE VISIT (OUTPATIENT)
Age: 88
End: 2023-07-20
Payer: MEDICARE

## 2023-07-20 VITALS
WEIGHT: 200.2 LBS | RESPIRATION RATE: 18 BRPM | DIASTOLIC BLOOD PRESSURE: 68 MMHG | BODY MASS INDEX: 27.12 KG/M2 | TEMPERATURE: 97.8 F | SYSTOLIC BLOOD PRESSURE: 116 MMHG | HEART RATE: 79 BPM | OXYGEN SATURATION: 95 % | HEIGHT: 72 IN

## 2023-07-20 DIAGNOSIS — R60.0 LOWER EXTREMITY EDEMA: Primary | ICD-10-CM

## 2023-07-20 DIAGNOSIS — R26.2 DIFFICULTY IN WALKING, NOT ELSEWHERE CLASSIFIED: ICD-10-CM

## 2023-07-20 PROCEDURE — G8427 DOCREV CUR MEDS BY ELIG CLIN: HCPCS | Performed by: INTERNAL MEDICINE

## 2023-07-20 PROCEDURE — 4004F PT TOBACCO SCREEN RCVD TLK: CPT | Performed by: INTERNAL MEDICINE

## 2023-07-20 PROCEDURE — G8419 CALC BMI OUT NRM PARAM NOF/U: HCPCS | Performed by: INTERNAL MEDICINE

## 2023-07-20 PROCEDURE — 99213 OFFICE O/P EST LOW 20 MIN: CPT | Performed by: INTERNAL MEDICINE

## 2023-07-20 PROCEDURE — 1123F ACP DISCUSS/DSCN MKR DOCD: CPT | Performed by: INTERNAL MEDICINE

## 2023-07-20 ASSESSMENT — PATIENT HEALTH QUESTIONNAIRE - PHQ9
2. FEELING DOWN, DEPRESSED OR HOPELESS: 0
SUM OF ALL RESPONSES TO PHQ QUESTIONS 1-9: 0
1. LITTLE INTEREST OR PLEASURE IN DOING THINGS: 0
SUM OF ALL RESPONSES TO PHQ QUESTIONS 1-9: 0
SUM OF ALL RESPONSES TO PHQ9 QUESTIONS 1 & 2: 0
SUM OF ALL RESPONSES TO PHQ QUESTIONS 1-9: 0
SUM OF ALL RESPONSES TO PHQ QUESTIONS 1-9: 0

## 2023-07-20 NOTE — PROGRESS NOTES
1. Lower extremity edema  Doing quite well with the torsemide 20 mg a day. Advised him to continue with that and to follow-up in October. Given his history of of UTI, he was advised to come in promptly if he has symptoms. 2. Difficulty in walking, not elsewhere classified  Advised to reestablish contact with physical therapy at [facility].  He has a valid referral in his possession dated June 22. Chief Complaint   Patient presents with    Hypertension    Diabetes         No orders of the defined types were placed in this encounter. Williams Collazo MD, FACP      HPI:         is a 80 y.o. male who arrives for evaluation of lower extremity edema and difficulty walking in addition to hypertension. We reviewed all of his medications today. He appears to be tolerating the Demadex well and is lost about 8 pounds since his last visit. He denies PND or orthopnea. Continues to walk with assist.        No Known Allergies    Outpatient Encounter Medications as of 7/20/2023   Medication Sig Dispense Refill    torsemide (DEMADEX) 20 MG tablet Take 1 tablet by mouth daily (with breakfast) 90 tablet 4    lansoprazole (PREVACID) 30 MG delayed release capsule TAKE 1 CAPSULE BY MOUTH DAILY (BEFORE BREAKFAST).       tamsulosin (FLOMAX) 0.4 MG capsule Take 1 capsule by mouth daily      albuterol sulfate HFA (PROVENTIL;VENTOLIN;PROAIR) 108 (90 Base) MCG/ACT inhaler Inhale 2 puffs into the lungs every 4 hours as needed      atorvastatin (LIPITOR) 40 MG tablet       clopidogrel (PLAVIX) 75 MG tablet TAKE 1 TABLET BY MOUTH EVERY DAY      cyanocobalamin 100 MCG tablet Take 1 tablet by mouth daily      fluticasone-salmeterol (ADVAIR) 250-50 MCG/ACT AEPB diskus inhaler       pantoprazole (PROTONIX) 20 MG tablet       trospium (SANCTURA) 20 MG tablet Take 1 tablet by mouth 2 times daily (before meals)      pyridoxine (B-6) 100 MG tablet Take 1 tablet by mouth daily (Patient not taking: Reported

## 2023-08-02 ENCOUNTER — HOSPITAL ENCOUNTER (OUTPATIENT)
Facility: HOSPITAL | Age: 88
Setting detail: RECURRING SERIES
Discharge: HOME | End: 2023-08-05
Payer: MEDICARE

## 2023-08-02 PROCEDURE — 97161 PT EVAL LOW COMPLEX 20 MIN: CPT

## 2023-08-02 PROCEDURE — 97110 THERAPEUTIC EXERCISES: CPT

## 2023-08-02 NOTE — PROGRESS NOTES
[facility]  [address], [address]   Office [phone]  Fax [phone]       PHYSICAL THERAPY - MEDICARE EVALUATION/PLAN OF CARE NOTE (updated 3/23)      Date: 2023          Patient Name:  Mikael Fairchild :  1935   Medical   Diagnosis:  Difficulty in walking, not elsewhere classified [R26.2] Treatment Diagnosis:  LE weakness, decreased gait and balance   Referral Source:  Lai Benoit MD Insurance:  Payor: Micky Abdi / Plan: MEDICARE PART A AND B / Product Type: *No Product type* /             Patient  verified yes     Visit #   Current  / Total 1 16     SUBJECTIVE  Pain Level (0-10 scale): 0  []constant []intermittent []improving []worsening []no change since onset    Any medication changes, allergies to medications, adverse drug reactions, diagnosis change, or new procedure performed?: [x] No    [] Yes (see summary sheet for update)  Medications: Verified on Patient Summary List    Subjective functional status/changes:     Patient drives, walks with a 3 wh. Rollator, unable to walk comfortably, due to diminished strength in legs, no c/o pain, this has been going on for several years  Patient lives with spouse, 2 story house    Onset Date: years  Start of Care: 2023  Comorbidities:arthritis, tobacco use, prostate cancer, stroke 8 years ago    Pt Goals: strengthen legs and back          OBJECTIVE    Posture:  stands with trunk and knees flexed  Gait and Functional Mobility:  ambulates ind with rollator on level surfaces, trunk flexed forward, rollator ahead of patient, decreased foot clearance, patient shuffles feet  ROM: decreased flexibility of hamstrings     MMT: 4-/5  Neurological: Reflexes / Sensations: intact LE sensation    Objective/Functional Outcome Measure: lópez balance score 40/56        15 min [x]Eval - untimed                          Therapeutic Procedures:   Tx Min Billable or 1:1 Min (if diff from Tx

## 2023-08-10 DIAGNOSIS — K21.9 GASTRO-ESOPHAGEAL REFLUX DISEASE WITHOUT ESOPHAGITIS: ICD-10-CM

## 2023-08-10 RX ORDER — LANSOPRAZOLE 30 MG/1
CAPSULE, DELAYED RELEASE ORAL
Qty: 90 CAPSULE | Refills: 1 | Status: SHIPPED | OUTPATIENT
Start: 2023-08-10

## 2023-08-10 NOTE — DISCHARGE SUMMARY
[facility]  Hospitalist Discharge Summary    Patient ID:  Jorge Cruz  [id number]  80 y.o.  1935    PCP on record: Meryle Albany, MD    Admit date: 6/1/2023  Discharge date and time: 6/3/2023     DISCHARGE DIAGNOSIS:    Principal Problem:    Urinary tract infection    Active Problems:    Acute febrile illness    Weakness generalized    History of CVA (cerebrovascular accident)    COPD with hypoxia ([address]    Essential hypertension    Dyslipidemia    CONSULTATIONS:  IP CONSULT HOME HEALTH    Excerpted HPI from H&P of  Evelyn Melgoza MD:    The patient is a 80 y.o. male presented to emergency department with generalized weakness and increased urinary frequency for the last 3 days. Also c/o burning in urine. No blood in urine. He lives at home with home with wife. No fever chills rigors no chest pain or shortness of breath. He came to the ER and was found out to have urinary tract infection and placed in the hospital     Patient usually walks with walker/cane and doing physical therapy for lower extremity weakness but he says that his weakness got worse.    ______________________________________________________________________  DISCHARGE SUMMARY/HOSPITAL COURSE:  for full details see H&P, daily progress notes, labs, consult notes. Pt presented to ED with generalized weakness and increased urinary frequency x 3 days. Lives in [place] with wife in [place]. Elevated temp and finding c/w UTI in ED. Similar presentations with infections in 2019 and 2020. Has recently been referred to outpt PT for strengthening.      Principal Problem:    Urinary tract infection  Active Problems:    Acute febrile illness    Weakness generalized  Presenting with extreme sense of weakness as on prior hospitalizations 2019 and 2020  Better following initial antibiotics and IVF  PT/OT eval, cleared for d/c with [facility]  Culture results were not specific, but a UTI was suspected  Will complete a course of
Hide Additional Notes?: No
Additional Notes (Optional): No treatment needed recommended at this time
Detail Level: Detailed

## 2023-08-14 ENCOUNTER — HOSPITAL ENCOUNTER (OUTPATIENT)
Facility: HOSPITAL | Age: 88
Setting detail: RECURRING SERIES
Discharge: HOME | End: 2023-08-17
Payer: MEDICARE

## 2023-08-14 PROCEDURE — 97110 THERAPEUTIC EXERCISES: CPT

## 2023-08-14 NOTE — PROGRESS NOTES
PHYSICAL THERAPY - MEDICARE DAILY TREATMENT NOTE (updated 3/23)      Date: 2023          Patient Name:  Na Burns :  1935   Medical   Diagnosis:  Difficulty in walking, not elsewhere classified [R26.2] Treatment Diagnosis:  M62.81  GENERAL MUSCLE WEAKNESS    Referral Source:  Erik Valiente MD Insurance:   Payor: Tyrone Dears / Plan: MEDICARE PART A AND B / Product Type: *No Product type* /                     Patient  verified yes     Visit #   Current  / Total 2 16   Time   In / Out 115 200   Total Treatment Time 45   Total Timed Codes 45   1:1 Treatment Time 39      [facility] Totals Reminder:  bill using total billable   min of TIMED therapeutic procedures and modalities. 8-22 min = 1 unit; 23-37 min = 2 units; 38-52 min = 3 units; 53-67 min = 4 units; 68-82 min = 5 units            SUBJECTIVE    Pain Level (0-10 scale): 0    Any medication changes, allergies to medications, adverse drug reactions, diagnosis change, or new procedure performed?: [x] No    [] Yes (see summary sheet for update)  Medications: Verified on Patient Summary List    Subjective functional status/changes: \"My legs feel shakey\"    OBJECTIVE      Therapeutic Procedures: Tx Min Billable or 1:1 Min (if diff from Tx Min) Procedure, Rationale, Specifics   45 45 40728 Therapeutic Exercise (timed):  increase ROM, strength, coordination, balance, and proprioception to improve patient's ability to progress to PLOF and address remaining functional goals.  (see flow sheet as applicable)     Details if applicable:  Nu Step L 2, 10'  Standing hip flexion 2x10  Standing hip abduction 2x10  Toe tap 6\" step 2x10  Airex- standing balance progression- normal stance, narrow stance, modified tandem, tandem  Bridging on peanut ball 3x10  Abdominal bracing with hip and knee flexion on peanut ball 3x10  SAQ 3x10 3x 10                       45 45    Total Total       [x]  Patient Education billed concurrently with other procedures   [x]

## 2023-08-16 ENCOUNTER — HOSPITAL ENCOUNTER (OUTPATIENT)
Facility: HOSPITAL | Age: 88
Setting detail: RECURRING SERIES
Discharge: HOME | End: 2023-08-19
Payer: MEDICARE

## 2023-08-16 PROCEDURE — 97110 THERAPEUTIC EXERCISES: CPT

## 2023-08-16 NOTE — PROGRESS NOTES
[] Other detail:           Pain Level at end of session (0-10 scale): 0      Assessment   Patient continues to benefit from strengthening and balance training to increase his overall mobility and safety. Patient will continue to benefit from skilled PT / OT services to modify and progress therapeutic interventions and analyze and address functional mobility deficits to address functional deficits and attain remaining goals.     Progress toward goals / Updated goals:  []  See Progress Note/Recertification    Working towards goals      PLAN  Yes  Continue plan of care    [x]  Upgrade activities as tolerated  []  Discharge due to :  []  Other:      Lesli Kayser, PT       8/16/2023       2:15 PM

## 2023-08-22 ENCOUNTER — HOSPITAL ENCOUNTER (OUTPATIENT)
Facility: HOSPITAL | Age: 88
Setting detail: RECURRING SERIES
Discharge: HOME | End: 2023-08-25
Payer: MEDICARE

## 2023-08-22 PROCEDURE — 97110 THERAPEUTIC EXERCISES: CPT

## 2023-08-22 NOTE — PROGRESS NOTES
concurrently with other procedures   [x] Review HEP    [] Progressed/Changed HEP, detail:    [] Other detail:             Pain Level at end of session (0-10 scale): 0      Assessment   Working towards goals  Patient will continue to benefit from skilled PT / OT services to modify and progress therapeutic interventions and analyze and address functional mobility deficits to address functional deficits and attain remaining goals. Progress toward goals / Updated goals:  []  See Progress Note/Recertification    Short Term Goals: To be accomplished in 8 treatments   Patient will be independent in a HEP to improve his balance and LE strength. Patient will score a 43/56 on the lópez balance test to indicate a decreased risk of falls. Patient will report that he has not had a recent fall at home. Long Term Goals: To be accomplished in 16 treatments   Patient will increase lópez balance score to a 45/56 to indicate a decreased risk of falls. Patient will ambulate in the clinic short distances without an assistive device independently and safely to simulate home situations.        PLAN  Yes  Continue plan of care  [x]  Upgrade activities as tolerated  []  Discharge due to :  []  Other:      Machelle Jefferson PT       8/22/2023       9:59 AM

## 2023-08-24 ENCOUNTER — HOSPITAL ENCOUNTER (OUTPATIENT)
Facility: HOSPITAL | Age: 88
Setting detail: RECURRING SERIES
Discharge: HOME | End: 2023-08-27
Payer: MEDICARE

## 2023-08-24 PROCEDURE — 97110 THERAPEUTIC EXERCISES: CPT

## 2023-08-24 NOTE — PROGRESS NOTES
PHYSICAL THERAPY - MEDICARE DAILY TREATMENT NOTE (updated 3/23)      Date: 2023          Patient Name:  Hamilton Fuchs :  1935   Medical   Diagnosis:  Difficulty in walking, not elsewhere classified [R26.2] Treatment Diagnosis:  Decreased gait and balance   Referral Source:  Emilie May MD Insurance:   Payor: Noe Villagran / Plan: MEDICARE PART A AND B / Product Type: *No Product type* /                     Patient  verified yes     Visit #   Current  / Total 5 16   Time   In / Out 1045 1130   Total Treatment Time 45   Total Timed Codes 45   1:1 Treatment Time 39      [facility] Totals Reminder:  bill using total billable   min of TIMED therapeutic procedures and modalities. 8-22 min = 1 unit; 23-37 min = 2 units; 38-52 min = 3 units; 53-67 min = 4 units; 68-82 min = 5 units            SUBJECTIVE    Pain Level (0-10 scale): 0    Any medication changes, allergies to medications, adverse drug reactions, diagnosis change, or new procedure performed?: [x] No    [] Yes (see summary sheet for update)  Medications: Verified on Patient Summary List    Subjective functional status/changes:     Patient reports he is also exercising at home    OBJECTIVE      Therapeutic Procedures: Tx Min Billable or 1:1 Min (if diff from Tx Min) Procedure, Rationale, Specifics   45 45 24206 Therapeutic Exercise (timed):  increase ROM, strength, coordination, balance, and proprioception to improve patient's ability to progress to PLOF and address remaining functional goals.  (see flow sheet as applicable)      Details if applicable:  Nu Step L 3, 10'  Sidestepping on airex balance beam in parallel bars 10x  Airex standing balance progression  4\" step up forward 10x each LE  4\" toe taps 10x  Forward stepping and side stepping over hurdles  Bridging on peanut ball 3x10  Abdominal bracing with hip and knee flexion on peanut ball 3x10  SAQ 2# 2x10  LAQ 2# 2x10                       45 45    Total Total       [x]  Patient Education

## 2023-08-29 ENCOUNTER — HOSPITAL ENCOUNTER (OUTPATIENT)
Facility: HOSPITAL | Age: 88
Setting detail: RECURRING SERIES
Discharge: HOME | End: 2023-09-01
Payer: MEDICARE

## 2023-08-29 PROCEDURE — 97110 THERAPEUTIC EXERCISES: CPT

## 2023-08-29 NOTE — PROGRESS NOTES
PHYSICAL THERAPY - MEDICARE DAILY TREATMENT NOTE (updated 3/23)      Date: 2023          Patient Name:  Rachael Rangel :  1935   Medical   Diagnosis:  Difficulty in walking, not elsewhere classified [R26.2] Treatment Diagnosis:  LE weakness, decreased balance   Referral Source:  Kelsey Sena MD Insurance:   Payor: Freddyreynaldo Stage / Plan: MEDICARE PART A AND B / Product Type: *No Product type* /                     Patient  verified yes     Visit #   Current  / Total 6 16   Time   In / Out 1045 1130   Total Treatment Time 45   Total Timed Codes 45   1:1 Treatment Time 39      [facility] Totals Reminder:  bill using total billable   min of TIMED therapeutic procedures and modalities. 8-22 min = 1 unit; 23-37 min = 2 units; 38-52 min = 3 units; 53-67 min = 4 units; 68-82 min = 5 units            SUBJECTIVE    Pain Level (0-10 scale): 0    Any medication changes, allergies to medications, adverse drug reactions, diagnosis change, or new procedure performed?: [x] No    [] Yes (see summary sheet for update)  Medications: Verified on Patient Summary List    Subjective functional status/changes:     Patient reports he thinks he sat too much yesterday    OBJECTIVE      Therapeutic Procedures: Tx Min Billable or 1:1 Min (if diff from Tx Min) Procedure, Rationale, Specifics   45 45 [id number] Therapeutic Exercise (timed):  increase ROM, strength, coordination, balance, and proprioception to improve patient's ability to progress to PLOF and address remaining functional goals.  (see flow sheet as applicable)      Details if applicable:  Nu Step L 2, 10'  4\" step up forward 10x each LE  4\" toe taps 10x  1 leg stance,  Tandem stance  Bridging on peanut ball 3x10  Abdominal bracing with hip and knee flexion on peanut ball 3x10  SAQ 2# 2x10  LAQ 2# 2x10                       45 45    Total Total       [x]  Patient Education billed concurrently with other procedures   [x] Review HEP    [] Progressed/Changed HEP, detail:

## 2023-08-31 ENCOUNTER — HOSPITAL ENCOUNTER (OUTPATIENT)
Facility: HOSPITAL | Age: 88
Setting detail: RECURRING SERIES
End: 2023-08-31
Payer: MEDICARE

## 2023-08-31 PROCEDURE — 97110 THERAPEUTIC EXERCISES: CPT

## 2023-08-31 NOTE — PROGRESS NOTES
PHYSICAL THERAPY - MEDICARE DAILY TREATMENT NOTE (updated 3/23)      Date: 2023          Patient Name:  Jesus Pritchett :  1935   Medical   Diagnosis:  Difficulty in walking, not elsewhere classified [R26.2] Treatment Diagnosis:  M62.81  GENERAL MUSCLE WEAKNESS and R26.81   Unsteadiness on feet    Referral Source:  Adia Andrade MD Insurance:   Payor: Mona Abts / Plan: MEDICARE PART A AND B / Product Type: *No Product type* /                     Patient  verified yes     Visit #   Current  / Total 7 16   Time   In / Out 1045 1130   Total Treatment Time 45   Total Timed Codes 45   1:1 Treatment Time 39      [facility] Totals Reminder:  bill using total billable   min of TIMED therapeutic procedures and modalities. 8-22 min = 1 unit; 23-37 min = 2 units; 38-52 min = 3 units; 53-67 min = 4 units; 68-82 min = 5 units            SUBJECTIVE    Pain Level (0-10 scale): 0    Any medication changes, allergies to medications, adverse drug reactions, diagnosis change, or new procedure performed?: [x] No    [] Yes (see summary sheet for update)  Medications: Verified on Patient Summary List    Subjective functional status/changes:     Patient reports he felt more off balance than usual today, not certain why    OBJECTIVE     Therapeutic Procedures: Tx Min Billable or 1:1 Min (if diff from Tx Min) Procedure, Rationale, Specifics   45 45 68204 Therapeutic Exercise (timed):  increase ROM, strength, coordination, balance, and proprioception to improve patient's ability to progress to PLOF and address remaining functional goals.  (see flow sheet as applicable)      Details if applicable:  Nu Step L 2, 10'  4\" step up forward 10x each LE  4\" toe taps 10x  Ascend and descend 4, 6\" stairs with bilateral handrails 3x  Bridging on peanut ball 3x10  Abdominal bracing with hip and knee flexion on peanut ball 3x10  SAQ 2# 3x10  LAQ 2# 3x10  SLR 10x                                   45 45     Total Total         [x] Patient Education billed concurrently with other procedures   [x] Review HEP    [] Progressed/Changed HEP, detail:    [] Other detail:         Other Objective/Functional Measures  López balance score 32/56    Pain Level at end of session (0-10 scale): 0      Assessment   Patient is progressing towards goals  Patient will continue to benefit from skilled PT / OT services to modify and progress therapeutic interventions and analyze and address functional mobility deficits to address functional deficits and attain remaining goals. Progress toward goals / Updated goals:  []  See Progress Note/Recertification  Short Term Goals: To be accomplished in 8 treatments   Patient will be independent in a HEP to improve his balance and LE strength. MET  Patient will score a 33/56 on the lópez balance test to indicate a decreased risk of falls. PROGRESSING  Patient will report that he has not had a recent fall at home. MET  Long Term Goals: To be accomplished in 16 treatments  PROGRESSING   Patient will increase lópez balance score to a 36/56 to indicate a decreased risk of falls. Patient will ambulate in the clinic short distances without an assistive device independently and safely to simulate home situations.            PLAN  Yes  Continue plan of care    [x]  Upgrade activities as tolerated  []  Discharge due to :  []  Other:      Thierno Speaker, PT       8/31/2023       8:58 AM

## 2023-08-31 NOTE — PROGRESS NOTES
[facility]  [address], [address]  Office [phone]  Fax [phone]   PHYSICAL THERAPY PROGRESS NOTE  Patient Name:  Hamilton Fuchs :  1935   Treatment/Medical Diagnosis: Difficulty in walking, not elsewhere classified [R26.2]   Referral Source:  Emilie May MD     Date of Initial Visit:  2023 Attended Visits:  7 Missed Visits:       SUMMARY OF TREATMENT/ASSESSMENT:  Patient being seen for exercise, education, balance to increase his LE strength, balance, and gait. CURRENT STATUS  Patient is ambulating independently with a rollator. Donaldson balance score has increased 2 points to 32/56. Patient is progressing towards goals. Short Term Goals: To be accomplished in 8 treatments   Patient will be independent in a HEP to improve his balance and LE strength. MET  Patient will score a 33/56 on the donaldson balance test to indicate a decreased risk of falls. PROGRESSING  Patient will report that he has not had a recent fall at home. MET  Long Term Goals: To be accomplished in 16 treatments  PROGRESSING   Patient will increase donaldson balance score to a 36/56 to indicate a decreased risk of falls. Patient will ambulate in the clinic short distances without an assistive device independently and safely to simulate home situations. RECOMMENDATIONS  Continue skilled PT 1-2 times/week up to 16 visits to work towards goals. Daylin Reardon, PT       2023       10:57 AM    If you have any questions/comments please contact us directly at [phone]. Thank you for allowing us to assist in the care of your patient.

## 2023-09-05 ENCOUNTER — HOSPITAL ENCOUNTER (OUTPATIENT)
Facility: HOSPITAL | Age: 88
Setting detail: RECURRING SERIES
Discharge: HOME | End: 2023-09-08
Payer: MEDICARE

## 2023-09-05 PROCEDURE — 97110 THERAPEUTIC EXERCISES: CPT

## 2023-09-05 NOTE — PROGRESS NOTES
PHYSICAL THERAPY - MEDICARE DAILY TREATMENT NOTE (updated 3/23)      Date: 2023          Patient Name:  Lucia Small :  1935   Medical   Diagnosis:  Difficulty in walking, not elsewhere classified [R26.2] Treatment Diagnosis:  M62.81  GENERAL MUSCLE WEAKNESS and R26.81   Unsteadiness on feet    Referral Source:  Matt Dunn MD Insurance:   Payor: Meseret Cart / Plan: MEDICARE PART A AND B / Product Type: *No Product type* /                     Patient  verified yes     Visit #   Current  / Total 8 16   Time   In / Out 915 10   Total Treatment Time 45   Total Timed Codes 45   1:1 Treatment Time 39      [facility] Totals Reminder:  bill using total billable   min of TIMED therapeutic procedures and modalities. 8-22 min = 1 unit; 23-37 min = 2 units; 38-52 min = 3 units; 53-67 min = 4 units; 68-82 min = 5 units            SUBJECTIVE    Pain Level (0-10 scale): 0    Any medication changes, allergies to medications, adverse drug reactions, diagnosis change, or new procedure performed?: [x] No    [] Yes (see summary sheet for update)  Medications: Verified on Patient Summary List    Subjective functional status/changes: \"My legs are stiff this morning\"    OBJECTIVE    Therapeutic Procedures: Tx Min Billable or 1:1 Min (if diff from Tx Min) Procedure, Rationale, Specifics   45 45 11007 Therapeutic Exercise (timed):  increase ROM, strength, coordination, balance, and proprioception to improve patient's ability to progress to PLOF and address remaining functional goals.  (see flow sheet as applicable)      Details if applicable:  Nu Step L 2, 10'  10x marching  4\" step up forward 10x each LE with UE support  4\" toe taps 2x10x trying without UE support  4 square 6x CW and CCW with UE support  Bridging on peanut ball 3x10  Abdominal bracing with hip and knee flexion on peanut ball 3x10  SAQ 2# 3x10  LAQ 2# 3x10  SLR 10x                                   45 45     Total Total           [x]  Patient

## 2023-09-07 ENCOUNTER — HOSPITAL ENCOUNTER (OUTPATIENT)
Facility: HOSPITAL | Age: 88
Setting detail: RECURRING SERIES
Discharge: HOME | End: 2023-09-10
Payer: MEDICARE

## 2023-09-07 PROCEDURE — 97110 THERAPEUTIC EXERCISES: CPT

## 2023-09-07 NOTE — PROGRESS NOTES
PHYSICAL THERAPY - MEDICARE DAILY TREATMENT NOTE (updated 3/23)      Date: 2023          Patient Name:  Catrachita Willis :  1935   Medical   Diagnosis:  Difficulty in walking, not elsewhere classified [R26.2] Treatment Diagnosis:  M62.81  GENERAL MUSCLE WEAKNESS and R26.81   Unsteadiness on feet    Referral Source:  Teresita Xavier MD Insurance:   Payor: [facility] / Plan: MEDICARE PART A AND B / Product Type: *No Product type* /                     Patient  verified yes     Visit #   Current  / Total 9 16   Time   In / Out 10 1045   Total Treatment Time 45   Total Timed Codes 45   1:1 Treatment Time 39      [facility] Totals Reminder:  bill using total billable   min of TIMED therapeutic procedures and modalities. 8-22 min = 1 unit; 23-37 min = 2 units; 38-52 min = 3 units; 53-67 min = 4 units; 68-82 min = 5 units            SUBJECTIVE    Pain Level (0-10 scale): 0    Any medication changes, allergies to medications, adverse drug reactions, diagnosis change, or new procedure performed?: [x] No    [] Yes (see summary sheet for update)  Medications: Verified on Patient Summary List    Subjective functional status/changes:     Patient reports 1 month ago he was able to help with lawn/garden with wife holding onto belt. He is unable to do that now. He feels therapy is helping    OBJECTIVE       Therapeutic Procedures: Tx Min Billable or 1:1 Min (if diff from Tx Min) Procedure, Rationale, Specifics   45 45 95891 Therapeutic Exercise (timed):  increase ROM, strength, coordination, balance, and proprioception to improve patient's ability to progress to PLOF and address remaining functional goals.  (see flow sheet as applicable)      Details if applicable:  Nu Step L 2.5, 10'  Standing:  10x marching 2 sets  10 x hip abduction 2 sets  10 x knee flexion 2 sets  2x10 mini squats  Lateral step overs 4\" cone 20x  Forward back step over 2\" 20x  Bridging on peanut ball 3x10  Abdominal bracing with hip and knee

## 2023-09-12 ENCOUNTER — HOSPITAL ENCOUNTER (OUTPATIENT)
Facility: HOSPITAL | Age: 88
Setting detail: RECURRING SERIES
Discharge: HOME | End: 2023-09-15
Payer: MEDICARE

## 2023-09-12 PROCEDURE — 97110 THERAPEUTIC EXERCISES: CPT

## 2023-09-12 NOTE — PROGRESS NOTES
PHYSICAL THERAPY - MEDICARE DAILY TREATMENT NOTE (updated 3/23)      Date: 2023          Patient Name:  Charu Sterling :  1935   Medical   Diagnosis:  Difficulty in walking, not elsewhere classified [R26.2] Treatment Diagnosis:  M62.81  GENERAL MUSCLE WEAKNESS, R26.81   Unsteadiness on feet, and R26.89   Abnormalities of gait and mobility    Referral Source:  Scooby Tamayo MD Insurance:   Payor: Phyllis Dallas / Plan: MEDICARE PART A AND B / Product Type: *No Product type* /                     Patient  verified yes     Visit #   Current  / Total 10 16   Time   In / Out 1045 1130   Total Treatment Time 45   Total Timed Codes 45   1:1 Treatment Time 39      [facility] Totals Reminder:  bill using total billable   min of TIMED therapeutic procedures and modalities. 8-22 min = 1 unit; 23-37 min = 2 units; 38-52 min = 3 units; 53-67 min = 4 units; 68-82 min = 5 units            SUBJECTIVE    Pain Level (0-10 scale): 0    Any medication changes, allergies to medications, adverse drug reactions, diagnosis change, or new procedure performed?: [x] No    [] Yes (see summary sheet for update)  Medications: Verified on Patient Summary List    Subjective functional status/changes:     No new c/o    OBJECTIVE     Therapeutic Procedures: Tx Min Billable or 1:1 Min (if diff from Tx Min) Procedure, Rationale, Specifics   45 45 26938 Therapeutic Exercise (timed):  increase ROM, strength, coordination, balance, and proprioception to improve patient's ability to progress to PLOF and address remaining functional goals.  (see flow sheet as applicable)      Details if applicable:  Nu Step L 2.5, 10'  Standing:  Sit to stand with UE in 30 seconds 8x  Sit to stand with UE crossed in front of body 5x  2x10 mini squats  Ascend and descend stairs with UE support  Lateral step overs 4\" hurdles 20x  Forward back step over 4\" 20x    Supine:  Bridging on peanut ball 3x10  Abdominal bracing with hip and knee flexion on peanut ball

## 2023-09-14 ENCOUNTER — HOSPITAL ENCOUNTER (OUTPATIENT)
Facility: HOSPITAL | Age: 88
Setting detail: RECURRING SERIES
Discharge: HOME | End: 2023-09-17
Payer: MEDICARE

## 2023-09-14 PROCEDURE — 97110 THERAPEUTIC EXERCISES: CPT

## 2023-09-14 NOTE — PROGRESS NOTES
PHYSICAL THERAPY - MEDICARE DAILY TREATMENT NOTE (updated 3/23)      Date: 2023          Patient Name:  Marilu Flores :  1935   Medical   Diagnosis:  Difficulty in walking, not elsewhere classified [R26.2] Treatment Diagnosis:  M62.81  GENERAL MUSCLE WEAKNESS, R26.81   Unsteadiness on feet, and R26.89   Abnormalities of gait and mobility    Referral Source:  Blake Gimenez MD Insurance:   Payor: Surendra Castle / Plan: MEDICARE PART A AND B / Product Type: *No Product type* /                     Patient  verified yes     Visit #   Current  / Total 11 24   Time   In / Out 1045 1130   Total Treatment Time 45   Total Timed Codes 45   1:1 Treatment Time 39      [facility] Totals Reminder:  bill using total billable   min of TIMED therapeutic procedures and modalities. 8-22 min = 1 unit; 23-37 min = 2 units; 38-52 min = 3 units; 53-67 min = 4 units; 68-82 min = 5 units            SUBJECTIVE    Pain Level (0-10 scale): 0    Any medication changes, allergies to medications, adverse drug reactions, diagnosis change, or new procedure performed?: [x] No    [] Yes (see summary sheet for update)  Medications: Verified on Patient Summary List    Subjective functional status/changes:     Walking feels about the same    OBJECTIVE     Therapeutic Procedures: Tx Min Billable or 1:1 Min (if diff from Tx Min) Procedure, Rationale, Specifics   45 45 15473 Therapeutic Exercise (timed):  increase ROM, strength, coordination, balance, and proprioception to improve patient's ability to progress to PLOF and address remaining functional goals. (see flow sheet as applicable)      Details if applicable:  Nu Step L 2.5, 10'    Standing:   Toe taps 4\" step 10x without UE support  Sit to stand with UE crossed in front of body 3x5x  2' walk 150'  Ambulation 25' without a device with SBA     Supine:  Bridging on peanut ball 3x10  Abdominal bracing with hip and knee flexion on peanut ball 3x10  SAQ 2 1/2# 3x10  SLR 10x     Sitting:

## 2023-09-18 DIAGNOSIS — J44.9 CHRONIC OBSTRUCTIVE PULMONARY DISEASE, UNSPECIFIED (HCC): ICD-10-CM

## 2023-09-18 RX ORDER — FLUTICASONE PROPIONATE AND SALMETEROL 50; 250 UG/1; UG/1
POWDER RESPIRATORY (INHALATION)
Qty: 60 EACH | Refills: 5 | Status: SHIPPED | OUTPATIENT
Start: 2023-09-18

## 2023-09-19 ENCOUNTER — HOSPITAL ENCOUNTER (OUTPATIENT)
Facility: HOSPITAL | Age: 88
Setting detail: RECURRING SERIES
Discharge: HOME | End: 2023-09-22
Payer: MEDICARE

## 2023-09-19 PROCEDURE — 97110 THERAPEUTIC EXERCISES: CPT

## 2023-09-19 NOTE — PROGRESS NOTES
PHYSICAL THERAPY - MEDICARE DAILY TREATMENT NOTE (updated 3/23)      Date: 2023          Patient Name:  Marilu Flores :  1935   Medical   Diagnosis:  Difficulty in walking, not elsewhere classified [R26.2] Treatment Diagnosis:  M62.81  GENERAL MUSCLE WEAKNESS, R26.81   Unsteadiness on feet, and R26.89   Abnormalities of gait and mobility    Referral Source:  Blake Gimenez MD Insurance:   Payor: Surendra Castle / Plan: MEDICARE PART A AND B / Product Type: *No Product type* /                     Patient  verified yes     Visit #   Current  / Total 12 24   Time   In / Out 1045 1130   Total Treatment Time 45   Total Timed Codes 45   1:1 Treatment Time 39      [facility] Totals Reminder:  bill using total billable   min of TIMED therapeutic procedures and modalities. 8-22 min = 1 unit; 23-37 min = 2 units; 38-52 min = 3 units; 53-67 min = 4 units; 68-82 min = 5 units            SUBJECTIVE    Pain Level (0-10 scale): 0    Any medication changes, allergies to medications, adverse drug reactions, diagnosis change, or new procedure performed?: [x] No    [] Yes (see summary sheet for update)  Medications: Verified on Patient Summary List    Subjective functional status/changes:     Patient reports he feels off balance today    OBJECTIVE     Therapeutic Procedures: Tx Min Billable or 1:1 Min (if diff from Tx Min) Procedure, Rationale, Specifics   45 45 [id number] Therapeutic Exercise (timed):  increase ROM, strength, coordination, balance, and proprioception to improve patient's ability to progress to PLOF and address remaining functional goals. (see flow sheet as applicable)      Details if applicable:  Nu Step L 2.0, 10'     Standing:   Toe taps 4\" step 10x without UE support  Sit to stand with UE crossed in front of body 3x5x  Amb with rollator 220' working on upright posture  Ambulation 25' without a device with SBA  Standing upright, working on knee extension and upright posture     Supine:  Bridging on peanut

## 2023-09-21 ENCOUNTER — HOSPITAL ENCOUNTER (OUTPATIENT)
Facility: HOSPITAL | Age: 88
Setting detail: RECURRING SERIES
Discharge: HOME | End: 2023-09-24
Payer: MEDICARE

## 2023-09-21 PROCEDURE — 97110 THERAPEUTIC EXERCISES: CPT

## 2023-09-21 NOTE — PROGRESS NOTES
hip and knee flexion on peanut ball 3x10  SAQ 2 1/2# 3x10  SLR 10x     Sitting:   LAQ  21/2 # 2x10                       45 45    Total Total       [x]  Patient Education billed concurrently with other procedures   [x] Review HEP    [] Progressed/Changed HEP, detail:    [] Other detail:           Pain Level at end of session (0-10 scale): 0      Assessment   Patient demonstrated improved balance when picking objects off the floor today  Patient will continue to benefit from skilled PT / OT services to modify and progress therapeutic interventions and analyze and address functional mobility deficits to address functional deficits and attain remaining goals.     Progress toward goals / Updated goals:  []  See Progress Note/Recertification    Working towards goals      PLAN  Yes  Continue plan of care    [x]  Upgrade activities as tolerated  []  Discharge due to :  []  Other:      Lesli Kayser, PT       9/21/2023       10:11 AM

## 2023-09-26 ENCOUNTER — HOSPITAL ENCOUNTER (OUTPATIENT)
Facility: HOSPITAL | Age: 88
Setting detail: RECURRING SERIES
Discharge: HOME | End: 2023-09-29
Payer: MEDICARE

## 2023-09-26 PROCEDURE — 97110 THERAPEUTIC EXERCISES: CPT

## 2023-09-26 NOTE — PROGRESS NOTES
PHYSICAL THERAPY - MEDICARE DAILY TREATMENT NOTE (updated 3/23)      Date: 2023          Patient Name:  Marilu Flores :  1935   Medical   Diagnosis:  Difficulty in walking, not elsewhere classified [R26.2] Treatment Diagnosis:  M62.81  GENERAL MUSCLE WEAKNESS, R26.81   Unsteadiness on feet, and R26.89   Abnormalities of gait and mobility    Referral Source:  Blake Gimenez MD Insurance:   Payor: Surendra Castle / Plan: MEDICARE PART A AND B / Product Type: *No Product type* /                     Patient  verified yes     Visit #   Current  / Total 14 24   Time   In / Out 1045 1130   Total Treatment Time 45   Total Timed Codes 45   1:1 Treatment Time 39      [facility] Totals Reminder:  bill using total billable   min of TIMED therapeutic procedures and modalities. 8-22 min = 1 unit; 23-37 min = 2 units; 38-52 min = 3 units; 53-67 min = 4 units; 68-82 min = 5 units            SUBJECTIVE    Pain Level (0-10 scale): 0    Any medication changes, allergies to medications, adverse drug reactions, diagnosis change, or new procedure performed?: [x] No    [] Yes (see summary sheet for update)  Medications: Verified on Patient Summary List    Subjective functional status/changes:     Patient feels more steady    OBJECTIVE       Therapeutic Procedures: Tx Min Billable or 1:1 Min (if diff from Tx Min) Procedure, Rationale, Specifics   45 45  [id number] Therapeutic Exercise (timed):  increase ROM, strength, coordination, balance, and proprioception to improve patient's ability to progress to PLOF and address remaining functional goals. (see flow sheet as applicable)      Details if applicable:  Nu Step L 2.5, 10'     Standing:   Toe taps 4\" step 10x without UE support  Sit to stand with UE crossed in front of body 3x5x    Ambulation with rollator inside 220' x 2 working on picking up objects from ground, supervised assist with reaching outside DONNA  Ambulation short distances without a device working on upright posture

## 2023-09-28 ENCOUNTER — HOSPITAL ENCOUNTER (OUTPATIENT)
Facility: HOSPITAL | Age: 88
Setting detail: RECURRING SERIES
End: 2023-09-28
Payer: MEDICARE

## 2023-09-28 PROCEDURE — 97110 THERAPEUTIC EXERCISES: CPT

## 2023-09-28 NOTE — PROGRESS NOTES
PHYSICAL THERAPY - MEDICARE DAILY TREATMENT NOTE (updated 3/23)      Date: 2023          Patient Name:  Charu Sterling :  1935   Medical   Diagnosis:  Difficulty in walking, not elsewhere classified [R26.2] Treatment Diagnosis:  M62.81  GENERAL MUSCLE WEAKNESS and R26.81   Unsteadiness on feet    Referral Source:  Scooby Tamayo MD Insurance:   Payor: MEDICARE / Plan: MEDICARE PART A AND B / Product Type: *No Product type* /                     Patient  verified yes     Visit #   Current  / Total 15 24   Time   In / Out 1045 1130   Total Treatment Time 45   Total Timed Codes 40   1:1 Treatment Time 40      [facility] Totals Reminder:  bill using total billable   min of TIMED therapeutic procedures and modalities. 8-22 min = 1 unit; 23-37 min = 2 units; 38-52 min = 3 units; 53-67 min = 4 units; 68-82 min = 5 units            SUBJECTIVE    Pain Level (0-10 scale): 0    Any medication changes, allergies to medications, adverse drug reactions, diagnosis change, or new procedure performed?: [x] No    [] Yes (see summary sheet for update)  Medications: Verified on Patient Summary List    Subjective functional status/changes:     Patient has lumbar injections yesterday, instructed to not lift over 10lbs for 24 hours    OBJECTIVE      Therapeutic Procedures: Tx Min Billable or 1:1 Min (if diff from Tx Min) Procedure, Rationale, Specifics   40 40 [id number] Therapeutic Exercise (timed):  increase ROM, strength, coordination, balance, and proprioception to improve patient's ability to progress to PLOF and address remaining functional goals.  (see flow sheet as applicable)     Details if applicable:    Balance exercises in parallel bars, standing progression  Sidestepping in the parallel bars  Sit to stand on an airex while balancing  Supine glut sets with LE on peanut ball 30x  Supine hip and knee flexion with abdominal bracing on peanut ball 30x  Gait- 200' in the clinic with rollator working on upright posture, increasing foot clearance                       40 40    Total Total       [x]  Patient Education billed concurrently with other procedures   [x] Review HEP    [] Progressed/Changed HEP, detail:    [] Other detail:           Pain Level at end of session (0-10 scale): 0      Assessment   Followed post shot instructions today, patient continues to improve his balance and mobility  Patient will continue to benefit from skilled PT / OT services to modify and progress therapeutic interventions and analyze and address functional mobility deficits to address functional deficits and attain remaining goals.     Progress toward goals / Updated goals:  []  See Progress Note/Recertification    Working towards goals      PLAN  Yes  Continue plan of care    [x]  Upgrade activities as tolerated  []  Discharge due to :  []  Other:      Nilay Dang PT       9/28/2023       10:51 AM

## 2023-10-05 ENCOUNTER — HOSPITAL ENCOUNTER (OUTPATIENT)
Facility: HOSPITAL | Age: 88
Setting detail: RECURRING SERIES
Discharge: HOME | End: 2023-10-08
Payer: MEDICARE

## 2023-10-05 PROCEDURE — 97110 THERAPEUTIC EXERCISES: CPT

## 2023-10-05 NOTE — PROGRESS NOTES
PHYSICAL THERAPY - MEDICARE DAILY TREATMENT NOTE (updated 3/23)      Date: 10/5/2023          Patient Name:  Mack Zepeda :  1935   Medical   Diagnosis:  Difficulty in walking, not elsewhere classified [R26.2] Treatment Diagnosis:  M62.81  GENERAL MUSCLE WEAKNESS    Referral Source:  Sandrita Whaley MD Insurance:   Payor: MEDICARE / Plan: MEDICARE PART A AND B / Product Type: *No Product type* /                     Patient  verified yes     Visit #   Current  / Total 16 24   Time   In / Out 2 245   Total Treatment Time 45   Total Timed Codes 45   1:1 Treatment Time 39      [facility] Totals Reminder:  bill using total billable   min of TIMED therapeutic procedures and modalities. 8-22 min = 1 unit; 23-37 min = 2 units; 38-52 min = 3 units; 53-67 min = 4 units; 68-82 min = 5 units            SUBJECTIVE    Pain Level (0-10 scale): 0    Any medication changes, allergies to medications, adverse drug reactions, diagnosis change, or new procedure performed?: [x] No    [] Yes (see summary sheet for update)  Medications: Verified on Patient Summary List    Subjective functional status/changes:     Patient thinks his balance is improving    OBJECTIVE      Therapeutic Procedures: Tx Min Billable or 1:1 Min (if diff from Tx Min) Procedure, Rationale, Specifics   45 45 [id number] Therapeutic Exercise (timed):  increase ROM, strength, coordination, balance, and proprioception to improve patient's ability to progress to PLOF and address remaining functional goals. (see flow sheet as applicable)      Details if applicable:  Nu Step L 1.5, 10'     Standing:   Toe taps 4\" step 10x without UE support  Sit to stand with UE crossed in front of body 3x5x     Ambulation with rollator inside 220' x 2 working on picking up objects from ground, supervised assist with reaching outside DONNA  Ambulation short distances without a device working on upright posture with supervised assist  Standing upright, working on knee extension and

## 2023-10-07 NOTE — PROGRESS NOTES
[facility]  [address], [address]  Office [phone]  Fax [phone]   PHYSICAL THERAPY PROGRESS NOTE  Patient Name:  Merrell Galeazzi :  1935   Treatment/Medical Diagnosis: Difficulty in walking, not elsewhere classified [R26.2]   Referral Source:  Alize Galindo MD     Date of Initial Visit:  2023 Attended Visits:  15 Missed Visits:       SUMMARY OF TREATMENT/ASSESSMENT:  Patient is being seen for decreased mobility, decreased balance, LE weakness, and decreased gait. CURRENT STATUS    30 second sit to stand 6x  Donaldson balance 33/56- improved by 3 points since the initial evaluation   Patient reports he is able to stand for longer periods of time since starting therapy. He feels less fearful of falling. He continues to work towards goals. Short Term Goals: To be accomplished in 8 treatments   Patient will be independent in a HEP to improve his balance and LE strength. MET  Patient will score a 33/56 on the donaldson balance test to indicate a decreased risk of falls. PROGRESSING  Patient will report that he has not had a recent fall at home. MET  Long Term Goals: To be accomplished in 16 treatments  PROGRESSING   Patient will increase donaldson balance score to a 36/56 to indicate a decreased risk of falls. Patient will ambulate in the clinic short distances without an assistive device independently and safely to simulate home situations. RECOMMENDATIONS  Continue skilled PT 1-2 times/week up to 24 visits to work towards goals. Recommend maintenance exercise program when patient D/C. Sonny Bell, PT       10/7/2023       5:21 PM    If you have any questions/comments please contact us directly at [phone]. Thank you for allowing us to assist in the care of your patient.

## 2023-10-10 ENCOUNTER — HOSPITAL ENCOUNTER (OUTPATIENT)
Facility: HOSPITAL | Age: 88
Setting detail: RECURRING SERIES
Discharge: HOME | End: 2023-10-13
Payer: MEDICARE

## 2023-10-10 PROCEDURE — 97110 THERAPEUTIC EXERCISES: CPT

## 2023-10-10 NOTE — PROGRESS NOTES
PHYSICAL THERAPY - MEDICARE DAILY TREATMENT NOTE (updated 3/23)      Date: 10/10/2023          Patient Name:  Niharika Wise :  1935   Medical   Diagnosis:  Difficulty in walking, not elsewhere classified [R26.2] Treatment Diagnosis:  M62.81  GENERAL MUSCLE WEAKNESS and R26.81   Unsteadiness on feet    Referral Source:  Andreea Gomez MD Insurance:   Payor: MEDICARE / Plan: MEDICARE PART A AND B / Product Type: *No Product type* /                     Patient  verified yes     Visit #   Current  / Total 17 24   Time   In / Out 245 330   Total Treatment Time 45   Total Timed Codes 45   1:1 Treatment Time 39      [facility] Totals Reminder:  bill using total billable   min of TIMED therapeutic procedures and modalities. 8-22 min = 1 unit; 23-37 min = 2 units; 38-52 min = 3 units; 53-67 min = 4 units; 68-82 min = 5 units            SUBJECTIVE    Pain Level (0-10 scale): 0    Any medication changes, allergies to medications, adverse drug reactions, diagnosis change, or new procedure performed?: [x] No    [] Yes (see summary sheet for update)  Medications: Verified on Patient Summary List    Subjective functional status/changes:     Patient reports he feels weak at times, the therapy helps    OBJECTIVE       Therapeutic Procedures: Tx Min Billable or 1:1 Min (if diff from Tx Min) Procedure, Rationale, Specifics   45 45 36661 Therapeutic Exercise (timed):  increase ROM, strength, coordination, balance, and proprioception to improve patient's ability to progress to PLOF and address remaining functional goals.  (see flow sheet as applicable)      Details if applicable:  Nu Step L 1.5, 10'     Standing:  Sit to stand with UE crossed in front of body 3x5x  Shuttle balance yellow- balance forward and sideways and marching and hip abd with UE support     Ambulation with rollator inside 220'   Ambulation short distances without a device working on upright posture with supervised assist  Standing upright, working on

## 2023-10-12 ENCOUNTER — HOSPITAL ENCOUNTER (OUTPATIENT)
Facility: HOSPITAL | Age: 88
Setting detail: RECURRING SERIES
Discharge: HOME | End: 2023-10-15
Payer: MEDICARE

## 2023-10-12 PROCEDURE — 97110 THERAPEUTIC EXERCISES: CPT

## 2023-10-12 NOTE — PROGRESS NOTES
PHYSICAL THERAPY - MEDICARE DAILY TREATMENT NOTE (updated 3/23)      Date: 10/12/2023          Patient Name:  Christel Whaley :  1935   Medical   Diagnosis:  Difficulty in walking, not elsewhere classified [R26.2] Treatment Diagnosis:  M62.81  GENERAL MUSCLE WEAKNESS and R26.89   Abnormalities of gait and mobility    Referral Source:  Sanjay Bejarano MD Insurance:   Payor: Arsalan Hernandez / Plan: MEDICARE PART A AND B / Product Type: *No Product type* /                             Patient  verified yes     Visit #   Current  / Total 18 24   Time   In / Out 245 330   Total Treatment Time 45   Total Timed Codes 45   1:1 Treatment Time 39      [facility] Totals Reminder:  bill using total billable   min of TIMED therapeutic procedures and modalities. 8-22 min = 1 unit; 23-37 min = 2 units; 38-52 min = 3 units; 53-67 min = 4 units; 68-82 min = 5 units               SUBJECTIVE     Pain Level (0-10 scale): 0     Any medication changes, allergies to medications, adverse drug reactions, diagnosis change, or new procedure performed?: [x] No    [] Yes (see summary sheet for update)  Medications: Verified on Patient Summary List     Subjective functional status/changes:     Patient reports he feels weak at times, the therapy helps     OBJECTIVE        Therapeutic Procedures: Tx Min Billable or 1:1 Min (if diff from Tx Min) Procedure, Rationale, Specifics   45 53 [id number] Therapeutic Exercise (timed):  increase ROM, strength, coordination, balance, and proprioception to improve patient's ability to progress to PLOF and address remaining functional goals.  (see flow sheet as applicable)      Details if applicable:  Nu Step L 1.5, 10'     Standing:  Sit to stand with UE crossed in front of body 3x5x  Shuttle balance yellow- balance forward and sideways and marching and hip abd with UE support  Rebounder 1 kg 2x10     Ambulation with rollator inside 220'   Ambulation short distances without a device working on

## 2023-10-17 ENCOUNTER — HOSPITAL ENCOUNTER (OUTPATIENT)
Facility: HOSPITAL | Age: 88
Setting detail: RECURRING SERIES
Discharge: HOME | End: 2023-10-20
Payer: MEDICARE

## 2023-10-17 PROCEDURE — 97110 THERAPEUTIC EXERCISES: CPT

## 2023-10-17 NOTE — PROGRESS NOTES
PHYSICAL THERAPY - MEDICARE DAILY TREATMENT NOTE (updated 3/23)      Date: 10/17/2023          Patient Name:  Hamilton Fuchs :  1935   Medical   Diagnosis:  Difficulty in walking, not elsewhere classified [R26.2] Treatment Diagnosis:  M62.81  GENERAL MUSCLE WEAKNESS and R26.81   Unsteadiness on feet    Referral Source:  Emilie May MD Insurance:   Payor: MEDICARE / Plan: MEDICARE PART A AND B / Product Type: *No Product type* /                                 Patient  verified yes     Visit #   Current  / Total 19 24   Time   In / Out 245 330   Total Treatment Time 45   Total Timed Codes 45   1:1 Treatment Time 39      [facility] Totals Reminder:  bill using total billable   min of TIMED therapeutic procedures and modalities. 8-22 min = 1 unit; 23-37 min = 2 units; 38-52 min = 3 units; 53-67 min = 4 units; 68-82 min = 5 units               SUBJECTIVE     Pain Level (0-10 scale): 0     Any medication changes, allergies to medications, adverse drug reactions, diagnosis change, or new procedure performed?: [x] No    [] Yes (see summary sheet for update)  Medications: Verified on Patient Summary List     Subjective functional status/changes:     Patient reports he feels weak at times, the therapy helps     OBJECTIVE        Therapeutic Procedures: Tx Min Billable or 1:1 Min (if diff from Tx Min) Procedure, Rationale, Specifics   45 45 46531 Therapeutic Exercise (timed):  increase ROM, strength, coordination, balance, and proprioception to improve patient's ability to progress to PLOF and address remaining functional goals.  (see flow sheet as applicable)      Details if applicable:  Nu Step L 1.5, 10'     Standing:  Sit to stand with UE crossed in front of body 3x5x  Shuttle balance yellow- balance forward and sideways and marching and hip abd with UE support  Rebounder 1 kg 2x10     Ambulation with rollator inside 220'   Ambulation short distances without a device working on upright posture with

## 2023-10-19 ENCOUNTER — OFFICE VISIT (OUTPATIENT)
Age: 88
End: 2023-10-19
Payer: MEDICARE

## 2023-10-19 VITALS
DIASTOLIC BLOOD PRESSURE: 68 MMHG | HEART RATE: 84 BPM | WEIGHT: 201.2 LBS | SYSTOLIC BLOOD PRESSURE: 106 MMHG | OXYGEN SATURATION: 97 % | HEIGHT: 72 IN | RESPIRATION RATE: 18 BRPM | BODY MASS INDEX: 27.25 KG/M2

## 2023-10-19 DIAGNOSIS — Z00.00 MEDICARE ANNUAL WELLNESS VISIT, SUBSEQUENT: Primary | ICD-10-CM

## 2023-10-19 DIAGNOSIS — R60.0 LOWER EXTREMITY EDEMA: ICD-10-CM

## 2023-10-19 DIAGNOSIS — J41.0 SIMPLE CHRONIC BRONCHITIS (HCC): ICD-10-CM

## 2023-10-19 DIAGNOSIS — Z23 NEEDS FLU SHOT: ICD-10-CM

## 2023-10-19 PROCEDURE — G0008 ADMIN INFLUENZA VIRUS VAC: HCPCS | Performed by: INTERNAL MEDICINE

## 2023-10-19 PROCEDURE — 1123F ACP DISCUSS/DSCN MKR DOCD: CPT | Performed by: INTERNAL MEDICINE

## 2023-10-19 PROCEDURE — G8484 FLU IMMUNIZE NO ADMIN: HCPCS | Performed by: INTERNAL MEDICINE

## 2023-10-19 PROCEDURE — 90694 VACC AIIV4 NO PRSRV 0.5ML IM: CPT | Performed by: INTERNAL MEDICINE

## 2023-10-19 PROCEDURE — G0439 PPPS, SUBSEQ VISIT: HCPCS | Performed by: INTERNAL MEDICINE

## 2023-10-19 RX ORDER — FAMOTIDINE 40 MG/1
40 TABLET, FILM COATED ORAL
COMMUNITY
Start: 2023-08-15

## 2023-10-19 ASSESSMENT — PATIENT HEALTH QUESTIONNAIRE - PHQ9
7. TROUBLE CONCENTRATING ON THINGS, SUCH AS READING THE NEWSPAPER OR WATCHING TELEVISION: 1
SUM OF ALL RESPONSES TO PHQ QUESTIONS 1-9: 8
9. THOUGHTS THAT YOU WOULD BE BETTER OFF DEAD, OR OF HURTING YOURSELF: 0
SUM OF ALL RESPONSES TO PHQ QUESTIONS 1-9: 8
5. POOR APPETITE OR OVEREATING: 0
4. FEELING TIRED OR HAVING LITTLE ENERGY: 3
8. MOVING OR SPEAKING SO SLOWLY THAT OTHER PEOPLE COULD HAVE NOTICED. OR THE OPPOSITE, BEING SO FIGETY OR RESTLESS THAT YOU HAVE BEEN MOVING AROUND A LOT MORE THAN USUAL: 0
10. IF YOU CHECKED OFF ANY PROBLEMS, HOW DIFFICULT HAVE THESE PROBLEMS MADE IT FOR YOU TO DO YOUR WORK, TAKE CARE OF THINGS AT HOME, OR GET ALONG WITH OTHER PEOPLE: 1
3. TROUBLE FALLING OR STAYING ASLEEP: 1
2. FEELING DOWN, DEPRESSED OR HOPELESS: 0
1. LITTLE INTEREST OR PLEASURE IN DOING THINGS: 3
6. FEELING BAD ABOUT YOURSELF - OR THAT YOU ARE A FAILURE OR HAVE LET YOURSELF OR YOUR FAMILY DOWN: 0
SUM OF ALL RESPONSES TO PHQ9 QUESTIONS 1 & 2: 3

## 2023-10-19 ASSESSMENT — LIFESTYLE VARIABLES
HOW MANY STANDARD DRINKS CONTAINING ALCOHOL DO YOU HAVE ON A TYPICAL DAY: 1 OR 2
HOW OFTEN DO YOU HAVE A DRINK CONTAINING ALCOHOL: MONTHLY OR LESS

## 2023-10-26 ENCOUNTER — HOSPITAL ENCOUNTER (OUTPATIENT)
Facility: HOSPITAL | Age: 88
Setting detail: RECURRING SERIES
Discharge: HOME | End: 2023-10-29
Payer: MEDICARE

## 2023-10-26 PROCEDURE — 97110 THERAPEUTIC EXERCISES: CPT

## 2023-10-26 NOTE — PROGRESS NOTES
[facility]  [address] [place] [address] [address], [address]  Office [phone]  Fax [phone]   DISCHARGE SUMMARY  Patient Name: Niharika Wise : 1935   Treatment/Medical Diagnosis: Difficulty in walking, not elsewhere classified [R26.2]   Referral Source: Andreea Gomez MD     Date of Initial Visit: 2023 Attended Visits: 20 Missed Visits:      SUMMARY OF TREATMENT  Patient being treated for balance and strengthening    CURRENT STATUS  Patient ambulating independently with a rollator  30 second sit to stand 6x  Donaldson balance 33/56- improved by 3 points since the initial evaluation   Patient reports he is able to stand for longer periods of time since starting therapy. He feels less fearful of falling. He continues to work towards goals. Short Term Goals: To be accomplished in 8 treatments   Patient will be independent in a HEP to improve his balance and LE strength. MET  Patient will score a 33/56 on the donaldson balance test to indicate a decreased risk of falls. PROGRESSING  Patient will report that he has not had a recent fall at home. MET  Long Term Goals: To be accomplished in 16 treatments  PROGRESSING   Patient will increase donaldson balance score to a 36/56 to indicate a decreased risk of falls. Patient will ambulate in the clinic short distances without an assistive device independently and safely to simulate home situations. RECOMMENDATIONS  Discontinue therapy. Progressing towards or have reached established goals. Patient plans to participate in the maintenance exercise program.        Cassie Adair PT       10/26/2023       10:03 AM    If you have any questions/comments please contact us directly at [phone]. Thank you for allowing us to assist in the care of your patient.

## 2023-10-26 NOTE — PROGRESS NOTES
PHYSICAL THERAPY - MEDICARE DAILY TREATMENT NOTE (updated 3/23)      Date: 10/26/2023          Patient Name:  Niharika Wise :  1935   Medical   Diagnosis:  Difficulty in walking, not elsewhere classified [R26.2] Treatment Diagnosis:  M62.81  GENERAL MUSCLE WEAKNESS and R26.81   Unsteadiness on feet    Referral Source:  Andreea Gomez MD Insurance:   Payor: MEDICARE / Plan: MEDICARE PART A AND B / Product Type: *No Product type* /                     Patient  verified yes     Visit #   Current  / Total 20 20   Time   In / Out 915 10   Total Treatment Time 45   Total Timed Codes 45   1:1 Treatment Time 39      [facility] Totals Reminder:  bill using total billable   min of TIMED therapeutic procedures and modalities. 8-22 min = 1 unit; 23-37 min = 2 units; 38-52 min = 3 units; 53-67 min = 4 units; 68-82 min = 5 units            SUBJECTIVE    Pain Level (0-10 scale): 0    Any medication changes, allergies to medications, adverse drug reactions, diagnosis change, or new procedure performed?: [x] No    [] Yes (see summary sheet for update)  Medications: Verified on Patient Summary List    Subjective functional status/changes: Today will be patient's last visit and he will return in a couple weeks , he has dental procedures and removal of skin cancers the next couple of weeks    OBJECTIVE       Therapeutic Procedures: Tx Min Billable or 1:1 Min (if diff from Tx Min) Procedure, Rationale, Specifics   45 45 [id number] Therapeutic Exercise (timed):  increase ROM, strength, coordination, balance, and proprioception to improve patient's ability to progress to PLOF and address remaining functional goals.  (see flow sheet as applicable)      Details if applicable:  Nu Step L 1.5, 10'     Standing:  Sit to stand with UE crossed in front of body 3x5x  Shuttle balance yellow- balance forward and sideways and marching and hip abd with UE support  Rebounder 1 kg 2x10     Ambulation with rollator inside 220'   Ambulation

## 2023-11-07 ENCOUNTER — HOSPITAL ENCOUNTER (OUTPATIENT)
Facility: HOSPITAL | Age: 88
Setting detail: OBSERVATION
Discharge: HOME | End: 2023-11-09
Attending: FAMILY MEDICINE | Admitting: HOSPITALIST
Payer: MEDICARE

## 2023-11-07 ENCOUNTER — APPOINTMENT (OUTPATIENT)
Facility: HOSPITAL | Age: 88
End: 2023-11-07
Payer: MEDICARE

## 2023-11-07 DIAGNOSIS — R53.1 WEAKNESS: Primary | ICD-10-CM

## 2023-11-07 LAB
ALBUMIN SERPL-MCNC: 3.6 G/DL (ref 3.5–5)
ALBUMIN/GLOB SERPL: 1.2 (ref 1.1–2.2)
ALP SERPL-CCNC: 109 U/L (ref 45–117)
ALT SERPL-CCNC: 27 U/L (ref 12–78)
ANION GAP SERPL CALC-SCNC: 5 MMOL/L (ref 5–15)
APPEARANCE UR: CLEAR
AST SERPL-CCNC: 20 U/L (ref 15–37)
BACTERIA URNS QL MICRO: NEGATIVE /HPF
BASOPHILS # BLD: 0.1 K/UL (ref 0–0.1)
BASOPHILS NFR BLD: 1 % (ref 0–1)
BILIRUB SERPL-MCNC: 1.1 MG/DL (ref 0.2–1)
BILIRUB UR QL: NEGATIVE
BUN SERPL-MCNC: 19 MG/DL (ref 6–20)
BUN/CREAT SERPL: 12 (ref 12–20)
CALCIUM SERPL-MCNC: 9.1 MG/DL (ref 8.5–10.1)
CHLORIDE SERPL-SCNC: 100 MMOL/L (ref 97–108)
CO2 SERPL-SCNC: 31 MMOL/L (ref 21–32)
COLOR UR: NORMAL
CREAT SERPL-MCNC: 1.62 MG/DL (ref 0.7–1.3)
DIFFERENTIAL METHOD BLD: ABNORMAL
EKG ATRIAL RATE: 82 BPM
EKG DIAGNOSIS: NORMAL
EKG P AXIS: 67 DEGREES
EKG P-R INTERVAL: 172 MS
EKG Q-T INTERVAL: 370 MS
EKG QRS DURATION: 76 MS
EKG QTC CALCULATION (BAZETT): 432 MS
EKG R AXIS: -15 DEGREES
EKG T AXIS: 55 DEGREES
EKG VENTRICULAR RATE: 82 BPM
EOSINOPHIL # BLD: 0.1 K/UL (ref 0–0.4)
EOSINOPHIL NFR BLD: 1 % (ref 0–7)
EPITH CASTS URNS QL MICRO: NORMAL /LPF
ERYTHROCYTE [DISTWIDTH] IN BLOOD BY AUTOMATED COUNT: 13.9 % (ref 11.5–14.5)
FLUAV RNA SPEC QL NAA+PROBE: NOT DETECTED
FLUBV RNA SPEC QL NAA+PROBE: NOT DETECTED
GLOBULIN SER CALC-MCNC: 3.1 G/DL (ref 2–4)
GLUCOSE SERPL-MCNC: 154 MG/DL (ref 65–100)
GLUCOSE UR STRIP.AUTO-MCNC: NEGATIVE MG/DL
HCT VFR BLD AUTO: 42.2 % (ref 36.6–50.3)
HGB BLD-MCNC: 14.2 G/DL (ref 12.1–17)
HGB UR QL STRIP: NEGATIVE
IMM GRANULOCYTES # BLD AUTO: 0.1 K/UL (ref 0–0.04)
IMM GRANULOCYTES NFR BLD AUTO: 0 % (ref 0–0.5)
KETONES UR QL STRIP.AUTO: NEGATIVE MG/DL
LACTATE SERPL-SCNC: 1.5 MMOL/L (ref 0.4–2)
LEUKOCYTE ESTERASE UR QL STRIP.AUTO: NEGATIVE
LYMPHOCYTES # BLD: 1.6 K/UL (ref 0.8–3.5)
LYMPHOCYTES NFR BLD: 13 % (ref 12–49)
MCH RBC QN AUTO: 30.8 PG (ref 26–34)
MCHC RBC AUTO-ENTMCNC: 33.6 G/DL (ref 30–36.5)
MCV RBC AUTO: 91.5 FL (ref 80–99)
MONOCYTES # BLD: 1.1 K/UL (ref 0–1)
MONOCYTES NFR BLD: 9 % (ref 5–13)
NEUTS SEG # BLD: 9.2 K/UL (ref 1.8–8)
NEUTS SEG NFR BLD: 76 % (ref 32–75)
NITRITE UR QL STRIP.AUTO: NEGATIVE
NRBC # BLD: 0 K/UL (ref 0–0.01)
NRBC BLD-RTO: 0 PER 100 WBC
PH UR STRIP: 6 (ref 5–8)
PLATELET # BLD AUTO: 179 K/UL (ref 150–400)
PMV BLD AUTO: 11.7 FL (ref 8.9–12.9)
POTASSIUM SERPL-SCNC: 4 MMOL/L (ref 3.5–5.1)
PROT SERPL-MCNC: 6.7 G/DL (ref 6.4–8.2)
PROT UR STRIP-MCNC: NEGATIVE MG/DL
RBC # BLD AUTO: 4.61 M/UL (ref 4.1–5.7)
RBC #/AREA URNS HPF: NORMAL /HPF (ref 0–5)
SARS-COV-2 RNA RESP QL NAA+PROBE: NOT DETECTED
SODIUM SERPL-SCNC: 136 MMOL/L (ref 136–145)
SP GR UR REFRACTOMETRY: 1.01 (ref 1–1.03)
TROPONIN I SERPL HS-MCNC: 9 NG/L (ref 0–76)
TSH SERPL DL<=0.05 MIU/L-ACNC: 0.62 UIU/ML (ref 0.36–3.74)
UROBILINOGEN UR QL STRIP.AUTO: 0.2 EU/DL (ref 0.2–1)
WBC # BLD AUTO: 12.2 K/UL (ref 4.1–11.1)
WBC URNS QL MICRO: NORMAL /HPF (ref 0–4)

## 2023-11-07 PROCEDURE — 85025 COMPLETE CBC W/AUTO DIFF WBC: CPT

## 2023-11-07 PROCEDURE — 84484 ASSAY OF TROPONIN QUANT: CPT

## 2023-11-07 PROCEDURE — 80053 COMPREHEN METABOLIC PANEL: CPT

## 2023-11-07 PROCEDURE — 36415 COLL VENOUS BLD VENIPUNCTURE: CPT

## 2023-11-07 PROCEDURE — 71045 X-RAY EXAM CHEST 1 VIEW: CPT

## 2023-11-07 PROCEDURE — 83605 ASSAY OF LACTIC ACID: CPT

## 2023-11-07 PROCEDURE — 84443 ASSAY THYROID STIM HORMONE: CPT

## 2023-11-07 PROCEDURE — 81001 URINALYSIS AUTO W/SCOPE: CPT

## 2023-11-07 PROCEDURE — 87636 SARSCOV2 & INF A&B AMP PRB: CPT

## 2023-11-07 PROCEDURE — 2580000003 HC RX 258: Performed by: FAMILY MEDICINE

## 2023-11-07 PROCEDURE — 99285 EMERGENCY DEPT VISIT HI MDM: CPT

## 2023-11-07 PROCEDURE — 83036 HEMOGLOBIN GLYCOSYLATED A1C: CPT

## 2023-11-07 RX ORDER — 0.9 % SODIUM CHLORIDE 0.9 %
1000 INTRAVENOUS SOLUTION INTRAVENOUS ONCE
Status: COMPLETED | OUTPATIENT
Start: 2023-11-07 | End: 2023-11-07

## 2023-11-07 RX ADMIN — SODIUM CHLORIDE 1000 ML: 9 INJECTION, SOLUTION INTRAVENOUS at 21:00

## 2023-11-07 ASSESSMENT — PAIN - FUNCTIONAL ASSESSMENT: PAIN_FUNCTIONAL_ASSESSMENT: 0-10

## 2023-11-07 ASSESSMENT — PAIN SCALES - GENERAL
PAINLEVEL_OUTOF10: 0

## 2023-11-07 NOTE — ED TRIAGE NOTES
Pt arrived by EMS for urinary frequency. EMS reports pts wife stated pt is having urinary frequency with a history of UTI's. Pts wife told EMS that pt had a stroke about 14 years ago and that was caused by a UTI.   Pt arrived awake and alert  pt educated on ER flow

## 2023-11-08 ENCOUNTER — APPOINTMENT (OUTPATIENT)
Facility: HOSPITAL | Age: 88
End: 2023-11-08
Payer: MEDICARE

## 2023-11-08 PROBLEM — E11.65 TYPE 2 DIABETES MELLITUS WITH HYPERGLYCEMIA, WITHOUT LONG-TERM CURRENT USE OF INSULIN (HCC): Status: ACTIVE | Noted: 2022-06-27

## 2023-11-08 PROBLEM — R29.898 WEAKNESS OF BOTH LOWER EXTREMITIES: Status: ACTIVE | Noted: 2019-02-10

## 2023-11-08 PROBLEM — R53.1 WEAKNESS: Status: ACTIVE | Noted: 2023-11-08

## 2023-11-08 PROBLEM — N18.30 CKD (CHRONIC KIDNEY DISEASE) STAGE 3, GFR 30-59 ML/MIN (HCC): Status: ACTIVE | Noted: 2023-11-08

## 2023-11-08 PROBLEM — N18.31 STAGE 3A CHRONIC KIDNEY DISEASE (HCC): Status: ACTIVE | Noted: 2023-11-08

## 2023-11-08 PROBLEM — I50.32 CHRONIC DIASTOLIC HEART FAILURE (HCC): Status: ACTIVE | Noted: 2023-11-08

## 2023-11-08 PROBLEM — M48.062 LUMBAR STENOSIS WITH NEUROGENIC CLAUDICATION: Status: ACTIVE | Noted: 2022-12-27

## 2023-11-08 LAB
EST. AVERAGE GLUCOSE BLD GHB EST-MCNC: 117 MG/DL
GLUCOSE BLD STRIP.AUTO-MCNC: 108 MG/DL (ref 65–117)
GLUCOSE BLD STRIP.AUTO-MCNC: 110 MG/DL (ref 65–117)
GLUCOSE BLD STRIP.AUTO-MCNC: 124 MG/DL (ref 65–117)
HBA1C MFR BLD: 5.7 % (ref 4–5.6)
SERVICE CMNT-IMP: ABNORMAL
SERVICE CMNT-IMP: NORMAL
SERVICE CMNT-IMP: NORMAL

## 2023-11-08 PROCEDURE — 82962 GLUCOSE BLOOD TEST: CPT

## 2023-11-08 PROCEDURE — 6360000002 HC RX W HCPCS: Performed by: HOSPITALIST

## 2023-11-08 PROCEDURE — 51798 US URINE CAPACITY MEASURE: CPT

## 2023-11-08 PROCEDURE — 97161 PT EVAL LOW COMPLEX 20 MIN: CPT

## 2023-11-08 PROCEDURE — 96372 THER/PROPH/DIAG INJ SC/IM: CPT

## 2023-11-08 PROCEDURE — 94640 AIRWAY INHALATION TREATMENT: CPT

## 2023-11-08 PROCEDURE — 97110 THERAPEUTIC EXERCISES: CPT

## 2023-11-08 PROCEDURE — 71046 X-RAY EXAM CHEST 2 VIEWS: CPT

## 2023-11-08 PROCEDURE — 6370000000 HC RX 637 (ALT 250 FOR IP): Performed by: HOSPITALIST

## 2023-11-08 PROCEDURE — 72148 MRI LUMBAR SPINE W/O DYE: CPT

## 2023-11-08 PROCEDURE — 2580000003 HC RX 258: Performed by: HOSPITALIST

## 2023-11-08 PROCEDURE — G0378 HOSPITAL OBSERVATION PER HR: HCPCS

## 2023-11-08 RX ORDER — ACETAMINOPHEN 325 MG/1
650 TABLET ORAL EVERY 6 HOURS PRN
Status: DISCONTINUED | OUTPATIENT
Start: 2023-11-08 | End: 2023-11-09 | Stop reason: HOSPADM

## 2023-11-08 RX ORDER — SODIUM CHLORIDE 0.9 % (FLUSH) 0.9 %
5-40 SYRINGE (ML) INJECTION EVERY 12 HOURS SCHEDULED
Status: DISCONTINUED | OUTPATIENT
Start: 2023-11-08 | End: 2023-11-09 | Stop reason: HOSPADM

## 2023-11-08 RX ORDER — INSULIN LISPRO 100 [IU]/ML
0.05 INJECTION, SOLUTION INTRAVENOUS; SUBCUTANEOUS
Status: DISCONTINUED | OUTPATIENT
Start: 2023-11-08 | End: 2023-11-09 | Stop reason: HOSPADM

## 2023-11-08 RX ORDER — MAGNESIUM SULFATE IN WATER 40 MG/ML
2000 INJECTION, SOLUTION INTRAVENOUS PRN
Status: DISCONTINUED | OUTPATIENT
Start: 2023-11-08 | End: 2023-11-09 | Stop reason: HOSPADM

## 2023-11-08 RX ORDER — INSULIN LISPRO 100 [IU]/ML
0-4 INJECTION, SOLUTION INTRAVENOUS; SUBCUTANEOUS NIGHTLY
Status: DISCONTINUED | OUTPATIENT
Start: 2023-11-08 | End: 2023-11-09 | Stop reason: HOSPADM

## 2023-11-08 RX ORDER — POTASSIUM CHLORIDE 750 MG/1
40 TABLET, FILM COATED, EXTENDED RELEASE ORAL PRN
Status: DISCONTINUED | OUTPATIENT
Start: 2023-11-08 | End: 2023-11-09 | Stop reason: HOSPADM

## 2023-11-08 RX ORDER — ACETAMINOPHEN 650 MG/1
650 SUPPOSITORY RECTAL EVERY 6 HOURS PRN
Status: DISCONTINUED | OUTPATIENT
Start: 2023-11-08 | End: 2023-11-09 | Stop reason: HOSPADM

## 2023-11-08 RX ORDER — PANTOPRAZOLE SODIUM 40 MG/1
40 TABLET, DELAYED RELEASE ORAL
Status: DISCONTINUED | OUTPATIENT
Start: 2023-11-08 | End: 2023-11-09 | Stop reason: HOSPADM

## 2023-11-08 RX ORDER — DEXTROSE MONOHYDRATE 100 MG/ML
INJECTION, SOLUTION INTRAVENOUS CONTINUOUS PRN
Status: DISCONTINUED | OUTPATIENT
Start: 2023-11-08 | End: 2023-11-09 | Stop reason: HOSPADM

## 2023-11-08 RX ORDER — INSULIN LISPRO 100 [IU]/ML
0-4 INJECTION, SOLUTION INTRAVENOUS; SUBCUTANEOUS
Status: DISCONTINUED | OUTPATIENT
Start: 2023-11-08 | End: 2023-11-09 | Stop reason: HOSPADM

## 2023-11-08 RX ORDER — POTASSIUM CHLORIDE 7.45 MG/ML
10 INJECTION INTRAVENOUS PRN
Status: DISCONTINUED | OUTPATIENT
Start: 2023-11-08 | End: 2023-11-09 | Stop reason: HOSPADM

## 2023-11-08 RX ORDER — ENOXAPARIN SODIUM 100 MG/ML
30 INJECTION SUBCUTANEOUS 2 TIMES DAILY
Status: DISCONTINUED | OUTPATIENT
Start: 2023-11-08 | End: 2023-11-08

## 2023-11-08 RX ORDER — FLUTICASONE PROPIONATE AND SALMETEROL 250; 50 UG/1; UG/1
1 POWDER RESPIRATORY (INHALATION) 2 TIMES DAILY
Status: DISCONTINUED | OUTPATIENT
Start: 2023-11-08 | End: 2023-11-08

## 2023-11-08 RX ORDER — BUDESONIDE 0.5 MG/2ML
0.5 INHALANT ORAL
Status: DISCONTINUED | OUTPATIENT
Start: 2023-11-08 | End: 2023-11-09 | Stop reason: HOSPADM

## 2023-11-08 RX ORDER — POLYETHYLENE GLYCOL 3350 17 G/17G
17 POWDER, FOR SOLUTION ORAL DAILY
Status: DISCONTINUED | OUTPATIENT
Start: 2023-11-08 | End: 2023-11-09 | Stop reason: HOSPADM

## 2023-11-08 RX ORDER — ARFORMOTEROL TARTRATE 15 UG/2ML
15 SOLUTION RESPIRATORY (INHALATION)
Status: DISCONTINUED | OUTPATIENT
Start: 2023-11-08 | End: 2023-11-09 | Stop reason: HOSPADM

## 2023-11-08 RX ORDER — ONDANSETRON 2 MG/ML
4 INJECTION INTRAMUSCULAR; INTRAVENOUS EVERY 6 HOURS PRN
Status: DISCONTINUED | OUTPATIENT
Start: 2023-11-08 | End: 2023-11-09 | Stop reason: HOSPADM

## 2023-11-08 RX ORDER — SODIUM CHLORIDE 9 MG/ML
INJECTION, SOLUTION INTRAVENOUS PRN
Status: DISCONTINUED | OUTPATIENT
Start: 2023-11-08 | End: 2023-11-09 | Stop reason: HOSPADM

## 2023-11-08 RX ORDER — ATORVASTATIN CALCIUM 40 MG/1
40 TABLET, FILM COATED ORAL DAILY
Status: DISCONTINUED | OUTPATIENT
Start: 2023-11-08 | End: 2023-11-09 | Stop reason: HOSPADM

## 2023-11-08 RX ORDER — ENOXAPARIN SODIUM 100 MG/ML
40 INJECTION SUBCUTANEOUS DAILY
Status: DISCONTINUED | OUTPATIENT
Start: 2023-11-09 | End: 2023-11-09 | Stop reason: HOSPADM

## 2023-11-08 RX ORDER — SODIUM CHLORIDE, SODIUM LACTATE, POTASSIUM CHLORIDE, CALCIUM CHLORIDE 600; 310; 30; 20 MG/100ML; MG/100ML; MG/100ML; MG/100ML
INJECTION, SOLUTION INTRAVENOUS CONTINUOUS
Status: DISPENSED | OUTPATIENT
Start: 2023-11-08 | End: 2023-11-08

## 2023-11-08 RX ORDER — TAMSULOSIN HYDROCHLORIDE 0.4 MG/1
0.4 CAPSULE ORAL DAILY
Status: DISCONTINUED | OUTPATIENT
Start: 2023-11-08 | End: 2023-11-08

## 2023-11-08 RX ORDER — SODIUM CHLORIDE 0.9 % (FLUSH) 0.9 %
5-40 SYRINGE (ML) INJECTION PRN
Status: DISCONTINUED | OUTPATIENT
Start: 2023-11-08 | End: 2023-11-09 | Stop reason: HOSPADM

## 2023-11-08 RX ORDER — TROSPIUM CHLORIDE 20 MG/1
20 TABLET, FILM COATED ORAL
Status: DISCONTINUED | OUTPATIENT
Start: 2023-11-08 | End: 2023-11-09 | Stop reason: HOSPADM

## 2023-11-08 RX ORDER — LANOLIN ALCOHOL/MO/W.PET/CERES
1000 CREAM (GRAM) TOPICAL DAILY
Status: DISCONTINUED | OUTPATIENT
Start: 2023-11-08 | End: 2023-11-09 | Stop reason: HOSPADM

## 2023-11-08 RX ORDER — ARFORMOTEROL TARTRATE 15 UG/2ML
15 SOLUTION RESPIRATORY (INHALATION)
Status: DISCONTINUED | OUTPATIENT
Start: 2023-11-08 | End: 2023-11-08

## 2023-11-08 RX ORDER — ONDANSETRON 4 MG/1
4 TABLET, ORALLY DISINTEGRATING ORAL EVERY 8 HOURS PRN
Status: DISCONTINUED | OUTPATIENT
Start: 2023-11-08 | End: 2023-11-09 | Stop reason: HOSPADM

## 2023-11-08 RX ORDER — BUDESONIDE 0.25 MG/2ML
0.25 INHALANT ORAL
Status: DISCONTINUED | OUTPATIENT
Start: 2023-11-08 | End: 2023-11-08

## 2023-11-08 RX ADMIN — ATORVASTATIN CALCIUM 40 MG: 40 TABLET, FILM COATED ORAL at 09:18

## 2023-11-08 RX ADMIN — CYANOCOBALAMIN TAB 1000 MCG 1000 MCG: 1000 TAB at 16:08

## 2023-11-08 RX ADMIN — BUDESONIDE 500 MCG: 0.5 SUSPENSION RESPIRATORY (INHALATION) at 19:54

## 2023-11-08 RX ADMIN — TAMSULOSIN HYDROCHLORIDE 0.4 MG: 0.4 CAPSULE ORAL at 09:18

## 2023-11-08 RX ADMIN — BUDESONIDE 500 MCG: 0.5 SUSPENSION RESPIRATORY (INHALATION) at 08:03

## 2023-11-08 RX ADMIN — ARFORMOTEROL TARTRATE 15 MCG: 15 SOLUTION RESPIRATORY (INHALATION) at 19:54

## 2023-11-08 RX ADMIN — TROSPIUM CHLORIDE 20 MG: 20 TABLET, FILM COATED ORAL at 16:08

## 2023-11-08 RX ADMIN — SODIUM CHLORIDE, PRESERVATIVE FREE 10 ML: 5 INJECTION INTRAVENOUS at 20:41

## 2023-11-08 RX ADMIN — ARFORMOTEROL TARTRATE 15 MCG: 15 SOLUTION RESPIRATORY (INHALATION) at 08:03

## 2023-11-08 RX ADMIN — SODIUM CHLORIDE, POTASSIUM CHLORIDE, SODIUM LACTATE AND CALCIUM CHLORIDE: 600; 310; 30; 20 INJECTION, SOLUTION INTRAVENOUS at 04:48

## 2023-11-08 RX ADMIN — ENOXAPARIN SODIUM 30 MG: 100 INJECTION SUBCUTANEOUS at 09:22

## 2023-11-08 ASSESSMENT — PAIN DESCRIPTION - LOCATION: LOCATION: BACK

## 2023-11-08 ASSESSMENT — PAIN SCALES - GENERAL
PAINLEVEL_OUTOF10: 0
PAINLEVEL_OUTOF10: 2
PAINLEVEL_OUTOF10: 0
PAINLEVEL_OUTOF10: 0

## 2023-11-08 NOTE — ASSESSMENT & PLAN NOTE
- Mild impaired diastolic filling (Grade I) noted on repeated TTE w/o evidence of acute HF present  - Continue medical management and low sodium diet w/ chronic diuretic tx held @ this time given renal failure and suspected dehydration  - Monitor Wt

## 2023-11-08 NOTE — PLAN OF CARE
Problem: Safety - Adult  Goal: Free from fall injury  Outcome: Progressing  Flowsheets (Taken 11/8/2023 0655)  Free From Fall Injury: Instruct family/caregiver on patient safety Pt arrived to [facility] via wheelchair in NAD, VSS, AAOX4. Ambulated to standing scale and to bed. Connected to bedside cardiac monitor. Pt presents for maintenance hemodialysis. RFA AVF cannulated with 15g needles x2 using aseptic technique. Lines connected and secured- tx initiated at 0820.    0828- pt c/o 7/10 abdominal pain. Called primary RN Melani to send down pt's pain medication, not loaded in dialysis pyxis.

## 2023-11-08 NOTE — ED PROVIDER NOTES
Differential Dx Weakness: DAMIR vs UTI vs Spinal stenosis. In this patient's case, he seemed neurologically in tact, and his weakness seems to be global more than just his lower extremities. After 1000 ml NS, he was ambulated, but he reported that he did not feel steady on his feel. Will admit to hospitalist service. ED Course as of 11/08/23 0748   Wed Nov 08, 2023   0133 Pt accepted to Obs by Dr. Mindy Moreno. Orders written. [VG]      ED Course User Index  [VG] Anastacio Schmidt MD       Disposition Considerations (Tests not done, Shared Decision Making, Pt Expectation of Test or Tx.): 0     FINAL IMPRESSION     1. Weakness          DISPOSITION/PLAN   DISPOSITION Decision To Admit 11/08/2023 07:47:59 AM      Admit Note: Pt is being admitted by Dr Magan Garcia. The results of their tests and reason(s) for their admission have been discussed with pt and/or available family. They convey agreement and understanding for the need to be admitted and for the admission diagnosis. PATIENT REFERRED TO:  No follow-up provider specified. DISCHARGE MEDICATIONS:     Medication List        ASK your doctor about these medications      Advair Diskus 250-50 MCG/ACT Aepb diskus inhaler  Generic drug: fluticasone-salmeterol  TAKE 1 PUFF BY MOUTH TWICE A DAY     albuterol sulfate  (90 Base) MCG/ACT inhaler  Commonly known as: PROVENTIL;VENTOLIN;PROAIR     atorvastatin 40 MG tablet  Commonly known as: LIPITOR     cyanocobalamin 100 MCG tablet     lansoprazole 30 MG delayed release capsule  Commonly known as: PREVACID  TAKE 1 CAPSULE BY MOUTH DAILY (BEFORE BREAKFAST). tamsulosin 0.4 MG capsule  Commonly known as: FLOMAX     torsemide 20 MG tablet  Commonly known as: DEMADEX  Take 1 tablet by mouth daily (with breakfast)     trospium 20 MG tablet  Commonly known as: SANCTURA                DISCONTINUED MEDICATIONS:  Current Discharge Medication List          I am the Primary Clinician of Record.    Anastacio Schmidt MD (electronically signed)      (Please note that parts of this dictation were completed with voice recognition software. Quite often unanticipated grammatical, syntax, homophones, and other interpretive errors are inadvertently transcribed by the computer software. Please disregards these errors.  Please excuse any errors that have escaped final proofreading.)          Cal Torres MD  11/08/23 4768

## 2023-11-08 NOTE — H&P
V2.0  History and Physical      Name:  Christel Whaley /Age/Sex: 1935  (80 y.o. male)   MRN & CSN:  [id number] & [id number] Encounter Date/Time: 23  445am   Location:  120/01 PCP: Sanjay Bejarano MD       Hospital Day: 2    Assessment and Plan:   Christel Whaley is a 80 y.o. male with a pmh of lumbar stenosis.  HF, HTN and DM who presented a cc of progressive LE weakness    Hospital Problems             Last Modified POA    Weakness of both lower extremities 2023 Yes    Essential hypertension 2023 Yes    Type 2 diabetes mellitus with hyperglycemia, without long-term current use of insulin ([address]) 2023 Yes    Stage 3a chronic kidney disease ([address]) 2023 Yes    Chronic diastolic heart failure ([address]) 2023 Yes     Plan:  Stage 3a chronic kidney disease ([address])  - Baseline GFR ~ 45-50 maintained with non-oliguric dz present on admission & no obstruction identified on bladder scan  - Mild pre-renal dz in context of chronic diuretic use the suspected etiology  - Avoid ACE/NSAID and monitor renal fxn and UOP on gentle IVF support  - Adjust meds for <CrCl    Chronic diastolic heart failure (HCC)  - Mild impaired diastolic filling (Grade I) noted on repeated TTE w/o evidence of acute HF present  - Continue medical management and low sodium diet w/ chronic diuretic tx held @ this time given renal failure and suspected dehydration  - Monitor Wt    Type 2 diabetes mellitus with hyperglycemia, without long-term current use of insulin (HCC)  - Repeated A1c <6 w/o acute inpt hyperglycemia noted  - Continue WT based prandial Novolog for inpt management  - Resume diet control and titrate PRN    Essential hypertension  - Chronic presentation w/ SBP stable & home regimen reviewed  - Meds adjusted given renal failure  - Titrate for goal SBP<140    Weakness of both lower extremities  - Known hx of previous CVA and 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 BNP: No results for input(s): \"PROBNP\" in the last 72 hours. UA:  Lab Results   Component Value Date/Time    NITRU Negative 11/07/2023 11:30 PM    COLORU YELLOW/STRAW 11/07/2023 11:30 PM    PHUR 6.0 07/14/2020 10:26 AM    WBCUA 0-4 11/07/2023 11:30 PM    RBCUA 0-5 11/07/2023 11:30 PM    BACTERIA Negative 11/07/2023 11:30 PM    CLARITYU CLEAR 07/14/2020 10:26 AM    SPECGRAV 1.010 11/07/2023 11:30 PM    LEUKOCYTESUR Negative 11/07/2023 11:30 PM    UROBILINOGEN 0.2 11/07/2023 11:30 PM    BILIRUBINUR Negative 11/07/2023 11:30 PM    BILIRUBINUR Negative 07/14/2020 10:26 AM    BLOODU Negative 11/07/2023 11:30 PM    GLUCOSEU Negative 11/07/2023 11:30 PM    KETUA Negative 11/07/2023 11:30 PM     Urine Cultures:   Lab Results   Component Value Date/Time    LABURIN ESCHERICHIA COLI 08/21/2020 02:19 PM     Blood Cultures: No results found for: \"BC\"  No results found for: \"BLOODCULT2\"  Organism: No results found for: \"ORG\"    Imaging/Diagnostics Last 24 Hours   XR CHEST (2 VW)    Result Date: 11/8/2023  INDICATION:   weakness, bibasilar atelectasis COMPARISON: 11/7/2023 FINDINGS: Frontal and lateral views of the chest demonstrate a normal cardiomediastinal silhouette. The lungs are adequately expanded. There is no edema, effusion, consolidation, or pneumothorax. The osseous structures are unremarkable. No acute process. XR CHEST PORTABLE    Result Date: 11/7/2023  EXAM:  XR CHEST PORTABLE INDICATION: Weakness COMPARISON: 6/1/2023 TECHNIQUE: portable chest AP view FINDINGS: The cardiac silhouette is within normal limits. The pulmonary vasculature is within normal limits. Lung volumes are moderate with bibasilar atelectasis left greater than right. The visualized bones and upper abdomen are age-appropriate. Bibasilar atelectasis, left greater than right. A standard 2 view examination is recommended when clinically possible.          Electronically signed by Kong Mckeon DO on 11/8/2023 at 6:19

## 2023-11-08 NOTE — PROGRESS NOTES
Patient admitted this morning by the Nocturnist for bilateral lower extremity weakness. MRI pending, will follow results, PT/OT consult.

## 2023-11-08 NOTE — ACP (ADVANCE CARE PLANNING)
The Advanced Directive that is scanned in the chart/on file has the wrong information. Primary Healthcare Decision Maker that is listed needs to be changed from Austin Carlisle (ex wife) to Mayco Steele, (Present wife). Advanced Directive template given to the patient to complete. Will ask  Rock to assist if needed.

## 2023-11-08 NOTE — ASSESSMENT & PLAN NOTE
- Repeated A1c <6 w/o acute inpt hyperglycemia noted  - Continue WT based prandial Novolog for inpt management  - Resume diet control and titrate PRN

## 2023-11-08 NOTE — CARE COORDINATION
Care Management Initial Assessment       RUR:  NA  Observation  Readmission? No  1st IM letter given? No  1st  letter given: No        11/08/23 1609   Service Assessment   Patient Orientation Alert and Oriented;Person;Place;Situation;Self   Cognition Alert   History Provided By Patient   Primary Caregiver Self   Support Systems Spouse/Significant Other   Patient's Healthcare Decision Maker is: Legal Next of Kin  Jing Call, Patient's wife)   PCP Verified by CM Yes  Elsie Bellamy MD)   Prior Functional Level Independent in ADLs/IADLs   Current Functional Level Assistance with the following:;Bathing;Dressing; Mobility  (Generalized weakness when admitted. May need to call for assistance)   Can patient return to prior living arrangement Yes   Ability to make needs known: Good   Family able to assist with home care needs: Yes   Would you like for me to discuss the discharge plan with any other family members/significant others, and if so, who? Yes  Jing Call, Wife)   Financial Resources Medicare; Other (Comment)  (BCBS)   Community Resources None   Social/Functional History   Lives With Spouse   Type of [facility] [address] Cane;Walker, rolling   Active  Yes   Discharge Planning   Type of Residence House   Living Arrangements Spouse/Significant Other   Current Services Prior To Admission None   Patient expects to be discharged to: House     Met with the patient and wife at bedside to review and discuss plan of care and disposition needs. Patient confirmed demographics, insurance and emergency contact. Patient and his wife lives in a town house but utilizes the first floor. At baseline patient is independent with ADLs and driving. Does not think he will have any needs at discharge. Care Management Introductory letter with contact information given to the patient. Patient admitted to Observation status. MO[facility] Observation notification letter explained to the patient.  He and his wife verbalized

## 2023-11-08 NOTE — PROGRESS NOTES
MRI completed, ate good lunch and wife is leaving to go home and patient wants to go to sleep. Bed check on , call bell and phone at bedside. Denies any pain or discomfort at this time.

## 2023-11-08 NOTE — ASSESSMENT & PLAN NOTE
- Baseline GFR ~ 45-50 maintained with non-oliguric dz present on admission & no obstruction identified on bladder scan  - Mild pre-renal dz in context of chronic diuretic use the suspected etiology  - Avoid ACE/NSAID and monitor renal fxn and UOP on gentle IVF support  - Adjust meds for <CrCl

## 2023-11-08 NOTE — ASSESSMENT & PLAN NOTE
- Chronic presentation w/ SBP stable & home regimen reviewed  - Meds adjusted given renal failure  - Titrate for goal SBP<140

## 2023-11-08 NOTE — ASSESSMENT & PLAN NOTE
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

## 2023-11-08 NOTE — ED NOTES
Admission SBAR Note  Situation/Background:     Patient is being transferred to [facility] Room# 120    Patient's Chief Complaint was Urinary Frequency and is admitted for Failed ambulatory trial/weakness. CODE STATUS: Full  CSSRS: 0 - No Risk    ISOLATION/PRECAUTIONS: No  ISOLATION TYPE:     Is this a behavioral health patient? No  Has wanding been completed No  Are belongings secure? No    Called outstanding consults: Yes    STAT labs collected: Yes    Repeat Lactic Acid DUE? No  TIME DUE: na    All STAT orders are complete: Yes    The following personal items will be sent with the patient during transfer to the floor: All valuables: none       ASSESSMENT:    NEURO:   NIH SCORE: 0,1-4,5-15,15-20,21-42: 0   SHOBHA SWALLOW SCREEN COMPLETE: No  ORIENTATION LEVEL: ORIENTATION LEVEL: Person, Place, Time, and Situation  Cognition:  appropriate decision making, appropriate for age attention/concentration, and appropriate safety awareness  Speech: shows no evidence of impairment    Is patient impulsive? No  Is patient oriented? Yes  Do they follow commands? Yes  Is the patient ambulatory? No    FALL RISK? Yes  Interventions: Implemented/recommended use of non-skid footwear    RESPIRATORY:   Is patient on oxygen? No  Oxygen therapy: room air  O2 rate: 0    CARDIAC:   Is cardiac monitoring ordered? No    Last Rhythm: Rhythm including paccardio: Normal Sinus Rhythm 90  Patient to transfer with tele box on? No  Infusions: Meds; iv fluids: normal saline  LINE ACCESS: 20G Peripheral IV , Wrist , Iv rate: prn       /GI:   Continent Bowel/Bladder? No  Urinary Output: 200  Chronic or Acute: Chronic  If Chronic, is it 1days old, was it changed prior to specimen collection? Yes  Was UA with reflex sent to lab? Yes  If no, collect and send prior to transport to inpatient area. INTEGUMENTARY:  IS THE PATIENT UNDRESSED? Yes  ARE THERE WOUNDS PRESENT?

## 2023-11-09 ENCOUNTER — TELEPHONE (OUTPATIENT)
Age: 88
End: 2023-11-09

## 2023-11-09 VITALS
WEIGHT: 201.6 LBS | RESPIRATION RATE: 18 BRPM | OXYGEN SATURATION: 94 % | BODY MASS INDEX: 27.3 KG/M2 | TEMPERATURE: 97.7 F | DIASTOLIC BLOOD PRESSURE: 78 MMHG | HEIGHT: 72 IN | HEART RATE: 104 BPM | SYSTOLIC BLOOD PRESSURE: 118 MMHG

## 2023-11-09 LAB
ALBUMIN SERPL-MCNC: 3.1 G/DL (ref 3.5–5)
ALBUMIN/GLOB SERPL: 0.9 (ref 1.1–2.2)
ALP SERPL-CCNC: 88 U/L (ref 45–117)
ALT SERPL-CCNC: 21 U/L (ref 12–78)
ANION GAP SERPL CALC-SCNC: 4 MMOL/L (ref 5–15)
AST SERPL-CCNC: 17 U/L (ref 15–37)
BASOPHILS # BLD: 0.1 K/UL (ref 0–0.1)
BASOPHILS NFR BLD: 1 % (ref 0–1)
BILIRUB SERPL-MCNC: 1.2 MG/DL (ref 0.2–1)
BUN SERPL-MCNC: 16 MG/DL (ref 6–20)
BUN/CREAT SERPL: 12 (ref 12–20)
CALCIUM SERPL-MCNC: 9.1 MG/DL (ref 8.5–10.1)
CHLORIDE SERPL-SCNC: 105 MMOL/L (ref 97–108)
CO2 SERPL-SCNC: 31 MMOL/L (ref 21–32)
CREAT SERPL-MCNC: 1.3 MG/DL (ref 0.7–1.3)
DIFFERENTIAL METHOD BLD: ABNORMAL
EOSINOPHIL # BLD: 0.2 K/UL (ref 0–0.4)
EOSINOPHIL NFR BLD: 2 % (ref 0–7)
ERYTHROCYTE [DISTWIDTH] IN BLOOD BY AUTOMATED COUNT: 13.6 % (ref 11.5–14.5)
GLOBULIN SER CALC-MCNC: 3.5 G/DL (ref 2–4)
GLUCOSE BLD STRIP.AUTO-MCNC: 105 MG/DL (ref 65–117)
GLUCOSE BLD STRIP.AUTO-MCNC: 140 MG/DL (ref 65–117)
GLUCOSE SERPL-MCNC: 95 MG/DL (ref 65–100)
HCT VFR BLD AUTO: 40.6 % (ref 36.6–50.3)
HGB BLD-MCNC: 13.3 G/DL (ref 12.1–17)
IMM GRANULOCYTES # BLD AUTO: 0 K/UL (ref 0–0.04)
IMM GRANULOCYTES NFR BLD AUTO: 0 % (ref 0–0.5)
LYMPHOCYTES # BLD: 1.3 K/UL (ref 0.8–3.5)
LYMPHOCYTES NFR BLD: 17 % (ref 12–49)
MCH RBC QN AUTO: 30.4 PG (ref 26–34)
MCHC RBC AUTO-ENTMCNC: 32.8 G/DL (ref 30–36.5)
MCV RBC AUTO: 92.7 FL (ref 80–99)
MONOCYTES # BLD: 0.7 K/UL (ref 0–1)
MONOCYTES NFR BLD: 9 % (ref 5–13)
NEUTS SEG # BLD: 5.6 K/UL (ref 1.8–8)
NEUTS SEG NFR BLD: 71 % (ref 32–75)
NRBC # BLD: 0 K/UL (ref 0–0.01)
NRBC BLD-RTO: 0 PER 100 WBC
PHOSPHATE SERPL-MCNC: 3.6 MG/DL (ref 2.6–4.7)
PLATELET # BLD AUTO: 148 K/UL (ref 150–400)
PMV BLD AUTO: 12 FL (ref 8.9–12.9)
POTASSIUM SERPL-SCNC: 4.4 MMOL/L (ref 3.5–5.1)
PROT SERPL-MCNC: 6.6 G/DL (ref 6.4–8.2)
RBC # BLD AUTO: 4.38 M/UL (ref 4.1–5.7)
SERVICE CMNT-IMP: ABNORMAL
SERVICE CMNT-IMP: NORMAL
SODIUM SERPL-SCNC: 140 MMOL/L (ref 136–145)
WBC # BLD AUTO: 7.9 K/UL (ref 4.1–11.1)

## 2023-11-09 PROCEDURE — 2580000003 HC RX 258: Performed by: HOSPITALIST

## 2023-11-09 PROCEDURE — 85025 COMPLETE CBC W/AUTO DIFF WBC: CPT

## 2023-11-09 PROCEDURE — 96372 THER/PROPH/DIAG INJ SC/IM: CPT

## 2023-11-09 PROCEDURE — 97165 OT EVAL LOW COMPLEX 30 MIN: CPT

## 2023-11-09 PROCEDURE — G0378 HOSPITAL OBSERVATION PER HR: HCPCS

## 2023-11-09 PROCEDURE — 6360000002 HC RX W HCPCS: Performed by: HOSPITALIST

## 2023-11-09 PROCEDURE — 6370000000 HC RX 637 (ALT 250 FOR IP): Performed by: HOSPITALIST

## 2023-11-09 PROCEDURE — 82962 GLUCOSE BLOOD TEST: CPT

## 2023-11-09 PROCEDURE — 80053 COMPREHEN METABOLIC PANEL: CPT

## 2023-11-09 PROCEDURE — 94760 N-INVAS EAR/PLS OXIMETRY 1: CPT

## 2023-11-09 PROCEDURE — 97535 SELF CARE MNGMENT TRAINING: CPT

## 2023-11-09 PROCEDURE — 84100 ASSAY OF PHOSPHORUS: CPT

## 2023-11-09 PROCEDURE — 36415 COLL VENOUS BLD VENIPUNCTURE: CPT

## 2023-11-09 PROCEDURE — 94640 AIRWAY INHALATION TREATMENT: CPT

## 2023-11-09 RX ADMIN — SODIUM CHLORIDE, PRESERVATIVE FREE 10 ML: 5 INJECTION INTRAVENOUS at 07:59

## 2023-11-09 RX ADMIN — INSULIN LISPRO 6 UNITS: 100 INJECTION, SOLUTION INTRAVENOUS; SUBCUTANEOUS at 07:58

## 2023-11-09 RX ADMIN — ARFORMOTEROL TARTRATE 15 MCG: 15 SOLUTION RESPIRATORY (INHALATION) at 07:15

## 2023-11-09 RX ADMIN — BUDESONIDE 500 MCG: 0.5 SUSPENSION RESPIRATORY (INHALATION) at 07:15

## 2023-11-09 RX ADMIN — ENOXAPARIN SODIUM 40 MG: 100 INJECTION SUBCUTANEOUS at 09:12

## 2023-11-09 RX ADMIN — INSULIN LISPRO 6 UNITS: 100 INJECTION, SOLUTION INTRAVENOUS; SUBCUTANEOUS at 12:22

## 2023-11-09 RX ADMIN — CYANOCOBALAMIN TAB 1000 MCG 1000 MCG: 1000 TAB at 09:11

## 2023-11-09 RX ADMIN — ATORVASTATIN CALCIUM 40 MG: 40 TABLET, FILM COATED ORAL at 09:11

## 2023-11-09 RX ADMIN — PANTOPRAZOLE SODIUM 40 MG: 40 TABLET, DELAYED RELEASE ORAL at 07:58

## 2023-11-09 RX ADMIN — TROSPIUM CHLORIDE 20 MG: 20 TABLET, FILM COATED ORAL at 07:57

## 2023-11-09 NOTE — CARE COORDINATION
11/09/23 1103   Services At/After Discharge   Transition of Care Consult (CM Consult) N/A   Services At/After Discharge None   Confirm Follow Up Transport Family       Mr. Salima Ortiz is being discharged today. States he does NOT want to do home health. He would like to go to [facility] OP PT. Asked him if he would like CM to make that vahid and he states he prefers to make it his own. Called [facility] OP PT. Spoke with Genesis Coelho. Orders in for OP PT. Mr. Salima Ortiz is aware and agrees with discharge plan. Told Mr. Alvarez to contact CM for any questions or concerns even after discharge. Transition of Care Plan:    RUR: n/a obs   Prior Level of Functioning: Independent   Disposition: Home. Patient to go back to [facility] OP PT--he prefers to make vahid himself. If SNF or IPR: Date FOC offered:   Date FOC received:   Accepting facility:   Date authorization started with reference number:   Date authorization received and expires: Follow up appointments:   DME needed:   Transportation at discharge: POV  IM/IMM Medicare/ letter given: n/a in obs   Is patient a [place] and connected with [facility]? If yes, was [employer] transfer form completed and [facility] notified? Caregiver Contact:   Discharge Caregiver contacted prior to discharge? Care Conference needed?    Barriers to discharge: None

## 2023-11-09 NOTE — PLAN OF CARE
Problem: Pain  Goal: Verbalizes/displays adequate comfort level or baseline comfort level  11/9/2023 1110 by Phi EDWARDS RN  Outcome: [facility] 114 Resolved Met  Flowsheets (Taken 11/9/2023 0745)  Verbalizes/displays adequate comfort level or baseline comfort level:   Encourage patient to monitor pain and request assistance   Assess pain using appropriate pain scale  11/8/2023 2223 by Manan Flores RN  Outcome: Progressing     Problem: Safety - Adult  Goal: Free from fall injury  11/9/2023 1110 by Rishi Barajas RN  Outcome: [facility] 114 Resolved Met  Flowsheets (Taken 11/9/2023 0745)  Free From Fall Injury: Instruct family/caregiver on patient safety  11/8/2023 2223 by Manan Flores RN  Outcome: Progressing     Problem: ABCDS Injury Assessment  Goal: Absence of physical injury  Outcome: [facility] 114 Resolved Met     Problem: Respiratory - Adult  Goal: Achieves optimal ventilation and oxygenation  Outcome: [address] [place] [address] Resolved Met  Flowsheets (Taken 11/9/2023 0745)  Achieves optimal ventilation and oxygenation:   Assess for changes in respiratory status   Assess for changes in mentation and behavior     Problem: Skin/Tissue Integrity  Goal: Absence of new skin breakdown  Description: 1. Monitor for areas of redness and/or skin breakdown  2. Assess vascular access sites hourly  3. Every 4-6 hours minimum:  Change oxygen saturation probe site  4. Every 4-6 hours:  If on nasal continuous positive airway pressure, respiratory therapy assess nares and determine need for appliance change or resting period.   Outcome: [address] [place] [address] Resolved Met     Problem: Chronic Conditions and Co-morbidities  Goal: Patient's chronic conditions and co-morbidity symptoms are monitored and maintained or improved  Outcome: [address] [address] Resolved Met  Flowsheets (Taken 11/9/2023 0745)  Care Plan - Patient's Chronic Conditions and Co-Morbidity Symptoms are Monitored and Maintained or Improved: Monitor and assess patient's chronic conditions and comorbid

## 2023-11-09 NOTE — PLAN OF CARE
SMALL INTESTINE SURGERY  1996    UROLOGICAL SURGERY      prostate  removed    UROLOGICAL SURGERY      adhesion repair          Expanded or extensive additional review of patient history:   Lives With: Spouse  Type of Home: House  Home Layout: Able to Live on Main level with bedroom/bathroom  Home Access: Stairs to enter with rails     Entrance Stairs - Rails: Right  Bathroom Shower/Tub: Walk-in shower  Bathroom Toilet: Standard  Bathroom Equipment: Grab bars in shower, Shower chair     Home Equipment: Walker, rolling, Cane  Has the patient had two or more falls in the past year or any fall with injury in the past year?: Yes        Hand Dominance: right     EXAMINATION OF PERFORMANCE DEFICITS:    Cognitive/Behavioral Status:  Orientation  Overall Orientation Status: Within Functional Limits  Orientation Level: Oriented X4  Cognition  Overall Cognitive Status: WFL    Skin: bandage noted on R elbow    Edema: none noted    Hearing:   Hearing  Hearing: Within functional limits    Vision/Perceptual:    Vision - Basic Assessment  Prior Vision: Wears glasses only for reading     Vision  Vision Exceptions: Wears glasses for reading  Perception  Overall Perceptual Status: WFL    Range of Motion:   AROM:  (Old R RTC injury w/o repair limits in AROM R shoulder, unable to get to 90 w/o assist using L hand)         Strength:  Strength:  (5/5 LUE shoulder/elbow; impaired RUE shoulder lacks full AROM due to RTC injury old; elbow RUE 5/5)      Coordination:  Coordination: Generally decreased, functional            Tone & Sensation:   Tone: Normal  Sensation: Intact          Functional Mobility and Transfers for ADLs:    Bed Mobility:  Bed mobility  Rolling to Right: Modified independent  Supine to Sit: Modified independent  Scooting: Independent  Bed Mobility Training  Overall Level of Assistance: Supervision; Adaptive equipment  Rolling: Supervision; Adaptive equipment  Supine to Sit: Supervision; Adaptive equipment  Scooting: skills that result in activity limitations and/or participation restrictions LOW Complexity: No comorbidities that affect functional and  no verbal  or physical assist needed to complete eval tasks   Based on the above components, the patient evaluation is determined to be of the following complexity level: Low

## 2023-11-09 NOTE — DISCHARGE INSTRUCTIONS
DISCHARGE SUMMARY from Nurse    PATIENT INSTRUCTIONS:      These are general instructions for a healthy lifestyle:    No smoking/ No tobacco products/ Avoid exposure to second hand smoke  Surgeon General's Warning:  Quitting smoking now greatly reduces serious risk to your health. Obesity, smoking, and sedentary lifestyle greatly increases your risk for illness    A healthy diet, regular physical exercise & weight monitoring are important for maintaining a healthy lifestyle    You may be retaining fluid if you have a history of heart failure or if you experience any of the following symptoms:  Weight gain of 3 pounds or more overnight or 5 pounds in a week, increased swelling in our hands or feet or shortness of breath while lying flat in bed. Please call your doctor as soon as you notice any of these symptoms; do not wait until your next office visit. The discharge information has been reviewed with the patient and spouse. The patient and spouse verbalized understanding. Discharge medications reviewed with the patient and spouse and appropriate educational materials and side effects teaching were provided.   ___________________________________________________________________________________________________________________________________

## 2023-11-09 NOTE — PROGRESS NOTES
1100:  pt dressed for pending discharge. 1245:  Finished reviewing discharge instructions. Pt voiced he gets his PT prior to admission already and plans to continue. Pt has AVS visit paperwork upon discharge.

## 2023-11-09 NOTE — PLAN OF CARE
Problem: Respiratory - Adult  Goal: Achieves optimal ventilation and oxygenation  11/8/2023 2001 by uW Ricci RT  Outcome: Progressing

## 2023-11-13 ENCOUNTER — OFFICE VISIT (OUTPATIENT)
Age: 88
End: 2023-11-13

## 2023-11-13 VITALS
DIASTOLIC BLOOD PRESSURE: 67 MMHG | OXYGEN SATURATION: 94 % | BODY MASS INDEX: 27.2 KG/M2 | HEIGHT: 72 IN | SYSTOLIC BLOOD PRESSURE: 113 MMHG | WEIGHT: 200.8 LBS | RESPIRATION RATE: 18 BRPM | HEART RATE: 107 BPM

## 2023-11-13 DIAGNOSIS — E11.65 TYPE 2 DIABETES MELLITUS WITH HYPERGLYCEMIA, WITHOUT LONG-TERM CURRENT USE OF INSULIN (HCC): ICD-10-CM

## 2023-11-13 DIAGNOSIS — J41.0 SIMPLE CHRONIC BRONCHITIS (HCC): ICD-10-CM

## 2023-11-13 DIAGNOSIS — Z09 HOSPITAL DISCHARGE FOLLOW-UP: ICD-10-CM

## 2023-11-13 DIAGNOSIS — M48.061 SPINAL STENOSIS AT L4-L5 LEVEL: Primary | ICD-10-CM

## 2023-11-13 ASSESSMENT — PATIENT HEALTH QUESTIONNAIRE - PHQ9
1. LITTLE INTEREST OR PLEASURE IN DOING THINGS: 0
SUM OF ALL RESPONSES TO PHQ9 QUESTIONS 1 & 2: 0
SUM OF ALL RESPONSES TO PHQ QUESTIONS 1-9: 0
SUM OF ALL RESPONSES TO PHQ QUESTIONS 1-9: 0
2. FEELING DOWN, DEPRESSED OR HOPELESS: 0
SUM OF ALL RESPONSES TO PHQ QUESTIONS 1-9: 0
SUM OF ALL RESPONSES TO PHQ QUESTIONS 1-9: 0

## 2023-11-23 LAB
EKG ATRIAL RATE: 82 BPM
EKG DIAGNOSIS: NORMAL
EKG P AXIS: 67 DEGREES
EKG P-R INTERVAL: 172 MS
EKG Q-T INTERVAL: 370 MS
EKG QRS DURATION: 76 MS
EKG QTC CALCULATION (BAZETT): 432 MS
EKG R AXIS: -15 DEGREES
EKG T AXIS: 55 DEGREES
EKG VENTRICULAR RATE: 82 BPM

## 2023-12-05 DIAGNOSIS — K21.9 GASTRO-ESOPHAGEAL REFLUX DISEASE WITHOUT ESOPHAGITIS: ICD-10-CM

## 2023-12-05 DIAGNOSIS — R60.0 LOWER EXTREMITY EDEMA: ICD-10-CM

## 2023-12-05 DIAGNOSIS — J44.9 CHRONIC OBSTRUCTIVE PULMONARY DISEASE, UNSPECIFIED (HCC): ICD-10-CM

## 2023-12-05 NOTE — TELEPHONE ENCOUNTER
----- Message from Maria C Edi sent at 12/5/2023 10:13 AM EST -----  Subject: Message to Provider    QUESTIONS  Information for Provider? Pt stating he needs a refill on all of his   medications. Obinna sent a refill request to us on Friday and have not   heard back from us so he is calling to see if we can expedite this process   because he is out. Please advise.   ---------------------------------------------------------------------------  --------------  Maria Eugenia TRAN  [phone]; OK to leave message on voicemail  ---------------------------------------------------------------------------  --------------  SCRIPT ANSWERS  Relationship to Patient?  Self Activity as tolerated

## 2023-12-06 RX ORDER — TROSPIUM CHLORIDE 20 MG/1
20 TABLET, FILM COATED ORAL
Qty: 180 TABLET | Refills: 2 | Status: SHIPPED | OUTPATIENT
Start: 2023-12-06

## 2023-12-06 RX ORDER — FAMOTIDINE 40 MG/1
20 TABLET, FILM COATED ORAL 2 TIMES DAILY
Qty: 30 TABLET | Refills: 5 | Status: SHIPPED | OUTPATIENT
Start: 2023-12-06

## 2023-12-06 RX ORDER — ATORVASTATIN CALCIUM 40 MG/1
40 TABLET, FILM COATED ORAL DAILY
Qty: 90 TABLET | Refills: 4 | Status: SHIPPED | OUTPATIENT
Start: 2023-12-06

## 2024-01-23 ENCOUNTER — OFFICE VISIT (OUTPATIENT)
Age: 89
End: 2024-01-23
Payer: MEDICARE

## 2024-01-23 VITALS
DIASTOLIC BLOOD PRESSURE: 60 MMHG | HEART RATE: 101 BPM | BODY MASS INDEX: 27.28 KG/M2 | WEIGHT: 201.4 LBS | OXYGEN SATURATION: 94 % | SYSTOLIC BLOOD PRESSURE: 110 MMHG | HEIGHT: 72 IN | TEMPERATURE: 97.1 F | RESPIRATION RATE: 18 BRPM

## 2024-01-23 DIAGNOSIS — H61.21 HEARING LOSS OF RIGHT EAR DUE TO CERUMEN IMPACTION: Primary | ICD-10-CM

## 2024-01-23 PROCEDURE — 1123F ACP DISCUSS/DSCN MKR DOCD: CPT | Performed by: NURSE PRACTITIONER

## 2024-01-23 PROCEDURE — 99213 OFFICE O/P EST LOW 20 MIN: CPT | Performed by: NURSE PRACTITIONER

## 2024-01-23 PROCEDURE — G8427 DOCREV CUR MEDS BY ELIG CLIN: HCPCS | Performed by: NURSE PRACTITIONER

## 2024-01-23 PROCEDURE — 4004F PT TOBACCO SCREEN RCVD TLK: CPT | Performed by: NURSE PRACTITIONER

## 2024-01-23 PROCEDURE — G8419 CALC BMI OUT NRM PARAM NOF/U: HCPCS | Performed by: NURSE PRACTITIONER

## 2024-01-23 PROCEDURE — G8484 FLU IMMUNIZE NO ADMIN: HCPCS | Performed by: NURSE PRACTITIONER

## 2024-01-23 PROCEDURE — 69210 REMOVE IMPACTED EAR WAX UNI: CPT | Performed by: NURSE PRACTITIONER

## 2024-01-23 NOTE — PROGRESS NOTES
Naveen Alvarez is a 88 y.o. male presenting for/with:    Chief Complaint   Patient presents with    Ear Problem     Stopped up x 1 week.        Vitals:    01/23/24 0900   BP: 110/60   Pulse: (!) 101   Resp: 18   Temp: 97.1 °F (36.2 °C)   TempSrc: Temporal   SpO2: 94%   Weight: 91.4 kg (201 lb 6.4 oz)   Height: 1.829 m (6')       Pain Scale: 0 - No pain/10  Pain Location:     \"Have you been to the ER, urgent care clinic since your last visit?  Hospitalized since your last visit?\"    NO    “Have you seen or consulted any other health care providers outside of [facility] since your last visit?”    NO                 11/13/2023     8:41 AM   PHQ-9    Little interest or pleasure in doing things 0   Feeling down, depressed, or hopeless 0   PHQ-2 Score 0   PHQ-9 Total Score 0           6/8/2023    10:30 AM 9/23/2021    12:00 AM   [facility] AMB LEARNING ASSESSMENT   Primary Learner Patient Patient   Primary Language ENGLISH ENGLISH   Learning Preference DEMONSTRATION READING   Answered By self patient   Relationship to Learner SELF SELF            11/13/2023     8:41 AM   Amb Fall Risk Assessment and TUG Test   Do you feel unsteady or are you worried about falling?  yes   2 or more falls in past year? no   Fall with injury in past year? no           1/23/2024     9:00 AM 10/19/2023    10:00 AM 7/20/2023     9:00 AM 6/8/2023     9:00 AM   ADL ASSESSMENT   Feeding yourself No Help Needed No Help Needed No Help Needed No Help Needed   Getting from bed to chair No Help Needed No Help Needed No Help Needed No Help Needed   Getting dressed No Help Needed No Help Needed No Help Needed No Help Needed   Bathing or showering No Help Needed No Help Needed No Help Needed No Help Needed   Walk across the room (includes cane/walker) No Help Needed No Help Needed Help Needed No Help Needed   Using the telphone No Help Needed No Help Needed No Help Needed No Help Needed   Taking your medications No Help Needed No Help Needed No 
   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  Robot assisted    HERNIA REPAIR  2013    abd hernia     HERNIA REPAIR  1996    inguinal R&L    PROSTATECTOMY      SMALL INTESTINE SURGERY  1996    UROLOGICAL SURGERY      prostate  removed    UROLOGICAL SURGERY      adhesion repair       Social History     Socioeconomic History    Marital status:     Years of education: 12   Tobacco Use    Smoking status: Every Day    Smokeless tobacco: Never   Substance and Sexual Activity    Alcohol use: Yes    Drug use: No    Sexual activity: Not Currently     Partners: Female     Social Determinants of Health     Financial Resource Strain: Low Risk  (6/8/2023)    Overall Financial Resource Strain (CARDIA)     Difficulty of Paying Living Expenses: Not hard at all    Transportation Problems ([facility] HRSN)   Physical Activity: Insufficiently Active (10/19/2023)    Exercise Vital Sign     Days of Exercise per Week: 3 days     Minutes of Exercise per Session: 20 min   Housing Stability: Low Risk  (11/8/2023)    Housing Stability Vital Sign     Unable to Pay for Housing in the Last Year: No     Number of Places Lived in the Last Year: 1     Unstable Housing in the Last Year: No       Family History   Problem Relation Age of Onset    Heart Disease Brother     Heart Disease Father     Heart Disease Mother        Above history reviewed.      ROS:  Denies fever, chills, cough, chest pain, SOB,  nausea, vomiting, or diarrhea.  Denies wt loss, wt gain, hemoptysis, hematochezia or

## 2024-02-27 ENCOUNTER — APPOINTMENT (OUTPATIENT)
Facility: HOSPITAL | Age: 89
DRG: 683 | End: 2024-02-27
Payer: MEDICARE

## 2024-02-27 ENCOUNTER — HOSPITAL ENCOUNTER (INPATIENT)
Facility: HOSPITAL | Age: 89
LOS: 7 days | Discharge: HOME | DRG: 683 | End: 2024-03-06
Attending: EMERGENCY MEDICINE | Admitting: INTERNAL MEDICINE
Payer: MEDICARE

## 2024-02-27 DIAGNOSIS — S51.011A SKIN TEAR OF RIGHT ELBOW WITHOUT COMPLICATION, INITIAL ENCOUNTER: Primary | ICD-10-CM

## 2024-02-27 DIAGNOSIS — I50.32 CHRONIC DIASTOLIC HEART FAILURE (HCC): ICD-10-CM

## 2024-02-27 LAB
ALBUMIN SERPL-MCNC: 3.5 G/DL (ref 3.5–5)
ALBUMIN/GLOB SERPL: 1.1 (ref 1.1–2.2)
ALP SERPL-CCNC: 95 U/L (ref 45–117)
ALT SERPL-CCNC: 23 U/L (ref 12–78)
ANION GAP SERPL CALC-SCNC: 10 MMOL/L (ref 5–15)
APPEARANCE UR: CLEAR
AST SERPL-CCNC: 21 U/L (ref 15–37)
BACTERIA URNS QL MICRO: NEGATIVE /HPF
BASOPHILS # BLD: 0 K/UL (ref 0–0.1)
BASOPHILS NFR BLD: 0 % (ref 0–1)
BILIRUB SERPL-MCNC: 1.3 MG/DL (ref 0.2–1)
BILIRUB UR QL: NEGATIVE
BUN SERPL-MCNC: 25 MG/DL (ref 6–20)
BUN/CREAT SERPL: 14 (ref 12–20)
CALCIUM SERPL-MCNC: 8.7 MG/DL (ref 8.5–10.1)
CHLORIDE SERPL-SCNC: 101 MMOL/L (ref 97–108)
CO2 SERPL-SCNC: 30 MMOL/L (ref 21–32)
COLOR UR: NORMAL
CREAT SERPL-MCNC: 1.83 MG/DL (ref 0.7–1.3)
DIFFERENTIAL METHOD BLD: ABNORMAL
EOSINOPHIL # BLD: 0 K/UL (ref 0–0.4)
EOSINOPHIL NFR BLD: 0 % (ref 0–7)
EPITH CASTS URNS QL MICRO: NORMAL /LPF
ERYTHROCYTE [DISTWIDTH] IN BLOOD BY AUTOMATED COUNT: 14.1 % (ref 11.5–14.5)
FLUAV RNA SPEC QL NAA+PROBE: NOT DETECTED
FLUBV RNA SPEC QL NAA+PROBE: NOT DETECTED
GLOBULIN SER CALC-MCNC: 3.1 G/DL (ref 2–4)
GLUCOSE SERPL-MCNC: 114 MG/DL (ref 65–100)
GLUCOSE UR STRIP.AUTO-MCNC: NEGATIVE MG/DL
HCT VFR BLD AUTO: 39.6 % (ref 36.6–50.3)
HGB BLD-MCNC: 13 G/DL (ref 12.1–17)
HGB UR QL STRIP: NEGATIVE
IMM GRANULOCYTES # BLD AUTO: 0 K/UL (ref 0–0.04)
IMM GRANULOCYTES NFR BLD AUTO: 0 % (ref 0–0.5)
KETONES UR QL STRIP.AUTO: NEGATIVE MG/DL
LACTATE SERPL-SCNC: 1.2 MMOL/L (ref 0.4–2)
LEUKOCYTE ESTERASE UR QL STRIP.AUTO: NEGATIVE
LYMPHOCYTES # BLD: 0.6 K/UL (ref 0.8–3.5)
LYMPHOCYTES NFR BLD: 5 % (ref 12–49)
MCH RBC QN AUTO: 29.5 PG (ref 26–34)
MCHC RBC AUTO-ENTMCNC: 32.8 G/DL (ref 30–36.5)
MCV RBC AUTO: 90 FL (ref 80–99)
MONOCYTES # BLD: 0.8 K/UL (ref 0–1)
MONOCYTES NFR BLD: 7 % (ref 5–13)
NEUTS BAND NFR BLD MANUAL: 4 %
NEUTS SEG # BLD: 10.3 K/UL (ref 1.8–8)
NEUTS SEG NFR BLD: 84 % (ref 32–75)
NITRITE UR QL STRIP.AUTO: NEGATIVE
NRBC # BLD: 0 K/UL (ref 0–0.01)
NRBC BLD-RTO: 0 PER 100 WBC
PH UR STRIP: 5.5 (ref 5–8)
PLATELET # BLD AUTO: 139 K/UL (ref 150–400)
PLATELET COMMENT: ABNORMAL
PMV BLD AUTO: 12 FL (ref 8.9–12.9)
POTASSIUM SERPL-SCNC: 3.3 MMOL/L (ref 3.5–5.1)
PROT SERPL-MCNC: 6.6 G/DL (ref 6.4–8.2)
PROT UR STRIP-MCNC: NEGATIVE MG/DL
RBC # BLD AUTO: 4.4 M/UL (ref 4.1–5.7)
RBC #/AREA URNS HPF: NORMAL /HPF (ref 0–5)
RBC MORPH BLD: ABNORMAL
SARS-COV-2 RNA RESP QL NAA+PROBE: NOT DETECTED
SODIUM SERPL-SCNC: 141 MMOL/L (ref 136–145)
SP GR UR REFRACTOMETRY: 1.02 (ref 1–1.03)
URINE CULTURE IF INDICATED: NORMAL
UROBILINOGEN UR QL STRIP.AUTO: 0.2 EU/DL (ref 0.2–1)
WBC # BLD AUTO: 11.7 K/UL (ref 4.1–11.1)
WBC URNS QL MICRO: NORMAL /HPF (ref 0–4)

## 2024-02-27 PROCEDURE — 85025 COMPLETE CBC W/AUTO DIFF WBC: CPT

## 2024-02-27 PROCEDURE — 83735 ASSAY OF MAGNESIUM: CPT

## 2024-02-27 PROCEDURE — 83880 ASSAY OF NATRIURETIC PEPTIDE: CPT

## 2024-02-27 PROCEDURE — 80053 COMPREHEN METABOLIC PANEL: CPT

## 2024-02-27 PROCEDURE — 2580000003 HC RX 258: Performed by: EMERGENCY MEDICINE

## 2024-02-27 PROCEDURE — 83605 ASSAY OF LACTIC ACID: CPT

## 2024-02-27 PROCEDURE — 96361 HYDRATE IV INFUSION ADD-ON: CPT

## 2024-02-27 PROCEDURE — 71045 X-RAY EXAM CHEST 1 VIEW: CPT

## 2024-02-27 PROCEDURE — 99285 EMERGENCY DEPT VISIT HI MDM: CPT

## 2024-02-27 PROCEDURE — 93005 ELECTROCARDIOGRAM TRACING: CPT | Performed by: EMERGENCY MEDICINE

## 2024-02-27 PROCEDURE — 87636 SARSCOV2 & INF A&B AMP PRB: CPT

## 2024-02-27 PROCEDURE — 81001 URINALYSIS AUTO W/SCOPE: CPT

## 2024-02-27 PROCEDURE — 84443 ASSAY THYROID STIM HORMONE: CPT

## 2024-02-27 PROCEDURE — 84484 ASSAY OF TROPONIN QUANT: CPT

## 2024-02-27 PROCEDURE — 96365 THER/PROPH/DIAG IV INF INIT: CPT

## 2024-02-27 PROCEDURE — 70450 CT HEAD/BRAIN W/O DYE: CPT

## 2024-02-27 PROCEDURE — 6360000002 HC RX W HCPCS: Performed by: EMERGENCY MEDICINE

## 2024-02-27 PROCEDURE — 87040 BLOOD CULTURE FOR BACTERIA: CPT

## 2024-02-27 PROCEDURE — 36415 COLL VENOUS BLD VENIPUNCTURE: CPT

## 2024-02-27 PROCEDURE — 84145 PROCALCITONIN (PCT): CPT

## 2024-02-27 RX ORDER — 0.9 % SODIUM CHLORIDE 0.9 %
1000 INTRAVENOUS SOLUTION INTRAVENOUS ONCE
Status: COMPLETED | OUTPATIENT
Start: 2024-02-27 | End: 2024-02-27

## 2024-02-27 RX ADMIN — SODIUM CHLORIDE 1000 ML: 9 INJECTION, SOLUTION INTRAVENOUS at 20:02

## 2024-02-27 RX ADMIN — CEFTRIAXONE SODIUM 1000 MG: 1 INJECTION, POWDER, FOR SOLUTION INTRAMUSCULAR; INTRAVENOUS at 20:37

## 2024-02-27 ASSESSMENT — PAIN - FUNCTIONAL ASSESSMENT: PAIN_FUNCTIONAL_ASSESSMENT: NONE - DENIES PAIN

## 2024-02-27 NOTE — ED TRIAGE NOTES
Pt has a pmhx of falls and arrives EMS with c/o a GLF. Skin tear to arm. Pt did not strike head. Pt is on blood thinners. Pt reports general weakness.

## 2024-02-28 ENCOUNTER — APPOINTMENT (OUTPATIENT)
Facility: HOSPITAL | Age: 89
DRG: 683 | End: 2024-02-28
Payer: MEDICARE

## 2024-02-28 PROBLEM — R53.1 GENERALIZED WEAKNESS: Status: ACTIVE | Noted: 2024-02-28

## 2024-02-28 PROBLEM — N17.9 AKI (ACUTE KIDNEY INJURY) (HCC): Status: ACTIVE | Noted: 2024-02-28

## 2024-02-28 LAB
ANION GAP SERPL CALC-SCNC: 10 MMOL/L (ref 5–15)
BUN SERPL-MCNC: 20 MG/DL (ref 6–20)
BUN/CREAT SERPL: 13 (ref 12–20)
CALCIUM SERPL-MCNC: 8.6 MG/DL (ref 8.5–10.1)
CHLORIDE SERPL-SCNC: 100 MMOL/L (ref 97–108)
CO2 SERPL-SCNC: 28 MMOL/L (ref 21–32)
CREAT SERPL-MCNC: 1.53 MG/DL (ref 0.7–1.3)
D DIMER PPP FEU-MCNC: 1.88 MG/L FEU (ref 0–0.65)
GLUCOSE SERPL-MCNC: 124 MG/DL (ref 65–100)
MAGNESIUM SERPL-MCNC: 1.8 MG/DL (ref 1.6–2.4)
NT PRO BNP: 161 PG/ML (ref 0–450)
POTASSIUM SERPL-SCNC: 3.8 MMOL/L (ref 3.5–5.1)
PROCALCITONIN SERPL-MCNC: 1.56 NG/ML
RSV RNA NPH QL NAA+PROBE: NOT DETECTED
SODIUM SERPL-SCNC: 138 MMOL/L (ref 136–145)
TROPONIN I SERPL HS-MCNC: 14 NG/L (ref 0–76)
TSH SERPL DL<=0.05 MIU/L-ACNC: 0.51 UIU/ML (ref 0.36–3.74)

## 2024-02-28 PROCEDURE — 71275 CT ANGIOGRAPHY CHEST: CPT

## 2024-02-28 PROCEDURE — 6370000000 HC RX 637 (ALT 250 FOR IP): Performed by: FAMILY MEDICINE

## 2024-02-28 PROCEDURE — 96372 THER/PROPH/DIAG INJ SC/IM: CPT

## 2024-02-28 PROCEDURE — 80048 BASIC METABOLIC PNL TOTAL CA: CPT

## 2024-02-28 PROCEDURE — 93005 ELECTROCARDIOGRAM TRACING: CPT | Performed by: INTERNAL MEDICINE

## 2024-02-28 PROCEDURE — 97530 THERAPEUTIC ACTIVITIES: CPT

## 2024-02-28 PROCEDURE — 94640 AIRWAY INHALATION TREATMENT: CPT

## 2024-02-28 PROCEDURE — 6370000000 HC RX 637 (ALT 250 FOR IP): Performed by: INTERNAL MEDICINE

## 2024-02-28 PROCEDURE — 6360000002 HC RX W HCPCS: Performed by: INTERNAL MEDICINE

## 2024-02-28 PROCEDURE — 97161 PT EVAL LOW COMPLEX 20 MIN: CPT

## 2024-02-28 PROCEDURE — 97110 THERAPEUTIC EXERCISES: CPT

## 2024-02-28 PROCEDURE — 36415 COLL VENOUS BLD VENIPUNCTURE: CPT

## 2024-02-28 PROCEDURE — 2580000003 HC RX 258: Performed by: INTERNAL MEDICINE

## 2024-02-28 PROCEDURE — 97165 OT EVAL LOW COMPLEX 30 MIN: CPT

## 2024-02-28 PROCEDURE — 87634 RSV DNA/RNA AMP PROBE: CPT

## 2024-02-28 PROCEDURE — 85379 FIBRIN DEGRADATION QUANT: CPT

## 2024-02-28 PROCEDURE — 1100000003 HC PRIVATE W/ TELEMETRY

## 2024-02-28 PROCEDURE — 97535 SELF CARE MNGMENT TRAINING: CPT

## 2024-02-28 PROCEDURE — 6360000004 HC RX CONTRAST MEDICATION: Performed by: INTERNAL MEDICINE

## 2024-02-28 RX ORDER — LANSOPRAZOLE 30 MG/1
30 CAPSULE, DELAYED RELEASE ORAL DAILY
Status: DISCONTINUED | OUTPATIENT
Start: 2024-02-28 | End: 2024-03-02

## 2024-02-28 RX ORDER — SODIUM CHLORIDE 0.9 % (FLUSH) 0.9 %
5-40 SYRINGE (ML) INJECTION EVERY 12 HOURS SCHEDULED
Status: DISCONTINUED | OUTPATIENT
Start: 2024-02-28 | End: 2024-03-06 | Stop reason: HOSPADM

## 2024-02-28 RX ORDER — POLYETHYLENE GLYCOL 3350 17 G/17G
17 POWDER, FOR SOLUTION ORAL DAILY PRN
Status: DISCONTINUED | OUTPATIENT
Start: 2024-02-28 | End: 2024-03-06 | Stop reason: HOSPADM

## 2024-02-28 RX ORDER — POTASSIUM CHLORIDE 7.45 MG/ML
10 INJECTION INTRAVENOUS PRN
Status: DISCONTINUED | OUTPATIENT
Start: 2024-02-28 | End: 2024-02-29

## 2024-02-28 RX ORDER — ONDANSETRON 4 MG/1
4 TABLET, ORALLY DISINTEGRATING ORAL EVERY 8 HOURS PRN
Status: DISCONTINUED | OUTPATIENT
Start: 2024-02-28 | End: 2024-03-06 | Stop reason: HOSPADM

## 2024-02-28 RX ORDER — ACETAMINOPHEN 650 MG/1
650 SUPPOSITORY RECTAL EVERY 6 HOURS PRN
Status: DISCONTINUED | OUTPATIENT
Start: 2024-02-28 | End: 2024-03-06 | Stop reason: HOSPADM

## 2024-02-28 RX ORDER — BUDESONIDE 0.5 MG/2ML
0.25 INHALANT ORAL
Status: DISCONTINUED | OUTPATIENT
Start: 2024-02-28 | End: 2024-03-06 | Stop reason: HOSPADM

## 2024-02-28 RX ORDER — SODIUM CHLORIDE 0.9 % (FLUSH) 0.9 %
5-40 SYRINGE (ML) INJECTION PRN
Status: DISCONTINUED | OUTPATIENT
Start: 2024-02-28 | End: 2024-03-06 | Stop reason: HOSPADM

## 2024-02-28 RX ORDER — IPRATROPIUM BROMIDE AND ALBUTEROL SULFATE 2.5; .5 MG/3ML; MG/3ML
1 SOLUTION RESPIRATORY (INHALATION)
Status: DISCONTINUED | OUTPATIENT
Start: 2024-02-28 | End: 2024-02-28

## 2024-02-28 RX ORDER — TORSEMIDE 20 MG/1
20 TABLET ORAL
Status: DISCONTINUED | OUTPATIENT
Start: 2024-02-28 | End: 2024-02-28

## 2024-02-28 RX ORDER — TAMSULOSIN HYDROCHLORIDE 0.4 MG/1
0.4 CAPSULE ORAL DAILY
Status: DISCONTINUED | OUTPATIENT
Start: 2024-02-28 | End: 2024-02-29

## 2024-02-28 RX ORDER — SODIUM CHLORIDE 9 MG/ML
INJECTION, SOLUTION INTRAVENOUS CONTINUOUS
Status: DISCONTINUED | OUTPATIENT
Start: 2024-02-28 | End: 2024-03-01

## 2024-02-28 RX ORDER — 0.9 % SODIUM CHLORIDE 0.9 %
500 INTRAVENOUS SOLUTION INTRAVENOUS ONCE
Status: COMPLETED | OUTPATIENT
Start: 2024-02-28 | End: 2024-02-28

## 2024-02-28 RX ORDER — MAGNESIUM SULFATE IN WATER 40 MG/ML
2000 INJECTION, SOLUTION INTRAVENOUS PRN
Status: DISCONTINUED | OUTPATIENT
Start: 2024-02-28 | End: 2024-02-29

## 2024-02-28 RX ORDER — ONDANSETRON 2 MG/ML
4 INJECTION INTRAMUSCULAR; INTRAVENOUS EVERY 6 HOURS PRN
Status: DISCONTINUED | OUTPATIENT
Start: 2024-02-28 | End: 2024-03-06 | Stop reason: HOSPADM

## 2024-02-28 RX ORDER — ENOXAPARIN SODIUM 100 MG/ML
40 INJECTION SUBCUTANEOUS DAILY
Status: DISCONTINUED | OUTPATIENT
Start: 2024-02-28 | End: 2024-03-06 | Stop reason: HOSPADM

## 2024-02-28 RX ORDER — POTASSIUM CHLORIDE 750 MG/1
20 TABLET, FILM COATED, EXTENDED RELEASE ORAL ONCE
Status: COMPLETED | OUTPATIENT
Start: 2024-02-28 | End: 2024-02-28

## 2024-02-28 RX ORDER — ARFORMOTEROL TARTRATE 15 UG/2ML
15 SOLUTION RESPIRATORY (INHALATION)
Status: DISCONTINUED | OUTPATIENT
Start: 2024-02-28 | End: 2024-03-06 | Stop reason: HOSPADM

## 2024-02-28 RX ORDER — FAMOTIDINE 20 MG/1
20 TABLET, FILM COATED ORAL DAILY
Status: DISCONTINUED | OUTPATIENT
Start: 2024-02-28 | End: 2024-03-06 | Stop reason: HOSPADM

## 2024-02-28 RX ORDER — SODIUM CHLORIDE 9 MG/ML
INJECTION, SOLUTION INTRAVENOUS PRN
Status: DISCONTINUED | OUTPATIENT
Start: 2024-02-28 | End: 2024-03-06 | Stop reason: HOSPADM

## 2024-02-28 RX ORDER — IPRATROPIUM BROMIDE AND ALBUTEROL SULFATE 2.5; .5 MG/3ML; MG/3ML
1 SOLUTION RESPIRATORY (INHALATION)
Status: DISCONTINUED | OUTPATIENT
Start: 2024-02-28 | End: 2024-03-06 | Stop reason: HOSPADM

## 2024-02-28 RX ORDER — POTASSIUM CHLORIDE 750 MG/1
40 TABLET, FILM COATED, EXTENDED RELEASE ORAL PRN
Status: DISCONTINUED | OUTPATIENT
Start: 2024-02-28 | End: 2024-02-29

## 2024-02-28 RX ORDER — LANSOPRAZOLE 30 MG/1
30 CAPSULE, DELAYED RELEASE ORAL DAILY
Status: DISCONTINUED | OUTPATIENT
Start: 2024-02-28 | End: 2024-02-28

## 2024-02-28 RX ORDER — ATORVASTATIN CALCIUM 40 MG/1
40 TABLET, FILM COATED ORAL DAILY
Status: DISCONTINUED | OUTPATIENT
Start: 2024-02-28 | End: 2024-03-06 | Stop reason: HOSPADM

## 2024-02-28 RX ORDER — ACETAMINOPHEN 325 MG/1
650 TABLET ORAL EVERY 6 HOURS PRN
Status: DISCONTINUED | OUTPATIENT
Start: 2024-02-28 | End: 2024-03-06 | Stop reason: HOSPADM

## 2024-02-28 RX ADMIN — ARFORMOTEROL TARTRATE 15 MCG: 15 SOLUTION RESPIRATORY (INHALATION) at 19:35

## 2024-02-28 RX ADMIN — FAMOTIDINE 20 MG: 20 TABLET, FILM COATED ORAL at 09:38

## 2024-02-28 RX ADMIN — SODIUM CHLORIDE, PRESERVATIVE FREE 10 ML: 5 INJECTION INTRAVENOUS at 09:51

## 2024-02-28 RX ADMIN — SODIUM CHLORIDE, PRESERVATIVE FREE 5 ML: 5 INJECTION INTRAVENOUS at 20:25

## 2024-02-28 RX ADMIN — IPRATROPIUM BROMIDE AND ALBUTEROL SULFATE 1 DOSE: .5; 2.5 SOLUTION RESPIRATORY (INHALATION) at 07:43

## 2024-02-28 RX ADMIN — SODIUM CHLORIDE 500 ML: 9 INJECTION, SOLUTION INTRAVENOUS at 15:20

## 2024-02-28 RX ADMIN — ATORVASTATIN CALCIUM 40 MG: 40 TABLET, FILM COATED ORAL at 09:38

## 2024-02-28 RX ADMIN — ENOXAPARIN SODIUM 40 MG: 100 INJECTION SUBCUTANEOUS at 09:38

## 2024-02-28 RX ADMIN — BUDESONIDE 250 MCG: 0.5 SUSPENSION RESPIRATORY (INHALATION) at 07:43

## 2024-02-28 RX ADMIN — IOPAMIDOL 100 ML: 755 INJECTION, SOLUTION INTRAVENOUS at 03:42

## 2024-02-28 RX ADMIN — POTASSIUM CHLORIDE 20 MEQ: 750 TABLET, FILM COATED, EXTENDED RELEASE ORAL at 01:25

## 2024-02-28 RX ADMIN — SODIUM CHLORIDE: 9 INJECTION, SOLUTION INTRAVENOUS at 05:58

## 2024-02-28 RX ADMIN — SODIUM CHLORIDE: 9 INJECTION, SOLUTION INTRAVENOUS at 21:08

## 2024-02-28 RX ADMIN — BUDESONIDE 250 MCG: 0.5 SUSPENSION RESPIRATORY (INHALATION) at 19:35

## 2024-02-28 RX ADMIN — ARFORMOTEROL TARTRATE 15 MCG: 15 SOLUTION RESPIRATORY (INHALATION) at 07:43

## 2024-02-28 RX ADMIN — TAMSULOSIN HYDROCHLORIDE 0.4 MG: 0.4 CAPSULE ORAL at 09:38

## 2024-02-28 RX ADMIN — IPRATROPIUM BROMIDE AND ALBUTEROL SULFATE 1 DOSE: .5; 2.5 SOLUTION RESPIRATORY (INHALATION) at 19:35

## 2024-02-28 RX ADMIN — TORSEMIDE 20 MG: 20 TABLET ORAL at 09:32

## 2024-02-28 NOTE — PLAN OF CARE
the patient currently need... Total; A Lot A Little None   1.  Putting on and taking off regular lower body clothing? []  1 []  2 [x]  3 []  4   2.  Bathing (including washing, rinsing, drying)? []  1 []  2 [x]  3 []  4   3.  Toileting, which includes using toilet, bedpan or urinal? [] 1 [x]  2 []  3 []  4   4.  Putting on and taking off regular upper body clothing? []  1 []  2 [x]  3 []  4   5.  Taking care of personal grooming such as brushing teeth? []  1 []  2 []  3 [x]  4   6.  Eating meals? []  1 []  2 []  3 [x]  4   © , Trustees of [facility], under license to [employer]. All rights reserved     Score: 19/24     Interpretation of Tool:  Represents clinically-significant functional categories (i.e. Activities of daily living).    Cutoff score 39.4 (19) correlates to a good likelihood of discharging home versus a facility  Liudmila Maldoando, Beth Hatch, Jose Wong, Neha Hernández, Mariano Kumar, Chaz Maldonado, -PAC “6-Clicks” Functional Assessment Scores Predict Acute Care Hospital Discharge Destination, Physical Therapy, Volume 94, Issue 9, 2014, Pages 4523-0652, [email or web address]    Pain Ratin/10   Activity Tolerance:   Fair     After treatment:   Patient left in no apparent distress in bed, Call bell within reach, Bed/ chair alarm activated, and Caregiver / family present    COMMUNICATION/EDUCATION:   The patient's plan of care was discussed with: physical therapist, registered nurse, and physician    Patient Education  Education Given To: Patient;Family  Education Provided: Role of Therapy;Plan of Care  Education Method: Demonstration;Verbal  Barriers to Learning: None  Education Outcome: Verbalized understanding;Demonstrated understanding    Thank you for this referral.  Dagoberto Romero OT  Minutes: 40    Occupational Therapy Evaluation Charge Determination   History Examination Decision-Making   MEDIUM Complexity : Expanded review of  history including physical, cognitive and psychocial history  LOW Complexity: 1-3 Performance deficits relating to physical, cognitive, or psychosocial skills that result in activity limitations and/or participation restrictions LOW Complexity: No comorbidities that affect functional and  no verbal  or physical assist needed to complete eval tasks   Based on the above components, the patient evaluation is determined to be of the following complexity level: Low

## 2024-02-28 NOTE — ED NOTES
Admission SBAR Note  Situation/Background: Pt fell at home yesterday evening, Hit his head - CT of Head negative, Sustained a skin tear to right elbow. Pt weak - unable to ambulate in ED/unsteady.    Patient is being transferred to tele ([facility]), Room# 130    Patient's Chief Complaint was Fall and is admitted for Generalized weakness.    CODE STATUS: Full  CSSRS: 0 - No Risk    ISOLATION/PRECAUTIONS: No    Is this a behavioral health patient? No        STAT labs collected: Yes    Repeat Lactic Acid DUE? No      All STAT orders are complete: Yes    The following personal items will be sent with the patient during transfer to the floor:     All valuables: listed in ER with patient, with patient at bedside       ASSESSMENT:    NEURO:       ORIENTATION LEVEL: ORIENTATION LEVEL: Person, Place, and Time  Cognition: following commands  follows multi-step simple commands/direction  Speech: shows no evidence of impairment    Is patient impulsive? No  Is patient oriented? Yes  Do they follow commands? Yes  Is the patient ambulatory? No, normally ambulatory with walker - weak    FALL RISK? Yes  Interventions: Implemented/recommended use of non-skid footwear and Implemented/recommended resources for alarm system (personal alarm, bed alarm, call bell, etc.)     RESPIRATORY:   Is patient on oxygen? No  Oxygen therapy: room air      CARDIAC:   Is cardiac monitoring ordered? Yes    Last Rhythm: Rhythm including paccardio: pt not on cardiac monitor in ED  Patient to transfer with tele box on? Yes  Infusions: Meds; iv fluids: none  LINE ACCESS: 20G Peripheral IV , Antecubital and Left , Iv rate: heplock       /GI:   Continent Bowel/Bladder? No  Urinary Output: incontinent of bladder/bowel      Was UA with reflex sent to lab? Yes      INTEGUMENTARY:  IS THE PATIENT UNDRESSED? Yes  ARE THERE WOUNDS PRESENT? Yes  ARE THE WOUNDS DOCUMENTED? Yes, skin tear  right  Patient: Alok De La Torre    Procedure Summary       Date: 02/19/24 Room / Location: [facility]    Anesthesia Start: 1015 Anesthesia Stop:     Procedure: COLONOSCOPY Diagnosis:       Encounter for screening for malignant neoplasm of colon      Encounter for screening for malignant neoplasm of colon    Scheduled Providers: Kennedy Jimenez MD Responsible Provider: OSIRIS Castillo    Anesthesia Type: MAC ASA Status: 2            Anesthesia Type: MAC    Vitals Value Taken Time   BP 93/51 02/19/24 1043   Temp 36.3 °C (97.3 °F) 02/19/24 1043   Pulse 89 02/19/24 1043   Resp 16 02/19/24 1043   SpO2 95 % 02/19/24 1043       Anesthesia Post Evaluation    Patient location during evaluation: bedside  Patient participation: complete - patient cannot participate  Level of consciousness: awake and responsive to verbal stimuli  Pain score: 0  Pain management: adequate  Airway patency: patent  Cardiovascular status: acceptable and hemodynamically stable  Respiratory status: acceptable  Hydration status: acceptable  Postoperative Nausea and Vomiting: none        No notable events documented.     elbow, bruising top  of both hands, scratches to LLE near ankle (anterio)    RESTRAINTS IN USE: No         Vital Signs:   / Level of Consciousness: Alert (0)    Vitals:    02/27/24 2315 02/27/24 2330 02/28/24 0045 02/28/24 0115   BP:  103/79 113/70 107/71   Pulse:       Resp: 15 15     Temp:       TempSrc:       SpO2:  96% 92% 94%   Weight:       Height:              Pain 1: 0  Pain Scale 1: 0-10    REVIEW:    IP UNIT CALLED NOTE IS READY: Yes  IF THERE ARE QUESTIONS, CALL Breanna AT PHONE at [phone]

## 2024-02-28 NOTE — PROGRESS NOTES
Patient has been having low B/P 80/54,  then, 92/62. MD notified. Nurse was instructed to take Orthostatic B/P all three types standing, sitting and laying down. MD to be informed of results.   Nurse is watching patient closely. Orthostatic pressures were taken laying down, sitting and standing with x 2 assist( OT & nurse) MD notified and nurse was alerted that new orders were coming for bolus. MD will be alerted of any changes that may occur with patient.

## 2024-02-28 NOTE — PLAN OF CARE
Upon admission patient has some bruising on buttock, right elbow abrasion/tear (r/t fall), and LLE scratch on the shin (from cat at home).  Patient is weak and unsteady.   Dyspnea on exertion when moving in bed.  Goal is to encourage and assist with turning and to maintain skin integrity and no significant safety events.

## 2024-02-28 NOTE — CARE COORDINATION
they recommend. Patient also provided CM introductory letter and told to contact us if he has any questions or concerns during his stay.

## 2024-02-28 NOTE — ED PROVIDER NOTES
[facility] EMERGENCY DEP  EMERGENCY DEPARTMENT ENCOUNTER       Pt Name: Naveen Alvarez  MRN: [id number]  Birthdate 1935  Date of evaluation: 2/27/2024  Provider: Bijal Leger MD   PCP: Mohit Franco MD  Note Started: 8:30 PM 2/27/24     CHIEF COMPLAINT       Chief Complaint   Patient presents with    Fall    Fatigue        HISTORY OF PRESENT ILLNESS: 1 or more elements      History From: Patient, family members  Limitations: None     Naveen Alvarez is a 88 y.o. male who presents complaining of generalized weakness.  Patient reports he fell like his body locked up and he had a fall today, fell down onto right elbow.  Had bleeding from right elbow, denies head injury or LOC.  Family reports that the patient seemed weaker today than usual.  They did not note a specific fever but that he felt \"warm\" when EMS took him.  Daughter reports the patient has had a UTI in the past with similar symptoms.     Nursing Notes were all reviewed and agreed with or any disagreements were addressed in the HPI.       PHYSICAL EXAM      ED Triage Vitals   Enc Vitals Group      BP 02/27/24 1736 108/72      Pulse 02/27/24 1736 (!) 106      Respirations 02/27/24 1710 16      Temp 02/27/24 1710 98.1 °F (36.7 °C)      Temp Source 02/27/24 1710 Oral      SpO2 02/27/24 1736 91 %      Weight - Scale 02/27/24 1710 92.5 kg (204 lb)      Height 02/27/24 1710 1.829 m (6')      Head Circumference --       Peak Flow --       Pain Score --       Pain Loc --       Pain Edu? --       Excl. in ? --               Physical Exam  Constitutional:       Comments: Elderly   Cardiovascular:      Rate and Rhythm: Regular rhythm. Tachycardia present.   Pulmonary:      Effort: Pulmonary effort is normal.      Breath sounds: Normal breath sounds. No wheezing.   Musculoskeletal:      Cervical back: Neck supple.      Comments: Right elbow tiny skin tear, approximately 1 cm diameter.   Skin:     General: Skin is warm.   Neurological:      Mental Status: He is

## 2024-02-28 NOTE — PLAN OF CARE
Problem: Physical Therapy - Adult  Goal: By Discharge: Performs mobility at highest level of function for planned discharge setting.  See evaluation for individualized goals.  Description: FUNCTIONAL STATUS PRIOR TO ADMISSION: Patient was modified independent using a 3WW for functional mobility.    HOME SUPPORT PRIOR TO ADMISSION: The patient lived with spouse Cata but did not require assistance for BADLs; they share IADL tasks; pt drives normally; 2-3 falls already this year.    Physical Therapy Goals  Initiated 2/28/2024  1.  Patient will move from supine to sit and sit to supine in bed with modified independence within 7 day(s).    2.  Patient will perform sit to stand with SBA within 7 day(s).  3.  Patient will transfer from bed to chair and chair to bed with SBA using the least restrictive device within 7 day(s).  4.  Patient will ambulate with SBA for 200 feet with the least restrictive device within 7 day(s).   5.  Patient will ascend/descend 2-3 stairs with 1-2 handrail(s) with contact guard assist within 7 day(s).    Outcome: Not Progressing   PHYSICAL THERAPY EVALUATION    Patient: Naveen Alvarez (88 y.o. male)  Date: 2/28/2024  Primary Diagnosis: Generalized weakness [R53.1]  Skin tear of right elbow without complication, initial encounter [S51.011A]       Precautions: Restrictions/Precautions: Fall Risk, General Precautions, Bed Alarm                      ASSESSMENT :   DEFICITS/IMPAIRMENTS:   The patient is limited by decreased functional mobility, independence in ADLs, ROM, strength, sensation, body mechanics, activity tolerance, endurance, safety awareness, coordination, balance, proprioception, posture, fine-motor control     Based on the impairments listed above patient will benefit from skilled intervention to address the above impairments. Pt presented resting in bed; spouse present. Vitals taken as noted per flowsheet. Pt was then assisted to EOB using elevated HOB and bedrails; fair balance   Section

## 2024-02-28 NOTE — PLAN OF CARE
Occupational Therapy Goals:  Initiated 2/28/2024  1.  Patient will perform shower transfer with Modified Wood within 7 day(s).  2.  Patient will perform lower body dressing with Modified Wood within 7 day(s).  3.  Patient will perform upper body dressing with Wood within 7 day(s).  4.  Patient will perform toilet transfers with Modified Wood  within 7 day(s).  5.  Patient will perform all aspects of toileting with Wood within 7 day(s).  2/28/2024 1506 by Dagoberto Romero, ANAMARIA  Outcome: Progressing  OCCUPATIONAL THERAPY TREATMENT  Patient: Naveen Alvarez (88 y.o. male)  Date: 2/28/2024  Primary Diagnosis: Generalized weakness [R53.1]  Skin tear of right elbow without complication, initial encounter [S51.011A]  DAMIR (acute kidney injury) (HCC) [N17.9]       Precautions: Fall Risk, General Precautions, Bed Alarm                Chart, occupational therapy assessment, plan of care, and goals were reviewed.    ASSESSMENT  Patient continues to benefit from skilled OT services and is slowly progressing towards goals. Pt approached supine and agreeable to therapy. Vitals taken per request of MD and nursing at all 3 levels. See flowsheet. Pt assisted to EOB w/ min A. Pt completed STS from EOB to 2WW /w min/mod A. Pt able to static stand approx 1-2min for BP reading. Pt returned to supine w/ all needs met, call light/alarm in place, nursing notified.             PLAN :  Patient continues to benefit from skilled intervention to address the above impairments.  Continue treatment per established plan of care to address goals.      Recommendation for discharge: (in order for the patient to meet his/her long term goals): Therapy up to 5 days/week in Skilled nursing facility    Other factors to consider for discharge: patient's current support system is unable to meet their requirements for physical assistance, high risk for falls, not safe to be alone, and concern for safely navigating or  and Bed/ chair alarm activated    COMMUNICATION/EDUCATION:   The patient's plan of care was discussed with: physical therapist and registered nurse    Patient Education  Education Given To: Patient;Family  Education Provided: Role of Therapy;Plan of Care  Education Method: Demonstration;Verbal  Barriers to Learning: None  Education Outcome: Verbalized understanding;Demonstrated understanding    Thank you for this referral.  Dagoberto Romero, OT  Minutes: 15

## 2024-02-28 NOTE — PROGRESS NOTES
Pharmacy Medication Reconciliation     The patient was interviewed regarding clarification of the prior to admission medication regimen. Patient present in room and obtained permission from patient to discuss drug regimen with visitor(s) present.      Information Obtained From: Patient     Allergies updated/ Reaction: NKDA     Organizer (pill box, bottles, etc): Pill Organizer     Additions: None     Medications that need DELETION: Albuterol inhaler     Changes: None     Pertinent Pharmacy Findings:  Updated patient’s preferred outpatient pharmacy to: [facility] in [place], [place]        Patient was questioned regarding use of any other inhalers, topical products, over the counter medications, herbal medications, vitamin products or ophthalmic/nasal/otic medication use.                    Patient was inquired about side effects and was asked about pharmacist consultation if needed or desired (Y/N): Y        PTA medication list was corrected to the following:      Prior to Admission Medications   Prescriptions Last Dose Informant Patient Reported? Taking?   ADVAIR DISKUS 250-50 MCG/ACT AEPB diskus inhaler 2/27/2024   Yes Yes   Sig: TAKE 1 PUFF BY MOUTH TWICE A DAY   Patient taking differently: Inhale 1 puff into the lungs 2 times daily   atorvastatin (LIPITOR) 40 MG tablet 2/27/2024   Yes Yes   Sig: Take 1 tablet by mouth daily   cyanocobalamin 100 MCG tablet 2/27/2024   Yes Yes   Sig: Take 1 tablet by mouth daily   famotidine (PEPCID) 40 MG tablet 2/27/2024   Yes Yes   Sig: Take 0.5 tablets by mouth 2 times daily   lansoprazole (PREVACID) 30 MG delayed release capsule 2/27/2024   Yes Yes   Sig: TAKE 1 CAPSULE BY MOUTH DAILY (BEFORE BREAKFAST).   Patient taking differently: Take 1 capsule by mouth daily   tamsulosin (FLOMAX) 0.4 MG capsule 2/27/2024   Yes Yes   Sig: Take 1 capsule by mouth daily   torsemide (DEMADEX) 20 MG tablet 2/27/2024   Yes Yes   Sig: Take 1 tablet by mouth daily (with breakfast)   trospium (SANCTURA)

## 2024-02-28 NOTE — PLAN OF CARE
Problem: Physical Therapy - Adult  Goal: By Discharge: Performs mobility at highest level of function for planned discharge setting.  See evaluation for individualized goals.  Description: FUNCTIONAL STATUS PRIOR TO ADMISSION: Patient was modified independent using a 3WW for functional mobility.    HOME SUPPORT PRIOR TO ADMISSION: The patient lived with spouse Cata but did not require assistance for BADLs; they share IADL tasks; pt drives normally; 2-3 falls already this year.    Physical Therapy Goals  Initiated 2/28/2024  1.  Patient will move from supine to sit and sit to supine in bed with modified independence within 7 day(s).    2.  Patient will perform sit to stand with SBA within 7 day(s).  3.  Patient will transfer from bed to chair and chair to bed with SBA using the least restrictive device within 7 day(s).  4.  Patient will ambulate with SBA for 200 feet with the least restrictive device within 7 day(s).   5.  Patient will ascend/descend 2-3 stairs with 1-2 handrail(s) with contact guard assist within 7 day(s).    2/28/2024 1542 by Juan Elizondo, PT  Outcome: Not Progressing   PHYSICAL THERAPY TREATMENT    Patient: Naveen Alvarez (88 y.o. male)  Date: 2/28/2024  Diagnosis: Generalized weakness [R53.1]  Skin tear of right elbow without complication, initial encounter [S51.011A]  DAMIR (acute kidney injury) (HCC) [N17.9] Generalized weakness      Precautions: Fall Risk, General Precautions, Bed Alarm                      ASSESSMENT:  Patient continues to benefit from skilled PT services and is not progressing towards goals. Pt presented still sitting up in recliner chair; requesting to get back into bed. Vitals taken as noted per flowsheet; RN aware of low BP. Therapist then rearranged recliner chair and placed it directly next to the EOB for a 90 deg transfer as pt is exhibiting significantly impaired mobility in standing. Cues for safety and technique as pt required max A to stand from lower recliner

## 2024-02-28 NOTE — H&P
02/27/2024    ALT 23 02/27/2024    LABGLOM 35 (L) 02/27/2024    GFRAA >60 07/21/2022    AGRATIO 1.1 02/27/2024    GLOB 3.1 02/27/2024           Lab Results   Component Value Date    WBC 11.7 (H) 02/27/2024    HGB 13.0 02/27/2024    HCT 39.6 02/27/2024    MCV 90.0 02/27/2024     (L) 02/27/2024     [unfilled]    Xray Result (most recent):  XR CHEST PORTABLE 02/27/2024    Narrative  EXAM: XR CHEST PORTABLE    INDICATION: weakness    COMPARISON: 11/8/2023    FINDINGS: A portable AP radiograph of the chest was obtained at 1919 hours. The  patient is on a cardiac monitor. Mild pulmonary edema.. The cardiac and  mediastinal contours and pulmonary vascularity are normal.  The bones and soft  tissues are grossly within normal limits.    Impression  Mild pulmonary edema      CT Result (most recent):  CTA CHEST W WO CONTRAST 02/28/2024    Narrative  EXAM:  CTA CHEST W WO CONTRAST    INDICATION: Weakness. Elevated D-dimer.    COMPARISON: Radiograph 2/27/2024. CT 7/14/2020.    TECHNIQUE: Helical thin section chest CT following uneventful intravenous  administration of nonionic contrast according to departmental PE protocol.  Coronal and sagittal reformats were performed. 3D/MIP post processing was  performed. CT dose reduction was achieved through use of a standardized protocol  tailored for this examination and automatic exposure control for dose  modulation.    FINDINGS: This is a good quality study for the evaluation of pulmonary embolism  to the first subsegmental arterial level. There is no pulmonary embolism to this  level.    The visualized thyroid gland is unremarkable. The aorta and main pulmonary  artery are stable in caliber. Cardiac size is within normal limits. No  pericardial effusion. No lymphadenopathy by imaging size criteria.    There is mild bilateral dependent atelectasis. There is no lung mass or  consolidation there is mild paraseptal emphysema.. No pleural effusion or  pneumothorax. Central  signs to date as well as treatment rendered and patient's response to those treatments.  In addition, prior medical, surgical and relevant social and family histories were reviewed.    Electronically signed by Melly Mcneil MD on 2/28/2024 at 5:11 AM

## 2024-02-28 NOTE — ACP (ADVANCE CARE PLANNING)
Advance Care Planning     General Advance Care Planning (ACP) Conversation    Date of Conversation: 2/28/2024  Conducted with: Patient with Decision Making Capacity    Healthcare Decision Maker:    Primary Decision Maker: Cata Alvarez - Spouse - [phone]    Secondary Decision Maker: Naveen Alvarez - Child - [phone]  Click here to complete Healthcare Decision Makers including selection of the Healthcare Decision Maker Relationship (ie \"Primary\").   Today we documented Decision Maker(s) consistent with Legal Next of Kin hierarchy.    Content/Action Overview:  Has NO ACP documents-Information provided  Reviewed DNR/DNI and patient elects Full Code (Attempt Resuscitation)  treatment goals  N/a    Length of Voluntary ACP Conversation in minutes:  <16 minutes (Non-Billable)    Trixie Jeong           Patient revoked his ACP document on 11/9/23. The revoked document stated his ex-wife Angella Alvarez was his decision maker. Patient states he wants his current wife Cata Alvarez to be his primary healthcare decision maker. Provided Mr. Alvarez ACP template and asked to fill out but his wife follows next of kin.

## 2024-02-29 ENCOUNTER — APPOINTMENT (OUTPATIENT)
Facility: HOSPITAL | Age: 89
DRG: 683 | End: 2024-02-29
Attending: INTERNAL MEDICINE
Payer: MEDICARE

## 2024-02-29 LAB
ANION GAP SERPL CALC-SCNC: 9 MMOL/L (ref 5–15)
BASOPHILS # BLD: 0 K/UL (ref 0–0.1)
BASOPHILS NFR BLD: 1 % (ref 0–1)
BUN SERPL-MCNC: 21 MG/DL (ref 6–20)
BUN/CREAT SERPL: 13 (ref 12–20)
CALCIUM SERPL-MCNC: 8.2 MG/DL (ref 8.5–10.1)
CHLORIDE SERPL-SCNC: 101 MMOL/L (ref 97–108)
CO2 SERPL-SCNC: 27 MMOL/L (ref 21–32)
CREAT SERPL-MCNC: 1.59 MG/DL (ref 0.7–1.3)
DIFFERENTIAL METHOD BLD: ABNORMAL
ECHO AO ROOT DIAM: 3.2 CM
ECHO AO ROOT INDEX: 1.49 CM/M2
ECHO AR MAX VEL PISA: 3.1 M/S
ECHO AV AREA PEAK VELOCITY: 1.1 CM2
ECHO AV AREA PEAK VELOCITY: 1.2 CM2
ECHO AV AREA VTI: 1.3 CM2
ECHO AV AREA/BSA VTI: 0.6 CM2/M2
ECHO AV MEAN GRADIENT: 24 MMHG
ECHO AV MEAN VELOCITY: 2.2 M/S
ECHO AV PEAK GRADIENT: 33 MMHG
ECHO AV PEAK VELOCITY: 3.1 M/S
ECHO AV REGURGITANT PHT: 510.3 MILLISECOND
ECHO AV VELOCITY RATIO: 0.35
ECHO AV VTI: 60.2 CM
ECHO BSA: 2.17 M2
ECHO EST RA PRESSURE: 3 MMHG
ECHO LA DIAMETER INDEX: 2 CM/M2
ECHO LA DIAMETER: 4.3 CM
ECHO LA TO AORTIC ROOT RATIO: 1.34
ECHO LA VOL A-L A2C: 82 ML (ref 18–58)
ECHO LA VOL A-L A4C: 92 ML (ref 18–58)
ECHO LA VOL MOD A2C: 80 ML (ref 18–58)
ECHO LA VOL MOD A4C: 84 ML (ref 18–58)
ECHO LA VOLUME AREA LENGTH: 92 ML
ECHO LA VOLUME INDEX A-L A2C: 38 ML/M2 (ref 16–34)
ECHO LA VOLUME INDEX A-L A4C: 43 ML/M2 (ref 16–34)
ECHO LA VOLUME INDEX AREA LENGTH: 43 ML/M2 (ref 16–34)
ECHO LA VOLUME INDEX MOD A2C: 37 ML/M2 (ref 16–34)
ECHO LA VOLUME INDEX MOD A4C: 39 ML/M2 (ref 16–34)
ECHO LV E' LATERAL VELOCITY: 10 CM/S
ECHO LV E' SEPTAL VELOCITY: 7 CM/S
ECHO LV FRACTIONAL SHORTENING: 28 % (ref 28–44)
ECHO LV INTERNAL DIMENSION DIASTOLE INDEX: 2 CM/M2
ECHO LV INTERNAL DIMENSION DIASTOLIC: 4.3 CM (ref 4.2–5.9)
ECHO LV INTERNAL DIMENSION SYSTOLIC INDEX: 1.44 CM/M2
ECHO LV INTERNAL DIMENSION SYSTOLIC: 3.1 CM
ECHO LV IVSD: 1.1 CM (ref 0.6–1)
ECHO LV MASS 2D: 152.6 G (ref 88–224)
ECHO LV MASS INDEX 2D: 71 G/M2 (ref 49–115)
ECHO LV POSTERIOR WALL DIASTOLIC: 1 CM (ref 0.6–1)
ECHO LV RELATIVE WALL THICKNESS RATIO: 0.47
ECHO LVOT AREA: 3.1 CM2
ECHO LVOT AV VTI INDEX: 0.39
ECHO LVOT DIAM: 2 CM
ECHO LVOT MEAN GRADIENT: 3 MMHG
ECHO LVOT PEAK GRADIENT: 5 MMHG
ECHO LVOT PEAK VELOCITY: 1.1 M/S
ECHO LVOT STROKE VOLUME INDEX: 34.2 ML/M2
ECHO LVOT SV: 73.5 ML
ECHO LVOT VTI: 23.4 CM
ECHO MV A VELOCITY: 1.39 M/S
ECHO MV E DECELERATION TIME (DT): 274 MS
ECHO MV E VELOCITY: 1.13 M/S
ECHO MV E/A RATIO: 0.81
ECHO MV E/E' LATERAL: 11.3
ECHO MV E/E' RATIO (AVERAGED): 13.72
ECHO MV EROA PISA: 0.3 CM2
ECHO MV REGURGITANT ALIASING (NYQUIST) VELOCITY: 55 CM/S
ECHO MV REGURGITANT RADIUS PISA: 0.66 CM
ECHO MV REGURGITANT VELOCITY PISA: 5.1 M/S
ECHO MV REGURGITANT VOLUME PISA: 46.35 ML
ECHO MV REGURGITANT VTIA: 157.1 CM
ECHO PULMONARY ARTERY SYSTOLIC PRESSURE (PASP): 33 MMHG
ECHO RIGHT VENTRICULAR SYSTOLIC PRESSURE (RVSP): 33 MMHG
ECHO RV TAPSE: 2 CM (ref 1.7–?)
ECHO TV REGURGITANT MAX VELOCITY: 2.73 M/S
ECHO TV REGURGITANT PEAK GRADIENT: 30 MMHG
EKG ATRIAL RATE: 56 BPM
EKG ATRIAL RATE: 94 BPM
EKG DIAGNOSIS: NORMAL
EKG DIAGNOSIS: NORMAL
EKG P AXIS: 58 DEGREES
EKG P AXIS: 63 DEGREES
EKG P-R INTERVAL: 174 MS
EKG P-R INTERVAL: 180 MS
EKG Q-T INTERVAL: 368 MS
EKG Q-T INTERVAL: 392 MS
EKG QRS DURATION: 72 MS
EKG QRS DURATION: 74 MS
EKG QTC CALCULATION (BAZETT): 460 MS
EKG QTC CALCULATION (BAZETT): 492 MS
EKG R AXIS: -14 DEGREES
EKG R AXIS: 5 DEGREES
EKG T AXIS: 43 DEGREES
EKG T AXIS: 62 DEGREES
EKG VENTRICULAR RATE: 94 BPM
EKG VENTRICULAR RATE: 95 BPM
EOSINOPHIL # BLD: 0 K/UL (ref 0–0.4)
EOSINOPHIL NFR BLD: 1 % (ref 0–7)
ERYTHROCYTE [DISTWIDTH] IN BLOOD BY AUTOMATED COUNT: 14.4 % (ref 11.5–14.5)
GLUCOSE SERPL-MCNC: 97 MG/DL (ref 65–100)
HCT VFR BLD AUTO: 35.8 % (ref 36.6–50.3)
HGB BLD-MCNC: 11.9 G/DL (ref 12.1–17)
IMM GRANULOCYTES # BLD AUTO: 0 K/UL (ref 0–0.04)
IMM GRANULOCYTES NFR BLD AUTO: 1 % (ref 0–0.5)
LYMPHOCYTES # BLD: 0.5 K/UL (ref 0.8–3.5)
LYMPHOCYTES NFR BLD: 9 % (ref 12–49)
MAGNESIUM SERPL-MCNC: 1.9 MG/DL (ref 1.6–2.4)
MCH RBC QN AUTO: 30 PG (ref 26–34)
MCHC RBC AUTO-ENTMCNC: 33.2 G/DL (ref 30–36.5)
MCV RBC AUTO: 90.2 FL (ref 80–99)
MONOCYTES # BLD: 0.5 K/UL (ref 0–1)
MONOCYTES NFR BLD: 9 % (ref 5–13)
NEUTS SEG # BLD: 4.7 K/UL (ref 1.8–8)
NEUTS SEG NFR BLD: 80 % (ref 32–75)
NRBC # BLD: 0 K/UL (ref 0–0.01)
NRBC BLD-RTO: 0 PER 100 WBC
PLATELET # BLD AUTO: 109 K/UL (ref 150–400)
PMV BLD AUTO: 12.7 FL (ref 8.9–12.9)
POTASSIUM SERPL-SCNC: 3.5 MMOL/L (ref 3.5–5.1)
RBC # BLD AUTO: 3.97 M/UL (ref 4.1–5.7)
SODIUM SERPL-SCNC: 137 MMOL/L (ref 136–145)
WBC # BLD AUTO: 5.9 K/UL (ref 4.1–11.1)

## 2024-02-29 PROCEDURE — 6360000002 HC RX W HCPCS: Performed by: INTERNAL MEDICINE

## 2024-02-29 PROCEDURE — 97116 GAIT TRAINING THERAPY: CPT

## 2024-02-29 PROCEDURE — 97110 THERAPEUTIC EXERCISES: CPT

## 2024-02-29 PROCEDURE — 97535 SELF CARE MNGMENT TRAINING: CPT

## 2024-02-29 PROCEDURE — 36415 COLL VENOUS BLD VENIPUNCTURE: CPT

## 2024-02-29 PROCEDURE — 6370000000 HC RX 637 (ALT 250 FOR IP): Performed by: INTERNAL MEDICINE

## 2024-02-29 PROCEDURE — 83735 ASSAY OF MAGNESIUM: CPT

## 2024-02-29 PROCEDURE — 85025 COMPLETE CBC W/AUTO DIFF WBC: CPT

## 2024-02-29 PROCEDURE — 1100000003 HC PRIVATE W/ TELEMETRY

## 2024-02-29 PROCEDURE — 94640 AIRWAY INHALATION TREATMENT: CPT

## 2024-02-29 PROCEDURE — 93306 TTE W/DOPPLER COMPLETE: CPT

## 2024-02-29 PROCEDURE — 80048 BASIC METABOLIC PNL TOTAL CA: CPT

## 2024-02-29 PROCEDURE — 2580000003 HC RX 258: Performed by: INTERNAL MEDICINE

## 2024-02-29 PROCEDURE — 93306 TTE W/DOPPLER COMPLETE: CPT | Performed by: INTERNAL MEDICINE

## 2024-02-29 RX ADMIN — FAMOTIDINE 20 MG: 20 TABLET, FILM COATED ORAL at 09:14

## 2024-02-29 RX ADMIN — IPRATROPIUM BROMIDE AND ALBUTEROL SULFATE 1 DOSE: .5; 2.5 SOLUTION RESPIRATORY (INHALATION) at 07:47

## 2024-02-29 RX ADMIN — SODIUM CHLORIDE, PRESERVATIVE FREE 10 ML: 5 INJECTION INTRAVENOUS at 09:14

## 2024-02-29 RX ADMIN — SODIUM CHLORIDE, PRESERVATIVE FREE 5 ML: 5 INJECTION INTRAVENOUS at 20:34

## 2024-02-29 RX ADMIN — ARFORMOTEROL TARTRATE 15 MCG: 15 SOLUTION RESPIRATORY (INHALATION) at 19:26

## 2024-02-29 RX ADMIN — BUDESONIDE 250 MCG: 0.5 SUSPENSION RESPIRATORY (INHALATION) at 19:26

## 2024-02-29 RX ADMIN — ATORVASTATIN CALCIUM 40 MG: 40 TABLET, FILM COATED ORAL at 09:14

## 2024-02-29 RX ADMIN — ENOXAPARIN SODIUM 40 MG: 100 INJECTION SUBCUTANEOUS at 09:13

## 2024-02-29 RX ADMIN — TAMSULOSIN HYDROCHLORIDE 0.4 MG: 0.4 CAPSULE ORAL at 09:13

## 2024-02-29 RX ADMIN — ARFORMOTEROL TARTRATE 15 MCG: 15 SOLUTION RESPIRATORY (INHALATION) at 07:47

## 2024-02-29 RX ADMIN — IPRATROPIUM BROMIDE AND ALBUTEROL SULFATE 1 DOSE: .5; 2.5 SOLUTION RESPIRATORY (INHALATION) at 19:26

## 2024-02-29 RX ADMIN — BUDESONIDE 250 MCG: 0.5 SUSPENSION RESPIRATORY (INHALATION) at 07:47

## 2024-02-29 NOTE — PLAN OF CARE
Problem: Occupational Therapy - Adult  Goal: By Discharge: Performs self-care activities at highest level of function for planned discharge setting.  See evaluation for individualized goals.  Description: FUNCTIONAL STATUS PRIOR TO ADMISSION:    Receives Help From: Family, ADL Assistance: Independent,  ,  ,  ,  ,  , Homemaking Assistance: Needs assistance, Ambulation Assistance: Independent, Transfer Assistance: Independent, Active : Yes     HOME SUPPORT: Patient lived with spouse but didn't require assistance.    Occupational Therapy Goals:  Initiated 2/28/2024  1.  Patient will perform shower transfer with Modified [place] within 7 day(s).  2.  Patient will perform lower body dressing with Modified [place] within 7 day(s).  3.  Patient will perform upper body dressing with [place] within 7 day(s).  4.  Patient will perform toilet transfers with Modified [place]  within 7 day(s).  5.  Patient will perform all aspects of toileting with [place] within 7 day(s).  Outcome: Progressing   OCCUPATIONAL THERAPY TREATMENT  Patient: Naveen Alvarez (88 y.o. male)  Date: 2/29/2024  Primary Diagnosis: Generalized weakness [R53.1]  Skin tear of right elbow without complication, initial encounter [S51.011A]  DAMIR (acute kidney injury) ([facility]) [N17.9]       Precautions: Fall Risk, General Precautions, Bed Alarm                Chart, occupational therapy assessment, plan of care, and goals were reviewed.    ASSESSMENT  Patient continues to benefit from skilled OT services and is progressing towards goals. Pt approached seated in recliner and agreeable to therapy. Pt completed oral hygiene task while seated w/ set up. Pt then completed AROM BUE and BLE exercises in prep for standing. Pt completed STS x 2 trials w/ min A. Pt demos improved standing and standing tolerance at 2WW. Pt then completed toilet transfer w/ 2WW and min A. Pt completed toileting task w/ min A for clothing management. Pt able to  To: Patient  Education Provided: Role of Therapy;Plan of Care;ADL Adaptive Strategies;Transfer Training  Education Method: Demonstration;Verbal  Barriers to Learning: None  Education Outcome: Verbalized understanding;Demonstrated understanding    Thank you for this referral.  Dagoberto Romero OT  Minutes: 30

## 2024-02-29 NOTE — CARE COORDINATION
Transition of Care Plan:    RUR: 13%   Prior Level of Functioning:   Disposition:   If SNF or IPR: Date FOC offered:   Date FOC received:   Accepting facility:   Date authorization started with reference number:   Date authorization received and expires:   Follow up appointments:   DME needed:   Transportation at discharge:   IM/IMM Medicare/ letter given: 1st IMM obtained 2/29   Is patient a [place] and connected with [facility]?    If yes, was [place] transfer form completed and [facility] notified?   Caregiver Contact:   Discharge Caregiver contacted prior to discharge?   Care Conference needed?   Barriers to discharge: 3 inpatient midnights       0915: Patient was upgraded to inpatient status yesterday 2/28. Important Message from Medicare Letter provided to Naveen Alvarez. Oral explanation was provided and all questions answered. Signed document placed on the bedside chart to be scanned under the media tab. Copy provided to Naveen Alvarez     Also spoke with patient regarding discharge plans. Told him I spoke with PT&OT yesterday who recommend skilled care. Told patient after 3 inpatient midnights he would qualify for skilled care. Qualifying midnights include 2/28, 2/29 and 3/1. Eligible for skilled care on 3/2 Saturday. Asked patient if he would have preference in skilled care either here in swing bed or go to a facility. He states he would prefer to stay here. Will get in touch with PT&OT today to see if they are agreeable.

## 2024-02-29 NOTE — PLAN OF CARE
Problem: Physical Therapy - Adult  Goal: By Discharge: Performs mobility at highest level of function for planned discharge setting.  See evaluation for individualized goals.  Description: FUNCTIONAL STATUS PRIOR TO ADMISSION: Patient was modified independent using a 3WW for functional mobility.    HOME SUPPORT PRIOR TO ADMISSION: The patient lived with spouse Cata but did not require assistance for BADLs; they share IADL tasks; pt drives normally; 2-3 falls already this year.    Physical Therapy Goals  Initiated 2/28/2024  1.  Patient will move from supine to sit and sit to supine in bed with modified independence within 7 day(s).    2.  Patient will perform sit to stand with SBA within 7 day(s).  3.  Patient will transfer from bed to chair and chair to bed with SBA using the least restrictive device within 7 day(s).  4.  Patient will ambulate with SBA for 200 feet with the least restrictive device within 7 day(s).   5.  Patient will ascend/descend 2-3 stairs with 1-2 handrail(s) with contact guard assist within 7 day(s).    Outcome: Progressing   PHYSICAL THERAPY TREATMENT    Patient: Naveen Alvarez (88 y.o. male)  Date: 2/29/2024  Diagnosis: Generalized weakness [R53.1]  Skin tear of right elbow without complication, initial encounter [S51.011A]  DAMIR (acute kidney injury) ([facility]) [N17.9] Generalized weakness      Precautions: Fall Risk, General Precautions, Bed Alarm                      ASSESSMENT:  Patient continues to benefit from skilled PT services and is slowly progressing towards goals. Pt presented sitting in recliner chair. Vitals taken as noted per flowsheet; RN aware of low BP. It was then noted that pt's edematous feet were on the floor and his gray grippy socks were pressing into his legs/feet with pitting edema so therapist removed his socks and replaced them with yellow grippy socks that fit him better; RN informed. He was then rolled into the hallway to work on his walking. Cues offered for  Verbal  Barriers to Learning: None  Education Outcome: Verbalized understanding;Demonstrated understanding;Continued education needed      LUPE PALACIOS, PT  Minutes: 41

## 2024-02-29 NOTE — PLAN OF CARE
Problem: Respiratory - Adult  Goal: Achieves optimal ventilation and oxygenation  2/28/2024 1941 by Parvin Ma, RT  Outcome: Progressing

## 2024-02-29 NOTE — PLAN OF CARE
Problem: Physical Therapy - Adult  Goal: By Discharge: Performs mobility at highest level of function for planned discharge setting.  See evaluation for individualized goals.  Description: FUNCTIONAL STATUS PRIOR TO ADMISSION: Patient was modified independent using a 3WW for functional mobility.    HOME SUPPORT PRIOR TO ADMISSION: The patient lived with spouse Cata but did not require assistance for BADLs; they share IADL tasks; pt drives normally; 2-3 falls already this year.    Physical Therapy Goals  Initiated 2/28/2024  1.  Patient will move from supine to sit and sit to supine in bed with modified independence within 7 day(s).    2.  Patient will perform sit to stand with SBA within 7 day(s).  3.  Patient will transfer from bed to chair and chair to bed with SBA using the least restrictive device within 7 day(s).  4.  Patient will ambulate with SBA for 200 feet with the least restrictive device within 7 day(s).   5.  Patient will ascend/descend 2-3 stairs with 1-2 handrail(s) with contact guard assist within 7 day(s).    2/29/2024 1806 by Juan Elizondo, PT  Outcome: Progressing   PHYSICAL THERAPY TREATMENT    Patient: Naveen Alvarez (88 y.o. male)  Date: 2/29/2024  Diagnosis: Generalized weakness [R53.1]  Skin tear of right elbow without complication, initial encounter [S51.011A]  DAMIR (acute kidney injury) ([facility]) [N17.9] Generalized weakness      Precautions: Fall Risk, General Precautions, Bed Alarm                      ASSESSMENT:  Patient continues to benefit from skilled PT services and is progressing towards goals. Pt presented still sitting up in recliner chair w/ spouse Cata present. Vitals taken as noted per flowsheet; low BP continues; RN aware. Pt then transferred to standing and was able to slowly walk all the way around the nursing station w/ 2WW prior to returning to his room; CGA to min A; no LOB, (+) minor SOB. Again pt frequently exhibited short choppy stuttered steps and required cues to  \"stop and reset\" which entailed pulling the AD  to his body, taking a deep breath, standing up straight and starting over w/ a 3-pt step to gait pattern leading w/ the R leg. Again this was helpful in halting the stutter step and facilitating his fluid reciprocal gait movement; of note- he had to \"stop and reset\" several times during the 150' walk. Once back in the room he was left resting in the recliner chair w/ B LE elevated to help w/ edema clearance and both tray table and call bell in reach; chair alarm on.     Pt benefited from a second session today to assist w/ maximizing overall functional independence and safety working to reduce dependency on caregivers.        PLAN:  Patient continues to benefit from skilled intervention to address the above impairments.  Continue treatment per established plan of care.    Recommendation for discharge: (in order for the patient to meet his/her long term goals): Therapy 2 days/week in the home and also see \"other factors to consider\" below for additional discharge concerns/needs, Therapy up to 5 days/week in Skilled nursing facility, or Continue to assess pending progress    Other factors to consider for discharge: patient's current support system is unable to meet their requirements for physical assistance, high risk for falls, not safe to be alone, and concern for safely navigating or managing the home environment    IF patient discharges home will need the following DME: continuing to assess with progress and 2WW?       SUBJECTIVE:   Patient stated, \"I'm ready to walk some more.\"    OBJECTIVE DATA SUMMARY:   Critical Behavior:  Orientation  Overall Orientation Status: Within Functional Limits  Cognition  Overall Cognitive Status: WFL    Functional Mobility Training:  Bed Mobility:  Bed Mobility Training  Bed Mobility Training: No  Transfers:  Transfer Training  Transfer Training: Yes  Overall Level of Assistance: Contact-guard assistance  Interventions:

## 2024-02-29 NOTE — PLAN OF CARE
Problem: Respiratory - Adult  Goal: Achieves optimal ventilation and oxygenation  2/29/2024 0751 by Emily Bell, RT  Outcome: Progressing  2/28/2024 1941 by Parvin Ma, RT  Outcome: Progressing  2/28/2024 1940 by Parvin Ma, RT  Outcome: Progressing

## 2024-02-29 NOTE — PROGRESS NOTES
Hospitalist Progress Note    NAME:   Naveen Alvarez   : 1935   MRN: [id number]     Date/Time: 2024 3:00 PM  Patient PCP: Mohit Franco MD    Estimated discharge date:  Barriers:       Assessment / Plan:    Acute kidney injury/CRF 3  Initial GFR of 35 significantly improved to GFR far greater than 14 and creatinine down to 1.5 noted.  Continued on IV fluids.  Likely secondary to significant subacute viral illness.    Orthostatic hypotension  Significantly orthostatic on .  Normal saline bolus administered.  Repeat orthostatics today have significantly improved and are normal.    Severe generalized weakness  Workup is essentially negative other than orthostatic hypotension and acute kidney injury which have improved.  Initially associated dry cough and shortness of breath have significantly improved.  Normal BNP noted.  No focal deficits noted.  Patient continues to be weak.  PT recommending swing/SNF placement.  Will obtain echocardiogram.  TSH is normal.  CT angiogram chest negative for any acute abnormalities.  Chest x-ray with only mild pulmonary edema.    History of CVA  Continue Lipitor.    DVT prophylaxis  Lovenox SC      Medical Decision Making:   I personally reviewed labs:  I personally reviewed imaging:  I personally reviewed EKG:  Toxic drug monitoring:   Discussed case with:       Code Status: Full CODE STATUS      Subjective:     Chief Complaint / Reason for Physician Visit  \" Severe weakness and mild cough\".  Discussed with RN events overnight.     88-year-old male presented to emergency department with generalized weakness, fever, cough and exertional shortness of breath.  Differential diagnosis includes acute viral bronchitis versus CHF versus COPD.     He does have history of chronic diastolic CHF.  Chest x-ray shows mild pulmonary edema per radiologist interpretation.  CTA chest did not show any fluid overload or effusion.  His BNP is not elevated.  maging studies of the  chest did not show any fluid overload or effusions.       Symptoms are more suggestive of acute viral bronchitis.  His creatinine also went up from 1.4 to 1.8 will give gentle IV fluids hydration, and monitor clinically.      2/29  Patient is alert oriented x 4 with wife at bedside.  Shortness of breath significant improved along with orthostatic hypotension.    Counseled on improving DAMIR with persistent weakness noted.  Blood pressures have improved.  Will obtain echocardiogram with noted shortness of breath initially.    Patient irina require SNF versus swing placement per CM.      Objective:     VITALS:   Last 24hrs VS reviewed since prior progress note. Most recent are:  Patient Vitals for the past 24 hrs:   BP Temp Temp src Pulse Resp SpO2   02/29/24 1358 (!) 86/64 -- -- 82 -- 98 %   02/29/24 1202 -- -- -- (!) 103 -- --   02/29/24 1155 109/75 -- -- 79 -- --   02/29/24 1152 103/75 -- -- 74 -- --   02/29/24 1148 103/63 -- -- (!) 116 -- --   02/29/24 0800 -- -- -- (!) 103 -- --   02/29/24 0759 (!) 100/46 98.1 °F (36.7 °C) -- 78 20 100 %   02/29/24 0747 -- -- -- 84 18 94 %   02/29/24 0400 101/86 98.4 °F (36.9 °C) Oral -- 20 96 %   02/29/24 0001 (!) 101/53 98.2 °F (36.8 °C) Oral (!) 101 18 96 %   02/28/24 2004 106/62 -- -- 87 18 --   02/28/24 2000 -- -- -- (!) 111 -- --   02/28/24 1937 -- -- -- 56 18 92 %   02/28/24 1730 109/62 98.3 °F (36.8 °C) Oral 89 19 --         Intake/Output Summary (Last 24 hours) at 2/29/2024 1500  Last data filed at 2/29/2024 1400  Gross per 24 hour   Intake 245 ml   Output 2425 ml   Net -2180 ml        I had a face to face encounter and independently examined this patient on 2/29/2024, as outlined below:  PHYSICAL EXAM:  General: Alert, cooperative  EENT:  EOMI. Anicteric sclerae.  Resp:  CTA bilaterally, no wheezing or rales.  No accessory muscle use  CV:  Regular  rhythm,  No edema  GI:  Soft, Non distended, Non tender.  +Bowel sounds  Neurologic:  Alert and oriented X 3, normal speech,

## 2024-03-01 LAB
ANION GAP SERPL CALC-SCNC: 10 MMOL/L (ref 5–15)
BUN SERPL-MCNC: 19 MG/DL (ref 6–20)
BUN/CREAT SERPL: 14 (ref 12–20)
CALCIUM SERPL-MCNC: 8.2 MG/DL (ref 8.5–10.1)
CHLORIDE SERPL-SCNC: 103 MMOL/L (ref 97–108)
CO2 SERPL-SCNC: 25 MMOL/L (ref 21–32)
CREAT SERPL-MCNC: 1.37 MG/DL (ref 0.7–1.3)
GLUCOSE SERPL-MCNC: 114 MG/DL (ref 65–100)
POTASSIUM SERPL-SCNC: 3.6 MMOL/L (ref 3.5–5.1)
SODIUM SERPL-SCNC: 138 MMOL/L (ref 136–145)

## 2024-03-01 PROCEDURE — 6360000002 HC RX W HCPCS: Performed by: INTERNAL MEDICINE

## 2024-03-01 PROCEDURE — 80048 BASIC METABOLIC PNL TOTAL CA: CPT

## 2024-03-01 PROCEDURE — 1100000003 HC PRIVATE W/ TELEMETRY

## 2024-03-01 PROCEDURE — 6370000000 HC RX 637 (ALT 250 FOR IP): Performed by: INTERNAL MEDICINE

## 2024-03-01 PROCEDURE — 97535 SELF CARE MNGMENT TRAINING: CPT

## 2024-03-01 PROCEDURE — 97530 THERAPEUTIC ACTIVITIES: CPT

## 2024-03-01 PROCEDURE — 94640 AIRWAY INHALATION TREATMENT: CPT

## 2024-03-01 PROCEDURE — 36415 COLL VENOUS BLD VENIPUNCTURE: CPT

## 2024-03-01 PROCEDURE — 2580000003 HC RX 258: Performed by: INTERNAL MEDICINE

## 2024-03-01 PROCEDURE — 97110 THERAPEUTIC EXERCISES: CPT

## 2024-03-01 RX ORDER — GUAIFENESIN/DEXTROMETHORPHAN 100-10MG/5
5 SYRUP ORAL 3 TIMES DAILY PRN
Status: DISCONTINUED | OUTPATIENT
Start: 2024-03-01 | End: 2024-03-06 | Stop reason: HOSPADM

## 2024-03-01 RX ORDER — BENZOCAINE/MENTHOL 6 MG-10 MG
LOZENGE MUCOUS MEMBRANE 3 TIMES DAILY
Status: DISCONTINUED | OUTPATIENT
Start: 2024-03-01 | End: 2024-03-06 | Stop reason: HOSPADM

## 2024-03-01 RX ADMIN — BUDESONIDE 250 MCG: 0.5 SUSPENSION RESPIRATORY (INHALATION) at 07:25

## 2024-03-01 RX ADMIN — HYDROCORTISONE: 0.01 CREAM TOPICAL at 20:55

## 2024-03-01 RX ADMIN — FAMOTIDINE 20 MG: 20 TABLET, FILM COATED ORAL at 08:30

## 2024-03-01 RX ADMIN — ARFORMOTEROL TARTRATE 15 MCG: 15 SOLUTION RESPIRATORY (INHALATION) at 19:24

## 2024-03-01 RX ADMIN — HYDROCORTISONE: 0.01 CREAM TOPICAL at 14:54

## 2024-03-01 RX ADMIN — SODIUM CHLORIDE, PRESERVATIVE FREE 10 ML: 5 INJECTION INTRAVENOUS at 08:32

## 2024-03-01 RX ADMIN — IPRATROPIUM BROMIDE AND ALBUTEROL SULFATE 1 DOSE: .5; 2.5 SOLUTION RESPIRATORY (INHALATION) at 19:24

## 2024-03-01 RX ADMIN — ARFORMOTEROL TARTRATE 15 MCG: 15 SOLUTION RESPIRATORY (INHALATION) at 07:25

## 2024-03-01 RX ADMIN — IPRATROPIUM BROMIDE AND ALBUTEROL SULFATE 1 DOSE: .5; 2.5 SOLUTION RESPIRATORY (INHALATION) at 07:25

## 2024-03-01 RX ADMIN — ENOXAPARIN SODIUM 40 MG: 100 INJECTION SUBCUTANEOUS at 08:30

## 2024-03-01 RX ADMIN — ATORVASTATIN CALCIUM 40 MG: 40 TABLET, FILM COATED ORAL at 08:30

## 2024-03-01 RX ADMIN — HYDROCORTISONE: 0.01 CREAM TOPICAL at 10:55

## 2024-03-01 RX ADMIN — SODIUM CHLORIDE: 9 INJECTION, SOLUTION INTRAVENOUS at 03:50

## 2024-03-01 RX ADMIN — BUDESONIDE 250 MCG: 0.5 SUSPENSION RESPIRATORY (INHALATION) at 19:24

## 2024-03-01 RX ADMIN — ACETAMINOPHEN 650 MG: 325 TABLET ORAL at 09:52

## 2024-03-01 ASSESSMENT — PAIN SCALES - GENERAL
PAINLEVEL_OUTOF10: 3
PAINLEVEL_OUTOF10: 0

## 2024-03-01 ASSESSMENT — PAIN DESCRIPTION - LOCATION: LOCATION: BACK

## 2024-03-01 NOTE — PLAN OF CARE
Problem: Occupational Therapy - Adult  Goal: By Discharge: Performs self-care activities at highest level of function for planned discharge setting.  See evaluation for individualized goals.  Description: FUNCTIONAL STATUS PRIOR TO ADMISSION:    Receives Help From: Family, ADL Assistance: Independent,  ,  ,  ,  ,  , Homemaking Assistance: Needs assistance, Ambulation Assistance: Independent, Transfer Assistance: Independent, Active : Yes     HOME SUPPORT: Patient lived with spouse but didn't require assistance.    Occupational Therapy Goals:  Initiated 2/28/2024  1.  Patient will perform shower transfer with Modified [place] within 7 day(s).  2.  Patient will perform lower body dressing with Modified [place] within 7 day(s).  3.  Patient will perform upper body dressing with [place] within 7 day(s).  4.  Patient will perform toilet transfers with Modified [place]  within 7 day(s).  5.  Patient will perform all aspects of toileting with [place] within 7 day(s).  Outcome: Progressing   OCCUPATIONAL THERAPY TREATMENT  Patient: Naveen Alvarez (88 y.o. male)  Date: 3/1/2024  Primary Diagnosis: Generalized weakness [R53.1]  Skin tear of right elbow without complication, initial encounter [S51.011A]  DAMIR (acute kidney injury) ([facility]) [N17.9]       Precautions: Fall Risk, General Precautions, Bed Alarm                Chart, occupational therapy assessment, plan of care, and goals were reviewed.    ASSESSMENT  Patient continues to benefit from skilled OT services and is slowly progressing towards goals. Pt approached seated in recliner w/ spouse present and agreeable to therapy. Pt provided w/ oral hygiene and completed w/ set up while seated. Pt provided w/ and educated on AE equipment. Pt receptive and completed LB dressing socks w/ use of AE. Pt required min A and cueing to use AE for donning/doffing socks while seated. Pt then completed STS x 3 trials and standing marches in prep for

## 2024-03-01 NOTE — PLAN OF CARE
Problem: Physical Therapy - Adult  Goal: By Discharge: Performs mobility at highest level of function for planned discharge setting.  See evaluation for individualized goals.  Description: FUNCTIONAL STATUS PRIOR TO ADMISSION: Patient was modified independent using a 3WW for functional mobility.    HOME SUPPORT PRIOR TO ADMISSION: The patient lived with spouse Cata but did not require assistance for BADLs; they share IADL tasks; pt drives normally; 2-3 falls already this year.    Physical Therapy Goals  Initiated 2/28/2024  1.  Patient will move from supine to sit and sit to supine in bed with modified independence within 7 day(s).    2.  Patient will perform sit to stand with SBA within 7 day(s).  3.  Patient will transfer from bed to chair and chair to bed with SBA using the least restrictive device within 7 day(s).  4.  Patient will ambulate with SBA for 200 feet with the least restrictive device within 7 day(s).   5.  Patient will ascend/descend 2-3 stairs with 1-2 handrail(s) with contact guard assist within 7 day(s).    Outcome: Not Progressing   PHYSICAL THERAPY TREATMENT    Patient: Naveen Alvarez (88 y.o. male)  Date: 3/1/2024  Diagnosis: Generalized weakness [R53.1]  Skin tear of right elbow without complication, initial encounter [S51.011A]  DAMIR (acute kidney injury) ([facility]) [N17.9] Generalized weakness      Precautions: Fall Risk, General Precautions, Bed Alarm ; poor safety awareness; high fall risk; shuffled steps; fall hx                     ASSESSMENT:  Patient continues to benefit from skilled PT services and is not progressing towards goals. Pt was received 2x today trying to transfer w/o assistance. First getting up from EOB and trying to transfer into unlocked recliner chair using the rolling bedside table for support; IV connected near window and Purewick attached to wall near bathroom; no bed alarm on. Upon second attempt today pt had been put on commode by nursing and door was shut for  training;Tactile cues;Verbal cues  Base of Support: Widened  Speed/[place]: Shuffled;Slow  Step Length: Left shortened;Right shortened  Gait Abnormalities: Ataxic;Decreased step clearance;Festinating gait;Shuffling gait;Step to gait  Neuro Re-Education:                    Pain Ratin/10   Pain Intervention(s):       Activity Tolerance:   Fair , requires rest breaks, observed shortness of breath on exertion, and SpO2 stable on room air    After treatment:   Patient left in no apparent distress sitting up in chair, Call bell within reach, Bed/ chair alarm activated, and Heels elevated for pressure relief      COMMUNICATION/EDUCATION:   The patient's plan of care was discussed with: occupational therapist, registered nurse, and     Patient Education  Education Given To: Patient;Family  Education Provided: Role of Therapy;Plan of Care;Precautions;Transfer Training;Fall Prevention Strategies;Equipment;Family Education  Education Provided Comments: cues for safety and technique when transferring and when trying to walk w/ AD to reduce fall risk; reviewed need for rehab prior to d/c home discussed w/ pt and spouse; therapist reviewed importance of not getting up without assistance  Education Method: Verbal  Barriers to Learning: None  Education Outcome: Verbalized understanding;Demonstrated understanding;Continued education needed      LUPE PALACIOS PT  Minutes: 40

## 2024-03-01 NOTE — PLAN OF CARE
Problem: Respiratory - Adult  Goal: Achieves optimal ventilation and oxygenation  2/29/2024 1930 by Parvin Ma, RT  Outcome: Progressing  2/29/2024 1930 by Parvin Ma, RT  Outcome: Progressing  2/29/2024 0751 by Emily Bell, RT  Outcome: Progressing

## 2024-03-02 ENCOUNTER — APPOINTMENT (OUTPATIENT)
Facility: HOSPITAL | Age: 89
DRG: 683 | End: 2024-03-02
Payer: MEDICARE

## 2024-03-02 LAB
ALBUMIN SERPL-MCNC: 3.1 G/DL (ref 3.5–5)
ALBUMIN/GLOB SERPL: 0.9 (ref 1.1–2.2)
ALP SERPL-CCNC: 116 U/L (ref 45–117)
ALT SERPL-CCNC: 60 U/L (ref 12–78)
ANION GAP SERPL CALC-SCNC: 10 MMOL/L (ref 5–15)
AST SERPL-CCNC: 66 U/L (ref 15–37)
BILIRUB SERPL-MCNC: 1.6 MG/DL (ref 0.2–1)
BUN SERPL-MCNC: 16 MG/DL (ref 6–20)
BUN/CREAT SERPL: 13 (ref 12–20)
CALCIUM SERPL-MCNC: 8.6 MG/DL (ref 8.5–10.1)
CHLORIDE SERPL-SCNC: 100 MMOL/L (ref 97–108)
CO2 SERPL-SCNC: 27 MMOL/L (ref 21–32)
CREAT SERPL-MCNC: 1.26 MG/DL (ref 0.7–1.3)
ERYTHROCYTE [DISTWIDTH] IN BLOOD BY AUTOMATED COUNT: 14.3 % (ref 11.5–14.5)
GLOBULIN SER CALC-MCNC: 3.5 G/DL (ref 2–4)
GLUCOSE SERPL-MCNC: 104 MG/DL (ref 65–100)
HCT VFR BLD AUTO: 35.5 % (ref 36.6–50.3)
HGB BLD-MCNC: 11.7 G/DL (ref 12.1–17)
MAGNESIUM SERPL-MCNC: 2 MG/DL (ref 1.6–2.4)
MCH RBC QN AUTO: 29.7 PG (ref 26–34)
MCHC RBC AUTO-ENTMCNC: 33 G/DL (ref 30–36.5)
MCV RBC AUTO: 90.1 FL (ref 80–99)
NRBC # BLD: 0 K/UL (ref 0–0.01)
NRBC BLD-RTO: 0 PER 100 WBC
NT PRO BNP: 3675 PG/ML (ref 0–450)
PLATELET # BLD AUTO: 120 K/UL (ref 150–400)
PMV BLD AUTO: 12.5 FL (ref 8.9–12.9)
POTASSIUM SERPL-SCNC: 3.7 MMOL/L (ref 3.5–5.1)
PROT SERPL-MCNC: 6.6 G/DL (ref 6.4–8.2)
RBC # BLD AUTO: 3.94 M/UL (ref 4.1–5.7)
SODIUM SERPL-SCNC: 137 MMOL/L (ref 136–145)
WBC # BLD AUTO: 7.1 K/UL (ref 4.1–11.1)

## 2024-03-02 PROCEDURE — 83880 ASSAY OF NATRIURETIC PEPTIDE: CPT

## 2024-03-02 PROCEDURE — 6370000000 HC RX 637 (ALT 250 FOR IP): Performed by: INTERNAL MEDICINE

## 2024-03-02 PROCEDURE — 94760 N-INVAS EAR/PLS OXIMETRY 1: CPT

## 2024-03-02 PROCEDURE — 80053 COMPREHEN METABOLIC PANEL: CPT

## 2024-03-02 PROCEDURE — 36415 COLL VENOUS BLD VENIPUNCTURE: CPT

## 2024-03-02 PROCEDURE — 1100000000 HC RM PRIVATE

## 2024-03-02 PROCEDURE — 85027 COMPLETE CBC AUTOMATED: CPT

## 2024-03-02 PROCEDURE — 2580000003 HC RX 258: Performed by: INTERNAL MEDICINE

## 2024-03-02 PROCEDURE — 83735 ASSAY OF MAGNESIUM: CPT

## 2024-03-02 PROCEDURE — 71045 X-RAY EXAM CHEST 1 VIEW: CPT

## 2024-03-02 PROCEDURE — 94640 AIRWAY INHALATION TREATMENT: CPT

## 2024-03-02 PROCEDURE — 6360000002 HC RX W HCPCS: Performed by: INTERNAL MEDICINE

## 2024-03-02 PROCEDURE — 97116 GAIT TRAINING THERAPY: CPT

## 2024-03-02 RX ORDER — PANTOPRAZOLE SODIUM 40 MG/1
40 TABLET, DELAYED RELEASE ORAL
Status: DISCONTINUED | OUTPATIENT
Start: 2024-03-03 | End: 2024-03-06 | Stop reason: HOSPADM

## 2024-03-02 RX ORDER — BUMETANIDE 1 MG/1
1 TABLET ORAL DAILY
Status: DISCONTINUED | OUTPATIENT
Start: 2024-03-02 | End: 2024-03-03

## 2024-03-02 RX ADMIN — SODIUM CHLORIDE, PRESERVATIVE FREE 10 ML: 5 INJECTION INTRAVENOUS at 20:43

## 2024-03-02 RX ADMIN — HYDROCORTISONE: 0.01 CREAM TOPICAL at 20:42

## 2024-03-02 RX ADMIN — IPRATROPIUM BROMIDE AND ALBUTEROL SULFATE 1 DOSE: .5; 2.5 SOLUTION RESPIRATORY (INHALATION) at 19:26

## 2024-03-02 RX ADMIN — HYDROCORTISONE: 0.01 CREAM TOPICAL at 16:17

## 2024-03-02 RX ADMIN — FAMOTIDINE 20 MG: 20 TABLET, FILM COATED ORAL at 10:00

## 2024-03-02 RX ADMIN — ACETAMINOPHEN 650 MG: 325 TABLET ORAL at 00:47

## 2024-03-02 RX ADMIN — ENOXAPARIN SODIUM 40 MG: 100 INJECTION SUBCUTANEOUS at 10:00

## 2024-03-02 RX ADMIN — IPRATROPIUM BROMIDE AND ALBUTEROL SULFATE 1 DOSE: .5; 2.5 SOLUTION RESPIRATORY (INHALATION) at 07:23

## 2024-03-02 RX ADMIN — BUDESONIDE 250 MCG: 0.5 SUSPENSION RESPIRATORY (INHALATION) at 07:23

## 2024-03-02 RX ADMIN — BUMETANIDE 1 MG: 1 TABLET ORAL at 10:00

## 2024-03-02 RX ADMIN — ACETAMINOPHEN 650 MG: 325 TABLET ORAL at 16:16

## 2024-03-02 RX ADMIN — SODIUM CHLORIDE, PRESERVATIVE FREE 10 ML: 5 INJECTION INTRAVENOUS at 10:08

## 2024-03-02 RX ADMIN — ATORVASTATIN CALCIUM 40 MG: 40 TABLET, FILM COATED ORAL at 10:00

## 2024-03-02 RX ADMIN — ARFORMOTEROL TARTRATE 15 MCG: 15 SOLUTION RESPIRATORY (INHALATION) at 07:24

## 2024-03-02 RX ADMIN — HYDROCORTISONE: 0.01 CREAM TOPICAL at 10:07

## 2024-03-02 RX ADMIN — ACETAMINOPHEN 650 MG: 325 TABLET ORAL at 09:59

## 2024-03-02 RX ADMIN — GUAIFENESIN SYRUP AND DEXTROMETHORPHAN 5 ML: 100; 10 SYRUP ORAL at 13:54

## 2024-03-02 RX ADMIN — GUAIFENESIN SYRUP AND DEXTROMETHORPHAN 5 ML: 100; 10 SYRUP ORAL at 00:47

## 2024-03-02 RX ADMIN — ARFORMOTEROL TARTRATE 15 MCG: 15 SOLUTION RESPIRATORY (INHALATION) at 19:26

## 2024-03-02 RX ADMIN — BUDESONIDE 250 MCG: 0.5 SUSPENSION RESPIRATORY (INHALATION) at 19:26

## 2024-03-02 ASSESSMENT — PAIN DESCRIPTION - ORIENTATION
ORIENTATION: LEFT
ORIENTATION: LEFT

## 2024-03-02 ASSESSMENT — PAIN SCALES - WONG BAKER
WONGBAKER_NUMERICALRESPONSE: 0
WONGBAKER_NUMERICALRESPONSE: 0

## 2024-03-02 ASSESSMENT — PAIN DESCRIPTION - LOCATION
LOCATION: LEG
LOCATION: LEG

## 2024-03-02 ASSESSMENT — PAIN SCALES - GENERAL
PAINLEVEL_OUTOF10: 5
PAINLEVEL_OUTOF10: 5
PAINLEVEL_OUTOF10: 4
PAINLEVEL_OUTOF10: 2
PAINLEVEL_OUTOF10: 2

## 2024-03-02 ASSESSMENT — PAIN - FUNCTIONAL ASSESSMENT
PAIN_FUNCTIONAL_ASSESSMENT: PREVENTS OR INTERFERES SOME ACTIVE ACTIVITIES AND ADLS
PAIN_FUNCTIONAL_ASSESSMENT: PREVENTS OR INTERFERES SOME ACTIVE ACTIVITIES AND ADLS

## 2024-03-02 ASSESSMENT — PAIN DESCRIPTION - DESCRIPTORS
DESCRIPTORS: ACHING
DESCRIPTORS: ACHING

## 2024-03-02 NOTE — PLAN OF CARE
Problem: Skin/Tissue Integrity  Goal: Absence of new skin breakdown  Description: 1.  Monitor for areas of redness and/or skin breakdown  2.  Assess vascular access sites hourly  3.  Every 4-6 hours minimum:  Change oxygen saturation probe site  4.  Every 4-6 hours:  If on nasal continuous positive airway pressure, respiratory therapy assess nares and determine need for appliance change or resting period.  Outcome: Progressing     Problem: Safety - Adult  Goal: Free from fall injury  Outcome: Progressing     Problem: Chronic Conditions and Co-morbidities  Goal: Patient's chronic conditions and co-morbidity symptoms are monitored and maintained or improved  Outcome: Progressing     Problem: Respiratory - Adult  Goal: Achieves optimal ventilation and oxygenation  3/2/2024 1126 by Carmella Lee LPN  Outcome: Progressing  3/2/2024 0728 by Shaheen Romo Sr., P  Outcome: Progressing     Problem: Pain  Goal: Verbalizes/displays adequate comfort level or baseline comfort level  Outcome: Progressing

## 2024-03-02 NOTE — PLAN OF CARE
Problem: Respiratory - Adult  Goal: Achieves optimal ventilation and oxygenation  3/2/2024 0728 by Shaheen Romo Sr., RCP  Outcome: Progressing  3/1/2024 1932 by Naveen Knapp, OSCARP  Outcome: Progressing     Problem: Physical Therapy - Adult  Goal: By Discharge: Performs mobility at highest level of function for planned discharge setting.  See evaluation for individualized goals.  Description: FUNCTIONAL STATUS PRIOR TO ADMISSION: Patient was modified independent using a 3WW for functional mobility.    HOME SUPPORT PRIOR TO ADMISSION: The patient lived with spouse Cata but did not require assistance for BADLs; they share IADL tasks; pt drives normally; 2-3 falls already this year.    Physical Therapy Goals  Initiated 2/28/2024  1.  Patient will move from supine to sit and sit to supine in bed with modified independence within 7 day(s).    2.  Patient will perform sit to stand with SBA within 7 day(s).  3.  Patient will transfer from bed to chair and chair to bed with SBA using the least restrictive device within 7 day(s).  4.  Patient will ambulate with SBA for 200 feet with the least restrictive device within 7 day(s).   5.  Patient will ascend/descend 2-3 stairs with 1-2 handrail(s) with contact guard assist within 7 day(s).    3/1/2024 1844 by Juan Elizondo, PT  Outcome: Not Progressing

## 2024-03-02 NOTE — PROGRESS NOTES
[facility]  Hospitalist Progress Note    NAME: Naveen Alvarez   :  1935   MRN:  [id number]     Total duration of encounter: 4 days      Interim Hospital Summary: 88 y.o. male who presented on 2024 with Generalized weakness. 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       Presents to ED  with c/o SOB / OKEEFE with cough and weakness.  No able to ambulate like usual. He noted feverishness and dry cough.  Mild swelling in both legs.  ED labs noted for DAMIR on Demedex diuresis, held on admission.  CXR noted for mild pulm edema.  CTA w/o PE. Has had more congestion and cough.  Repeat CXR noted for CHF and proBNP elevated >3000    Subjective:     Chief Complaint / Reason for Physician Visit  \"cough\".  Discussed with RN   Congested, Cough kept awake    Review of Systems:  Symptom Y/N Comments  Symptom Y/N Comments   Fever/Chills n   Chest Pain n    Poor Appetite y   Edema y    Cough y   Abdominal Pain n    Sputum n   Joint Pain n    SOB/OKEEFE y   Pruritis/Rash n    Nausea/vomit n   Tolerating PT/OT ?    Diarrhea n   Tolerating Diet y    Constipation n   Other         Current Facility-Administered Medications:     bumetanide (BUMEX) tablet 1 mg, 1 mg, Oral, Daily, Shaheen Melendrez MD, 1 mg at 24 1000    [START ON 3/3/2024] pantoprazole (PROTONIX) tablet 40 mg, 40 mg, Oral, [facility], Shaheen Melendrez MD     BPH  Off Flomax since admission  Need to check for retension  ______________________________________________________________________  SAFETY:   Code Status:Full  DVT prophylaxis:Lovenox  Stress Ulcer prophylaxis: Pepcid 20mg daily, Prevacid  Bladder catheter:no  Family Contact Info:  Primary Emergency Contact: Cata Alvarez, Home Phone: [phone]  Bedded: [facility] Room 128/01  Disposition: TBD, likely home when stable  Admission status:  Inpatient    Reviewed most current lab test results and cultures  YES  Reviewed most current radiology test results   YES  Review and summation of old records today    NO  Reviewed patient's current orders and MAR    YES  PMH/ reviewed - no change compared to H&P    Care Plan discussed with:                                   Comments  Patient x     Family      RN x     Care Manager       Consultant                           Multidiciplinary team rounds were held today with , nursing, pharmacist and clinical coordinator.  Patient's plan of care was discussed; medications were reviewed and discharge planning was addressed.        ____________________________________________    Total NON Critical Care TIME:  35   Minutes        Comments   >50% of visit spent in counseling and coordination of care   x      Signed: Shaheen Melendrez MD  [facility] Hospitalist  [phone]

## 2024-03-02 NOTE — PLAN OF CARE
Problem: Physical Therapy - Adult  Goal: By Discharge: Performs mobility at highest level of function for planned discharge setting.  See evaluation for individualized goals.  Description: FUNCTIONAL STATUS PRIOR TO ADMISSION: Patient was modified independent using a 3WW for functional mobility.    HOME SUPPORT PRIOR TO ADMISSION: The patient lived with spouse Cata but did not require assistance for BADLs; they share IADL tasks; pt drives normally; 2-3 falls already this year.    Physical Therapy Goals  Initiated 2/28/2024  1.  Patient will move from supine to sit and sit to supine in bed with modified independence within 7 day(s).    2.  Patient will perform sit to stand with SBA within 7 day(s).  3.  Patient will transfer from bed to chair and chair to bed with SBA using the least restrictive device within 7 day(s).  4.  Patient will ambulate with SBA for 200 feet with the least restrictive device within 7 day(s).   5.  Patient will ascend/descend 2-3 stairs with 1-2 handrail(s) with contact guard assist within 7 day(s).    Outcome: Not Progressing     PHYSICAL THERAPY TREATMENT    Patient: Naveen Alvarez (88 y.o. male)  Date: 3/2/2024  Diagnosis: Generalized weakness [R53.1]  Skin tear of right elbow without complication, initial encounter [S51.011A]  DAMIR (acute kidney injury) ([facility]) [N17.9] Generalized weakness      Precautions: Fall Risk, General Precautions, Bed Alarm                      ASSESSMENT:  Patient continues to benefit from skilled PT services and is not progressing towards goals. Pt received in chair agreeable to therapy. Reports he did not sleep well due to a cough, however it has since resolved. Pt also with complaints of LE pain which he states he has not spoken to nurse about. BLE edema noted, however unable to compare from previous days. Started with seated LAQ and ankle pumps to help with circulation prior to ambulation trial. Pt required cueing x1 and then was able to teach back where  proper hand placement was for sit to stand transfer. Pt ambulated in hallway ~40-50ft with RW and min A. Pt with poor maintenance of positioning in RW, however was able to state where proper placement is. Pt most successful with ambulation when total sequence of walker, step, step was repeated continuously in addition to therapist holding RW to prevent pt from pushing it too far out in front of him. Near end of session, pt reports fatigue and continued BLE discomfort with increased shuffling gait and toe walking. Pt returned to ronald chair and left with all needs met, call bell within reach, BLE elevated for comfort and chair alarm activated. Nurse was notified of pt BLE pain and swelling.         PLAN:  Patient continues to benefit from skilled intervention to address the above impairments.  Continue treatment per established plan of care.    Recommendation for discharge: (in order for the patient to meet his/her long term goals): Therapy up to 5 days/week in Skilled nursing facility or Continue to assess pending progress    Other factors to consider for discharge: patient's current support system is unable to meet their requirements for physical assistance, poor safety awareness, impaired cognition, high risk for falls, not safe to be alone, and concern for safely navigating or managing the home environment    IF patient discharges home will need the following DME: continuing to assess with progress       SUBJECTIVE:   Patient stated, \"I was not able to sleep lastnight because of this cough, it's better now.\"    OBJECTIVE DATA SUMMARY:   Critical Behavior:          Functional Mobility Training:  Bed Mobility:  Bed Mobility Training  Bed Mobility Training: No  Transfers:  Transfer Training  Transfer Training: Yes  Overall Level of Assistance: Minimum assistance;Additional time  Interventions: Safety awareness training;Tactile cues;Verbal cues  Sit to Stand: Minimum assistance;Additional time  Stand to Sit:

## 2024-03-02 NOTE — PROGRESS NOTES
Anicteric sclerae. MMM  Resp:  B congestion, rhonchi,  no wheezing.  Suspect rales.  No accessory muscle use  CV:  Regular  rhythm,  Tr edema  GI:  Soft, Non distended, Non tender.  +Bowel sounds  Neurologic:  Alert and oriented X 3, normal speech, no focal weakness  Psych:   Not anxious   Skin:  No rashes.      LABS:  I reviewed today's most current labs and imaging studies.  Pertinent labs include:  Recent Labs     02/29/24  0559   WBC 5.9   HGB 11.9*   HCT 35.8*   *     Recent Labs     02/28/24  0850 02/29/24  0559 03/01/24  0615    137 138   K 3.8 3.5 3.6    101 103   CO2 28 27 25   BUN 20 21* 19   MG  --  1.9  --        RADIOLOGY:  CT HEAD 2/27:  The ventricles and sulci are normal in size, shape and configuration. There is  mild periventricular and subcortical white matter hypodensity consistent with  chronic small vessel ischemic disease.. There is no intracranial hemorrhage,  extra-axial collection, or mass effect. The basilar cisterns are open. No CT  evidence of acute infarct.   The bone windows demonstrate no abnormalities. The visualized portions of the  paranasal sinuses and mastoid air cells are clear.   IMPRESSION: No acute intracranial abnormality.    CTA CHEST 2/28:  This is a good quality study for the evaluation of pulmonary embolism  to the first subsegmental arterial level. There is no pulmonary embolism to this level.    The visualized thyroid gland is unremarkable. The aorta and main pulmonary  artery are stable in caliber. Cardiac size is within normal limits. No  pericardial effusion. No lymphadenopathy by imaging size criteria.   There is mild bilateral dependent atelectasis. There is no lung mass or  consolidation there is mild paraseptal emphysema.. No pleural effusion or  pneumothorax. Central airways are unremarkable.   Limited images of the upper abdomen are within normal limits. The bony  structures are age-appropriate.   IMPRESSION: No acute pulmonary artery  embolism or other acute abnormality in the chest.    pCXR 2/27:   A portable AP radiograph of the chest was obtained at 1919 hours. The  patient is on a cardiac monitor. Mild pulmonary edema.. The cardiac and  mediastinal contours and pulmonary vascularity are normal.  The bones and soft  tissues are grossly within normal limits.    IMPRESSION:  Mild pulmonary edema     EKG 2/28:  SB 56 bpm with bigeminal PVCs, LAE, LVH, Prolonged QT, ABN  EKG 2/27: NSR 94 bpm, Normal    Procedures: see electronic medical records for all procedures/Xrays and details which were not copied into this note but were reviewed prior to creation of Plan.        Assessment / Plan:    Principal Problem:    Generalized weakness  A/w underlying cardiac disease  Some plan to TCU rehab Sat if improved  Eating well    Active Problems:    DAMIR (acute kidney injury) ([facility])    Stage 3a chronic kidney disease (HCC)  Cr down from 1.83 to 1.37 today  Tolerating current meds well  K and Mg in range      Chronic diastolic heart failure (HCC)  Has been off Demedex 20mg daily       COPD  Cont Pulmicort / Brovana Nebs bid  Robitussin DM  Duonebs bid      Essential hypertension  No current tx      Type 2 diabetes mellitus with hyperglycemia, without long-term current use of insulin ([facility])   today       Hyperlipidemia  Cont home tx Lipitor 40mg daily       BPH  Off Flomax since admission  ______________________________________________________________________  SAFETY:   Code Status:Full  DVT prophylaxis:Lovenox  Stress Ulcer prophylaxis: Pepcid 20mg daily, Prevacid  Bladder catheter:no  Family Contact Info:  Primary Emergency Contact: Cata Alvarez, Home Phone: [phone]  Bedded: [facility] Room 128/01  Disposition: TBD, likely home when stable  Admission status:  Inpatient    Reviewed most current lab test results and cultures  YES  Reviewed most current radiology test results   YES  Review and summation of old records today    NO  Reviewed patient's current

## 2024-03-03 LAB
ANION GAP SERPL CALC-SCNC: 12 MMOL/L (ref 5–15)
BUN SERPL-MCNC: 15 MG/DL (ref 6–20)
BUN/CREAT SERPL: 13 (ref 12–20)
CALCIUM SERPL-MCNC: 8.1 MG/DL (ref 8.5–10.1)
CHLORIDE SERPL-SCNC: 103 MMOL/L (ref 97–108)
CO2 SERPL-SCNC: 25 MMOL/L (ref 21–32)
CREAT SERPL-MCNC: 1.13 MG/DL (ref 0.7–1.3)
GLUCOSE SERPL-MCNC: 104 MG/DL (ref 65–100)
MAGNESIUM SERPL-MCNC: 1.9 MG/DL (ref 1.6–2.4)
POTASSIUM SERPL-SCNC: 3.3 MMOL/L (ref 3.5–5.1)
SODIUM SERPL-SCNC: 140 MMOL/L (ref 136–145)

## 2024-03-03 PROCEDURE — 83735 ASSAY OF MAGNESIUM: CPT

## 2024-03-03 PROCEDURE — 6370000000 HC RX 637 (ALT 250 FOR IP): Performed by: INTERNAL MEDICINE

## 2024-03-03 PROCEDURE — 6360000002 HC RX W HCPCS: Performed by: INTERNAL MEDICINE

## 2024-03-03 PROCEDURE — 36415 COLL VENOUS BLD VENIPUNCTURE: CPT

## 2024-03-03 PROCEDURE — 94640 AIRWAY INHALATION TREATMENT: CPT

## 2024-03-03 PROCEDURE — 1100000000 HC RM PRIVATE

## 2024-03-03 PROCEDURE — 97110 THERAPEUTIC EXERCISES: CPT

## 2024-03-03 PROCEDURE — 80048 BASIC METABOLIC PNL TOTAL CA: CPT

## 2024-03-03 PROCEDURE — 94760 N-INVAS EAR/PLS OXIMETRY 1: CPT

## 2024-03-03 PROCEDURE — 2580000003 HC RX 258: Performed by: INTERNAL MEDICINE

## 2024-03-03 RX ORDER — BUMETANIDE 1 MG/1
2 TABLET ORAL DAILY
Status: DISCONTINUED | OUTPATIENT
Start: 2024-03-03 | End: 2024-03-05

## 2024-03-03 RX ORDER — TAMSULOSIN HYDROCHLORIDE 0.4 MG/1
0.4 CAPSULE ORAL NIGHTLY
Status: DISCONTINUED | OUTPATIENT
Start: 2024-03-03 | End: 2024-03-06 | Stop reason: HOSPADM

## 2024-03-03 RX ADMIN — ATORVASTATIN CALCIUM 40 MG: 40 TABLET, FILM COATED ORAL at 08:46

## 2024-03-03 RX ADMIN — HYDROCORTISONE: 0.01 CREAM TOPICAL at 08:53

## 2024-03-03 RX ADMIN — GUAIFENESIN SYRUP AND DEXTROMETHORPHAN 5 ML: 100; 10 SYRUP ORAL at 20:36

## 2024-03-03 RX ADMIN — GUAIFENESIN SYRUP AND DEXTROMETHORPHAN 5 ML: 100; 10 SYRUP ORAL at 02:37

## 2024-03-03 RX ADMIN — GUAIFENESIN SYRUP AND DEXTROMETHORPHAN 5 ML: 100; 10 SYRUP ORAL at 08:57

## 2024-03-03 RX ADMIN — BUDESONIDE 250 MCG: 0.5 SUSPENSION RESPIRATORY (INHALATION) at 19:21

## 2024-03-03 RX ADMIN — ACETAMINOPHEN 650 MG: 325 TABLET ORAL at 20:36

## 2024-03-03 RX ADMIN — BUDESONIDE 250 MCG: 0.5 SUSPENSION RESPIRATORY (INHALATION) at 07:14

## 2024-03-03 RX ADMIN — FAMOTIDINE 20 MG: 20 TABLET, FILM COATED ORAL at 08:46

## 2024-03-03 RX ADMIN — GUAIFENESIN SYRUP AND DEXTROMETHORPHAN 5 ML: 100; 10 SYRUP ORAL at 13:20

## 2024-03-03 RX ADMIN — BUMETANIDE 2 MG: 1 TABLET ORAL at 15:58

## 2024-03-03 RX ADMIN — IPRATROPIUM BROMIDE AND ALBUTEROL SULFATE 1 DOSE: .5; 2.5 SOLUTION RESPIRATORY (INHALATION) at 19:21

## 2024-03-03 RX ADMIN — PANTOPRAZOLE SODIUM 40 MG: 40 TABLET, DELAYED RELEASE ORAL at 06:59

## 2024-03-03 RX ADMIN — IPRATROPIUM BROMIDE AND ALBUTEROL SULFATE 1 DOSE: .5; 2.5 SOLUTION RESPIRATORY (INHALATION) at 07:14

## 2024-03-03 RX ADMIN — BUMETANIDE 1 MG: 1 TABLET ORAL at 08:51

## 2024-03-03 RX ADMIN — HYDROCORTISONE: 0.01 CREAM TOPICAL at 16:00

## 2024-03-03 RX ADMIN — ENOXAPARIN SODIUM 40 MG: 100 INJECTION SUBCUTANEOUS at 08:46

## 2024-03-03 RX ADMIN — SODIUM CHLORIDE, PRESERVATIVE FREE 10 ML: 5 INJECTION INTRAVENOUS at 09:59

## 2024-03-03 RX ADMIN — ARFORMOTEROL TARTRATE 15 MCG: 15 SOLUTION RESPIRATORY (INHALATION) at 07:14

## 2024-03-03 RX ADMIN — HYDROCORTISONE: 0.01 CREAM TOPICAL at 20:50

## 2024-03-03 RX ADMIN — ARFORMOTEROL TARTRATE 15 MCG: 15 SOLUTION RESPIRATORY (INHALATION) at 19:20

## 2024-03-03 RX ADMIN — TAMSULOSIN HYDROCHLORIDE 0.4 MG: 0.4 CAPSULE ORAL at 20:36

## 2024-03-03 RX ADMIN — SODIUM CHLORIDE, PRESERVATIVE FREE 10 ML: 5 INJECTION INTRAVENOUS at 20:36

## 2024-03-03 ASSESSMENT — PAIN - FUNCTIONAL ASSESSMENT: PAIN_FUNCTIONAL_ASSESSMENT: ACTIVITIES ARE NOT PREVENTED

## 2024-03-03 ASSESSMENT — PAIN DESCRIPTION - ORIENTATION: ORIENTATION: RIGHT;LEFT

## 2024-03-03 ASSESSMENT — PAIN DESCRIPTION - DESCRIPTORS: DESCRIPTORS: ACHING

## 2024-03-03 ASSESSMENT — PAIN SCALES - GENERAL
PAINLEVEL_OUTOF10: 0
PAINLEVEL_OUTOF10: 4

## 2024-03-03 ASSESSMENT — PAIN DESCRIPTION - LOCATION: LOCATION: LEG

## 2024-03-03 NOTE — PLAN OF CARE
Problem: Respiratory - Adult  Goal: Achieves optimal ventilation and oxygenation  3/3/2024 0720 by Shaheen Romo Sr., RCP  Outcome: Progressing  3/3/2024 0031 by Dominick Brown RN  Outcome: Progressing  3/2/2024 1930 by Naveen Knapp RCJOY  Outcome: Progressing

## 2024-03-03 NOTE — PLAN OF CARE
Problem: Physical Therapy - Adult  Goal: By Discharge: Performs mobility at highest level of function for planned discharge setting.  See evaluation for individualized goals.  Description: FUNCTIONAL STATUS PRIOR TO ADMISSION: Patient was modified independent using a 3WW for functional mobility.    HOME SUPPORT PRIOR TO ADMISSION: The patient lived with spouse Cata but did not require assistance for BADLs; they share IADL tasks; pt drives normally; 2-3 falls already this year.    Physical Therapy Goals  Initiated 2/28/2024  1.  Patient will move from supine to sit and sit to supine in bed with modified independence within 7 day(s).    2.  Patient will perform sit to stand with SBA within 7 day(s).  3.  Patient will transfer from bed to chair and chair to bed with SBA using the least restrictive device within 7 day(s).  4.  Patient will ambulate with SBA for 200 feet with the least restrictive device within 7 day(s).   5.  Patient will ascend/descend 2-3 stairs with 1-2 handrail(s) with contact guard assist within 7 day(s).    Outcome: Progressing   PHYSICAL THERAPY TREATMENT    Patient: Naveen Alvarez (88 y.o. male)  Date: 3/3/2024  Diagnosis: Generalized weakness [R53.1]  Skin tear of right elbow without complication, initial encounter [S51.011A]  DAMIR (acute kidney injury) (HCC) [N17.9] Generalized weakness      Precautions: Fall Risk, General Precautions, Bed Alarm                      ASSESSMENT:  Patient continues to benefit from skilled PT services and is progressing towards goals. Pt presented sitting up in recliner chair watching [employer] on TV w/ spouse Cata in the room. Vitals taken as noted per flowsheet; nursing again informed of low BP. Pt was then guided through some seated B LE therex to promote increased circulation and full AROM; details noted below. Then pt transferred to standing and was able to slowly walk around the nursing station and up/down his hallway w/ 2WW and CGA only. Frequent and  repeated cues to increase his step length but almost no staggered steps or \"freezing up\". His gait stability was likely the best that has been seen this admission so far; no LOB, no SOB. Once back in his room he was handed the phone from his wife and he spoke to his son; he also had therapist speak to his son. Once completed pt was left resting comfortably in the recliner chair w/ B LE elevated to help w/ the continued edema in his lower legs and feet; tray table setup for lunch; call bell in reach and chair alarm on; spouse still present.        PLAN:  Patient continues to benefit from skilled intervention to address the above impairments.  Continue treatment per established plan of care.    Recommendation for discharge: (in order for the patient to meet his/her long term goals): Outpatient physical therapy strengthening and balance post hospitalization, Therapy 2 days/week in the home and also see \"other factors to consider\" below for additional discharge concerns/needs, Therapy up to 5 days/week in Skilled nursing facility, or Continue to assess pending progress; *at this point pt may be appropriate to d/c home w/ HHPT and then transition to outpt PT; he still needs to clear stairs*    Other factors to consider for discharge: poor safety awareness, impaired cognition, high risk for falls, not safe to be alone, and concern for safely navigating or managing the home environment    IF patient discharges home will need the following DME: wheelchair 18 inch w/ ELR and gel cushion for community distance, continuing to assess with progress, and 2WW       SUBJECTIVE:   Patient stated, \"I finally got a good night of sleep last night.\"    OBJECTIVE DATA SUMMARY:   Critical Behavior:  Orientation  Overall Orientation Status: Within Functional Limits  Cognition  Overall Cognitive Status: Exceptions  Arousal/Alertness: Appropriate responses to stimuli  Following Commands: Follows multistep commands with repitition;Follows one

## 2024-03-03 NOTE — PROGRESS NOTES
[facility]  Hospitalist Progress Note    NAME: Naveen Alvarez   :  1935   MRN:  [id number]     Total duration of encounter: 5 days      Interim Hospital Summary: 88 y.o. male who presented on 2024 with Generalized weakness. 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       Presents to ED  with c/o SOB / OKEEFE with cough and weakness.  No able to ambulate like usual. He noted feverishness and dry cough.  Mild swelling in both legs.  ED labs noted for DAMIR on Demedex diuresis, held on admission.  CXR noted for mild pulm edema.  CTA w/o PE. Has had more congestion and cough.  Repeat CXR noted for CHF and proBNP elevated >3000, f/u 3/ pending.     Subjective:     Chief Complaint / Reason for Physician Visit  \"better\".  Discussed with RN   Not congested  Pt questions why his low body freezes when he needs to be back at hospital  He walked to bathroom the day he was admitted and stopped part way not able to  his legs  Wife came with chair to let him sit down    Review of Systems:  Symptom Y/N Comments  Symptom Y/N Comments   Fever/Chills n   Chest Pain n    Poor Appetite n   Edema y  better   Cough n   Abdominal Pain n    Sputum n   Joint Pain n    SOB/OKEEFE y   Pruritis/Rash n    Nausea/vomit n   Tolerating PT/OT ?    Diarrhea n   Tolerating Diet y    Constipation n   Other         Current  Chronic diastolic heart failure (HCC)  Has been off Demedex 20mg daily   Resume diuresis with Bumex, advance to 2mg daily  Follow symptoms and daily weights (up 8 lb)      COPD  Cont Pulmicort / Brovana Nebs bid  Robitussin DM  Duonebs bid      Essential hypertension  No current tx      Type 2 diabetes mellitus with hyperglycemia, without long-term current use of insulin (HCC)   today       Hyperlipidemia  Cont home tx Lipitor 40mg daily       BPH  Off Flomax since admission, resume  Need to check for retension  ______________________________________________________________________  SAFETY:   Code Status:Full  DVT prophylaxis:Lovenox  Stress Ulcer prophylaxis: Pepcid 20mg daily, Prevacid  Bladder catheter:no  Family Contact Info:  Primary Emergency Contact: Cata Alvarez, Home Phone: [phone]  Bedded: [facility] Room 128/01  Disposition: TBD, likely home when stable  Admission status:  Inpatient    Reviewed most current lab test results and cultures  YES  Reviewed most current radiology test results   YES  Review and summation of old records today    NO  Reviewed patient's current orders and MAR    YES  PMH/SH reviewed - no change compared to H&P    Care Plan discussed with:                                   Comments  Patient x     Family      RN x     Care Manager       Consultant                           Multidiciplinary team rounds were held today with , nursing, pharmacist and clinical coordinator.  Patient's plan of care was discussed; medications were reviewed and discharge planning was addressed.        ____________________________________________    Total NON Critical Care TIME:  35   Minutes        Comments   >50% of visit spent in counseling and coordination of care   x      Signed: Shaheen Melendrez MD  [facility] Hospitalist  [phone]

## 2024-03-04 LAB
ANION GAP SERPL CALC-SCNC: 8 MMOL/L (ref 5–15)
BACTERIA SPEC CULT: NORMAL
BACTERIA SPEC CULT: NORMAL
BUN SERPL-MCNC: 14 MG/DL (ref 6–20)
BUN/CREAT SERPL: 12 (ref 12–20)
CALCIUM SERPL-MCNC: 8.5 MG/DL (ref 8.5–10.1)
CHLORIDE SERPL-SCNC: 105 MMOL/L (ref 97–108)
CO2 SERPL-SCNC: 30 MMOL/L (ref 21–32)
CREAT SERPL-MCNC: 1.2 MG/DL (ref 0.7–1.3)
GLUCOSE SERPL-MCNC: 114 MG/DL (ref 65–100)
NT PRO BNP: 680 PG/ML (ref 0–450)
POTASSIUM SERPL-SCNC: 3.1 MMOL/L (ref 3.5–5.1)
SERVICE CMNT-IMP: NORMAL
SERVICE CMNT-IMP: NORMAL
SODIUM SERPL-SCNC: 143 MMOL/L (ref 136–145)

## 2024-03-04 PROCEDURE — 36415 COLL VENOUS BLD VENIPUNCTURE: CPT

## 2024-03-04 PROCEDURE — 83880 ASSAY OF NATRIURETIC PEPTIDE: CPT

## 2024-03-04 PROCEDURE — 6370000000 HC RX 637 (ALT 250 FOR IP): Performed by: INTERNAL MEDICINE

## 2024-03-04 PROCEDURE — 80048 BASIC METABOLIC PNL TOTAL CA: CPT

## 2024-03-04 PROCEDURE — 6360000002 HC RX W HCPCS: Performed by: INTERNAL MEDICINE

## 2024-03-04 PROCEDURE — 2580000003 HC RX 258: Performed by: INTERNAL MEDICINE

## 2024-03-04 PROCEDURE — 97110 THERAPEUTIC EXERCISES: CPT

## 2024-03-04 PROCEDURE — 94760 N-INVAS EAR/PLS OXIMETRY 1: CPT

## 2024-03-04 PROCEDURE — 94640 AIRWAY INHALATION TREATMENT: CPT

## 2024-03-04 PROCEDURE — 1100000000 HC RM PRIVATE

## 2024-03-04 RX ORDER — POTASSIUM CHLORIDE 750 MG/1
10 TABLET, FILM COATED, EXTENDED RELEASE ORAL 2 TIMES DAILY WITH MEALS
Status: DISCONTINUED | OUTPATIENT
Start: 2024-03-04 | End: 2024-03-06 | Stop reason: HOSPADM

## 2024-03-04 RX ADMIN — ENOXAPARIN SODIUM 40 MG: 100 INJECTION SUBCUTANEOUS at 11:45

## 2024-03-04 RX ADMIN — ARFORMOTEROL TARTRATE 15 MCG: 15 SOLUTION RESPIRATORY (INHALATION) at 19:46

## 2024-03-04 RX ADMIN — PANTOPRAZOLE SODIUM 40 MG: 40 TABLET, DELAYED RELEASE ORAL at 07:02

## 2024-03-04 RX ADMIN — BUDESONIDE 250 MCG: 0.5 SUSPENSION RESPIRATORY (INHALATION) at 07:38

## 2024-03-04 RX ADMIN — HYDROCORTISONE: 0.01 CREAM TOPICAL at 11:48

## 2024-03-04 RX ADMIN — FAMOTIDINE 20 MG: 20 TABLET, FILM COATED ORAL at 11:44

## 2024-03-04 RX ADMIN — HYDROCORTISONE: 0.01 CREAM TOPICAL at 17:39

## 2024-03-04 RX ADMIN — IPRATROPIUM BROMIDE AND ALBUTEROL SULFATE 1 DOSE: .5; 2.5 SOLUTION RESPIRATORY (INHALATION) at 19:46

## 2024-03-04 RX ADMIN — ARFORMOTEROL TARTRATE 15 MCG: 15 SOLUTION RESPIRATORY (INHALATION) at 07:38

## 2024-03-04 RX ADMIN — BUDESONIDE 250 MCG: 0.5 SUSPENSION RESPIRATORY (INHALATION) at 19:46

## 2024-03-04 RX ADMIN — BUMETANIDE 2 MG: 1 TABLET ORAL at 11:44

## 2024-03-04 RX ADMIN — POTASSIUM CHLORIDE 10 MEQ: 750 TABLET, FILM COATED, EXTENDED RELEASE ORAL at 11:43

## 2024-03-04 RX ADMIN — IPRATROPIUM BROMIDE AND ALBUTEROL SULFATE 1 DOSE: .5; 2.5 SOLUTION RESPIRATORY (INHALATION) at 07:38

## 2024-03-04 RX ADMIN — HYDROCORTISONE: 0.01 CREAM TOPICAL at 21:18

## 2024-03-04 RX ADMIN — ATORVASTATIN CALCIUM 40 MG: 40 TABLET, FILM COATED ORAL at 11:44

## 2024-03-04 RX ADMIN — TAMSULOSIN HYDROCHLORIDE 0.4 MG: 0.4 CAPSULE ORAL at 21:17

## 2024-03-04 RX ADMIN — POTASSIUM CHLORIDE 10 MEQ: 750 TABLET, FILM COATED, EXTENDED RELEASE ORAL at 17:38

## 2024-03-04 RX ADMIN — SODIUM CHLORIDE, PRESERVATIVE FREE 10 ML: 5 INJECTION INTRAVENOUS at 21:17

## 2024-03-04 RX ADMIN — SODIUM CHLORIDE, PRESERVATIVE FREE 10 ML: 5 INJECTION INTRAVENOUS at 11:51

## 2024-03-04 ASSESSMENT — PAIN SCALES - GENERAL: PAINLEVEL_OUTOF10: 0

## 2024-03-04 NOTE — PLAN OF CARE
Problem: Respiratory - Adult  Goal: Achieves optimal ventilation and oxygenation  3/4/2024 0742 by Emily Bell, RT  Outcome: Progressing  3/3/2024 1935 by Naveen Knapp RCP  Outcome: Progressing

## 2024-03-04 NOTE — PROGRESS NOTES
mcg, 15 mcg, Nebulization, BID RT, 15 mcg at 03/04/24 0738 **AND** budesonide (PULMICORT) nebulizer suspension 250 mcg, 0.25 mg, Nebulization, BID RT, Melly Mcneil MD, 250 mcg at 03/04/24 0738    ipratropium 0.5 mg-albuterol 2.5 mg (DUONEB) nebulizer solution 1 Dose, 1 Dose, Inhalation, BID RT, Melly Mcneil MD, 1 Dose at 03/04/24 0738    Objective:     VITALS:   Vitals:    03/04/24 0755 03/04/24 1058 03/04/24 1137 03/04/24 1617   BP:  96/61 102/71 113/67   Pulse: 73 73 77 81   Resp: 18   18   Temp:    97.3 °F (36.3 °C)   TempSrc:    Oral   SpO2: 98% 97% 94% 93%   Weight:       Height:            Intake/Output Summary (Last 24 hours) at 3/4/2024 1631  Last data filed at 3/4/2024 1400  Gross per 24 hour   Intake 480 ml   Output 2025 ml   Net -1545 ml         date  Wt (kg)    2/27   92.5     3/3   95 kg     3/4                PHYSICAL EXAM:  General: WD, WN. Alert, cooperative, no acute distress    EENT:  EOMI. Anicteric sclerae. MMM  Resp:  B congestion, rhonchi,  no wheezing.  Suspect rales.  No accessory muscle use  CV:  Regular  rhythm,  tr edema (cut sock elastic)  GI:  Soft, Non distended, Non tender.  +Bowel sounds  Neurologic:  Alert and oriented X 3, normal speech, no focal weakness  Psych:   Not anxious   Skin:  Maculopapular eruption on back      LABS:  I reviewed today's most current labs and imaging studies.  Pertinent labs include:  Recent Labs     03/02/24  0654   WBC 7.1   HGB 11.7*   HCT 35.5*   *       Recent Labs     03/02/24  0654 03/03/24  0612 03/04/24  0625    140 143   K 3.7 3.3* 3.1*    103 105   CO2 27 25 30   BUN 16 15 14   MG 2.0 1.9  --    ALT 60  --   --          RADIOLOGY:  CT HEAD 2/27:  The ventricles and sulci are normal in size, shape and configuration. There is  mild periventricular and subcortical white matter hypodensity consistent with  chronic small vessel ischemic disease.. There is no intracranial hemorrhage,  extra-axial collection, or mass effect. The  medications were reviewed and discharge planning was addressed.        ____________________________________________    Total NON Critical Care TIME:  35   Minutes        Comments   >50% of visit spent in counseling and coordination of care   x      Signed: Shaheen Melendrez MD  [facility] Hospitalist  [phone]

## 2024-03-04 NOTE — PLAN OF CARE
Problem: Physical Therapy - Adult  Goal: By Discharge: Performs mobility at highest level of function for planned discharge setting.  See evaluation for individualized goals.  Description: FUNCTIONAL STATUS PRIOR TO ADMISSION: Patient was modified independent using a 3WW for functional mobility.    HOME SUPPORT PRIOR TO ADMISSION: The patient lived with spouse Cata but did not require assistance for BADLs; they share IADL tasks; pt drives normally; 2-3 falls already this year.    Physical Therapy Goals  Initiated 2/28/2024  1.  Patient will move from supine to sit and sit to supine in bed with modified independence within 7 day(s).    2.  Patient will perform sit to stand with SBA within 7 day(s).  3.  Patient will transfer from bed to chair and chair to bed with SBA using the least restrictive device within 7 day(s).  4.  Patient will ambulate with SBA for 200 feet with the least restrictive device within 7 day(s).   5.  Patient will ascend/descend 2-3 stairs with 1-2 handrail(s) with contact guard assist within 7 day(s).    Outcome: Progressing   PHYSICAL THERAPY TREATMENT    Patient: Naveen Alvarez (88 y.o. male)  Date: 3/4/2024  Diagnosis: Generalized weakness [R53.1]  Skin tear of right elbow without complication, initial encounter [S51.011A]  DAMIR (acute kidney injury) ([facility]) [N17.9] Generalized weakness      Precautions: Fall Risk, General Precautions, Bed Alarm                      ASSESSMENT:  Patient continues to benefit from skilled PT services and is slowly progressing towards goals. Pt presented sitting in recliner chair w/ spouse and MD present. Vitals taken as noted per flowsheet. When ready pt transferred to standing and was able to slowly walk all the way to the rehab gym w/ 2WW and primarily SBA only; 1 brief standing rest break; no LOB. Pt has know chronic mobility issues which includes shuffled steps. His stability when walking was fairly good but he needs constant cues to take bigger steps, to

## 2024-03-04 NOTE — CARE COORDINATION
Transition of Care Plan:     RUR: 13%   Prior Level of Functioning:   Disposition:   If SNF or IPR: Date FOC offered:   Date FOC received:   Accepting facility:   Date authorization started with reference number:   Date authorization received and expires:   Follow up appointments:   DME needed:   Transportation at discharge:   IM/IMM Medicare/ letter given: 1st IMM obtained 2/29, 2nd IMM 3/4    Is patient a [place] and connected with VA?               If yes, was  transfer form completed and VA notified?   Caregiver Contact:   Discharge Caregiver contacted prior to discharge?   Care Conference needed?   Barriers to discharge: Medical instability     Patient's LOS >96 hours d/t medical instability. Per MD, doing good today could swing if recommended. Will see what therapy recommends.     1140: 2nd Important Message from Medicare Letter provided to Naveen Alvarez. Oral explanation was provided and all questions answered. Signed document placed on the bedside chart to be scanned under the media tab. Copy provided to Naveen Alvarez     Also discussed with patient discharge plans. Told him per PT doing very well and may be able to go home instead of swing bed.       1145: Spoke with PT. Sudhakar for patient to dc home. Close to baseline status.

## 2024-03-05 LAB
ANION GAP SERPL CALC-SCNC: 8 MMOL/L (ref 5–15)
BUN SERPL-MCNC: 16 MG/DL (ref 6–20)
BUN/CREAT SERPL: 12 (ref 12–20)
CALCIUM SERPL-MCNC: 8.8 MG/DL (ref 8.5–10.1)
CHLORIDE SERPL-SCNC: 102 MMOL/L (ref 97–108)
CO2 SERPL-SCNC: 32 MMOL/L (ref 21–32)
CREAT SERPL-MCNC: 1.39 MG/DL (ref 0.7–1.3)
GLUCOSE SERPL-MCNC: 95 MG/DL (ref 65–100)
MAGNESIUM SERPL-MCNC: 1.8 MG/DL (ref 1.6–2.4)
POTASSIUM SERPL-SCNC: 3.8 MMOL/L (ref 3.5–5.1)
SODIUM SERPL-SCNC: 142 MMOL/L (ref 136–145)

## 2024-03-05 PROCEDURE — 97535 SELF CARE MNGMENT TRAINING: CPT

## 2024-03-05 PROCEDURE — 97110 THERAPEUTIC EXERCISES: CPT

## 2024-03-05 PROCEDURE — 6370000000 HC RX 637 (ALT 250 FOR IP): Performed by: INTERNAL MEDICINE

## 2024-03-05 PROCEDURE — 83735 ASSAY OF MAGNESIUM: CPT

## 2024-03-05 PROCEDURE — 36415 COLL VENOUS BLD VENIPUNCTURE: CPT

## 2024-03-05 PROCEDURE — 1100000000 HC RM PRIVATE

## 2024-03-05 PROCEDURE — 6360000002 HC RX W HCPCS: Performed by: INTERNAL MEDICINE

## 2024-03-05 PROCEDURE — 2580000003 HC RX 258: Performed by: INTERNAL MEDICINE

## 2024-03-05 PROCEDURE — 94640 AIRWAY INHALATION TREATMENT: CPT

## 2024-03-05 PROCEDURE — 97530 THERAPEUTIC ACTIVITIES: CPT

## 2024-03-05 PROCEDURE — 80048 BASIC METABOLIC PNL TOTAL CA: CPT

## 2024-03-05 RX ORDER — TRIAMCINOLONE ACETONIDE 1 MG/G
CREAM TOPICAL 3 TIMES DAILY
Status: DISCONTINUED | OUTPATIENT
Start: 2024-03-05 | End: 2024-03-06 | Stop reason: HOSPADM

## 2024-03-05 RX ORDER — BUMETANIDE 1 MG/1
1 TABLET ORAL DAILY
Status: DISCONTINUED | OUTPATIENT
Start: 2024-03-06 | End: 2024-03-06 | Stop reason: HOSPADM

## 2024-03-05 RX ADMIN — POTASSIUM CHLORIDE 10 MEQ: 750 TABLET, FILM COATED, EXTENDED RELEASE ORAL at 16:55

## 2024-03-05 RX ADMIN — TRIAMCINOLONE ACETONIDE: 1 CREAM TOPICAL at 16:55

## 2024-03-05 RX ADMIN — BUMETANIDE 2 MG: 1 TABLET ORAL at 08:29

## 2024-03-05 RX ADMIN — HYDROCORTISONE: 0.01 CREAM TOPICAL at 16:55

## 2024-03-05 RX ADMIN — POTASSIUM CHLORIDE 10 MEQ: 750 TABLET, FILM COATED, EXTENDED RELEASE ORAL at 08:29

## 2024-03-05 RX ADMIN — HYDROCORTISONE: 0.01 CREAM TOPICAL at 20:32

## 2024-03-05 RX ADMIN — IPRATROPIUM BROMIDE AND ALBUTEROL SULFATE 1 DOSE: .5; 2.5 SOLUTION RESPIRATORY (INHALATION) at 07:00

## 2024-03-05 RX ADMIN — ARFORMOTEROL TARTRATE 15 MCG: 15 SOLUTION RESPIRATORY (INHALATION) at 19:35

## 2024-03-05 RX ADMIN — FAMOTIDINE 20 MG: 20 TABLET, FILM COATED ORAL at 08:28

## 2024-03-05 RX ADMIN — PANTOPRAZOLE SODIUM 40 MG: 40 TABLET, DELAYED RELEASE ORAL at 05:57

## 2024-03-05 RX ADMIN — SODIUM CHLORIDE, PRESERVATIVE FREE 10 ML: 5 INJECTION INTRAVENOUS at 08:30

## 2024-03-05 RX ADMIN — ENOXAPARIN SODIUM 40 MG: 100 INJECTION SUBCUTANEOUS at 08:29

## 2024-03-05 RX ADMIN — TRIAMCINOLONE ACETONIDE: 1 CREAM TOPICAL at 20:31

## 2024-03-05 RX ADMIN — ATORVASTATIN CALCIUM 40 MG: 40 TABLET, FILM COATED ORAL at 08:29

## 2024-03-05 RX ADMIN — BUDESONIDE 250 MCG: 0.5 SUSPENSION RESPIRATORY (INHALATION) at 07:00

## 2024-03-05 RX ADMIN — BUDESONIDE 250 MCG: 0.5 SUSPENSION RESPIRATORY (INHALATION) at 19:35

## 2024-03-05 RX ADMIN — SODIUM CHLORIDE, PRESERVATIVE FREE 10 ML: 5 INJECTION INTRAVENOUS at 20:36

## 2024-03-05 RX ADMIN — IPRATROPIUM BROMIDE AND ALBUTEROL SULFATE 1 DOSE: .5; 2.5 SOLUTION RESPIRATORY (INHALATION) at 19:35

## 2024-03-05 RX ADMIN — TAMSULOSIN HYDROCHLORIDE 0.4 MG: 0.4 CAPSULE ORAL at 20:27

## 2024-03-05 RX ADMIN — ARFORMOTEROL TARTRATE 15 MCG: 15 SOLUTION RESPIRATORY (INHALATION) at 07:00

## 2024-03-05 RX ADMIN — HYDROCORTISONE: 0.01 CREAM TOPICAL at 08:29

## 2024-03-05 NOTE — PLAN OF CARE
Problem: Occupational Therapy - Adult  Goal: By Discharge: Performs self-care activities at highest level of function for planned discharge setting.  See evaluation for individualized goals.  Description: FUNCTIONAL STATUS PRIOR TO ADMISSION:    Receives Help From: Family, ADL Assistance: Independent,  ,  ,  ,  ,  , Homemaking Assistance: Needs assistance, Ambulation Assistance: Independent, Transfer Assistance: Independent, Active : Yes     HOME SUPPORT: Patient lived with spouse but didn't require assistance.    Occupational Therapy Goals:  Initiated 2/28/2024  1.  Patient will perform shower transfer with Modified [place] within 7 day(s).  2.  Patient will perform lower body dressing with Modified [place] within 7 day(s).  3.  Patient will perform upper body dressing with [place] within 7 day(s).  4.  Patient will perform toilet transfers with Modified [place]  within 7 day(s).  5.  Patient will perform all aspects of toileting with [place] within 7 day(s).  Outcome: Progressing   OCCUPATIONAL THERAPY TREATMENT  Patient: Naveen Alvarez (88 y.o. male)  Date: 3/5/2024  Primary Diagnosis: Generalized weakness [R53.1]  Skin tear of right elbow without complication, initial encounter [S51.011A]  DAMIR (acute kidney injury) ([facility]) [N17.9]       Precautions: Fall Risk, General Precautions, Bed Alarm                Chart, occupational therapy assessment, plan of care, and goals were reviewed.    ASSESSMENT  Patient continues to benefit from skilled OT services and is progressing towards goals. Pt approached seated in chair and agreeable to therapy w/ wife present. Pt completed standing oral hygiene task at sink w/ set up and 2WW. Pt then completed shower transfer w/ use of grab bar and supervision w/ cue for safe 2WW placement. Pt completed functional mobility around nurses station w/ 2WW. Pt demos shuffling gait and would place self away from 2WW and would require cues for proper gait body  mechanics and proper safety/use of 2WW. Pt returned to chair w/ all needs met, call light/alarm in place, and spouse present.              PLAN :  Patient continues to benefit from skilled intervention to address the above impairments.  Continue treatment per established plan of care to address goals.      Recommendation for discharge: (in order for the patient to meet his/her long term goals): Therapy 2 days/week in the home    Other factors to consider for discharge: patient's current support system is unable to meet their requirements for physical assistance and high risk for falls    IF patient discharges home will need the following DME:    has been delivered for discharge       SUBJECTIVE:   Patient stated “I Just feel a little stiff today.”    OBJECTIVE DATA SUMMARY:   Cognitive/Behavioral Status:          Functional Mobility and Transfers for ADLs:  Bed Mobility:        Transfers:   Transfer Training  Transfer Training: Yes  Tub/Shower Transfer: Supervision           Balance:            ADL Intervention:                                                    LE Dressing: Setup       Toileting: Setup              Functional Mobility: Supervision        Skin Care: Incontinent cleanser      Pain Rating:  3/10   Pain Intervention(s):   rest, elevation, and repositioning      Activity Tolerance:   Good  Please refer to the flowsheet for vital signs taken during this treatment.    After treatment:   Patient left in no apparent distress sitting up in chair, Call bell within reach, Bed/ chair alarm activated, and Caregiver / family present    COMMUNICATION/EDUCATION:   The patient's plan of care was discussed with: physical therapist and registered nurse    Patient Education  Education Given To: Patient;Family  Education Provided: Transfer Training;Equipment  Education Method: Demonstration  Barriers to Learning: None  Education Outcome: Verbalized understanding;Demonstrated understanding    Thank you for this

## 2024-03-05 NOTE — PLAN OF CARE
Problem: Skin/Tissue Integrity  Goal: Absence of new skin breakdown  Description: 1.  Monitor for areas of redness and/or skin breakdown  2.  Assess vascular access sites hourly  3.  Every 4-6 hours minimum:  Change oxygen saturation probe site  4.  Every 4-6 hours:  If on nasal continuous positive airway pressure, respiratory therapy assess nares and determine need for appliance change or resting period.  3/5/2024 0838 by Leslee Bean RN  Outcome: Progressing  3/4/2024 2253 by Marti Farah RN  Outcome: Progressing     Problem: Safety - Adult  Goal: Free from fall injury  3/5/2024 0838 by Leslee Bean RN  Outcome: Progressing  3/4/2024 2253 by Marti Farah RN  Outcome: Progressing     Problem: Chronic Conditions and Co-morbidities  Goal: Patient's chronic conditions and co-morbidity symptoms are monitored and maintained or improved  Outcome: Progressing     Problem: Respiratory - Adult  Goal: Achieves optimal ventilation and oxygenation  3/5/2024 0838 by Leslee Bean RN  Outcome: Progressing  3/5/2024 0709 by Shaheen Romo Sr., RCP  Outcome: Progressing  3/4/2024 2007 by Naveen Knapp RCP  Outcome: Progressing     Problem: Pain  Goal: Verbalizes/displays adequate comfort level or baseline comfort level  Outcome: Progressing

## 2024-03-05 NOTE — CARE COORDINATION
Transition of Care Plan:     RUR: 11%   Prior Level of Functioning: Independent   Disposition:   If SNF or IPR: Date FOC offered:   Date FOC received:   Accepting facility:   Date authorization started with reference number:   Date authorization received and expires:   Follow up appointments:   DME needed:   Transportation at discharge:   IM/IMM Medicare/ letter given: 1st IMM obtained 2/29, 2nd IMM 3/4    Is patient a  and connected with VA?               If yes, was [place] transfer form completed and VA notified?   Caregiver Contact:   Discharge Caregiver contacted prior to discharge?   Care Conference needed?   Barriers to discharge: Medical instability     1424: Talked to patient about discharge plans. Told him spoke with MD earlier who said patient would benefit from health. He states he is agreeable to home health. No preference in home health company. Called [facility] health and spoke with Eliu. He states to send info over and they will review and let us know. Referral sent to [facility].     8296: Received voicemail from [facility] stating that they do NOT service patient's area. This is NOT what Eliu said. Eliu stated they service [facility]. Will get in touch with him tomorrow to make sure this is correct.

## 2024-03-05 NOTE — PLAN OF CARE
Problem: Respiratory - Adult  Goal: Achieves optimal ventilation and oxygenation  3/5/2024 0709 by Shaheen Romo Sr., RCP  Outcome: Progressing  3/4/2024 2007 by Naveen Knapp RCP  Outcome: Progressing

## 2024-03-05 NOTE — PROGRESS NOTES
Medications:     [START ON 3/6/2024] bumetanide (BUMEX) tablet 1 mg, 1 mg, Oral, Daily, Shaheen Melendrez MD    potassium chloride (KLOR-CON) extended release tablet 10 mEq, 10 mEq, Oral, BID WC, Shaheen Melendrez MD, 10 mEq at 03/05/24 0829    tamsulosin (FLOMAX) capsule 0.4 mg, 0.4 mg, Oral, Nightly, Shaheen Melendrez MD, 0.4 mg at 03/04/24 2117    pantoprazole (PROTONIX) tablet 40 mg, 40 mg, Oral, QAM AC, Shaheen Melendrez MD, 40 mg at 03/05/24 0557    guaiFENesin-dextromethorphan (ROBITUSSIN DM) 100-10 MG/5ML syrup 5 mL, 5 mL, Oral, TID PRN, Melly Mcneil MD, 5 mL at 03/03/24 2036    hydrocortisone 1 % cream, , Topical, TID, Shaheen Melendrez MD, Given at 03/05/24 0829    atorvastatin (LIPITOR) tablet 40 mg, 40 mg, Oral, Daily, Melly Mcneil MD, 40 mg at 03/05/24 0829    famotidine (PEPCID) tablet 20 mg, 20 mg, Oral, Daily, Melly Mcneil MD, 20 mg at 03/05/24 0828    sodium chloride flush 0.9 % injection 5-40 mL, 5-40 mL, IntraVENous, 2 times per day, Melly Mcneil MD, 10 mL at 03/05/24 0830    sodium chloride flush 0.9 % injection 5-40 mL, 5-40 mL, IntraVENous, PRN, Melly Mcneil MD    0.9 % sodium chloride infusion, , IntraVENous, PRN, Melly Mcneil MD    enoxaparin (LOVENOX) injection 40 mg, 40 mg, SubCUTAneous, Daily, Melly Mcneil MD, 40 mg at 03/05/24 0829    ondansetron (ZOFRAN-ODT) disintegrating tablet 4 mg, 4 mg, Oral, Q8H PRN **OR** ondansetron (ZOFRAN) injection 4 mg, 4 mg, IntraVENous, Q6H PRN, Melly Mcneil MD    polyethylene glycol (GLYCOLAX) packet 17 g, 17 g, Oral, Daily PRN, Melly Mcneil MD    acetaminophen (TYLENOL) tablet 650 mg, 650 mg, Oral, Q6H PRN, 650 mg at 03/03/24 2036 **OR** acetaminophen (TYLENOL) suppository 650 mg, 650 mg, Rectal, Q6H PRN, Melly Mcneil MD    arformoterol tartrate (BROVANA) nebulizer solution 15 mcg, 15 mcg, Nebulization, BID RT, 15 mcg at 03/05/24 0700 **AND** budesonide (PULMICORT) nebulizer suspension 250 mcg, 0.25 mg, Nebulization, BID RT, Melly Mcneil MD, 250 mcg at 03/05/24 0700     ipratropium 0.5 mg-albuterol 2.5 mg (DUONEB) nebulizer solution 1 Dose, 1 Dose, Inhalation, BID RT, Melly Mcneil MD, 1 Dose at 03/05/24 0700    Objective:     VITALS:   Vitals:    03/04/24 2255 03/05/24 0701 03/05/24 0734 03/05/24 0930   BP: 117/64  124/75    Pulse: 82 86 92    Resp: 20 20 20    Temp: 97.9 °F (36.6 °C)  97.9 °F (36.6 °C)    TempSrc: Oral  Oral    SpO2: 95% 92% 92%    Weight:    92.5 kg (204 lb)   Height:            Intake/Output Summary (Last 24 hours) at 3/5/2024 1536  Last data filed at 3/5/2024 1200  Gross per 24 hour   Intake 720 ml   Output 1100 ml   Net -380 ml         date  Wt (kg)    2/27   92.5     3/3   95 kg     3/4    97.5 kg     3/5    92.5 kg           PHYSICAL EXAM:  General: WD, WN. Alert, cooperative, no acute distress    EENT:  EOMI. Anicteric sclerae. MMM  Resp:  Scant basilar crackles, no rhonchi,  no wheezing.  No accessory muscle use  CV:  Regular  rhythm,  2+ edema (cut sock elastic) dorsal foot  GI:  Soft, Non distended, Non tender.  +Bowel sounds  Neurologic:  Alert and oriented X 3, normal speech, no focal weakness  Psych:   Not anxious   Skin:  Maculopapular eruption on back      LABS:  I reviewed today's most current labs and imaging studies.  Pertinent labs include:  No results for input(s): \"WBC\", \"HGB\", \"HCT\", \"PLT\" in the last 72 hours.    Recent Labs     03/03/24  0612 03/04/24  0625 03/05/24  0554    143 142   K 3.3* 3.1* 3.8    105 102   CO2 25 30 32   BUN 15 14 16   MG 1.9  --  1.8       RADIOLOGY:  CT HEAD 2/27:  The ventricles and sulci are normal in size, shape and configuration. There is  mild periventricular and subcortical white matter hypodensity consistent with  chronic small vessel ischemic disease.. There is no intracranial hemorrhage,  extra-axial collection, or mass effect. The basilar cisterns are open. No CT  evidence of acute infarct.   The bone windows demonstrate no abnormalities. The visualized portions of the  paranasal sinuses and

## 2024-03-05 NOTE — PLAN OF CARE
Problem: Physical Therapy - Adult  Goal: By Discharge: Performs mobility at highest level of function for planned discharge setting.  See evaluation for individualized goals.  Description: FUNCTIONAL STATUS PRIOR TO ADMISSION: Patient was modified independent using a 3WW for functional mobility.    HOME SUPPORT PRIOR TO ADMISSION: The patient lived with spouse Cata but did not require assistance for BADLs; they share IADL tasks; pt drives normally; 2-3 falls already this year.    Physical Therapy Goals  Initiated 2/28/2024  1.  Patient will move from supine to sit and sit to supine in bed with modified independence within 7 day(s).    2.  Patient will perform sit to stand with SBA within 7 day(s).  3.  Patient will transfer from bed to chair and chair to bed with SBA using the least restrictive device within 7 day(s).  4.  Patient will ambulate with SBA for 200 feet with the least restrictive device within 7 day(s).   5.  Patient will ascend/descend 2-3 stairs with 1-2 handrail(s) with contact guard assist within 7 day(s).    Outcome: Progressing   PHYSICAL THERAPY TREATMENT    Patient: Naveen Alvarez (88 y.o. male)  Date: 3/5/2024  Diagnosis: Generalized weakness [R53.1]  Skin tear of right elbow without complication, initial encounter [S51.011A]  DAMIR (acute kidney injury) ([facility]) [N17.9] Generalized weakness      Precautions: Fall Risk, General Precautions, Bed Alarm                      ASSESSMENT:  Patient continues to benefit from skilled PT services and is progressing towards goals. Pt presented napping in recliner chair; feet on floor w/ continued edema. Vitals taken as noted per flowsheet. Then pt was guided through some seated B LE therex to promote circulation, joint integrity and edema clearance; details noted below. Once completed pt transferred to standing and using the 2WW he was guided into the restroom to urinate; CGA onto commode but min A required for clothing management. When he was done pt was  [] []     15 [x] [] [] []       [] [] [] []            Pain Ratin/10   Pain Intervention(s):       Activity Tolerance:   Fair , requires rest breaks, observed shortness of breath on exertion, and SpO2 stable on room air    After treatment:   Patient left in no apparent distress sitting up in chair, Call bell within reach, Bed/ chair alarm activated, and Heels elevated for pressure relief      COMMUNICATION/EDUCATION:   The patient's plan of care was discussed with: occupational therapist, registered nurse, physician, , and certified nursing assistant/patient care technician    Patient Education  Education Given To: Patient  Education Provided: Role of Therapy;Plan of Care;Precautions;Transfer Training;Fall Prevention Strategies;Equipment;Family Education;Home Exercise Program  Education Provided Comments: cues for safety and technique when transferring and when walking w/ AD to reduce fall risk; therapist reviewed importance of not getting up without assistance; reviewed B LE therex for HEP; reviewed safety on stairs  Education Method: Verbal;Demonstration  Barriers to Learning: None  Education Outcome: Verbalized understanding;Demonstrated understanding;Continued education needed      LUPE PALACIOS, PT  Minutes: 45

## 2024-03-06 VITALS
HEIGHT: 72 IN | TEMPERATURE: 97.5 F | BODY MASS INDEX: 29.93 KG/M2 | DIASTOLIC BLOOD PRESSURE: 73 MMHG | SYSTOLIC BLOOD PRESSURE: 113 MMHG | HEART RATE: 78 BPM | OXYGEN SATURATION: 94 % | WEIGHT: 221 LBS | RESPIRATION RATE: 18 BRPM

## 2024-03-06 LAB
ANION GAP SERPL CALC-SCNC: 8 MMOL/L (ref 5–15)
BUN SERPL-MCNC: 18 MG/DL (ref 6–20)
BUN/CREAT SERPL: 13 (ref 12–20)
CALCIUM SERPL-MCNC: 8.5 MG/DL (ref 8.5–10.1)
CHLORIDE SERPL-SCNC: 102 MMOL/L (ref 97–108)
CO2 SERPL-SCNC: 31 MMOL/L (ref 21–32)
CREAT SERPL-MCNC: 1.37 MG/DL (ref 0.7–1.3)
GLUCOSE SERPL-MCNC: 96 MG/DL (ref 65–100)
POTASSIUM SERPL-SCNC: 3.8 MMOL/L (ref 3.5–5.1)
SODIUM SERPL-SCNC: 141 MMOL/L (ref 136–145)

## 2024-03-06 PROCEDURE — 36415 COLL VENOUS BLD VENIPUNCTURE: CPT

## 2024-03-06 PROCEDURE — 6370000000 HC RX 637 (ALT 250 FOR IP): Performed by: INTERNAL MEDICINE

## 2024-03-06 PROCEDURE — 6360000002 HC RX W HCPCS: Performed by: INTERNAL MEDICINE

## 2024-03-06 PROCEDURE — 80048 BASIC METABOLIC PNL TOTAL CA: CPT

## 2024-03-06 PROCEDURE — 94640 AIRWAY INHALATION TREATMENT: CPT

## 2024-03-06 PROCEDURE — 94761 N-INVAS EAR/PLS OXIMETRY MLT: CPT

## 2024-03-06 PROCEDURE — 2580000003 HC RX 258: Performed by: INTERNAL MEDICINE

## 2024-03-06 RX ORDER — POTASSIUM CHLORIDE 750 MG/1
10 TABLET, FILM COATED, EXTENDED RELEASE ORAL DAILY
Qty: 30 TABLET | Refills: 0 | Status: SHIPPED | OUTPATIENT
Start: 2024-03-06

## 2024-03-06 RX ORDER — TRIAMCINOLONE ACETONIDE 1 MG/G
CREAM TOPICAL
Qty: 60 G | Refills: 0 | Status: SHIPPED | OUTPATIENT
Start: 2024-03-06

## 2024-03-06 RX ORDER — BUMETANIDE 1 MG/1
1 TABLET ORAL DAILY
Qty: 30 TABLET | Refills: 0 | Status: SHIPPED | OUTPATIENT
Start: 2024-03-07

## 2024-03-06 RX ADMIN — IPRATROPIUM BROMIDE AND ALBUTEROL SULFATE 1 DOSE: .5; 2.5 SOLUTION RESPIRATORY (INHALATION) at 07:42

## 2024-03-06 RX ADMIN — BUDESONIDE 250 MCG: 0.5 SUSPENSION RESPIRATORY (INHALATION) at 07:41

## 2024-03-06 RX ADMIN — ATORVASTATIN CALCIUM 40 MG: 40 TABLET, FILM COATED ORAL at 09:43

## 2024-03-06 RX ADMIN — FAMOTIDINE 20 MG: 20 TABLET, FILM COATED ORAL at 09:43

## 2024-03-06 RX ADMIN — TRIAMCINOLONE ACETONIDE: 1 CREAM TOPICAL at 09:44

## 2024-03-06 RX ADMIN — HYDROCORTISONE: 0.01 CREAM TOPICAL at 09:44

## 2024-03-06 RX ADMIN — ARFORMOTEROL TARTRATE 15 MCG: 15 SOLUTION RESPIRATORY (INHALATION) at 07:42

## 2024-03-06 RX ADMIN — SODIUM CHLORIDE, PRESERVATIVE FREE 10 ML: 5 INJECTION INTRAVENOUS at 09:44

## 2024-03-06 RX ADMIN — ENOXAPARIN SODIUM 40 MG: 100 INJECTION SUBCUTANEOUS at 09:43

## 2024-03-06 RX ADMIN — PANTOPRAZOLE SODIUM 40 MG: 40 TABLET, DELAYED RELEASE ORAL at 07:13

## 2024-03-06 RX ADMIN — BUMETANIDE 1 MG: 1 TABLET ORAL at 09:43

## 2024-03-06 RX ADMIN — POTASSIUM CHLORIDE 10 MEQ: 750 TABLET, FILM COATED, EXTENDED RELEASE ORAL at 09:43

## 2024-03-06 NOTE — PLAN OF CARE
Problem: Respiratory - Adult  Goal: Achieves optimal ventilation and oxygenation  3/6/2024 0744 by Starr German, RT  Outcome: Progressing  3/5/2024 1939 by Parvin Ma, RT  Outcome: Progressing

## 2024-03-06 NOTE — PLAN OF CARE
Problem: Respiratory - Adult  Goal: Achieves optimal ventilation and oxygenation  3/5/2024 1939 by Parvin Ma,   Outcome: Progressing  3/5/2024 0838 by Leslee Bean RN  Outcome: Progressing  3/5/2024 0709 by Shaheen Romo Sr., RCP  Outcome: Progressing

## 2024-03-06 NOTE — PROGRESS NOTES
Discharge Summary    Naveen Alvarez  :  1935  MRN:  [id number]    ADMIT DATE:  2024  DISCHARGE DATE:  3/6/2024      Discharge instruction reviewed with Patient, wife and caregiver    Home med's returned n/a    Personal belongings returned Yes    Patient Wheeled to front entrance via wheelchair with Nurse        SIGNED:    Leslee Bean RN

## 2024-03-06 NOTE — CARE COORDINATION
03/06/24 1211   Services At/After Discharge   Transition of Care Consult (CM Consult) Home Health  ([facility])   Internal Home Health No   Reason Outside Agency Chosen Out of service area   Services At/After Discharge Home Health   [place] Resource Information Provided? No   Mode of Transport at Discharge Self   Confirm Follow Up Transport Family   Condition of Participation: Discharge Planning   The Plan for Transition of Care is related to the following treatment goals: Gain strength, mobility and endurance   The Patient and/or Patient Representative was provided with a Choice of Provider? Patient   The Patient and/Or Patient Representative agree with the Discharge Plan? Yes   Freedom of Choice list was provided with basic dialogue that supports the patient's individualized plan of care/goals, treatment preferences, and shares the quality data associated with the providers?  Yes       Mr. Alvarez Is being discharged home today. He will be going home with home health through [facility]. After that he will likely go to OP PT afterwards. Home health will set that up. Unable to have home health order and outpatient order.  Patient and family are aware and agree with home health. Patient/family told to contact CM for any questions or concerns even after discharge.     Transition of Care Plan:     RUR: 12%   Prior Level of Functioning: Independent   Disposition:   If SNF or IPR: Date FOC offered:   Date FOC received:   Accepting facility:   Date authorization started with reference number:   Date authorization received and expires:   Follow up appointments:  Mohit Franco MD 3/13 at 11:40am   DME needed:   Transportation at discharge:   IM/IMM Medicare/ letter given: 1st IMM obtained 2/29, 2nd IMM 3/4    Is patient a [place] and connected with VA?               If yes, was [place] transfer form completed and VA notified?   Caregiver Contact:   Discharge Caregiver contacted prior to discharge?   Care Conference  needed?   Barriers to discharge: Medical instability

## 2024-03-06 NOTE — CARE COORDINATION
Transition of Care Plan:     RUR: 12%   Prior Level of Functioning: Independent   Disposition:   If SNF or IPR: Date FOC offered:   Date FOC received:   Accepting facility:   Date authorization started with reference number:   Date authorization received and expires:   Follow up appointments:   DME needed:   Transportation at discharge:   IM/IMM Medicare/ letter given: 1st IMM obtained 2/29, 2nd IMM 3/4    Is patient a [place] and connected with VA?               If yes, was [place] transfer form completed and VA notified?   Caregiver Contact:   Discharge Caregiver contacted prior to discharge?   Care Conference needed?   Barriers to discharge: Medical instability           0836: Called Eliu from [facility] and told him what Ch[place] from [facility] said (said they had to decline referral). He said that two nurses quit. So they could NOT accept referral. [facility] and spoke with Malia. Referral sent.       0918: Spoke with Christopher from [facility]. They can see Mr. Alvarez tomorrow 3/7

## 2024-03-06 NOTE — PLAN OF CARE
Problem: Skin/Tissue Integrity  Goal: Absence of new skin breakdown  Description: 1.  Monitor for areas of redness and/or skin breakdown  2.  Assess vascular access sites hourly  3.  Every 4-6 hours minimum:  Change oxygen saturation probe site  4.  Every 4-6 hours:  If on nasal continuous positive airway pressure, respiratory therapy assess nares and determine need for appliance change or resting period.  Outcome: Progressing     Problem: Safety - Adult  Goal: Free from fall injury  Outcome: Progressing     Problem: Respiratory - Adult  Goal: Achieves optimal ventilation and oxygenation  3/6/2024 0744 by Starr German, RT  Outcome: Progressing

## 2024-03-06 NOTE — DISCHARGE SUMMARY
103 105 102 102   CO2 27 25 30 32 31   BUN,BUNPL 16 15 14 16 18   Creatinine 1.26 1.13 1.20 1.39 (H) 1.37 (H)   Bun/Cre Ratio 13 13 12 12 13   Anion Gap 10 12 8 8 8   Est, Glom Filt Rate 55 (L) >60 58 (L) 49 (L) 50 (L)   Magnesium 2.0 1.9  1.8    Glucose, Random 104 (H) 104 (H) 114 (H) 95 96   CALCIUM, SERUM, 962454 8.6 8.1 (L) 8.5 8.8 8.5   ALBUMIN/GLOBULIN RATIO 0.9 (L)       Total Protein 6.6       NT Pro-BNP 3,675 (H)  680 (H)     Albumin 3.1 (L)       Globulin 3.5       Alk Phos 116       ALT 60       AST 66 (H)       BILIRUBIN TOTAL 1.6 (H)          02/27/24 18:30 02/27/24 19:50 02/28/24 08:50 02/29/24 05:59 03/01/24 06:15   Sodium 141  138 137 138   Potassium 3.3 (L)  3.8 3.5 3.6   Chloride 101  100 101 103   CO2 30  28 27 25   BUN,BUNPL 25 (H)  20 21 (H) 19   Creatinine 1.83 (H)  1.53 (H) 1.59 (H) 1.37 (H)   Bun/Cre Ratio 14  13 13 14   Anion Gap 10  10 9 10   Est, Glom Filt Rate 35 (L)  43 (L) 41 (L) 50 (L)   Magnesium 1.8   1.9    Lactic Acid, Sepsis  1.2      Glucose, Random 114 (H)  124 (H) 97 114 (H)   CALCIUM, SERUM, 159857 8.7  8.6 8.2 (L) 8.2 (L)   ALBUMIN/GLOBULIN RATIO 1.1       Total Protein 6.6       Procalcitonin 1.56       NT Pro-       Troponin, High Sensitivity 14       Albumin 3.5       Globulin 3.1       Alk Phos 95       ALT 23       AST 21       BILIRUBIN TOTAL 1.3 (H)       TSH, 3RD GENERATION 0.51          02/27/24 18:30 02/29/24 05:59 03/02/24 06:54   WBC 11.7 (H) 5.9 7.1   RBC 4.40 3.97 (L) 3.94 (L)   Hemoglobin Quant 13.0 11.9 (L) 11.7 (L)   Hematocrit 39.6 35.8 (L) 35.5 (L)   MCV 90.0 90.2 90.1   MCH 29.5 30.0 29.7   MCHC 32.8 33.2 33.0   MPV 12.0 12.7 12.5   RDW 14.1 14.4 14.3   Platelet Count 139 (L) 109 (L) 120 (L)   Platelet Comment ADEQU     Neutrophils % 84 (H) 80 (H)    Lymphocyte % 5 (L) 9 (L)    Monocytes % 7 9    Eosinophils % 0 1    Basophils % 0 1       02/27/24 22:41   Color, UA YELLOW/STRAW   Glucose, UA Negative   Bilirubin, Urine Negative   Ketones, Urine  Negative   Specific Gravity, UA 1.025   Blood, Urine Negative   Protein, UA Negative   Urobilinogen, Urine 0.2   Nitrite, Urine Negative   Leukocyte Esterase, Urine Negative   Appearance CLEAR   pH, Urine 5.5   WBC, UA 0-4   RBC, UA 0-5   Epithelial Cells, UA FEW   Bacteria, UA Negative      02/28/24 01:42   D-Dimer, Quant 1.88 (H)      02/27/24 19:24   Rapid Influenza A By PCR Not detected   Rapid Influenza B By PCR Not detected   SARS-CoV-2, PCR Not detected     CULTURES  Paired BC 2/27:  NG x 6 days  Urine 2/27:  Culture not indicated    RADIOLOGY:  CT HEAD 2/27:  The ventricles and sulci are normal in size, shape and configuration. There is  mild periventricular and subcortical white matter hypodensity consistent with  chronic small vessel ischemic disease.. There is no intracranial hemorrhage,  extra-axial collection, or mass effect. The basilar cisterns are open. No CT  evidence of acute infarct.   The bone windows demonstrate no abnormalities. The visualized portions of the  paranasal sinuses and mastoid air cells are clear.   IMPRESSION: No acute intracranial abnormality.     CTA CHEST 2/28:  This is a good quality study for the evaluation of pulmonary embolism  to the first subsegmental arterial level. There is no pulmonary embolism to this level.    The visualized thyroid gland is unremarkable. The aorta and main pulmonary  artery are stable in caliber. Cardiac size is within normal limits. No  pericardial effusion. No lymphadenopathy by imaging size criteria.   There is mild bilateral dependent atelectasis. There is no lung mass or  consolidation there is mild paraseptal emphysema.. No pleural effusion or  pneumothorax. Central airways are unremarkable.   Limited images of the upper abdomen are within normal limits. The bony  structures are age-appropriate.   IMPRESSION: No acute pulmonary artery embolism or other acute abnormality in the chest.     pCXR 2/27:   A portable AP radiograph of the chest was

## 2024-03-06 NOTE — DISCHARGE INSTRUCTIONS
HOSPITALIST RECOMMENDATIONS    Monitor weight and symptoms at home  Elevate legs some during the day to limit swelling  Follow with Dr. Franco for medication adjustments  Use walker to avoid falls  VELVET CAREY MD   [phone]

## 2024-03-07 ENCOUNTER — TELEPHONE (OUTPATIENT)
Age: 89
End: 2024-03-07

## 2024-03-07 NOTE — TELEPHONE ENCOUNTER
Care Transitions Initial Follow Up Call    Outreach made within 2 business days of discharge: Yes    Patient: Naveen Alvarez Patient : 1935   MRN: [id number]  Reason for Admission: There are no discharge diagnoses documented for the most recent discharge.  Discharge Date: 3/6/24       Spoke with: Wife (Cata)    Discharge department/facility: [facility] Interactive Patient Contact:  Was patient able to fill all prescriptions: Yes  Was patient instructed to bring all medications to the follow-up visit: Yes  Is patient taking all medications as directed in the discharge summary? YES  Does patient understand their discharge instructions: Yes  Does patient have questions or concerns that need addressed prior to 7-14 day follow up office visit: no    Scheduled appointment with PCP within 7-14 days    Follow Up  Future Appointments   Date Time Provider Department Center   3/13/2024 11:50 AM Christopher Magana MD [facility] MAIN BS AMB   2024  9:50 AM Mohit Franco MD [facility] MAIN BS AMB       KOBI CORONADO, MARELYN

## 2024-03-13 ENCOUNTER — OFFICE VISIT (OUTPATIENT)
Age: 89
End: 2024-03-13

## 2024-03-13 VITALS
SYSTOLIC BLOOD PRESSURE: 119 MMHG | WEIGHT: 200.4 LBS | TEMPERATURE: 98 F | BODY MASS INDEX: 27.14 KG/M2 | OXYGEN SATURATION: 97 % | DIASTOLIC BLOOD PRESSURE: 65 MMHG | HEIGHT: 72 IN | RESPIRATION RATE: 18 BRPM | HEART RATE: 90 BPM

## 2024-03-13 DIAGNOSIS — Z87.828 HISTORY OF KIDNEY INJURY: ICD-10-CM

## 2024-03-13 DIAGNOSIS — I10 ESSENTIAL HYPERTENSION: ICD-10-CM

## 2024-03-13 DIAGNOSIS — E11.65 TYPE 2 DIABETES MELLITUS WITH HYPERGLYCEMIA, WITHOUT LONG-TERM CURRENT USE OF INSULIN (HCC): Primary | ICD-10-CM

## 2024-03-13 DIAGNOSIS — N18.31 STAGE 3A CHRONIC KIDNEY DISEASE (HCC): ICD-10-CM

## 2024-03-13 DIAGNOSIS — E78.2 MIXED HYPERLIPIDEMIA: ICD-10-CM

## 2024-03-13 DIAGNOSIS — J41.0 SIMPLE CHRONIC BRONCHITIS (HCC): ICD-10-CM

## 2024-03-13 DIAGNOSIS — I50.32 CHRONIC DIASTOLIC HEART FAILURE (HCC): ICD-10-CM

## 2024-03-13 ASSESSMENT — PATIENT HEALTH QUESTIONNAIRE - PHQ9
1. LITTLE INTEREST OR PLEASURE IN DOING THINGS: 0
SUM OF ALL RESPONSES TO PHQ QUESTIONS 1-9: 0
SUM OF ALL RESPONSES TO PHQ9 QUESTIONS 1 & 2: 0
2. FEELING DOWN, DEPRESSED OR HOPELESS: 0

## 2024-03-13 NOTE — PROGRESS NOTES
Naveen Alvarez is a 88 y.o. male presenting for/with:    Chief Complaint   Patient presents with    Follow-Up from Hospital     ED to Hospital Admission -[facility]- 2/2/24-3/6/24-Generalized Weakness       Vitals:    03/13/24 1141   BP: 119/65   Site: Left Upper Arm   Position: Sitting   Cuff Size: Medium Adult   Pulse: 90   Resp: 18   Temp: 98 °F (36.7 °C)   TempSrc: Temporal   SpO2: 97%   Weight: 90.9 kg (200 lb 6.4 oz)   Height: 1.829 m (6')       Pain Scale: 0 - No pain/10  Pain Location:     \"Have you been to the ER, urgent care clinic since your last visit?  Hospitalized since your last visit?\"    ED to Hospital Admission -[facility]- 2/2/24-3/6/24-Generalized Weakness    “Have you seen or consulted any other health care providers outside of [facility] since your last visit?”    NO                 3/13/2024    11:38 AM   PHQ-9    Little interest or pleasure in doing things 0   Feeling down, depressed, or hopeless 0   PHQ-2 Score 0   PHQ-9 Total Score 0           3/13/2024    11:50 AM 6/8/2023    10:30 AM 9/23/2021    12:00 AM   [facility] AMB LEARNING ASSESSMENT   Primary Learner Patient Patient Patient   Primary Language ENGLISH ENGLISH ENGLISH   Learning Preference DEMONSTRATION DEMONSTRATION READING   Answered By patient self patient   Relationship to Learner SELF SELF SELF            3/13/2024    11:39 AM   Amb Fall Risk Assessment and TUG Test   Do you feel unsteady or are you worried about falling?  yes   2 or more falls in past year? yes   Fall with injury in past year? yes           3/13/2024    11:00 AM 1/23/2024     9:00 AM 10/19/2023    10:00 AM 7/20/2023     9:00 AM 6/8/2023     9:00 AM   ADL ASSESSMENT   Feeding yourself No Help Needed No Help Needed No Help Needed No Help Needed No Help Needed   Getting from bed to chair No Help Needed No Help Needed No Help Needed No Help Needed No Help Needed   Getting dressed No Help Needed No Help Needed No Help Needed No Help Needed No Help Needed   Bathing or 
(SANCTURA) 20 MG tablet Take 1 tablet by mouth 2 times daily (before meals)    famotidine (PEPCID) 40 MG tablet Take 0.5 tablets by mouth 2 times daily    ADVAIR DISKUS 250-50 MCG/ACT AEPB diskus inhaler TAKE 1 PUFF BY MOUTH TWICE A DAY    lansoprazole (PREVACID) 30 MG delayed release capsule TAKE 1 CAPSULE BY MOUTH DAILY (BEFORE BREAKFAST). (Patient taking differently: Take 1 capsule by mouth daily)    tamsulosin (FLOMAX) 0.4 MG capsule Take 1 capsule by mouth daily    cyanocobalamin 100 MCG tablet Take 1 tablet by mouth daily     No current facility-administered medications for this visit.       ROS:   General: No fever, chills, or abnormal weight loss  Respiratory: No cough, dyspnea  CV: No chest pain, palpitations    Objective:  Visit Vitals  /65 (Site: Left Upper Arm, Position: Sitting, Cuff Size: Medium Adult)   Pulse 90   Temp 98 °F (36.7 °C) (Temporal)   Resp 18   Ht 1.829 m (6')   Wt 90.9 kg (200 lb 6.4 oz)   SpO2 97%   BMI 27.18 kg/m²     Wt Readings from Last 3 Encounters:   03/13/24 90.9 kg (200 lb 6.4 oz)   03/06/24 100.2 kg (221 lb)   01/23/24 91.4 kg (201 lb 6.4 oz)     BP Readings from Last 3 Encounters:   03/13/24 119/65   03/06/24 113/73   01/23/24 110/60     Physical Examination: General appearance - alert, well appearing, and in no distress  Mental status - alert, oriented to person, place, and time  Eyes - pupils equal and reactive, extraocular eye movements intact  ENT - bilateral external ears and nose normal. Normal lips  Neck - supple, no significant adenopathy, no thyromegaly or mass  Chest - clear to auscultation, no wheezes, rales or rhonchi, symmetric air entry  Heart - normal rate, regular rhythm, normal S1, S2, no murmurs, rubs, clicks or gallops  Extremities - peripheral pulses normal, no pedal edema, no clubbing or cyanosis    Echocardiogram 2/29/24:    Left Ventricle: Normal left ventricular systolic function with a visually estimated EF of 60 - 65%. Left ventricle size is

## 2024-03-14 LAB
ANION GAP SERPL CALC-SCNC: 6 MMOL/L (ref 5–15)
BUN SERPL-MCNC: 17 MG/DL (ref 6–20)
BUN/CREAT SERPL: 12 (ref 12–20)
CALCIUM SERPL-MCNC: 9 MG/DL (ref 8.5–10.1)
CHLORIDE SERPL-SCNC: 105 MMOL/L (ref 97–108)
CHOLEST SERPL-MCNC: 131 MG/DL
CO2 SERPL-SCNC: 30 MMOL/L (ref 21–32)
CREAT SERPL-MCNC: 1.41 MG/DL (ref 0.7–1.3)
CREAT UR-MCNC: 47.2 MG/DL
EST. AVERAGE GLUCOSE BLD GHB EST-MCNC: 108 MG/DL
GLUCOSE SERPL-MCNC: 97 MG/DL (ref 65–100)
HBA1C MFR BLD: 5.4 % (ref 4–5.6)
HDLC SERPL-MCNC: 49 MG/DL
HDLC SERPL: 2.7 (ref 0–5)
LDLC SERPL CALC-MCNC: 63.8 MG/DL (ref 0–100)
MICROALBUMIN UR-MCNC: <0.5 MG/DL
MICROALBUMIN/CREAT UR-RTO: <11 MG/G (ref 0–30)
POTASSIUM SERPL-SCNC: 4 MMOL/L (ref 3.5–5.1)
SODIUM SERPL-SCNC: 141 MMOL/L (ref 136–145)
TRIGL SERPL-MCNC: 91 MG/DL
VLDLC SERPL CALC-MCNC: 18.2 MG/DL

## 2024-03-18 ENCOUNTER — TELEPHONE (OUTPATIENT)
Age: 89
End: 2024-03-18

## 2024-03-18 DIAGNOSIS — J44.9 CHRONIC OBSTRUCTIVE PULMONARY DISEASE, UNSPECIFIED (HCC): ICD-10-CM

## 2024-03-18 RX ORDER — FLUTICASONE PROPIONATE AND SALMETEROL 250; 50 UG/1; UG/1
POWDER RESPIRATORY (INHALATION)
Qty: 3 EACH | Refills: 3 | Status: SHIPPED | OUTPATIENT
Start: 2024-03-18

## 2024-03-18 NOTE — TELEPHONE ENCOUNTER
Was out of advair, refilled that. Was waiting on PCP to decide to start Jardiance, as Dr. Franco will be the one to follow that.

## 2024-03-18 NOTE — TELEPHONE ENCOUNTER
----- Message from Malena Gauthier sent at 3/18/2024 11:16 AM EDT -----  Subject: Message to Provider    QUESTIONS  Information for Provider? PT was seen on 3/13 and he was told that Dr. Magana was calling in a few meds for him [facility] not received [facility].   Please advise  ---------------------------------------------------------------------------  --------------  CALL BACK INFO  [phone]; OK to leave message on voicemail  ---------------------------------------------------------------------------  --------------  SCRIPT ANSWERS  Relationship to Patient? Self

## 2024-04-18 ENCOUNTER — OFFICE VISIT (OUTPATIENT)
Age: 89
End: 2024-04-18
Payer: MEDICARE

## 2024-04-18 VITALS
RESPIRATION RATE: 18 BRPM | HEIGHT: 72 IN | SYSTOLIC BLOOD PRESSURE: 122 MMHG | TEMPERATURE: 97.1 F | OXYGEN SATURATION: 95 % | DIASTOLIC BLOOD PRESSURE: 75 MMHG | BODY MASS INDEX: 28.04 KG/M2 | WEIGHT: 207 LBS | HEART RATE: 98 BPM

## 2024-04-18 DIAGNOSIS — N18.31 STAGE 3A CHRONIC KIDNEY DISEASE (HCC): ICD-10-CM

## 2024-04-18 DIAGNOSIS — J41.1 MUCOPURULENT CHRONIC BRONCHITIS (HCC): Primary | ICD-10-CM

## 2024-04-18 DIAGNOSIS — K21.9 GASTROESOPHAGEAL REFLUX DISEASE WITHOUT ESOPHAGITIS: ICD-10-CM

## 2024-04-18 DIAGNOSIS — E11.65 TYPE 2 DIABETES MELLITUS WITH HYPERGLYCEMIA, WITHOUT LONG-TERM CURRENT USE OF INSULIN (HCC): ICD-10-CM

## 2024-04-18 DIAGNOSIS — M54.31 SCIATICA, RIGHT SIDE: ICD-10-CM

## 2024-04-18 PROCEDURE — 4004F PT TOBACCO SCREEN RCVD TLK: CPT | Performed by: INTERNAL MEDICINE

## 2024-04-18 PROCEDURE — 1123F ACP DISCUSS/DSCN MKR DOCD: CPT | Performed by: INTERNAL MEDICINE

## 2024-04-18 PROCEDURE — 3023F SPIROM DOC REV: CPT | Performed by: INTERNAL MEDICINE

## 2024-04-18 PROCEDURE — G8427 DOCREV CUR MEDS BY ELIG CLIN: HCPCS | Performed by: INTERNAL MEDICINE

## 2024-04-18 PROCEDURE — 3044F HG A1C LEVEL LT 7.0%: CPT | Performed by: INTERNAL MEDICINE

## 2024-04-18 PROCEDURE — G8419 CALC BMI OUT NRM PARAM NOF/U: HCPCS | Performed by: INTERNAL MEDICINE

## 2024-04-18 PROCEDURE — 99214 OFFICE O/P EST MOD 30 MIN: CPT | Performed by: INTERNAL MEDICINE

## 2024-04-18 RX ORDER — FAMOTIDINE 40 MG/1
20 TABLET, FILM COATED ORAL 2 TIMES DAILY
Qty: 30 TABLET | Refills: 5 | Status: SHIPPED | OUTPATIENT
Start: 2024-04-18

## 2024-04-18 RX ORDER — BUMETANIDE 1 MG/1
1 TABLET ORAL DAILY
Qty: 30 TABLET | Refills: 5 | Status: SHIPPED | OUTPATIENT
Start: 2024-04-18

## 2024-04-18 RX ORDER — POTASSIUM CHLORIDE 750 MG/1
10 TABLET, FILM COATED, EXTENDED RELEASE ORAL DAILY
Qty: 30 TABLET | Refills: 0 | Status: SHIPPED | OUTPATIENT
Start: 2024-04-18

## 2024-04-18 ASSESSMENT — PATIENT HEALTH QUESTIONNAIRE - PHQ9
SUM OF ALL RESPONSES TO PHQ9 QUESTIONS 1 & 2: 0
2. FEELING DOWN, DEPRESSED OR HOPELESS: NOT AT ALL
SUM OF ALL RESPONSES TO PHQ QUESTIONS 1-9: 0
1. LITTLE INTEREST OR PLEASURE IN DOING THINGS: NOT AT ALL
SUM OF ALL RESPONSES TO PHQ QUESTIONS 1-9: 0

## 2024-04-18 NOTE — PROGRESS NOTES
Duration of visit: 30 minutes, primarily education and coordination of care.  Relevant labs and imaging reviewed today    1. Mucopurulent chronic bronchitis (HCC)  Clinically stable with Advair discus.  Continue with this    2. Type 2 diabetes mellitus with hyperglycemia, without long-term current use of insulin (HCC)  Due for A1c later this year    3. Stage 3a chronic kidney disease (HCC)  Bumex and potassium refilled.  Educated that failure to take this medication will result in fluid accumulation, edema, shortness of breath and return to the hospital.  - bumetanide (BUMEX) 1 MG tablet; Take 1 tablet by mouth daily  Dispense: 30 tablet; Refill: 5  - potassium chloride (KLOR-CON) 10 MEQ extended release tablet; Take 1 tablet by mouth daily  Dispense: 30 tablet; Refill: 0    4. Gastroesophageal reflux disease without esophagitis  Refilled famotidine  - famotidine (PEPCID) 40 MG tablet; Take 0.5 tablets by mouth 2 times daily  Dispense: 30 tablet; Refill: 5    5. Sciatica, right side  At this point he feels that he does not need home physical therapy any further and he would like to return to see Lilly at [facility] physical therapy  - Ambulatory referral to Physical Therapy         Chief Complaint   Patient presents with    Follow-up     1 month F/U.     COPD         Orders Placed This Encounter   Procedures    Ambulatory referral to Physical Therapy     Referral Priority:   Routine     Referral Type:   Physical Therapy     Referral Reason:   Specialty Services Required     Requested Specialty:   Physical Therapist     Number of Visits Requested:   1       Mohit Franco MD, FACP      HPI:         is a 88 y.o. male who arrives for lots of questions about his most recent hospitalization and request a copy of the discharge summary.    Breathing-currently at baseline with Advair    Lower extremity edema-he has discontinued his Bumex 2 weeks ago when he ran out and wonders if he should 
(includes cane/walker) No Help Needed Help Needed No Help Needed No Help Needed Help Needed No Help Needed   Using the telphone No Help Needed No Help Needed No Help Needed No Help Needed No Help Needed No Help Needed   Taking your medications No Help Needed No Help Needed No Help Needed No Help Needed No Help Needed No Help Needed   Preparing meals No Help Needed Help Needed No Help Needed No Help Needed Help Needed No Help Needed   Managing money (expenses/bills) No Help Needed Help Needed No Help Needed No Help Needed Help Needed No Help Needed   Moderately strenuous housework (laundry) No Help Needed Help Needed No Help Needed No Help Needed Help Needed No Help Needed   Shopping for personal items (toiletries/medicines) No Help Needed Help Needed No Help Needed No Help Needed Help Needed No Help Needed   Shopping for groceries No Help Needed Help Needed No Help Needed No Help Needed Help Needed No Help Needed   Driving No Help Needed Help Needed No Help Needed No Help Needed Help Needed No Help Needed   Climbing a flight of stairs No Help Needed Help Needed No Help Needed No Help Needed Help Needed No Help Needed   Getting to places beyond walking distances No Help Needed Help Needed No Help Needed No Help Needed Help Needed No Help Needed           4/18/2024    10:00 AM   AMB Abuse Screening   Do you ever feel afraid of your partner? N   Are you in a relationship with someone who physically or mentally threatens you? N   Is it safe for you to go home? Y       Advance Care Planning     The patient has appointed the following active healthcare agents:    Primary Decision Maker: Cata Alvarez - Spouse - [phone]    Secondary Decision Maker: Naveen Alvarez - Child - [phone]

## 2024-05-06 ENCOUNTER — HOSPITAL ENCOUNTER (OUTPATIENT)
Facility: HOSPITAL | Age: 89
Setting detail: RECURRING SERIES
Discharge: HOME | End: 2024-05-09
Payer: MEDICARE

## 2024-05-06 PROCEDURE — 97161 PT EVAL LOW COMPLEX 20 MIN: CPT

## 2024-05-06 PROCEDURE — 97110 THERAPEUTIC EXERCISES: CPT

## 2024-05-06 NOTE — PROGRESS NOTES
[facility]  [address] [place] [address]  Office [phone]  Fax [phone]       PHYSICAL THERAPY - MEDICARE EVALUATION/PLAN OF CARE NOTE (updated 3/23)      Date: 2024          Patient Name:  Naveen Alvarez :  1935   Medical   Diagnosis:  Sciatica, right side [M54.31] Treatment Diagnosis:  M54.2  NECK PAIN and M54.59  OTHER LOWER BACK PAIN  and M62.81  GENERAL MUSCLE WEAKNESS, R26.81   Unsteadiness on feet, and R26.89   Abnormalities of gait and mobility    Referral Source:  Mohit Franco MD Insurance:  Payor: MEDICARE / Plan: MEDICARE PART A AND B / Product Type: *No Product type* /             Patient  verified yes     Visit #   Current  / Total 1 16     SUBJECTIVE  Pain Level (0-10 scale): 6/10  [x]constant [x]intermittent []improving []worsening []no change since onset    Any medication changes, allergies to medications, adverse drug reactions, diagnosis change, or new procedure performed?: [x] No    [] Yes (see summary sheet for update)  Medications: Verified on Patient Summary List    Subjective functional status/changes:     Patient reports he is having \"spells\" when he feels his bottom half locks up and he has a hard time walking.  Patient reports neck and back pain.Patient feels his issue is his weakness and difficulty walking.  Patient currently ambulates with a 3 wheeled walker, lives at home with spouse, has some difficulty with SOB, no h/o falls at home, was recently hospitalized.    Onset Date: months  Start of Care: 2024  Comorbidities:arthritis, tobacco use  Pt Goals: get back to walking with a cane    OBJECTIVE    Posture:  forward head posture, increased thoracic kyphosis,stands with hips flexed  Gait and Functional Mobility:  Patient ambulates with a 3 wheeled walker, decreased foot clearance, flexed at hips, head forward 120' on level surfaces with supervised assist, verbal cues to increase foot

## 2024-05-08 ENCOUNTER — HOSPITAL ENCOUNTER (OUTPATIENT)
Facility: HOSPITAL | Age: 89
Setting detail: RECURRING SERIES
Discharge: HOME | End: 2024-05-11
Payer: MEDICARE

## 2024-05-08 PROCEDURE — 97112 NEUROMUSCULAR REEDUCATION: CPT

## 2024-05-08 NOTE — PROGRESS NOTES
PHYSICAL THERAPY - MEDICARE DAILY TREATMENT NOTE (updated 3/23)      Date: 2024          Patient Name:  Naveen Alvarez :  1935   Medical   Diagnosis:  Sciatica, right side [M54.31] Treatment Diagnosis:  M54.2  NECK PAIN and M54.59  OTHER LOWER BACK PAIN  and M62.81  GENERAL MUSCLE WEAKNESS, R26.81   Unsteadiness on feet, and R26.89   Abnormalities of gait and mobility    Referral Source:  Mohit Franco MD Insurance:   Payor: MEDICARE / Plan: MEDICARE PART A AND B / Product Type: *No Product type* /                     Patient  verified yes     Visit #   Current  / Total 2 16   Time   In / Out 1315 1400   Total Treatment Time 45   Total Timed Codes 45   1:1 Treatment Time 45       BC Totals Reminder:  bill using total billable   min of TIMED therapeutic procedures and modalities.   8-22 min = 1 unit; 23-37 min = 2 units; 38-52 min = 3 units; 53-67 min = 4 units; 68-82 min = 5 units        .episode  SUBJECTIVE    Pain Level (0-10 scale): 0/10    Any medication changes, allergies to medications, adverse drug reactions, diagnosis change, or new procedure performed?: [x] No    [] Yes (see summary sheet for update)  Medications: Verified on Patient Summary List    Subjective functional status/changes:     No issues after last session     OBJECTIVE      Therapeutic Procedures:  Tx Min Billable or 1:1 Min (if diff from Tx Min) Procedure, Rationale, Specifics   45  71139 Neuromuscular Re-Education (timed):  improve balance, coordination, kinesthetic sense, posture, core stability and proprioception to improve patient's ability to develop conscious control of individual muscles and awareness of position of extremities in order to progress to PLOF and address remaining functional goals. (see flow sheet as applicable)     Details if applicable:      Focus on standing balance and functional mobility to decreas forward bend and parkinsonian like studder stepping    Tied YTB across the RW and initiated a

## 2024-05-11 DIAGNOSIS — N18.31 STAGE 3A CHRONIC KIDNEY DISEASE (HCC): ICD-10-CM

## 2024-05-14 RX ORDER — POTASSIUM CHLORIDE 750 MG/1
10 TABLET, FILM COATED, EXTENDED RELEASE ORAL DAILY
Qty: 90 TABLET | Refills: 0 | Status: SHIPPED | OUTPATIENT
Start: 2024-05-14

## 2024-05-20 ENCOUNTER — HOSPITAL ENCOUNTER (OUTPATIENT)
Facility: HOSPITAL | Age: 89
Setting detail: RECURRING SERIES
Discharge: HOME | End: 2024-05-23
Payer: MEDICARE

## 2024-05-20 PROCEDURE — 97112 NEUROMUSCULAR REEDUCATION: CPT

## 2024-05-20 PROCEDURE — 97110 THERAPEUTIC EXERCISES: CPT

## 2024-05-20 NOTE — PROGRESS NOTES
PHYSICAL THERAPY - MEDICARE DAILY TREATMENT NOTE (updated 3/23)      Date: 2024          Patient Name:  Naveen Alvarez :  1935   Medical   Diagnosis:  Sciatica, right side [M54.31] Treatment Diagnosis:  M54.59  OTHER LOWER BACK PAIN  and M62.81  GENERAL MUSCLE WEAKNESS    Referral Source:  Mohit Franco MD Insurance:   Payor: MEDICARE / Plan: MEDICARE PART A AND B / Product Type: *No Product type* /                     Patient  verified yes     Visit #   Current  / Total 3 16   Time   In / Out 1315 1400   Total Treatment Time 45   Total Timed Codes 45   1:1 Treatment Time 45      [facility] Totals Reminder:  bill using total billable   min of TIMED therapeutic procedures and modalities.   8-22 min = 1 unit; 23-37 min = 2 units; 38-52 min = 3 units; 53-67 min = 4 units; 68-82 min = 5 units        .episode  SUBJECTIVE    Pain Level (0-10 scale): 0/10    Any medication changes, allergies to medications, adverse drug reactions, diagnosis change, or new procedure performed?: [x] No    [] Yes (see summary sheet for update)  Medications: Verified on Patient Summary List    Subjective functional status/changes:     I feel pretty good     OBJECTIVE      Therapeutic Procedures:  Tx Min Billable or 1:1 Min (if diff from Tx Min) Procedure, Rationale, Specifics   15 15 75442 Therapeutic Exercise (timed):  increase ROM, strength, coordination, balance, and proprioception to improve patient's ability to progress to PLOF and address remaining functional goals. (see flow sheet as applicable)     Details if applicable:      Nustep Level 2 x 10 minutes without breaks today     30 30 41902 Neuromuscular Re-Education (timed):  improve balance, coordination, kinesthetic sense, posture, core stability and proprioception to improve patient's ability to develop conscious control of individual muscles and awareness of position of extremities in order to progress to PLOF and address remaining functional goals. (see flow

## 2024-05-22 ENCOUNTER — HOSPITAL ENCOUNTER (OUTPATIENT)
Facility: HOSPITAL | Age: 89
Setting detail: RECURRING SERIES
Discharge: HOME | End: 2024-05-25
Payer: MEDICARE

## 2024-05-22 PROCEDURE — 97110 THERAPEUTIC EXERCISES: CPT

## 2024-05-22 PROCEDURE — 97116 GAIT TRAINING THERAPY: CPT

## 2024-05-22 PROCEDURE — 97530 THERAPEUTIC ACTIVITIES: CPT

## 2024-05-22 NOTE — PROGRESS NOTES
PHYSICAL THERAPY - MEDICARE DAILY TREATMENT NOTE (updated 3/23)      Date: 2024          Patient Name:  Naveen Alvarez :  1935   Medical   Diagnosis:  Sciatica, right side [M54.31] Treatment Diagnosis:  M62.81  GENERAL MUSCLE WEAKNESS and R42       Dizziness and giddiness    Referral Source:  Mohit Franco MD Insurance:   Payor: MEDICARE / Plan: MEDICARE PART A AND B / Product Type: *No Product type* /                     Patient  verified yes     Visit #   Current  / Total 4 16   Time   In / Out 115 2   Total Treatment Time 45   Total Timed Codes 45   1:1 Treatment Time 45      [facility] Totals Reminder:  bill using total billable   min of TIMED therapeutic procedures and modalities.   8-22 min = 1 unit; 23-37 min = 2 units; 38-52 min = 3 units; 53-67 min = 4 units; 68-82 min = 5 units          SUBJECTIVE    Pain Level (0-10 scale): 0    Any medication changes, allergies to medications, adverse drug reactions, diagnosis change, or new procedure performed?: [x] No    [] Yes (see summary sheet for update)  Medications: Verified on Patient Summary List    Subjective functional status/changes:     Patient reports he feels weak and he hopes therapy will help    OBJECTIVE      Therapeutic Procedures:  Tx Min Billable or 1:1 Min (if diff from Tx Min) Procedure, Rationale, Specifics   20 20 71661 Therapeutic Exercise (timed):  increase ROM, strength, coordination, balance, and proprioception to improve patient's ability to progress to PLOF and address remaining functional goals. (see flow sheet as applicable)     Details if applicable:    Nu step x 10' L2  Sitting hip flexion 2x10  Sitting ankle pumps 2x10     10 10 69900 Therapeutic Activity (timed):  use of dynamic activities replicating functional movements to increase ROM, strength, coordination, balance, and proprioception in order to improve patient's ability to progress to PLOF and address remaining functional goals.  (see flow sheet as

## 2024-05-28 ENCOUNTER — HOSPITAL ENCOUNTER (OUTPATIENT)
Facility: HOSPITAL | Age: 89
Setting detail: RECURRING SERIES
Discharge: HOME | End: 2024-05-31
Payer: MEDICARE

## 2024-05-28 PROCEDURE — 97112 NEUROMUSCULAR REEDUCATION: CPT

## 2024-05-28 NOTE — PROGRESS NOTES
4' x 3  Wobble board under feet, red ball  fwd push outs while balancing on bosu 2x 20 reps    Gait negotiation with constant cues both Verbal and tactile from therapist to stand erect and  close to walker and look up as to decrease parkinsonism like gait mechanics. 300' with standing rest breaks intermittently while receiving instructions.    45 45    Total Total       [x]  Patient Education billed concurrently with other procedures   [x] Review HEP    [] Progressed/Changed HEP, detail:    [] Other detail:       Pain Level at end of session (0-10 scale): 0/10      Assessment   Patient did well, definitely fatigued at the end of the session.     Patient will continue to benefit from skilled PT / OT services to modify and progress therapeutic interventions, analyze and address functional mobility deficits, analyze and address strength deficits, analyze and cue for proper movement patterns, analyze and modify for postural abnormalities, and analyze and address imbalance/dizziness to address functional deficits and attain remaining goals.    Progress toward goals / Updated goals:  []  See Progress Note/Recertification    Continue to work on balance, stability, gait and mechanics.       PLAN  Yes  Continue plan of care    [x]  Upgrade activities as tolerated  []  Discharge due to :  []  Other:      Renae Toledo, PTA       5/28/2024       11:45 AM

## 2024-05-30 ENCOUNTER — HOSPITAL ENCOUNTER (OUTPATIENT)
Facility: HOSPITAL | Age: 89
Setting detail: RECURRING SERIES
End: 2024-05-30
Payer: MEDICARE

## 2024-05-30 PROCEDURE — 97110 THERAPEUTIC EXERCISES: CPT

## 2024-05-30 PROCEDURE — 97112 NEUROMUSCULAR REEDUCATION: CPT

## 2024-05-30 NOTE — PROGRESS NOTES
PHYSICAL THERAPY - MEDICARE DAILY TREATMENT NOTE (updated 3/23)      Date: 2024          Patient Name:  Naveen Alvarez :  1935   Medical   Diagnosis:  Sciatica, right side [M54.31] Treatment Diagnosis:  M62.81  GENERAL MUSCLE WEAKNESS, R26.81   Unsteadiness on feet, R27.8     Other lack of coordination, R26.89   Abnormalities of gait and mobility, and R27.9     Unspecified lack of coordination    Referral Source:  Mohit Franco MD Insurance:   Payor: MEDICARE / Plan: MEDICARE PART A AND B / Product Type: *No Product type* /                     Patient  verified yes     Visit #   Current  / Total 6 16   Time   In / Out 1130 1215   Total Treatment Time 45   Total Timed Codes 45   1:1 Treatment Time 45      [facility] Totals Reminder:  bill using total billable   min of TIMED therapeutic procedures and modalities.   8-22 min = 1 unit; 23-37 min = 2 units; 38-52 min = 3 units; 53-67 min = 4 units; 68-82 min = 5 units        .episode  SUBJECTIVE    Pain Level (0-10 scale): 0/10    Any medication changes, allergies to medications, adverse drug reactions, diagnosis change, or new procedure performed?: [x] No    [] Yes (see summary sheet for update)  Medications: Verified on Patient Summary List    Subjective functional status/changes:     I feel good.     OBJECTIVE      Therapeutic Procedures:  Tx Min Billable or 1:1 Min (if diff from Tx Min) Procedure, Rationale, Specifics   35 36 [id number] Neuromuscular Re-Education (timed):  improve balance, coordination, kinesthetic sense, posture, core stability and proprioception to improve patient's ability to develop conscious control of individual muscles and awareness of position of extremities in order to progress to PLOF and address remaining functional goals. (see flow sheet as applicable)     Details if applicable:      Standing marching into YTB x 7' x 2  Side stepping over  black large hurdles with manual resistance to keep upright posture    Heel/toe raises

## 2024-06-04 ENCOUNTER — HOSPITAL ENCOUNTER (OUTPATIENT)
Facility: HOSPITAL | Age: 89
Setting detail: RECURRING SERIES
Discharge: HOME | End: 2024-06-07
Payer: MEDICARE

## 2024-06-04 PROCEDURE — 97112 NEUROMUSCULAR REEDUCATION: CPT

## 2024-06-04 PROCEDURE — 97110 THERAPEUTIC EXERCISES: CPT

## 2024-06-04 NOTE — PROGRESS NOTES
proprioception  - progression with verbal cues throughout.   Crab walking in // bars  YTB at ankles R/L with target x3 steps for full // bar area; x4 sets   30 30 94312 Therapeutic Exercise (timed):  increase ROM, strength, coordination, balance, and proprioception to improve patient's ability to progress to PLOF and address remaining functional goals. (see flow sheet as applicable)     Details if applicable:      Supine bridges with wedge 2 x 15 (unable to perform in hooklying due to LUE HS cramping.   Supine hip flexor stretch  (unable to do caused cramping)    Seated HSS  3 x 20\" BLE     45 45    Total Total         Skin assessment post-treatment (if applicable):    [x]  intact    []  redness- no adverse reaction                 []redness - adverse reaction:          [x]  Patient Education billed concurrently with other procedures   [] Review HEP    [] Progressed/Changed HEP, detail:    [] Other detail:           Pain Level at end of session (0-10 scale): 0      Assessment   Pt tolerated session well with slightly improved standing posture with continued VC AD placement and upright posture with amb. Session focus on glute strengthening in supine for decreased body compensations to increase hip flexor flexibility with overall working towards posture and gait pattern with AD.   Patient will continue to benefit from skilled PT / OT services to modify and progress therapeutic interventions, analyze and address functional mobility deficits, analyze and address ROM deficits, analyze and address strength deficits, analyze and cue for proper movement patterns, and analyze and modify for postural abnormalities to address functional deficits and attain remaining goals.    Progress toward goals / Updated goals:  []  See Progress Note/Recertification    Continue to improve towards goals.       PLAN  Yes  Continue plan of care    []  Upgrade activities as tolerated  []  Discharge due to :  []  Other:      Renae Toledo, PTA

## 2024-06-13 ENCOUNTER — HOSPITAL ENCOUNTER (OUTPATIENT)
Facility: HOSPITAL | Age: 89
Setting detail: RECURRING SERIES
Discharge: HOME | End: 2024-06-16
Payer: MEDICARE

## 2024-06-13 PROCEDURE — 97110 THERAPEUTIC EXERCISES: CPT

## 2024-06-13 PROCEDURE — 97140 MANUAL THERAPY 1/> REGIONS: CPT

## 2024-06-13 NOTE — PROGRESS NOTES
PHYSICAL THERAPY - MEDICARE DAILY TREATMENT NOTE (updated 3/23)      Date: 2024          Patient Name:  Naveen Alvarez :  1935   Medical   Diagnosis:  Sciatica, right side [M54.31] Treatment Diagnosis:  M62.81  GENERAL MUSCLE WEAKNESS, R26.81   Unsteadiness on feet, R27.8     Other lack of coordination, and R26.89   Abnormalities of gait and mobility    Referral Source:  Mohit Franco MD Insurance:   Payor: MEDICARE / Plan: MEDICARE PART A AND B / Product Type: *No Product type* /                     Patient  verified yes     Visit #   Current  / Total 8 16   Time   In / Out 1045 1130   Total Treatment Time 45   Total Timed Codes 45   1:1 Treatment Time 45      [facility] Totals Reminder:  bill using total billable   min of TIMED therapeutic procedures and modalities.   8-22 min = 1 unit; 23-37 min = 2 units; 38-52 min = 3 units; 53-67 min = 4 units; 68-82 min = 5 units        .episode  SUBJECTIVE    Pain Level (0-10 scale): 0/10    Any medication changes, allergies to medications, adverse drug reactions, diagnosis change, or new procedure performed?: [x] No    [] Yes (see summary sheet for update)  Medications: Verified on Patient Summary List    Subjective functional status/changes:     I am awfully tired from going to [place] the other day.     OBJECTIVE      Therapeutic Procedures:  Tx Min Billable or 1:1 Min (if diff from Tx Min) Procedure, Rationale, Specifics   30 30 20685 Manual Therapy (timed):  decrease pain, increase ROM, increase tissue extensibility, and decrease trigger points to improve patient's ability to progress to PLOF and address remaining functional goals.  The manual therapy interventions were performed at a separate and distinct time from the therapeutic activities interventions . (see flow sheet as applicable)     Details if applicable:      C/R HSS BLE 3 sets  STM and TPR to Adductors on BLE to help with hip Range of motion   15 15 94980 Therapeutic Exercise (timed):

## 2024-06-13 NOTE — PROGRESS NOTES
[facility] Outpatient Rehabilitation  [address] [place] [address]  Office [phone]  Fax [phone]   PHYSICAL THERAPY PROGRESS NOTE  Patient Name:  Naveen Alvarez :  1935   Treatment/Medical Diagnosis: Sciatica, right side [M54.31]   Referral Source:  Mohit Franco MD     Date of Initial Visit:  2024 Attended Visits:  7 Missed Visits:  0     SUMMARY OF TREATMENT/ASSESSMENT:  ***    CURRENT STATUS    Short Term Goals: To be accomplished in 8 treatments   Patient will be independent in a HEP to increase his mobility.  Patient will report he is able to ambulate short distances without neck and back pain.  Patient will ambulate on level surfaces independently with good foot clearance.  Patient will transfer sit to stand 10x in 30 seconds to demonstrate improved mobility.  Long Term Goals: To be accomplished in 16 treatments   Patient will ambulate short distances independently with a st. Cane.  Patient will demonstrate improved upright posture and foot clearance with ambulation.  Patient will report he has returned to his prior level of function prior to hospitalization.     Frequency / Duration: Patient to be seen 1-2 times per week for 16 treatments        Renae Toledo PTA       2024       1:48 PM    If you have any questions/comments please contact us directly at [phone].   Thank you for allowing us to assist in the care of your patient.

## 2024-06-17 ENCOUNTER — HOSPITAL ENCOUNTER (OUTPATIENT)
Facility: HOSPITAL | Age: 89
Setting detail: RECURRING SERIES
Discharge: HOME | End: 2024-06-20
Payer: MEDICARE

## 2024-06-17 PROCEDURE — 97112 NEUROMUSCULAR REEDUCATION: CPT

## 2024-06-17 NOTE — PROGRESS NOTES
Outpatient Rehabilitation  [address] [place] [address]  Office [phone]  Fax [phone]   PHYSICAL THERAPY PROGRESS NOTE  Patient Name:  Naveen Alvarez :  1935   Treatment/Medical Diagnosis: Sciatica, right side [M54.31]   Referral Source:  Mohit Franco MD     Date of Initial Visit:  2024 Attended Visits:  7 Missed Visits:  0     SUMMARY OF TREATMENT/ASSESSMENT:  Mr. Alvarez is a very pleasant 88 year old male that is excited to come to therapy.   He is working hard to reduce his risk of falls by increasing his strengthening overall and improving his balance.  Since beginning this bout of therapy it is noted that he is requiring less cues to stand erect and inside his walker.  He is still very tight in his LE and thus tends to flex forward at the hip however we are working in therapy to loosen those muscles in conjunction with strengthening of his glutes to allow for better posture/positioning for improved overall mobility.  This patient continues to be making small gains at this time and would benefit from continued therapy to address remaining deficits at this time to improve his quality of life.     CURRENT STATUS    Short Term Goals: To be accomplished in 8 treatments   Patient will be independent in a HEP to increase his mobility. PROGRESSING  Patient will report he is able to ambulate short distances without neck and back pain.PROGRESSING  Patient will ambulate on level surfaces independently with good foot clearance.PROGRESSING  Patient will transfer sit to stand 10x in 30 seconds to demonstrate improved mobility.PROGRESSING (7)  Long Term Goals: To be accomplished in 16 treatments   Patient will ambulate short distances independently with a st. Cane.  Patient will demonstrate improved upright posture and foot clearance with ambulation.  Patient will report he has returned to his prior level of function prior to hospitalization.

## 2024-06-17 NOTE — PROGRESS NOTES
PHYSICAL THERAPY - MEDICARE DAILY TREATMENT NOTE (updated 3/23)      Date: 2024          Patient Name:  Naveen Alvarez :  1935   Medical   Diagnosis:  Sciatica, right side [M54.31] Treatment Diagnosis:  M62.81  GENERAL MUSCLE WEAKNESS, R26.0     ATAXIC GAIT, R26.81   Unsteadiness on feet, R27.8     Other lack of coordination, R26.89   Abnormalities of gait and mobility, R27.0     Ataxia, unspecified, R27.9     Unspecified lack of coordination, and R42       Dizziness and giddiness    Referral Source:  Mohit Franco MD Insurance:   Payor: MEDICARE / Plan: MEDICARE PART A AND B / Product Type: *No Product type* /                     Patient  verified yes     Visit #   Current  / Total 9 16   Time   In / Out 1135 1215   Total Treatment Time 45   Total Timed Codes 45   1:1 Treatment Time 45      [facility] Totals Reminder:  bill using total billable   min of TIMED therapeutic procedures and modalities.   8-22 min = 1 unit; 23-37 min = 2 units; 38-52 min = 3 units; 53-67 min = 4 units; 68-82 min = 5 units        .episode  SUBJECTIVE    Pain Level (0-10 scale): 0/10    Any medication changes, allergies to medications, adverse drug reactions, diagnosis change, or new procedure performed?: [x] No    [] Yes (see summary sheet for update)  Medications: Verified on Patient Summary List    Subjective functional status/changes:     I feel pretty good     OBJECTIVE      Therapeutic Procedures:  Tx Min Billable or 1:1 Min (if diff from Tx Min) Procedure, Rationale, Specifics   45 92 78227 Neuromuscular Re-Education (timed):  improve balance, coordination, kinesthetic sense, posture, core stability and proprioception to improve patient's ability to develop conscious control of individual muscles and awareness of position of extremities in order to progress to PLOF and address remaining functional goals. (see flow sheet as applicable)     Details if applicable:      Sit to standing training without arms    Blue

## 2024-06-19 ENCOUNTER — HOSPITAL ENCOUNTER (OUTPATIENT)
Facility: HOSPITAL | Age: 89
Setting detail: RECURRING SERIES
Discharge: HOME | End: 2024-06-22
Payer: MEDICARE

## 2024-06-19 PROCEDURE — 97112 NEUROMUSCULAR REEDUCATION: CPT

## 2024-06-19 PROCEDURE — 97110 THERAPEUTIC EXERCISES: CPT

## 2024-06-19 NOTE — PROGRESS NOTES
PHYSICAL THERAPY - MEDICARE DAILY TREATMENT NOTE (updated 3/23)      Date: 2024          Patient Name:  Naveen Alvarez :  1935   Medical   Diagnosis:  Sciatica, right side [M54.31] Treatment Diagnosis:  M62.81  GENERAL MUSCLE WEAKNESS, R26.81   Unsteadiness on feet, and R26.89   Abnormalities of gait and mobility    Referral Source:  Mohit Franco MD Insurance:   Payor: MEDICARE / Plan: MEDICARE PART A AND B / Product Type: *No Product type* /                     Patient  verified yes     Visit #   Current  / Total 10 16   Time   In / Out 1130 1215   Total Treatment Time 45   Total Timed Codes 45   1:1 Treatment Time 45      [facility] Totals Reminder:  bill using total billable   min of TIMED therapeutic procedures and modalities.   8-22 min = 1 unit; 23-37 min = 2 units; 38-52 min = 3 units; 53-67 min = 4 units; 68-82 min = 5 units          SUBJECTIVE    Pain Level (0-10 scale): 0    Any medication changes, allergies to medications, adverse drug reactions, diagnosis change, or new procedure performed?: [x] No    [] Yes (see summary sheet for update)  Medications: Verified on Patient Summary List    Subjective functional status/changes:     I am not back to where I was before I was in the hospital, I am improving  Had to clean a cat bowl and was able to kneel and get off floor with UE    OBJECTIVE      Therapeutic Procedures:  Tx Min Billable or 1:1 Min (if diff from Tx Min) Procedure, Rationale, Specifics   25 25  30208 Neuromuscular Re-Education (timed):  improve balance, coordination, kinesthetic sense, posture, core stability and proprioception to improve patient's ability to develop conscious control of individual muscles and awareness of position of extremities in order to progress to PLOF and address remaining functional goals. (see flow sheet as applicable)      Details if applicable:       Sit to standing training without arms     Blue foam:  Side stepping x 5'  Step lunge x5'     Gait-

## 2024-06-26 ENCOUNTER — HOSPITAL ENCOUNTER (OUTPATIENT)
Facility: HOSPITAL | Age: 89
Setting detail: RECURRING SERIES
Discharge: HOME | End: 2024-06-29
Payer: MEDICARE

## 2024-06-26 PROCEDURE — 97110 THERAPEUTIC EXERCISES: CPT

## 2024-06-26 NOTE — PROGRESS NOTES
PHYSICAL THERAPY - DAILY TREATMENT NOTE (updated 3/23)      Date: 2024          Patient Name:  Naveen Alvarez :  1935   Medical   Diagnosis:  Sciatica, right side [M54.31] Treatment Diagnosis:  M62.81  GENERAL MUSCLE WEAKNESS, R26.81   Unsteadiness on feet, and R26.89   Abnormalities of gait and mobility    Referral Source:  Mohit Franco MD Insurance:   Payor: MEDICARE / Plan: MEDICARE PART A AND B / Product Type: *No Product type* /                     Patient  verified yes     Visit #   Current  / Total 11 16   Time   In / Out 1130 1200   Total Treatment Time 30   Total Timed Codes 30         SUBJECTIVE    Pain Level (0-10 scale): 0    Any medication changes, allergies to medications, adverse drug reactions, diagnosis change, or new procedure performed?: [x] No    [] Yes (see summary sheet for update)  Medications: Verified on Patient Summary List    Subjective functional status/changes:     Just feeling really weak    OBJECTIVE      Therapeutic Procedures:  Tx Min Billable or 1:1 Min (if diff from Tx Min) Procedure, Rationale, Specifics   30 30 78094 Therapeutic Exercise (timed):  increase ROM, strength, coordination, balance, and proprioception to improve patient's ability to progress to PLOF and address remaining functional goals. (see flow sheet as applicable)     NS SL 12 lvl 4 LE only 10 min  Gt with rollator 75'x2 mod I   // bar glute ext with VC upright posture 10r slow stretch  Toe taps 6in step 1min x2   Mini squats with BUE support.x 10  Details if applicable:                         30 30    Total Total       Skin assessment post-treatment (if applicable):    [x]  intact    []  redness- no adverse reaction                 []redness - adverse reaction:          [x]  Patient Education billed concurrently with other procedures   [x] Review HEP    [] Progressed/Changed HEP, detail:    [] Other detail:           Pain Level at end of session (0-10 scale): 0      Assessment   Pt

## 2024-06-28 ENCOUNTER — HOSPITAL ENCOUNTER (OUTPATIENT)
Facility: HOSPITAL | Age: 89
Setting detail: RECURRING SERIES
Discharge: HOME | End: 2024-07-01
Payer: MEDICARE

## 2024-06-28 PROCEDURE — 97110 THERAPEUTIC EXERCISES: CPT

## 2024-06-28 NOTE — PROGRESS NOTES
PHYSICAL THERAPY - MEDICARE DAILY TREATMENT NOTE (updated 3/23)      Date: 2024          Patient Name:  Naveen Alvarez :  1935   Medical   Diagnosis:  Sciatica, right side [M54.31] Treatment Diagnosis:  M62.81  GENERAL MUSCLE WEAKNESS, R26.81   Unsteadiness on feet, and R26.89   Abnormalities of gait and mobility    Referral Source:  Mohit Franco MD Insurance:   Payor: MEDICARE / Plan: MEDICARE PART A AND B / Product Type: *No Product type* /                     Patient  verified yes     Visit #   Current  / Total 12 16   Time   In / Out 1130 1215   Total Treatment Time 45   Total Timed Codes 45   1:1 Treatment Time 45      [facility] Totals Reminder:  bill using total billable   min of TIMED therapeutic procedures and modalities.   8-22 min = 1 unit; 23-37 min = 2 units; 38-52 min = 3 units; 53-67 min = 4 units; 68-82 min = 5 units        .episode  SUBJECTIVE    Pain Level (0-10 scale): 0/10    Any medication changes, allergies to medications, adverse drug reactions, diagnosis change, or new procedure performed?: [x] No    [] Yes (see summary sheet for update)  Medications: Verified on Patient Summary List    Subjective functional status/changes:     No pain I am just having a rough weak day.     OBJECTIVE      Therapeutic Procedures:  Tx Min Billable or 1:1 Min (if diff from Tx Min) Procedure, Rationale, Specifics   45 45 59901 Therapeutic Exercise (timed):  increase ROM, strength, coordination, balance, and proprioception to improve patient's ability to progress to PLOF and address remaining functional goals. (see flow sheet as applicable)     Details if applicable:      Nustep level 4 x 10 minutes  Knee flexion/ER x 10 reps BLE (HEP)  HSS seated 3x30\"  Calf stretch seated 3x30\"  Seated Marching, LAQ and SAQ 4# ankle weights     45 45    Total Total     [x]  Patient Education billed concurrently with other procedures   [x] Review HEP    [] Progressed/Changed HEP, detail:    [] Other detail:

## 2024-07-01 ENCOUNTER — HOSPITAL ENCOUNTER (OUTPATIENT)
Facility: HOSPITAL | Age: 89
Setting detail: RECURRING SERIES
Discharge: HOME | End: 2024-07-04
Payer: MEDICARE

## 2024-07-01 PROCEDURE — 97112 NEUROMUSCULAR REEDUCATION: CPT

## 2024-07-01 PROCEDURE — 97110 THERAPEUTIC EXERCISES: CPT

## 2024-07-01 NOTE — PROGRESS NOTES
PHYSICAL THERAPY - MEDICARE DAILY TREATMENT NOTE (updated 3/23)      Date: 2024          Patient Name:  Naveen Alvarez :  1935   Medical   Diagnosis:  Sciatica, right side [M54.31] Treatment Diagnosis:  M62.81  GENERAL MUSCLE WEAKNESS, R26.81   Unsteadiness on feet, and R27.9     Unspecified lack of coordination    Referral Source:  Mohit Franco MD Insurance:   Payor: MEDICARE / Plan: MEDICARE PART A AND B / Product Type: *No Product type* /                     Patient  verified yes     Visit #   Current  / Total 13 16   Time   In / Out 0935 1020   Total Treatment Time 45   Total Timed Codes 45   1:1 Treatment Time 45      [facility] Totals Reminder:  bill using total billable   min of TIMED therapeutic procedures and modalities.   8-22 min = 1 unit; 23-37 min = 2 units; 38-52 min = 3 units; 53-67 min = 4 units; 68-82 min = 5 units        .episode  SUBJECTIVE    Pain Level (0-10 scale): 0/10    Any medication changes, allergies to medications, adverse drug reactions, diagnosis change, or new procedure performed?: [x] No    [] Yes (see summary sheet for update)  Medications: Verified on Patient Summary List    Subjective functional status/changes:     No pain today. I feel pretty good.     OBJECTIVE      Therapeutic Procedures:  Tx Min Billable or 1:1 Min (if diff from Tx Min) Procedure, Rationale, Specifics   30 30 32810 Neuromuscular Re-Education (timed):  improve balance, coordination, kinesthetic sense, posture, core stability and proprioception to improve patient's ability to develop conscious control of individual muscles and awareness of position of extremities in order to progress to PLOF and address remaining functional goals. (see flow sheet as applicable)     Details if applicable:      Standing HT raises x 30 with focus on not rocking backwards during activity.     Wobble board 20 taps side to side and fwd/back  Wobble board  2x30\" balance both directions       15 15 28099 Therapeutic

## 2024-07-08 ENCOUNTER — HOSPITAL ENCOUNTER (OUTPATIENT)
Facility: HOSPITAL | Age: 89
Setting detail: RECURRING SERIES
End: 2024-07-08
Payer: MEDICARE

## 2024-07-10 ENCOUNTER — HOSPITAL ENCOUNTER (OUTPATIENT)
Facility: HOSPITAL | Age: 89
Setting detail: RECURRING SERIES
Discharge: HOME | End: 2024-07-13
Payer: MEDICARE

## 2024-07-10 PROCEDURE — 97112 NEUROMUSCULAR REEDUCATION: CPT

## 2024-07-10 PROCEDURE — 97110 THERAPEUTIC EXERCISES: CPT

## 2024-07-10 NOTE — PROGRESS NOTES
PHYSICAL THERAPY - MEDICARE DAILY TREATMENT NOTE (updated 3/23)      Date: 7/10/2024          Patient Name:  Naveen Alvarez :  1935   Medical   Diagnosis:  Sciatica, right side [M54.31] Treatment Diagnosis:  M62.81  GENERAL MUSCLE WEAKNESS and R26.81   Unsteadiness on feet    Referral Source:  Mohit Franco MD Insurance:   Payor: MEDICARE / Plan: MEDICARE PART A AND B / Product Type: *No Product type* /                     Patient  verified yes     Visit #   Current  / Total 14 16   Time   In / Out 1045 1130   Total Treatment Time 45   Total Timed Codes 45   1:1 Treatment Time 45       [facility] Totals Reminder:  bill using total billable   min of TIMED therapeutic procedures and modalities.   8-22 min = 1 unit; 23-37 min = 2 units; 38-52 min = 3 units; 53-67 min = 4 units; 68-82 min = 5 units        .episode  SUBJECTIVE    Pain Level (0-10 scale): 0/10    Any medication changes, allergies to medications, adverse drug reactions, diagnosis change, or new procedure performed?: [x] No    [] Yes (see summary sheet for update)  Medications: Verified on Patient Summary List    Subjective functional status/changes:     I/m just really tired     OBJECTIVE      Therapeutic Procedures:  Tx Min Billable or 1:1 Min (if diff from Tx Min) Procedure, Rationale, Specifics   30 30 48455 Neuromuscular Re-Education (timed):  improve balance, coordination, kinesthetic sense, posture, core stability and proprioception to improve patient's ability to develop conscious control of individual muscles and awareness of position of extremities in order to progress to PLOF and address remaining functional goals. (see flow sheet as applicable)     Details if applicable:      Step ups   Lateral step ups  Wobble board both directions- taps and balance     15 15 16885 Therapeutic Exercise (timed):  increase ROM, strength, coordination, balance, and proprioception to improve patient's ability to progress to PLOF and address remaining 
Patient will ambulate short distances independently with a st. Cane. DISCONTINUE  Patient will demonstrate improved upright posture and foot clearance with ambulation.  Patient will report he has returned to his prior level of function prior to hospitalization.    RECOMMENDATIONS  Patient to be seen 1-2x/week for up to 12 additional visits then discharge to maintenance program.         Renae Toledo, TEJAS       7/10/2024       3:27 PM    If you have any questions/comments please contact us directly at [phone].   Thank you for allowing us to assist in the care of your patient.

## 2024-07-14 DIAGNOSIS — K21.9 GASTRO-ESOPHAGEAL REFLUX DISEASE WITHOUT ESOPHAGITIS: ICD-10-CM

## 2024-07-15 ENCOUNTER — HOSPITAL ENCOUNTER (OUTPATIENT)
Facility: HOSPITAL | Age: 89
Setting detail: RECURRING SERIES
Discharge: HOME | End: 2024-07-18
Payer: MEDICARE

## 2024-07-15 PROCEDURE — 97112 NEUROMUSCULAR REEDUCATION: CPT

## 2024-07-15 RX ORDER — LANSOPRAZOLE 30 MG/1
CAPSULE, DELAYED RELEASE ORAL
Qty: 90 CAPSULE | Refills: 1 | Status: SHIPPED | OUTPATIENT
Start: 2024-07-15

## 2024-07-15 NOTE — PROGRESS NOTES
PHYSICAL THERAPY - MEDICARE DAILY TREATMENT NOTE (updated 3/23)      Date: 7/15/2024          Patient Name:  Naveen Alvarez :  1935   Medical   Diagnosis:  Sciatica, right side [M54.31] Treatment Diagnosis:  M62.81  GENERAL MUSCLE WEAKNESS    Referral Source:  Mohit Franco MD Insurance:   Payor: MEDICARE / Plan: MEDICARE PART A AND B / Product Type: *No Product type* /                     Patient  verified yes     Visit #   Current  / Total 15 16   Time   In / Out 1045 1130   Total Treatment Time 45   Total Timed Codes 45   1:1 Treatment Time 45      [facility] Totals Reminder:  bill using total billable   min of TIMED therapeutic procedures and modalities.   8-22 min = 1 unit; 23-37 min = 2 units; 38-52 min = 3 units; 53-67 min = 4 units; 68-82 min = 5 units        .episode  SUBJECTIVE    Pain Level (0-10 scale): 0/10    Any medication changes, allergies to medications, adverse drug reactions, diagnosis change, or new procedure performed?: [x] No    [] Yes (see summary sheet for update)  Medications: Verified on Patient Summary List    Subjective functional status/changes:     I feel okay. My wife is so/so    OBJECTIVE      Therapeutic Procedures:  Tx Min Billable or 1:1 Min (if diff from Tx Min) Procedure, Rationale, Specifics   45 45 81760 Neuromuscular Re-Education (timed):  improve balance, coordination, kinesthetic sense, posture, core stability and proprioception to improve patient's ability to develop conscious control of individual muscles and awareness of position of extremities in order to progress to PLOF and address remaining functional goals. (see flow sheet as applicable)     Details if applicable:        Obstacle course serpentine x 2 laps    Standing marchingx5'  Standing HT x 5'  Standing hip abd alternating x5'  Standing  mini squats x5'     Scifit Level 2  x 10' to finish up     45 45    Total Total         [x]  Patient Education billed concurrently with other procedures   [x]

## 2024-07-24 ENCOUNTER — HOSPITAL ENCOUNTER (OUTPATIENT)
Facility: HOSPITAL | Age: 89
Setting detail: RECURRING SERIES
Discharge: HOME | End: 2024-07-27
Payer: MEDICARE

## 2024-07-24 PROCEDURE — 97110 THERAPEUTIC EXERCISES: CPT

## 2024-07-24 NOTE — PROGRESS NOTES
PHYSICAL THERAPY - MEDICARE DAILY TREATMENT NOTE (updated 3/23)      Date: 2024          Patient Name:  Naveen Alvarez :  1935   Medical   Diagnosis:  Sciatica, right side [M54.31] Treatment Diagnosis:  M62.81  GENERAL MUSCLE WEAKNESS and R26.89   Abnormalities of gait and mobility    Referral Source:  Mohit Franco MD Insurance:   Payor: MEDICARE / Plan: MEDICARE PART A AND B / Product Type: *No Product type* /                     Patient  verified yes     Visit #   Current  / Total 16 28   Time   In / Out 1045 1130   Total Treatment Time 45   Total Timed Codes 45   1:1 Treatment Time 45       BC Totals Reminder:  bill using total billable   min of TIMED therapeutic procedures and modalities.   8-22 min = 1 unit; 23-37 min = 2 units; 38-52 min = 3 units; 53-67 min = 4 units; 68-82 min = 5 units        .episode  SUBJECTIVE    Pain Level (0-10 scale): 0/10    Any medication changes, allergies to medications, adverse drug reactions, diagnosis change, or new procedure performed?: [x] No    [] Yes (see summary sheet for update)  Medications: Verified on Patient Summary List    Subjective functional status/changes:     I feel good except the itching on my head    OBJECTIVE      Therapeutic Procedures:  Tx Min Billable or 1:1 Min (if diff from Tx Min) Procedure, Rationale, Specifics   45  66821 Therapeutic Exercise (timed):  increase ROM, strength, coordination, balance, and proprioception to improve patient's ability to progress to PLOF and address remaining functional goals. (see flow sheet as applicable)     Details if applicable:    Prepping maintenance program:    Scifit stepper level 1 x 10 minutes  Hip abd/add machine level 5 x 50 reps  Chest press/pull level 5 x30 reps    Hip abd with BTB seated x 50  Seated marching with BRB x 50 reps       45 45    Total Total   [x]  Patient Education billed concurrently with other procedures   [x] Review HEP    [] Progressed/Changed HEP, detail:    []

## 2024-07-31 ENCOUNTER — HOSPITAL ENCOUNTER (OUTPATIENT)
Facility: HOSPITAL | Age: 89
Setting detail: RECURRING SERIES
Discharge: HOME | End: 2024-08-03
Payer: MEDICARE

## 2024-07-31 PROCEDURE — 97110 THERAPEUTIC EXERCISES: CPT

## 2024-07-31 NOTE — PROGRESS NOTES
[facility]  [address] [place] [address]  Office [phone]  Fax [phone]   DISCHARGE SUMMARY  Patient Name: Naveen Alvarez : 1935   Treatment/Medical Diagnosis: Sciatica, right side [M54.31]   Referral Source: Mohit Franco MD     Date of Initial Visit: 2024 Attended Visits: 17 Missed Visits: 0     SUMMARY OF TREATMENT  Mr. Alvarez has done well with working towards increasing his activity tolerance and strengthening after hospitalization.  We have decided that he is best to use his rollator outside of the home to increase safety and to continue commit to the maintenance program 2x/week to decrease risk of weakening.  Patient cotinines to require cues to stand tall and inside his walker but he is very motivated to stay moving and active for as long as he functionally can.     CURRENT STATUS  Short Term Goals: To be accomplished in 8 treatments   Patient will be independent in a HEP to increase his mobility. MET  Patient will report he is able to ambulate short distances without neck and back pain. MET  Patient will ambulate on level surfaces independently with good foot clearance. DISCONTINUE  Patient will transfer sit to stand 10x in 30 seconds to demonstrate improved mobility.MET  (11)  Long Term Goals: To be accomplished in 16 treatments   Patient will ambulate short distances independently with a st. Cane. DISCONTINUE  Patient will demonstrate improved upright posture and foot clearance with ambulation.DISCONTINUE  Patient will report he has returned to his prior level of function prior to hospitalization.DISCONTINUE- close      RECOMMENDATIONS  Discontinue therapy. Progressing towards or have reached established goals.        Renae Toledo, TEJAS       2024       2:19 PM  Katlin Salazar PT  If you have any questions/comments please contact us directly at [phone].   Thank you for allowing us to assist in the

## 2024-07-31 NOTE — PROGRESS NOTES
PHYSICAL THERAPY - MEDICARE DAILY TREATMENT NOTE (updated 3/23)      Date: 2024          Patient Name:  Naveen Alvarez :  1935   Medical   Diagnosis:  Sciatica, right side [M54.31] Treatment Diagnosis:  M62.81  GENERAL MUSCLE WEAKNESS, R26.81   Unsteadiness on feet, and R26.89   Abnormalities of gait and mobility    Referral Source:  Mohit Franco MD Insurance:   Payor: MEDICARE / Plan: MEDICARE PART A AND B / Product Type: *No Product type* /                     Patient  verified yes     Visit #   Current  / Total 17 28   Time   In / Out 1045 1130   Total Treatment Time 45   Total Timed Codes 45   1:1 Treatment Time 45      [facility] Totals Reminder:  bill using total billable   min of TIMED therapeutic procedures and modalities.   8-22 min = 1 unit; 23-37 min = 2 units; 38-52 min = 3 units; 53-67 min = 4 units; 68-82 min = 5 units        .episode  SUBJECTIVE    Pain Level (0-10 scale): 0/10    Any medication changes, allergies to medications, adverse drug reactions, diagnosis change, or new procedure performed?: [x] No    [] Yes (see summary sheet for update)  Medications: Verified on Patient Summary List    Subjective functional status/changes:     I am going to come back for maintenance.     OBJECTIVE      Therapeutic Procedures:  Tx Min Billable or 1:1 Min (if diff from Tx Min) Procedure, Rationale, Specifics   45 45 40209 Therapeutic Exercise (timed):  increase ROM, strength, coordination, balance, and proprioception to improve patient's ability to progress to PLOF and address remaining functional goals. (see flow sheet as applicable)     Details if applicable:  Nustep, hip abd/add machine review for maintenance program   45 45    Total Total       [x]  Patient Education billed concurrently with other procedures   [x] Review HEP    [] Progressed/Changed HEP, detail:    [] Other detail:         Pain Level at end of session (0-10 scale): 0/10      Assessment      Short Term Goals: To be

## 2024-09-03 RX ORDER — TROSPIUM CHLORIDE 20 MG/1
40 TABLET, FILM COATED ORAL
Qty: 180 TABLET | Refills: 2 | OUTPATIENT
Start: 2024-09-03

## 2024-09-12 DIAGNOSIS — N18.31 STAGE 3A CHRONIC KIDNEY DISEASE (HCC): ICD-10-CM

## 2024-09-12 RX ORDER — BUMETANIDE 1 MG/1
1 TABLET ORAL DAILY
Qty: 90 TABLET | Refills: 1 | Status: SHIPPED | OUTPATIENT
Start: 2024-09-12

## 2024-09-12 RX ORDER — POTASSIUM CHLORIDE 750 MG/1
10 TABLET, EXTENDED RELEASE ORAL DAILY
Qty: 90 TABLET | Refills: 4 | Status: SHIPPED | OUTPATIENT
Start: 2024-09-12

## 2024-10-25 ENCOUNTER — OFFICE VISIT (OUTPATIENT)
Age: 89
End: 2024-10-25

## 2024-10-25 VITALS
HEART RATE: 75 BPM | HEIGHT: 72 IN | SYSTOLIC BLOOD PRESSURE: 126 MMHG | TEMPERATURE: 98.5 F | DIASTOLIC BLOOD PRESSURE: 71 MMHG | RESPIRATION RATE: 18 BRPM | BODY MASS INDEX: 27.22 KG/M2 | OXYGEN SATURATION: 98 % | WEIGHT: 201 LBS

## 2024-10-25 DIAGNOSIS — Z23 NEEDS FLU SHOT: ICD-10-CM

## 2024-10-25 DIAGNOSIS — E11.65 TYPE 2 DIABETES MELLITUS WITH HYPERGLYCEMIA, WITHOUT LONG-TERM CURRENT USE OF INSULIN (HCC): ICD-10-CM

## 2024-10-25 DIAGNOSIS — N18.31 STAGE 3A CHRONIC KIDNEY DISEASE (HCC): ICD-10-CM

## 2024-10-25 DIAGNOSIS — Z00.00 MEDICARE ANNUAL WELLNESS VISIT, SUBSEQUENT: Primary | ICD-10-CM

## 2024-10-25 DIAGNOSIS — J41.1 MUCOPURULENT CHRONIC BRONCHITIS (HCC): ICD-10-CM

## 2024-10-25 SDOH — ECONOMIC STABILITY: FOOD INSECURITY: WITHIN THE PAST 12 MONTHS, THE FOOD YOU BOUGHT JUST DIDN'T LAST AND YOU DIDN'T HAVE MONEY TO GET MORE.: NEVER TRUE

## 2024-10-25 SDOH — ECONOMIC STABILITY: FOOD INSECURITY: WITHIN THE PAST 12 MONTHS, YOU WORRIED THAT YOUR FOOD WOULD RUN OUT BEFORE YOU GOT MONEY TO BUY MORE.: NEVER TRUE

## 2024-10-25 SDOH — ECONOMIC STABILITY: INCOME INSECURITY: HOW HARD IS IT FOR YOU TO PAY FOR THE VERY BASICS LIKE FOOD, HOUSING, MEDICAL CARE, AND HEATING?: NOT HARD AT ALL

## 2024-10-25 ASSESSMENT — PATIENT HEALTH QUESTIONNAIRE - PHQ9
SUM OF ALL RESPONSES TO PHQ QUESTIONS 1-9: 1
SUM OF ALL RESPONSES TO PHQ9 QUESTIONS 1 & 2: 1
SUM OF ALL RESPONSES TO PHQ QUESTIONS 1-9: 1
SUM OF ALL RESPONSES TO PHQ QUESTIONS 1-9: 1
2. FEELING DOWN, DEPRESSED OR HOPELESS: NOT AT ALL
SUM OF ALL RESPONSES TO PHQ QUESTIONS 1-9: 1
1. LITTLE INTEREST OR PLEASURE IN DOING THINGS: SEVERAL DAYS

## 2024-10-25 ASSESSMENT — LIFESTYLE VARIABLES
HOW OFTEN DO YOU HAVE A DRINK CONTAINING ALCOHOL: MONTHLY OR LESS
HOW MANY STANDARD DRINKS CONTAINING ALCOHOL DO YOU HAVE ON A TYPICAL DAY: 1 OR 2

## 2024-10-25 NOTE — PROGRESS NOTES
Duration 30 minutes primarily education, review of imaging, labs and records.    1. Medicare annual wellness visit, subsequent  2. Stage 3a chronic kidney disease (HCC)  Lab Results   Component Value Date    CREATININE 1.41 (H) 03/13/2024     3. Mucopurulent chronic bronchitis (HCC)  Continue Advair    4. Type 2 diabetes mellitus with hyperglycemia, without long-term current use of insulin ([facility])  Labs next visit    5. Needs flu shot  - Influenza, FLUAD Trivalent, (age 65 y+), IM, Preservative Free, 0.5mL         Chief Complaint   Patient presents with    Medicare AWV         Orders Placed This Encounter   Procedures    Influenza, FLUAD Trivalent, (age 65 y+), IM, Preservative Free, 0.5mL       Mohit Franco MD, FACP      HPI:         is a 89 y.o. male who arrives for SAWV and for interval assessment of COPD, LE edema, BPH, elevated LDL, and GERD.  Retired.  Still smokes a pipe.  Experiencing some positional pain in the left shoulder and left neck.        No Known Allergies    Outpatient Encounter Medications as of 10/25/2024   Medication Sig Dispense Refill    potassium chloride (KLOR-CON) 10 MEQ extended release tablet TAKE 1 TABLET BY MOUTH EVERY DAY 90 tablet 4    bumetanide (BUMEX) 1 MG tablet TAKE 1 TABLET BY MOUTH EVERY DAY 90 tablet 1    lansoprazole (PREVACID) 30 MG delayed release capsule TAKE 1 CAPSULE BY MOUTH DAILY (BEFORE BREAKFAST). 90 capsule 1    famotidine (PEPCID) 40 MG tablet Take 0.5 tablets by mouth 2 times daily 30 tablet 5    fluticasone-salmeterol (ADVAIR DISKUS) 250-50 MCG/ACT AEPB diskus inhaler Indications: lungs TAKE 1 PUFF BY MOUTH TWICE A DAY 3 each 3    triamcinolone (KENALOG) 0.1 % cream Apply topically 2 times daily. 60 g 0    atorvastatin (LIPITOR) 40 MG tablet Take 1 tablet by mouth daily 90 tablet 4    trospium (SANCTURA) 20 MG tablet Take 1 tablet by mouth 2 times daily (before meals) 180 tablet 2    cyanocobalamin 100 MCG tablet Take 1 tablet by mouth daily      
Medicare Annual Wellness Visit    Naveen Alvarez is here for Medicare AWV    Assessment & Plan   Medicare annual wellness visit, subsequent  Stage 3a chronic kidney disease (HCC)  Mucopurulent chronic bronchitis (HCC)  Type 2 diabetes mellitus with hyperglycemia, without long-term current use of insulin (HCC)  Needs flu shot  -     Influenza, FLUAD Trivalent, (age 65 y+), IM, Preservative Free, 0.5mL    Recommendations for Preventive Services Due: see orders and patient instructions/AVS.  Recommended screening schedule for the next 5-10 years is provided to the patient in written form: see Patient Instructions/AVS.     No follow-ups on file.     Subjective       Patient's complete Health Risk Assessment and screening values have been reviewed and are found in Flowsheets. The following problems were reviewed today and where indicated follow up appointments were made and/or referrals ordered.    Positive Risk Factor Screenings with Interventions:    Fall Risk:  Do you feel unsteady or are you worried about falling? : (!) yes  2 or more falls in past year?: no  Fall with injury in past year?: no       Interventions:    Reviewed medications, home hazards, visual acuity, and co-morbidities that can increase risk for falls    Cognitive:   Clock Drawing Test (CDT):  (patient declined)  Words recalled: 3 Words Recalled     Total Score Interpretation: Normal Mini-Cog    Interventions:  See AVS for additional education material           General HRA Questions:  Select all that apply: (!) New or Increased Pain    Interventions - Pain:  See AVS for additional education material      Inactivity:  On average, how many days per week do you engage in moderate to strenuous exercise (like a brisk walk)?: 1 day (!) Abnormal  On average, how many minutes do you engage in exercise at this level?: 20 min    Interventions:  See AVS for additional education material          ADL's:   Patient reports needing help with:  Select all that 
obtaining consent, and per orders of Dr. Franco, injection of Fluad given in Left deltoid by VALERIO RAMOS LPN. Patient instructed to remain in clinic for 20 minutes afterwards, and to report any adverse reaction to me immediately.

## 2024-12-04 RX ORDER — TROSPIUM CHLORIDE 20 MG/1
40 TABLET, FILM COATED ORAL
Qty: 180 TABLET | Refills: 2 | Status: SHIPPED | OUTPATIENT
Start: 2024-12-04

## 2024-12-06 RX ORDER — ATORVASTATIN CALCIUM 40 MG/1
40 TABLET, FILM COATED ORAL DAILY
Qty: 90 TABLET | Refills: 4 | Status: SHIPPED | OUTPATIENT
Start: 2024-12-06

## 2025-01-08 DIAGNOSIS — K21.9 GASTROESOPHAGEAL REFLUX DISEASE WITHOUT ESOPHAGITIS: ICD-10-CM

## 2025-01-08 DIAGNOSIS — K21.9 GASTRO-ESOPHAGEAL REFLUX DISEASE WITHOUT ESOPHAGITIS: ICD-10-CM

## 2025-01-08 RX ORDER — FAMOTIDINE 40 MG/1
TABLET, FILM COATED ORAL
Qty: 90 TABLET | Refills: 4 | Status: SHIPPED | OUTPATIENT
Start: 2025-01-08

## 2025-01-08 RX ORDER — LANSOPRAZOLE 30 MG/1
CAPSULE, DELAYED RELEASE ORAL
Qty: 90 CAPSULE | Refills: 4 | Status: SHIPPED | OUTPATIENT
Start: 2025-01-08

## 2025-02-27 ENCOUNTER — OFFICE VISIT (OUTPATIENT)
Age: 89
End: 2025-02-27
Payer: MEDICARE

## 2025-02-27 VITALS
HEIGHT: 72 IN | TEMPERATURE: 98 F | SYSTOLIC BLOOD PRESSURE: 116 MMHG | BODY MASS INDEX: 27.63 KG/M2 | OXYGEN SATURATION: 95 % | DIASTOLIC BLOOD PRESSURE: 74 MMHG | RESPIRATION RATE: 18 BRPM | WEIGHT: 204 LBS | HEART RATE: 107 BPM

## 2025-02-27 DIAGNOSIS — M25.512 CHRONIC PAIN OF BOTH SHOULDERS: Primary | ICD-10-CM

## 2025-02-27 DIAGNOSIS — E11.65 TYPE 2 DIABETES MELLITUS WITH HYPERGLYCEMIA, WITHOUT LONG-TERM CURRENT USE OF INSULIN (HCC): ICD-10-CM

## 2025-02-27 DIAGNOSIS — N18.31 STAGE 3A CHRONIC KIDNEY DISEASE (HCC): ICD-10-CM

## 2025-02-27 DIAGNOSIS — G89.29 CHRONIC PAIN OF BOTH SHOULDERS: Primary | ICD-10-CM

## 2025-02-27 DIAGNOSIS — I50.32 CHRONIC DIASTOLIC HEART FAILURE (HCC): ICD-10-CM

## 2025-02-27 DIAGNOSIS — J41.1 MUCOPURULENT CHRONIC BRONCHITIS (HCC): ICD-10-CM

## 2025-02-27 DIAGNOSIS — L03.811 CELLULITIS OF HEAD EXCEPT FACE: ICD-10-CM

## 2025-02-27 DIAGNOSIS — M25.511 CHRONIC PAIN OF BOTH SHOULDERS: Primary | ICD-10-CM

## 2025-02-27 PROCEDURE — 1126F AMNT PAIN NOTED NONE PRSNT: CPT | Performed by: INTERNAL MEDICINE

## 2025-02-27 PROCEDURE — 4004F PT TOBACCO SCREEN RCVD TLK: CPT | Performed by: INTERNAL MEDICINE

## 2025-02-27 PROCEDURE — 1123F ACP DISCUSS/DSCN MKR DOCD: CPT | Performed by: INTERNAL MEDICINE

## 2025-02-27 PROCEDURE — 99214 OFFICE O/P EST MOD 30 MIN: CPT | Performed by: INTERNAL MEDICINE

## 2025-02-27 PROCEDURE — 1159F MED LIST DOCD IN RCRD: CPT | Performed by: INTERNAL MEDICINE

## 2025-02-27 PROCEDURE — G8427 DOCREV CUR MEDS BY ELIG CLIN: HCPCS | Performed by: INTERNAL MEDICINE

## 2025-02-27 PROCEDURE — G8419 CALC BMI OUT NRM PARAM NOF/U: HCPCS | Performed by: INTERNAL MEDICINE

## 2025-02-27 PROCEDURE — 3023F SPIROM DOC REV: CPT | Performed by: INTERNAL MEDICINE

## 2025-02-27 RX ORDER — MUPIROCIN 20 MG/G
OINTMENT TOPICAL
Qty: 30 G | Refills: 5 | Status: SHIPPED | OUTPATIENT
Start: 2025-02-27 | End: 2025-03-06

## 2025-02-27 SDOH — ECONOMIC STABILITY: FOOD INSECURITY: WITHIN THE PAST 12 MONTHS, THE FOOD YOU BOUGHT JUST DIDN'T LAST AND YOU DIDN'T HAVE MONEY TO GET MORE.: NEVER TRUE

## 2025-02-27 SDOH — ECONOMIC STABILITY: FOOD INSECURITY: WITHIN THE PAST 12 MONTHS, YOU WORRIED THAT YOUR FOOD WOULD RUN OUT BEFORE YOU GOT MONEY TO BUY MORE.: NEVER TRUE

## 2025-02-27 ASSESSMENT — PATIENT HEALTH QUESTIONNAIRE - PHQ9
1. LITTLE INTEREST OR PLEASURE IN DOING THINGS: NOT AT ALL
SUM OF ALL RESPONSES TO PHQ QUESTIONS 1-9: 0
SUM OF ALL RESPONSES TO PHQ9 QUESTIONS 1 & 2: 0
2. FEELING DOWN, DEPRESSED OR HOPELESS: NOT AT ALL
SUM OF ALL RESPONSES TO PHQ QUESTIONS 1-9: 0

## 2025-02-27 NOTE — PROGRESS NOTES
Duration 30 minutes primarily education, review of imaging, labs and records.    Assessment & Plan  1. Cellulitis on the scalp.  A small patch of cellulitis, less than a dime in size, was identified on the scalp. Mupirocin (Bactroban) ointment will be prescribed and sent to [facility] pharmacy.    2. Pain in the left arm and neck.  The pain is likely due to irritation of pre-existing arthritis in the neck, possibly pressing on a nerve. A referral for physical therapy will be made to address this issue. If the pain worsens, further interventions such as x-rays or surgical consultation may be considered.    3. Cerumen impaction.  A small amount of light-colored ear wax was noted. He is advised to continue using Debrox for ear wax management.    4. Lower extremity edema.  The swelling in the legs is likely due to not taking the prescribed fluid pill, Bumex (bumetanide) 1 mg daily. He is advised to ensure he takes this medication daily. He is also instructed to bring all his medications to the next visit for verification.          Chief Complaint   Patient presents with    Medication Check         Orders Placed This Encounter   Procedures    Amb External Referral To Cardiology     Referral Priority:   Routine     Referral Type:   Eval and Treat     Referral Reason:   Specialty Services Required     Requested Specialty:   Cardiology     Number of Visits Requested:   1    Ambulatory referral to Physical Therapy     Referral Priority:   Routine     Referral Type:   Physical Therapy     Referral Reason:   Specialty Services Required     Requested Specialty:   Physical Therapy     Number of Visits Requested:   1       Mohit Franco MD, FACP      History of Present Illness  The patient is an 89-year-old male who presents today with complaints about a small patch of cellulitis on the scalp, multiple aches and pains in his extremities, ear wax, and lower extremity edema.    He reports a significant decrease in mobility,

## 2025-02-27 NOTE — PROGRESS NOTES
Naveen Alvarez is a 89 y.o. male presenting for/with:    Chief Complaint   Patient presents with    Medication Check       Vitals:    02/27/25 1335   BP: 116/74   Site: Left Upper Arm   Position: Sitting   Cuff Size: Medium Adult   Pulse: (!) 107   Resp: 18   Temp: 98 °F (36.7 °C)   TempSrc: Temporal   SpO2: 95%   Weight: 92.5 kg (204 lb)   Height: 1.829 m (6')       Pain Scale: 0 - No pain/10  Pain Location:     \"Have you been to the ER, urgent care clinic since your last visit?  Hospitalized since your last visit?\"    NO    “Have you seen or consulted any other health care providers outside of [facility] since your last visit?”    NO                 2/27/2025     1:32 PM   PHQ-9    Little interest or pleasure in doing things 0   Feeling down, depressed, or hopeless 0   PHQ-2 Score 0   PHQ-9 Total Score 0           2/27/2025     1:40 PM 3/13/2024    11:50 AM 6/8/2023    10:30 AM 9/23/2021    12:00 AM   [facility] AMB LEARNING ASSESSMENT   Primary Learner Patient Patient Patient Patient   Primary Language ENGLISH ENGLISH ENGLISH ENGLISH   Learning Preference DEMONSTRATION DEMONSTRATION DEMONSTRATION READING   Answered By patient patient self patient   Relationship to Learner SELF SELF SELF SELF            2/27/2025     1:33 PM   Amb Fall Risk Assessment and TUG Test   Do you feel unsteady or are you worried about falling?  yes   2 or more falls in past year? no   Fall with injury in past year? no           2/27/2025     1:00 PM 10/25/2024     1:00 PM 4/18/2024    10:00 AM 3/13/2024    11:00 AM 1/23/2024     9:00 AM 10/19/2023    10:00 AM 7/20/2023     9:00 AM   ADL ASSESSMENT   Feeding yourself No Help Needed No Help Needed No Help Needed No Help Needed No Help Needed No Help Needed No Help Needed   Getting from bed to chair No Help Needed No Help Needed No Help Needed No Help Needed No Help Needed No Help Needed No Help Needed   Getting dressed No Help Needed No Help Needed No Help Needed No Help Needed

## 2025-03-06 ENCOUNTER — HOSPITAL ENCOUNTER (OUTPATIENT)
Facility: HOSPITAL | Age: 89
Setting detail: RECURRING SERIES
Discharge: HOME | End: 2025-03-09
Payer: MEDICARE

## 2025-03-06 PROCEDURE — 97116 GAIT TRAINING THERAPY: CPT

## 2025-03-06 PROCEDURE — 97161 PT EVAL LOW COMPLEX 20 MIN: CPT

## 2025-03-06 PROCEDURE — 97530 THERAPEUTIC ACTIVITIES: CPT

## 2025-03-06 PROCEDURE — 97110 THERAPEUTIC EXERCISES: CPT

## 2025-03-06 NOTE — THERAPY EVALUATION
Dr. Robert
[facility]  [address] [place] [address]  Office [phone]  Fax [phone]       PHYSICAL THERAPY - MEDICARE EVALUATION/PLAN OF CARE NOTE (updated 3/23)      Date: 3/6/2025          Patient Name:  Naveen Alvarez :  1935   Medical   Diagnosis:  Chronic pain of both shoulders [M25.511, G89.29, M25.512] Treatment Diagnosis:  M25.511  RIGHT SHOULDER PAIN and M25.512  LEFT SHOULDER PAIN , M54.2  NECK PAIN , and M62.81  GENERAL MUSCLE WEAKNESS, R26.81   Unsteadiness on feet, and R26.89   Abnormalities of gait and mobility    Referral Source:  Mohit Franco MD Insurance:  Payor: MEDICARE / Plan: MEDICARE PART A AND B / Product Type: *No Product type* /             Patient  verified yes     Visit #   Current  / Total 1 16     SUBJECTIVE  Pain Level (0-10 scale): 0-8  []constant []intermittent []improving []worsening []no change since onset    Any medication changes, allergies to medications, adverse drug reactions, diagnosis change, or new procedure performed?: [x] No    [] Yes (see summary sheet for update)  Medications: Verified on Patient Summary List    Subjective functional status/changes:     Patient reports his legs are extremely weak, he has challenges getting out of a chair and off the toilet seat (elevated)  Intermittent neck and shoulder pain    Onset Date: years  Start of Care: 3/6/2025  PLOF: ambulates with a tripod rollator,drive  Comorbidities:arthritis  Living Situation: with spouse  Pt Goals: get out of a chair easier, walk better, strengthen legs    OBJECTIVE    Posture:  forward head and shoulders, stands with wide DONNA  Other Observations:  swelling bilateral lower legs/ankles (has an appt with cardiologist scheduled)  Gait and Functional Mobility:  ambulates independently with rollator, hips and knees flexed, decreased foot clearance  Palpation: mild tenderness bilateral upper traps and cervical 
162.56

## 2025-03-22 ENCOUNTER — HOSPITAL ENCOUNTER (INPATIENT)
Facility: HOSPITAL | Age: 89
LOS: 2 days | Discharge: HOME | DRG: 683 | End: 2025-03-24
Attending: EMERGENCY MEDICINE | Admitting: INTERNAL MEDICINE
Payer: MEDICARE

## 2025-03-22 ENCOUNTER — APPOINTMENT (OUTPATIENT)
Facility: HOSPITAL | Age: 89
DRG: 683 | End: 2025-03-22
Payer: MEDICARE

## 2025-03-22 DIAGNOSIS — N28.9 KIDNEY DISEASE: ICD-10-CM

## 2025-03-22 DIAGNOSIS — R26.2 AMBULATORY DYSFUNCTION: Primary | ICD-10-CM

## 2025-03-22 PROBLEM — N17.9 ACUTE KIDNEY INJURY SUPERIMPOSED ON CHRONIC KIDNEY DISEASE: Status: ACTIVE | Noted: 2025-03-22

## 2025-03-22 PROBLEM — N18.9 ACUTE KIDNEY INJURY SUPERIMPOSED ON CHRONIC KIDNEY DISEASE: Status: ACTIVE | Noted: 2025-03-22

## 2025-03-22 LAB
ALBUMIN SERPL-MCNC: 3.8 G/DL (ref 3.5–5)
ALBUMIN/GLOB SERPL: 1 (ref 1.1–2.2)
ALP SERPL-CCNC: 119 U/L (ref 45–117)
ALT SERPL-CCNC: 24 U/L (ref 12–78)
ANION GAP SERPL CALC-SCNC: 9 MMOL/L (ref 2–12)
APPEARANCE UR: CLEAR
AST SERPL-CCNC: 16 U/L (ref 15–37)
BACTERIA URNS QL MICRO: NEGATIVE /HPF
BASOPHILS # BLD: 0.06 K/UL (ref 0–0.1)
BASOPHILS NFR BLD: 0.6 % (ref 0–1)
BILIRUB SERPL-MCNC: 1.6 MG/DL (ref 0.2–1)
BILIRUB UR QL: NEGATIVE
BUN SERPL-MCNC: 25 MG/DL (ref 6–20)
BUN/CREAT SERPL: 16 (ref 12–20)
CALCIUM SERPL-MCNC: 9.1 MG/DL (ref 8.5–10.1)
CHLORIDE SERPL-SCNC: 103 MMOL/L (ref 97–108)
CO2 SERPL-SCNC: 27 MMOL/L (ref 21–32)
COLOR UR: NORMAL
CREAT SERPL-MCNC: 1.59 MG/DL (ref 0.7–1.3)
DIFFERENTIAL METHOD BLD: ABNORMAL
EOSINOPHIL # BLD: 0.14 K/UL (ref 0–0.4)
EOSINOPHIL NFR BLD: 1.3 % (ref 0–7)
EPITH CASTS URNS QL MICRO: NORMAL /LPF
ERYTHROCYTE [DISTWIDTH] IN BLOOD BY AUTOMATED COUNT: 13.8 % (ref 11.5–14.5)
FLUAV RNA SPEC QL NAA+PROBE: NOT DETECTED
FLUBV RNA SPEC QL NAA+PROBE: NOT DETECTED
GLOBULIN SER CALC-MCNC: 3.7 G/DL (ref 2–4)
GLUCOSE BLD STRIP.AUTO-MCNC: 120 MG/DL (ref 65–117)
GLUCOSE SERPL-MCNC: 109 MG/DL (ref 65–100)
GLUCOSE UR STRIP.AUTO-MCNC: NEGATIVE MG/DL
HCT VFR BLD AUTO: 45.4 % (ref 36.6–50.3)
HGB BLD-MCNC: 15.1 G/DL (ref 12.1–17)
HGB UR QL STRIP: NEGATIVE
IMM GRANULOCYTES # BLD AUTO: 0.04 K/UL (ref 0–0.04)
IMM GRANULOCYTES NFR BLD AUTO: 0.4 % (ref 0–0.5)
KETONES UR QL STRIP.AUTO: NEGATIVE MG/DL
LEUKOCYTE ESTERASE UR QL STRIP.AUTO: NEGATIVE
LYMPHOCYTES # BLD: 0.92 K/UL (ref 0.8–3.5)
LYMPHOCYTES NFR BLD: 8.8 % (ref 12–49)
MCH RBC QN AUTO: 30.6 PG (ref 26–34)
MCHC RBC AUTO-ENTMCNC: 33.3 G/DL (ref 30–36.5)
MCV RBC AUTO: 91.9 FL (ref 80–99)
MONOCYTES # BLD: 0.91 K/UL (ref 0–1)
MONOCYTES NFR BLD: 8.7 % (ref 5–13)
NEUTS SEG # BLD: 8.38 K/UL (ref 1.8–8)
NEUTS SEG NFR BLD: 80.2 % (ref 32–75)
NITRITE UR QL STRIP.AUTO: NEGATIVE
NRBC # BLD: 0 K/UL (ref 0–0.01)
NRBC BLD-RTO: 0 PER 100 WBC
NT PRO BNP: 202 PG/ML (ref 0–450)
PH UR STRIP: 7 (ref 5–8)
PLATELET # BLD AUTO: 172 K/UL (ref 150–400)
PMV BLD AUTO: 12 FL (ref 8.9–12.9)
POTASSIUM SERPL-SCNC: 4.1 MMOL/L (ref 3.5–5.1)
PROT SERPL-MCNC: 7.5 G/DL (ref 6.4–8.2)
PROT UR STRIP-MCNC: NEGATIVE MG/DL
RBC # BLD AUTO: 4.94 M/UL (ref 4.1–5.7)
RBC #/AREA URNS HPF: NORMAL /HPF (ref 0–5)
SARS-COV-2 RNA RESP QL NAA+PROBE: NOT DETECTED
SERVICE CMNT-IMP: ABNORMAL
SODIUM SERPL-SCNC: 139 MMOL/L (ref 136–145)
SOURCE: NORMAL
SP GR UR REFRACTOMETRY: 1.01 (ref 1–1.03)
URINE CULTURE IF INDICATED: NORMAL
UROBILINOGEN UR QL STRIP.AUTO: 0.2 EU/DL (ref 0.2–1)
WBC # BLD AUTO: 10.5 K/UL (ref 4.1–11.1)
WBC URNS QL MICRO: NORMAL /HPF (ref 0–4)

## 2025-03-22 PROCEDURE — 97530 THERAPEUTIC ACTIVITIES: CPT

## 2025-03-22 PROCEDURE — 6370000000 HC RX 637 (ALT 250 FOR IP): Performed by: INTERNAL MEDICINE

## 2025-03-22 PROCEDURE — 2500000003 HC RX 250 WO HCPCS: Performed by: INTERNAL MEDICINE

## 2025-03-22 PROCEDURE — 94640 AIRWAY INHALATION TREATMENT: CPT

## 2025-03-22 PROCEDURE — 82962 GLUCOSE BLOOD TEST: CPT

## 2025-03-22 PROCEDURE — 81001 URINALYSIS AUTO W/SCOPE: CPT

## 2025-03-22 PROCEDURE — 85025 COMPLETE CBC W/AUTO DIFF WBC: CPT

## 2025-03-22 PROCEDURE — 2580000003 HC RX 258: Performed by: EMERGENCY MEDICINE

## 2025-03-22 PROCEDURE — 83880 ASSAY OF NATRIURETIC PEPTIDE: CPT

## 2025-03-22 PROCEDURE — 87636 SARSCOV2 & INF A&B AMP PRB: CPT

## 2025-03-22 PROCEDURE — 6360000004 HC RX CONTRAST MEDICATION: Performed by: EMERGENCY MEDICINE

## 2025-03-22 PROCEDURE — 99285 EMERGENCY DEPT VISIT HI MDM: CPT

## 2025-03-22 PROCEDURE — 1100000000 HC RM PRIVATE

## 2025-03-22 PROCEDURE — 36415 COLL VENOUS BLD VENIPUNCTURE: CPT

## 2025-03-22 PROCEDURE — 80053 COMPREHEN METABOLIC PANEL: CPT

## 2025-03-22 PROCEDURE — 72132 CT LUMBAR SPINE W/DYE: CPT

## 2025-03-22 PROCEDURE — 6360000002 HC RX W HCPCS: Performed by: INTERNAL MEDICINE

## 2025-03-22 PROCEDURE — 97161 PT EVAL LOW COMPLEX 20 MIN: CPT

## 2025-03-22 RX ORDER — HEPARIN SODIUM 5000 [USP'U]/ML
5000 INJECTION, SOLUTION INTRAVENOUS; SUBCUTANEOUS 2 TIMES DAILY
Status: DISCONTINUED | OUTPATIENT
Start: 2025-03-22 | End: 2025-03-24 | Stop reason: HOSPADM

## 2025-03-22 RX ORDER — 0.9 % SODIUM CHLORIDE 0.9 %
500 INTRAVENOUS SOLUTION INTRAVENOUS ONCE
Status: COMPLETED | OUTPATIENT
Start: 2025-03-22 | End: 2025-03-22

## 2025-03-22 RX ORDER — DEXTROSE MONOHYDRATE 100 MG/ML
INJECTION, SOLUTION INTRAVENOUS CONTINUOUS PRN
Status: DISCONTINUED | OUTPATIENT
Start: 2025-03-22 | End: 2025-03-24 | Stop reason: HOSPADM

## 2025-03-22 RX ORDER — ONDANSETRON 4 MG/1
4 TABLET, ORALLY DISINTEGRATING ORAL EVERY 8 HOURS PRN
Status: DISCONTINUED | OUTPATIENT
Start: 2025-03-22 | End: 2025-03-24 | Stop reason: HOSPADM

## 2025-03-22 RX ORDER — POLYETHYLENE GLYCOL 3350 17 G/17G
17 POWDER, FOR SOLUTION ORAL DAILY PRN
Status: DISCONTINUED | OUTPATIENT
Start: 2025-03-22 | End: 2025-03-24 | Stop reason: HOSPADM

## 2025-03-22 RX ORDER — SODIUM CHLORIDE 9 MG/ML
INJECTION, SOLUTION INTRAVENOUS PRN
Status: DISCONTINUED | OUTPATIENT
Start: 2025-03-22 | End: 2025-03-24 | Stop reason: HOSPADM

## 2025-03-22 RX ORDER — ACETAMINOPHEN 325 MG/1
650 TABLET ORAL EVERY 6 HOURS PRN
Status: DISCONTINUED | OUTPATIENT
Start: 2025-03-22 | End: 2025-03-24 | Stop reason: HOSPADM

## 2025-03-22 RX ORDER — ATORVASTATIN CALCIUM 40 MG/1
40 TABLET, FILM COATED ORAL NIGHTLY
Status: DISCONTINUED | OUTPATIENT
Start: 2025-03-22 | End: 2025-03-24 | Stop reason: HOSPADM

## 2025-03-22 RX ORDER — GLUCAGON 1 MG/ML
1 KIT INJECTION PRN
Status: DISCONTINUED | OUTPATIENT
Start: 2025-03-22 | End: 2025-03-24 | Stop reason: HOSPADM

## 2025-03-22 RX ORDER — IOPAMIDOL 755 MG/ML
100 INJECTION, SOLUTION INTRAVASCULAR ONCE
Status: COMPLETED | OUTPATIENT
Start: 2025-03-22 | End: 2025-03-22

## 2025-03-22 RX ORDER — ONDANSETRON 2 MG/ML
4 INJECTION INTRAMUSCULAR; INTRAVENOUS EVERY 6 HOURS PRN
Status: DISCONTINUED | OUTPATIENT
Start: 2025-03-22 | End: 2025-03-24 | Stop reason: HOSPADM

## 2025-03-22 RX ORDER — SODIUM CHLORIDE 0.9 % (FLUSH) 0.9 %
5-40 SYRINGE (ML) INJECTION PRN
Status: DISCONTINUED | OUTPATIENT
Start: 2025-03-22 | End: 2025-03-24 | Stop reason: HOSPADM

## 2025-03-22 RX ORDER — SODIUM CHLORIDE 0.9 % (FLUSH) 0.9 %
5-40 SYRINGE (ML) INJECTION EVERY 12 HOURS SCHEDULED
Status: DISCONTINUED | OUTPATIENT
Start: 2025-03-22 | End: 2025-03-24 | Stop reason: HOSPADM

## 2025-03-22 RX ORDER — ACETAMINOPHEN 650 MG/1
650 SUPPOSITORY RECTAL EVERY 6 HOURS PRN
Status: DISCONTINUED | OUTPATIENT
Start: 2025-03-22 | End: 2025-03-24 | Stop reason: HOSPADM

## 2025-03-22 RX ORDER — FAMOTIDINE 20 MG/1
20 TABLET, FILM COATED ORAL DAILY
Status: DISCONTINUED | OUTPATIENT
Start: 2025-03-22 | End: 2025-03-24 | Stop reason: HOSPADM

## 2025-03-22 RX ORDER — INSULIN LISPRO 100 [IU]/ML
0-8 INJECTION, SOLUTION INTRAVENOUS; SUBCUTANEOUS
Status: DISCONTINUED | OUTPATIENT
Start: 2025-03-22 | End: 2025-03-24 | Stop reason: HOSPADM

## 2025-03-22 RX ORDER — BUMETANIDE 1 MG/1
1 TABLET ORAL DAILY
Status: DISCONTINUED | OUTPATIENT
Start: 2025-03-23 | End: 2025-03-24 | Stop reason: HOSPADM

## 2025-03-22 RX ADMIN — ARFORMOTEROL TARTRATE: 15 SOLUTION RESPIRATORY (INHALATION) at 19:18

## 2025-03-22 RX ADMIN — HEPARIN SODIUM 5000 UNITS: 5000 INJECTION INTRAVENOUS; SUBCUTANEOUS at 16:18

## 2025-03-22 RX ADMIN — SODIUM CHLORIDE 500 ML: 9 INJECTION, SOLUTION INTRAVENOUS at 11:46

## 2025-03-22 RX ADMIN — ACETAMINOPHEN 650 MG: 325 TABLET ORAL at 21:00

## 2025-03-22 RX ADMIN — ATORVASTATIN CALCIUM 40 MG: 40 TABLET, FILM COATED ORAL at 20:55

## 2025-03-22 RX ADMIN — SODIUM CHLORIDE, PRESERVATIVE FREE 10 ML: 5 INJECTION INTRAVENOUS at 20:57

## 2025-03-22 RX ADMIN — IOPAMIDOL 100 ML: 755 INJECTION, SOLUTION INTRAVENOUS at 11:38

## 2025-03-22 RX ADMIN — HEPARIN SODIUM 5000 UNITS: 5000 INJECTION INTRAVENOUS; SUBCUTANEOUS at 20:55

## 2025-03-22 ASSESSMENT — PAIN DESCRIPTION - DESCRIPTORS: DESCRIPTORS: ACHING

## 2025-03-22 ASSESSMENT — LIFESTYLE VARIABLES
HOW MANY STANDARD DRINKS CONTAINING ALCOHOL DO YOU HAVE ON A TYPICAL DAY: PATIENT DOES NOT DRINK
HOW OFTEN DO YOU HAVE A DRINK CONTAINING ALCOHOL: NEVER

## 2025-03-22 ASSESSMENT — PAIN SCALES - GENERAL
PAINLEVEL_OUTOF10: 1
PAINLEVEL_OUTOF10: 0
PAINLEVEL_OUTOF10: 3

## 2025-03-22 ASSESSMENT — PAIN DESCRIPTION - PAIN TYPE: TYPE: CHRONIC PAIN

## 2025-03-22 ASSESSMENT — PAIN - FUNCTIONAL ASSESSMENT: PAIN_FUNCTIONAL_ASSESSMENT: 0-10

## 2025-03-22 ASSESSMENT — PAIN DESCRIPTION - LOCATION: LOCATION: NECK

## 2025-03-22 NOTE — ED PROVIDER NOTES
EMERGENCY DEPARTMENT HISTORY AND PHYSICAL EXAM      Date: 3/22/2025  Patient Name: Naveen Alvarez    History of Presenting Illness     Chief Complaint   Patient presents with    Fatigue       History Provided By: Patient    HPI: Naveen Alvarez, 89 y.o. male with PMHx as noted below presents emerged department valuation of weakness, able to ambulate.  Patient states that his legs have been shaky and have given out on him with progressive weakness over the last few days, today was unable to ambulate or perform any of his ADLs requiring assistance from neighbor to carry him out.  Otherwise denies any back pain, bladder/bowel dysfunction, leg numbness or weakness    Pt denies any other alleviating or exacerbating factors. Additionally, pt specifically denies any recent fever, chills, headache, nausea, vomiting, abdominal pain, CP, SOB, lightheadedness, dizziness, numbness, weakness, BLE swelling, heart palpitations, urinary sxs, diarrhea, constipation, melena, hematochezia, cough, or congestion.  PCP: Mohit Franco MD    Current Facility-Administered Medications   Medication Dose Route Frequency Provider Last Rate Last Admin    sodium chloride flush 0.9 % injection 5-40 mL  5-40 mL IntraVENous 2 times per day Marti Fowler MD        sodium chloride flush 0.9 % injection 5-40 mL  5-40 mL IntraVENous PRN Marti Fowler MD        0.9 % sodium chloride infusion   IntraVENous PRN Marti Fowler MD        ondansetron (ZOFRAN-ODT) disintegrating tablet 4 mg  4 mg Oral Q8H PRN Marti Fowler MD        Or    ondansetron (ZOFRAN) injection 4 mg  4 mg IntraVENous Q6H PRN Marti Fowler MD        polyethylene glycol (GLYCOLAX) packet 17 g  17 g Oral Daily PRN Marti Fowler MD        acetaminophen (TYLENOL) tablet 650 mg  650 mg Oral Q6H PRN Marti Fowler MD        Or    acetaminophen (TYLENOL) suppository 650 mg  650 mg Rectal Q6H PRN Marti Fowler MD        heparin (porcine)  RBC 4.94 4.10 - 5.70 M/uL    Hemoglobin 15.1 12.1 - 17.0 g/dL    Hematocrit 45.4 36.6 - 50.3 %    MCV 91.9 80.0 - 99.0 FL    MCH 30.6 26.0 - 34.0 PG    MCHC 33.3 30.0 - 36.5 g/dL    RDW 13.8 11.5 - 14.5 %    Platelets 172 150 - 400 K/uL    MPV 12.0 8.9 - 12.9 FL    Nucleated RBCs 0.0 0  WBC    nRBC 0.00 0.00 - 0.01 K/uL    Neutrophils % 80.2 (H) 32.0 - 75.0 %    Lymphocytes % 8.8 (L) 12.0 - 49.0 %    Monocytes % 8.7 5.0 - 13.0 %    Eosinophils % 1.3 0.0 - 7.0 %    Basophils % 0.6 0.0 - 1.0 %    Immature Granulocytes % 0.4 0.0 - 0.5 %    Neutrophils Absolute 8.38 (H) 1.80 - 8.00 K/UL    Lymphocytes Absolute 0.92 0.80 - 3.50 K/UL    Monocytes Absolute 0.91 0.00 - 1.00 K/UL    Eosinophils Absolute 0.14 0.00 - 0.40 K/UL    Basophils Absolute 0.06 0.00 - 0.10 K/UL    Immature Granulocytes Absolute 0.04 0.00 - 0.04 K/UL    Differential Type AUTOMATED     Comprehensive Metabolic Panel    Collection Time: 03/22/25 10:38 AM   Result Value Ref Range    Sodium 139 136 - 145 mmol/L    Potassium 4.1 3.5 - 5.1 mmol/L    Chloride 103 97 - 108 mmol/L    CO2 27 21 - 32 mmol/L    Anion Gap 9 2 - 12 mmol/L    Glucose 109 (H) 65 - 100 mg/dL    BUN 25 (H) 6 - 20 MG/DL    Creatinine 1.59 (H) 0.70 - 1.30 MG/DL    BUN/Creatinine Ratio 16 12 - 20      Est, Glom Filt Rate 41 (L) >60 ml/min/1.73m2    Calcium 9.1 8.5 - 10.1 MG/DL    Total Bilirubin 1.6 (H) 0.2 - 1.0 MG/DL    ALT 24 12 - 78 U/L    AST 16 15 - 37 U/L    Alk Phosphatase 119 (H) 45 - 117 U/L    Total Protein 7.5 6.4 - 8.2 g/dL    Albumin 3.8 3.5 - 5.0 g/dL    Globulin 3.7 2.0 - 4.0 g/dL    Albumin/Globulin Ratio 1.0 (L) 1.1 - 2.2     COVID-19 & Influenza Combo    Collection Time: 03/22/25 11:04 AM    Specimen: Nasopharyngeal   Result Value Ref Range    Source Nasopharyngeal      SARS-CoV-2, PCR Not detected NOTD      Rapid Influenza A By PCR Not detected NOTD      Rapid Influenza B By PCR Not detected NOTD     Urinalysis with Reflex to Culture    Collection Time: 03/22/25

## 2025-03-22 NOTE — PLAN OF CARE
Problem: Chronic Conditions and Co-morbidities  Goal: Patient's chronic conditions and co-morbidity symptoms are monitored and maintained or improved  Outcome: Progressing     Problem: Physical Therapy - Adult  Goal: By Discharge: Performs mobility at highest level of function for planned discharge setting.  See evaluation for individualized goals.  Description: FUNCTIONAL STATUS PRIOR TO ADMISSION: Patient was modified independent using a rollator for functional mobility.    HOME SUPPORT PRIOR TO ADMISSION: The patient lived with wife but did not require assistance. He was driving and attending OP PT for balance training    Physical Therapy Goals  Initiated 3/22/2025  1.  Patient will move from supine to sit and sit to supine in bed with modified independence within 7 day(s).    2.  Patient will perform sit to stand with modified independence within 7 day(s).  3.  Patient will transfer from bed to chair and chair to bed with modified independence using the least restrictive device within 7 day(s).  4.  Patient will ambulate with modified independence for 50 feet with the least restrictive device within 7 day(s).   5.  Patient will ascend/descend 2 stairs with 2 handrail(s) with supervision/set-up within 7 day(s).   3/22/2025 1555 by Katlin Salazar, PT  Outcome: Progressing     Problem: ABCDS Injury Assessment  Goal: Absence of physical injury  Outcome: Progressing     Problem: Skin/Tissue Integrity  Goal: Skin integrity remains intact  Description: 1.  Monitor for areas of redness and/or skin breakdown  2.  Assess vascular access sites hourly  3.  Every 4-6 hours minimum:  Change oxygen saturation probe site  4.  Every 4-6 hours:  If on nasal continuous positive airway pressure, respiratory therapy assess nares and determine need for appliance change or resting period  Outcome: Progressing     Problem: Safety - Adult  Goal: Free from fall injury  Outcome: Progressing

## 2025-03-22 NOTE — ED NOTES
Admission SBAR Note  Situation/Background: Patient presented to the ED for BLE weakness that started yesterday morning.  Patient is alert and oriented and follows commands. Patient was able to stand bedside to use a urinal with a walker and standby assist. No c/o pain at this time.     [facility]    Patient's Chief Complaint was BLE weakness and is admitted for DAMIR .    CODE STATUS: Full  CSSRS: 0 - No Risk    ISOLATION/PRECAUTIONS: No  ISOLATION TYPE: n/a    Is this a behavioral health patient? No  Has wanding been completed No  Are belongings secure? No    Called outstanding consults: Yes    STAT labs collected: Yes    Repeat Lactic Acid DUE? No  TIME DUE: n/a    All STAT orders are complete: Yes    The following personal items will be sent with the patient during transfer to the floor:     All valuables: none       ASSESSMENT:    NEURO:   NIH SCORE: 0,1-4,5-15,15-20,21-42: 0   SHOBHA SWALLOW SCREEN COMPLETE: No  ORIENTATION LEVEL: ORIENTATION LEVEL: Person, Place, Time, and Situation  Cognition:  appropriate decision making and following commands  follows multi-step complex commands/direction  Speech: shows no evidence of impairment    Is patient impulsive? No  Is patient oriented? Yes  Do they follow commands? Yes  Is the patient ambulatory? Yes    FALL RISK? Yes  Interventions: Implemented/recommended use of non-skid footwear, Implemented/recommended use of fall risk identification flag to all team members, Implemented/recommended assistive devices and encouraged their use, Implemented/recommended resources for alarm system (personal alarm, bed alarm, call bell, etc.) , Implemented/recommended environmental changes (remove hazards, lower bed, improve lighting, etc.), and Implemented/recommended increased supervision/assistance    RESPIRATORY:   Is patient on oxygen? No  Oxygen therapy: n/a  O2 rate:

## 2025-03-22 NOTE — ED TRIAGE NOTES
Pt arrived with c/o bilateral leg weakness that has been going on for \"some time\". Pt states he has been seen here for this issue 3 times and \"they have never given him an answer\". Pt states this weakness has gotten worse over the past 3 days

## 2025-03-22 NOTE — H&P
Hospitalist Admission Note    NAME:   Naveen Alvarez   : 1935   MRN: [id number]     Date/Time: 3/22/2025 6:13 PM    Patient PCP: Mohit Franco MD    ______________________________________________________________________  Given the patient's current clinical presentation, I have a high level of concern for decompensation if discharged from the emergency department.  Complex decision making was performed, which includes reviewing the patient's available past medical records, laboratory results, and x-ray films.       My assessment of this patient's clinical condition and my plan of care is as follows.    Assessment / Plan:    Acute on chronic kidney disease stage 3a  HFpEF with bilateral lower extremity edema  -acute on CKD secondary to poor oral intake.  Patient states he has not taken his prescribed dose of Bumex 1mg daily due to frequent urination and instead had decreased oral intake  -will encourage oral hydration, continue PO Bumex and repeat labs in am  -UA negative for proteinuria, last 2D echocardiogram  noted EF 60-65% with moderate aortic valve stenosis and moderate mitral valve regurgitation  -strict I&O's, daily weights    2.   Ambulatory dysfunction  -patient denies any recent falls, has been seen by PT outpatient (last visit 3/6) with no improvement in weakness  -PT consult during this admission.  Patient is interested in a skilled facility at discharge    3.   DM Type 2  -SSI    4.   Essential hypertension  5.   Hyperlipidemia  -continue outpatient regimen    6.   COPD  -not in acute exacerbation      Medical Decision Making:   I personally reviewed labs: CMP, CBC  I personally reviewed imaging:CT lumbar spine  I personally reviewed EKG:  Discussed case with: ED provider. After discussion I am in agreement that acuity of patient's medical condition necessitates hospital stay.      Code Status: FULL  DVT Prophylaxis: Heparin      Subjective:   CHIEF COMPLAINT: \"I'm very

## 2025-03-22 NOTE — PLAN OF CARE
Problem: Physical Therapy - Adult  Goal: By Discharge: Performs mobility at highest level of function for planned discharge setting.  See evaluation for individualized goals.  Description: FUNCTIONAL STATUS PRIOR TO ADMISSION: Patient was modified independent using a rollator for functional mobility.    HOME SUPPORT PRIOR TO ADMISSION: The patient lived with wife but did not require assistance. He was driving and attending OP PT for balance training    Physical Therapy Goals  Initiated 3/22/2025  1.  Patient will move from supine to sit and sit to supine in bed with modified independence within 7 day(s).    2.  Patient will perform sit to stand with modified independence within 7 day(s).  3.  Patient will transfer from bed to chair and chair to bed with modified independence using the least restrictive device within 7 day(s).  4.  Patient will ambulate with modified independence for 50 feet with the least restrictive device within 7 day(s).   5.  Patient will ascend/descend 2 stairs with 2 handrail(s) with supervision/set-up within 7 day(s).   Outcome: Progressing   PHYSICAL THERAPY EVALUATION    Patient: Naveen Alvarez (89 y.o. male)  Date: 3/22/2025  Primary Diagnosis: Acute kidney injury superimposed on chronic kidney disease [N17.9, N18.9]       Precautions:       risk of falls       ASSESSMENT :   DEFICITS/IMPAIRMENTS:   The patient is limited by decreased functional mobility, strength, activity tolerance     Based on the impairments listed above patient will benefit from skilled PT for gait and balance training.  Patient's LE were giving away without notice today when ambulating. At baseline, patient drives and ambulates independently with a rollator.  We stayed close to the bed today for PT due to patients LE buckling. Assisted nursing with changing patient and patient helps with doffing shirt and donning and doffing undergarments    Patient will benefit from skilled intervention to address the above

## 2025-03-23 LAB
ANION GAP SERPL CALC-SCNC: 6 MMOL/L (ref 2–12)
BASOPHILS # BLD: 0.03 K/UL (ref 0–0.1)
BASOPHILS NFR BLD: 0.5 % (ref 0–1)
BUN SERPL-MCNC: 23 MG/DL (ref 6–20)
BUN/CREAT SERPL: 16 (ref 12–20)
CALCIUM SERPL-MCNC: 8.8 MG/DL (ref 8.5–10.1)
CHLORIDE SERPL-SCNC: 102 MMOL/L (ref 97–108)
CO2 SERPL-SCNC: 31 MMOL/L (ref 21–32)
CREAT SERPL-MCNC: 1.45 MG/DL (ref 0.7–1.3)
DIFFERENTIAL METHOD BLD: ABNORMAL
EOSINOPHIL # BLD: 0.1 K/UL (ref 0–0.4)
EOSINOPHIL NFR BLD: 1.6 % (ref 0–7)
ERYTHROCYTE [DISTWIDTH] IN BLOOD BY AUTOMATED COUNT: 13.8 % (ref 11.5–14.5)
GLUCOSE BLD STRIP.AUTO-MCNC: 102 MG/DL (ref 65–117)
GLUCOSE BLD STRIP.AUTO-MCNC: 115 MG/DL (ref 65–117)
GLUCOSE BLD STRIP.AUTO-MCNC: 127 MG/DL (ref 65–117)
GLUCOSE BLD STRIP.AUTO-MCNC: 92 MG/DL (ref 65–117)
GLUCOSE SERPL-MCNC: 145 MG/DL (ref 65–100)
HCT VFR BLD AUTO: 40.3 % (ref 36.6–50.3)
HGB BLD-MCNC: 13.4 G/DL (ref 12.1–17)
IMM GRANULOCYTES # BLD AUTO: 0.02 K/UL (ref 0–0.04)
IMM GRANULOCYTES NFR BLD AUTO: 0.3 % (ref 0–0.5)
LYMPHOCYTES # BLD: 0.91 K/UL (ref 0.8–3.5)
LYMPHOCYTES NFR BLD: 14.4 % (ref 12–49)
MCH RBC QN AUTO: 30.7 PG (ref 26–34)
MCHC RBC AUTO-ENTMCNC: 33.3 G/DL (ref 30–36.5)
MCV RBC AUTO: 92.4 FL (ref 80–99)
MONOCYTES # BLD: 0.47 K/UL (ref 0–1)
MONOCYTES NFR BLD: 7.5 % (ref 5–13)
NEUTS SEG # BLD: 4.77 K/UL (ref 1.8–8)
NEUTS SEG NFR BLD: 75.7 % (ref 32–75)
NRBC # BLD: 0 K/UL (ref 0–0.01)
NRBC BLD-RTO: 0 PER 100 WBC
PLATELET # BLD AUTO: 137 K/UL (ref 150–400)
PMV BLD AUTO: 11.4 FL (ref 8.9–12.9)
POTASSIUM SERPL-SCNC: 3.6 MMOL/L (ref 3.5–5.1)
RBC # BLD AUTO: 4.36 M/UL (ref 4.1–5.7)
SERVICE CMNT-IMP: ABNORMAL
SERVICE CMNT-IMP: NORMAL
SODIUM SERPL-SCNC: 139 MMOL/L (ref 136–145)
WBC # BLD AUTO: 6.3 K/UL (ref 4.1–11.1)

## 2025-03-23 PROCEDURE — 85025 COMPLETE CBC W/AUTO DIFF WBC: CPT

## 2025-03-23 PROCEDURE — 82962 GLUCOSE BLOOD TEST: CPT

## 2025-03-23 PROCEDURE — 6370000000 HC RX 637 (ALT 250 FOR IP): Performed by: INTERNAL MEDICINE

## 2025-03-23 PROCEDURE — 36415 COLL VENOUS BLD VENIPUNCTURE: CPT

## 2025-03-23 PROCEDURE — 2500000003 HC RX 250 WO HCPCS: Performed by: INTERNAL MEDICINE

## 2025-03-23 PROCEDURE — 94640 AIRWAY INHALATION TREATMENT: CPT

## 2025-03-23 PROCEDURE — 6360000002 HC RX W HCPCS: Performed by: INTERNAL MEDICINE

## 2025-03-23 PROCEDURE — 80048 BASIC METABOLIC PNL TOTAL CA: CPT

## 2025-03-23 PROCEDURE — 1100000000 HC RM PRIVATE

## 2025-03-23 PROCEDURE — 94761 N-INVAS EAR/PLS OXIMETRY MLT: CPT

## 2025-03-23 RX ADMIN — ATORVASTATIN CALCIUM 40 MG: 40 TABLET, FILM COATED ORAL at 20:55

## 2025-03-23 RX ADMIN — ACETAMINOPHEN 650 MG: 325 TABLET ORAL at 15:09

## 2025-03-23 RX ADMIN — HEPARIN SODIUM 5000 UNITS: 5000 INJECTION INTRAVENOUS; SUBCUTANEOUS at 09:20

## 2025-03-23 RX ADMIN — FAMOTIDINE 20 MG: 20 TABLET, FILM COATED ORAL at 09:20

## 2025-03-23 RX ADMIN — SODIUM CHLORIDE, PRESERVATIVE FREE 10 ML: 5 INJECTION INTRAVENOUS at 21:05

## 2025-03-23 RX ADMIN — ARFORMOTEROL TARTRATE: 15 SOLUTION RESPIRATORY (INHALATION) at 19:44

## 2025-03-23 RX ADMIN — BUMETANIDE 1 MG: 1 TABLET ORAL at 09:20

## 2025-03-23 RX ADMIN — SODIUM CHLORIDE, PRESERVATIVE FREE 10 ML: 5 INJECTION INTRAVENOUS at 09:21

## 2025-03-23 RX ADMIN — ARFORMOTEROL TARTRATE: 15 SOLUTION RESPIRATORY (INHALATION) at 07:57

## 2025-03-23 RX ADMIN — HEPARIN SODIUM 5000 UNITS: 5000 INJECTION INTRAVENOUS; SUBCUTANEOUS at 20:55

## 2025-03-23 RX ADMIN — ACETAMINOPHEN 650 MG: 325 TABLET ORAL at 20:55

## 2025-03-23 ASSESSMENT — PAIN SCALES - GENERAL
PAINLEVEL_OUTOF10: 5
PAINLEVEL_OUTOF10: 3

## 2025-03-23 ASSESSMENT — PAIN DESCRIPTION - LOCATION
LOCATION: LEG
LOCATION: NECK

## 2025-03-23 ASSESSMENT — PAIN DESCRIPTION - DESCRIPTORS
DESCRIPTORS: ACHING
DESCRIPTORS: CRAMPING

## 2025-03-23 ASSESSMENT — PAIN DESCRIPTION - ORIENTATION
ORIENTATION: RIGHT;LEFT
ORIENTATION: POSTERIOR

## 2025-03-23 NOTE — PROGRESS NOTES
Hospitalist Progress Note    NAME:   Naveen Alvarez   : 1935   MRN: [id number]     Date/Time: 3/23/2025 4:55 PM  Patient PCP: Mohit Franco MD    Assessment / Plan:    Acute on chronic kidney disease stage 3a  HFpEF with bilateral lower extremity edema  -acute on CKD secondary to poor oral intake.  Today patient is at his baseline renal function  -will encourage oral hydration, continue PO Bumex and repeat labs in am  -UA negative for proteinuria, last 2D echocardiogram  noted EF 60-65% with moderate aortic valve stenosis and moderate mitral valve regurgitation  -strict I&O's, daily weights     2.   Ambulatory dysfunction  -patient denies any recent falls, has been seen by PT outpatient (last visit 3/6) with no improvement in weakness  -PT consult during this admission.  Patient is interested in a skilled facility at discharge     3.   DM Type 2  -SSI     4.   Essential hypertension  5.   Hyperlipidemia  -continue outpatient regimen     6.   COPD  -not in acute exacerbation    Code Status: FULL  DVT Prophylaxis: Lovenox      Subjective:     Chief Complaint / Reason for Physician Visit  This am patient had no new complaints.  No nursing concerns overnight.      Objective:     VITALS:   Last 24hrs VS reviewed since prior progress note. Most recent are:  Patient Vitals for the past 24 hrs:   BP Temp Temp src Pulse Resp SpO2   25 1633 113/79 97.5 °F (36.4 °C) Oral 78 18 96 %   25 0813 110/80 97.5 °F (36.4 °C) Oral 86 18 93 %   25 0030 129/67 98 °F (36.7 °C) Oral 83 18 97 %   25 1918 -- -- -- 86 18 96 %         Intake/Output Summary (Last 24 hours) at 3/23/2025 1655  Last data filed at 3/23/2025 0813  Gross per 24 hour   Intake 480 ml   Output 700 ml   Net -220 ml        I had a face to face encounter and independently examined this patient on 3/23/2025, as outlined below:  PHYSICAL EXAM:  General: WD, WN. Alert, cooperative, no acute distress    EENT:  EOMI. Anicteric

## 2025-03-23 NOTE — PLAN OF CARE
Problem: Skin/Tissue Integrity  Goal: Skin integrity remains intact  Description: 1.  Monitor for areas of redness and/or skin breakdown  2.  Assess vascular access sites hourly  3.  Every 4-6 hours minimum:  Change oxygen saturation probe site  4.  Every 4-6 hours:  If on nasal continuous positive airway pressure, respiratory therapy assess nares and determine need for appliance change or resting period  3/22/2025 1654 by Leslee Bean RN  Outcome: Progressing     Problem: Safety - Adult  Goal: Free from fall injury  3/22/2025 1654 by Leslee Bean RN  Outcome: Progressing     Problem: Chronic Conditions and Co-morbidities  Goal: Patient's chronic conditions and co-morbidity symptoms are monitored and maintained or improved  3/22/2025 2307 by Marti Farah, RN  Outcome: Progressing  3/22/2025 1654 by Leslee Bean, RN  Outcome: Progressing

## 2025-03-23 NOTE — PLAN OF CARE
Problem: Chronic Conditions and Co-morbidities  Goal: Patient's chronic conditions and co-morbidity symptoms are monitored and maintained or improved  3/23/2025 1226 by Leslee Bean RN  Outcome: Progressing  3/22/2025 2307 by Marti Farah RN  Outcome: Progressing     Problem: ABCDS Injury Assessment  Goal: Absence of physical injury  3/23/2025 1226 by Leslee Bean RN  Outcome: Progressing  3/22/2025 2307 by Marti Farah RN  Outcome: Progressing     Problem: Skin/Tissue Integrity  Goal: Skin integrity remains intact  Description: 1.  Monitor for areas of redness and/or skin breakdown  2.  Assess vascular access sites hourly  3.  Every 4-6 hours minimum:  Change oxygen saturation probe site  4.  Every 4-6 hours:  If on nasal continuous positive airway pressure, respiratory therapy assess nares and determine need for appliance change or resting period  Outcome: Progressing     Problem: Safety - Adult  Goal: Free from fall injury  Outcome: Progressing     Problem: Pain  Goal: Verbalizes/displays adequate comfort level or baseline comfort level  Outcome: Progressing

## 2025-03-24 VITALS
BODY MASS INDEX: 27.59 KG/M2 | OXYGEN SATURATION: 96 % | RESPIRATION RATE: 20 BRPM | HEIGHT: 72 IN | DIASTOLIC BLOOD PRESSURE: 61 MMHG | TEMPERATURE: 97.5 F | WEIGHT: 203.7 LBS | HEART RATE: 84 BPM | SYSTOLIC BLOOD PRESSURE: 104 MMHG

## 2025-03-24 LAB
GLUCOSE BLD STRIP.AUTO-MCNC: 102 MG/DL (ref 65–117)
GLUCOSE BLD STRIP.AUTO-MCNC: 120 MG/DL (ref 65–117)
SERVICE CMNT-IMP: ABNORMAL
SERVICE CMNT-IMP: NORMAL

## 2025-03-24 PROCEDURE — 94640 AIRWAY INHALATION TREATMENT: CPT

## 2025-03-24 PROCEDURE — 97165 OT EVAL LOW COMPLEX 30 MIN: CPT

## 2025-03-24 PROCEDURE — 82962 GLUCOSE BLOOD TEST: CPT

## 2025-03-24 PROCEDURE — 2500000003 HC RX 250 WO HCPCS: Performed by: INTERNAL MEDICINE

## 2025-03-24 PROCEDURE — 6360000002 HC RX W HCPCS: Performed by: INTERNAL MEDICINE

## 2025-03-24 PROCEDURE — 6370000000 HC RX 637 (ALT 250 FOR IP): Performed by: INTERNAL MEDICINE

## 2025-03-24 RX ADMIN — ARFORMOTEROL TARTRATE: 15 SOLUTION RESPIRATORY (INHALATION) at 07:33

## 2025-03-24 RX ADMIN — FAMOTIDINE 20 MG: 20 TABLET, FILM COATED ORAL at 09:37

## 2025-03-24 RX ADMIN — SODIUM CHLORIDE, PRESERVATIVE FREE 10 ML: 5 INJECTION INTRAVENOUS at 09:37

## 2025-03-24 RX ADMIN — BUMETANIDE 1 MG: 1 TABLET ORAL at 09:37

## 2025-03-24 RX ADMIN — HEPARIN SODIUM 5000 UNITS: 5000 INJECTION INTRAVENOUS; SUBCUTANEOUS at 09:37

## 2025-03-24 NOTE — PROGRESS NOTES
Discharge instructions reviewed with patient and wife  Personal belongings returned: yes  Home meds returned: n/a  To front entrance via wheelchair  Discharged home  @ [phone]

## 2025-03-24 NOTE — PLAN OF CARE
Problem: Occupational Therapy - Adult  Goal: By Discharge: Performs self-care activities at highest level of function for planned discharge setting.  See evaluation for individualized goals.  Description: FUNCTIONAL STATUS PRIOR TO ADMISSION:  Patient was ambulatory using three wheeled walker. Notes multiple SNF stays due to sudden onset of LE weakness. Pt was IND with ADLS/IADLS and driving. Notes no falls but a feeling weakness in BLEs.   Receives Help From: Family, Prior Level of Assist for ADLs: Independent,  Prior Level of Assist for Homemaking: Independent,  ,  , Active : Yes     HOME SUPPORT: Patient lived alone with wife to provide assistance.-spouse has her own medical issues as well     Occupational Therapy Goals:  Initiated 3/24/2025  1.  Patient will perform bathing with Minimal Assist within 7 day(s).  2.  Patient will perform upper body dressing with Set-up within 7 day(s).  3.  Patient will perform lower body dressing with Minimal Assist within 7 day(s).  4.  Patient will perform toilet transfers with Contact Guard Assist  within 7 day(s).  5.  Patient will perform all aspects of toileting with Contact Guard Assist within 7 day(s).  6.  Patient will participate in upper extremity therapeutic exercise/activities with Modified [place] for 5 minutes within 7 day(s).    7.  Patient will utilize energy conservation techniques during functional activities with verbal and visual cues within 7 day(s).   3/24/2025 1244 by Carmella Edgar, OT  Outcome: Progressing   OCCUPATIONAL THERAPY EVALUATION    Patient: Naveen Alvarez (89 y.o. male)  Date: 3/24/2025  Primary Diagnosis: Acute kidney injury superimposed on chronic kidney disease [N17.9, N18.9]         Precautions: Fall Risk    ASSESSMENT :  The patient is limited by decreased functional mobility, independence in ADLs, high-level IADLs, ROM, strength, body mechanics, activity tolerance, endurance, safety awareness, balance.    Based on the

## 2025-03-24 NOTE — PLAN OF CARE
Problem: Chronic Conditions and Co-morbidities  Goal: Patient's chronic conditions and co-morbidity symptoms are monitored and maintained or improved  3/24/2025 1042 by Carmella Lee LPN  Outcome: Progressing  Flowsheets (Taken 3/24/2025 1042)  Care Plan - Patient's Chronic Conditions and Co-Morbidity Symptoms are Monitored and Maintained or Improved: Monitor and assess patient's chronic conditions and comorbid symptoms for stability, deterioration, or improvement  3/23/2025 2341 by Haylie Garvey RN  Outcome: Progressing     Problem: ABCDS Injury Assessment  Goal: Absence of physical injury  3/24/2025 1042 by Carmella Lee LPN  Outcome: Progressing  Flowsheets (Taken 3/24/2025 1042)  Absence of Physical Injury: Implement safety measures based on patient assessment  3/23/2025 2341 by Haylie Garvey RN  Outcome: Progressing     Problem: Skin/Tissue Integrity  Goal: Skin integrity remains intact  Description: 1.  Monitor for areas of redness and/or skin breakdown  2.  Assess vascular access sites hourly  3.  Every 4-6 hours minimum:  Change oxygen saturation probe site  4.  Every 4-6 hours:  If on nasal continuous positive airway pressure, respiratory therapy assess nares and determine need for appliance change or resting period  3/24/2025 1042 by Carmella Lee LPN  Outcome: Progressing  Flowsheets (Taken 3/24/2025 1042)  Skin Integrity Remains Intact: Monitor for areas of redness and/or skin breakdown  3/23/2025 2341 by Haylie Garvey RN  Outcome: Progressing     Problem: Safety - Adult  Goal: Free from fall injury  3/24/2025 1042 by Carmella Lee LPN  Outcome: Progressing  Flowsheets (Taken 3/24/2025 1042)  Free From Fall Injury: Instruct family/caregiver on patient safety  3/23/2025 2341 by Haylie Garvey RN  Outcome: Progressing     Problem: Pain  Goal: Verbalizes/displays adequate comfort level or baseline comfort level  3/24/2025 1042 by Carmella Lee LPN  Outcome: Progressing  Flowsheets  patient declined

## 2025-03-24 NOTE — PLAN OF CARE
Problem: Respiratory - Adult  Goal: Achieves optimal ventilation and oxygenation  3/24/2025 0730 by Emily Bell RT  Outcome: Progressing  3/23/2025 2341 by Haylie Garvey RN  Outcome: Progressing

## 2025-03-24 NOTE — PLAN OF CARE
Problem: Chronic Conditions and Co-morbidities  Goal: Patient's chronic conditions and co-morbidity symptoms are monitored and maintained or improved  3/23/2025 2341 by Haylie Garvey RN  Outcome: Progressing  3/23/2025 1226 by Leslee Bean RN  Outcome: Progressing     Problem: ABCDS Injury Assessment  Goal: Absence of physical injury  3/23/2025 2341 by Haylie Garvey RN  Outcome: Progressing  3/23/2025 1226 by Leslee Bean RN  Outcome: Progressing     Problem: Skin/Tissue Integrity  Goal: Skin integrity remains intact  Description: 1.  Monitor for areas of redness and/or skin breakdown  2.  Assess vascular access sites hourly  3.  Every 4-6 hours minimum:  Change oxygen saturation probe site  4.  Every 4-6 hours:  If on nasal continuous positive airway pressure, respiratory therapy assess nares and determine need for appliance change or resting period  3/23/2025 2341 by Haylie Garvey RN  Outcome: Progressing  3/23/2025 1226 by Leslee Bean RN  Outcome: Progressing     Problem: Safety - Adult  Goal: Free from fall injury  3/23/2025 2341 by Haylie Garvey RN  Outcome: Progressing  3/23/2025 1226 by Leslee Bean RN  Outcome: Progressing     Problem: Pain  Goal: Verbalizes/displays adequate comfort level or baseline comfort level  3/23/2025 2341 by Haylie Garvey RN  Outcome: Progressing  3/23/2025 1226 by Leslee Bean RN  Outcome: Progressing     Problem: Respiratory - Adult  Goal: Achieves optimal ventilation and oxygenation  3/23/2025 2341 by Haylie Garvey RN  Outcome: Progressing  3/23/2025 1425 by Starr German, RT  Outcome: Progressing

## 2025-03-24 NOTE — CARE COORDINATION
Care Management Initial Assessment       RUR: 12% Moderate  Readmission? No  1st IM letter given? Yes - 03/24/2025  1st  letter given: No       03/24/25 1614   Service Assessment   Patient Orientation Alert and Oriented;Person;Place;Situation;Self   Cognition Alert   History Provided By Patient;Significant Other   Primary Caregiver Self   Support Systems Spouse/Significant Other   Patient's Healthcare Decision Maker is: Named in Scanned ACP Document   PCP Verified by CM Yes   Last Visit to PCP Within last 3 months   Prior Functional Level Cooking;Housework;Shopping;Mobility   Current Functional Level Assistance with the following:;Cooking;Housework;Shopping;Mobility   Can patient return to prior living arrangement Yes   Ability to make needs known: Good   Family able to assist with home care needs: Yes   Would you like for me to discuss the discharge plan with any other family members/significant others, and if so, who? No   Financial Resources Medicare   Community Resources None   Social/Functional History   Lives With Spouse   Type of Home House   Home Layout Two level;Able to Live on Main level with bedroom/bathroom   Home Access Stairs to enter with rails   Entrance Stairs - Number of Steps 3   Entrance Stairs - Rails Both   Bathroom Toilet Standard   Bathroom Equipment Grab bars in shower;Shower chair   Home Equipment Grab bars;Walker - Rolling;Cane   Receives Help From Family   Prior Level of Assist for ADLs Independent   Prior Level of Assist for Homemaking Needs assistance   Meal Prep Moderate   Laundry Moderate   Vacuuming Moderate   Cleaning Moderate   Gardening Maximal   Yard Work Maximal   Shopping Moderate   Homemaking Responsibilities No   Ambulation Assistance Needs assistance   Prior Level of Assist for Transfers Needs assistance   Active  Yes   Mode of Transportation Car   Discharge Planning   Type of Home Care Services None     Spoke with patient and wife and bedside. Patient was able

## 2025-03-25 ENCOUNTER — TELEPHONE (OUTPATIENT)
Age: 89
End: 2025-03-25

## 2025-03-25 NOTE — TELEPHONE ENCOUNTER
Care Transitions Initial Follow Up Call    Outreach made within 2 business days of discharge: Yes    Patient: Naveen Alvarez Patient : 1935   MRN: [id number]  Reason for Admission: Kidney injury  Discharge Date: 3/24/25       Spoke with: patient     Discharge department/facility: [facility] Interactive Patient Contact:  Was patient able to fill all prescriptions: Yes  Was patient instructed to bring all medications to the follow-up visit: Yes  Is patient taking all medications as directed in the discharge summary? Yes  Does patient understand their discharge instructions: Yes  Does patient have questions or concerns that need addressed prior to 7-14 day follow up office visit: no    Additional needs identified to be addressed with provider  No needs identified             Scheduled appointment with PCP within 7-14 days    Follow Up  Future Appointments   Date Time Provider Department Center   3/28/2025  2:15 PM Mohit Franco MD [facility]   3/31/2025  2:20 PM Jose Harrell MD [facility]   2025  1:30 PM Mohit Franco MD [facility] DEP       Fani Hassan MA

## 2025-03-26 PROBLEM — I35.0 NONRHEUMATIC AORTIC VALVE STENOSIS: Status: ACTIVE | Noted: 2025-03-26

## 2025-03-26 PROBLEM — I34.0 NONRHEUMATIC MITRAL VALVE REGURGITATION: Status: ACTIVE | Noted: 2025-03-26

## 2025-03-26 NOTE — PROGRESS NOTES
ASSESSMENT and PLAN  1. Nonrheumatic aortic valve stenosis  Mixed aortic valve disease with moderate AS (peak 3.1 m/s, mean 24 mmHg, CORIE 1.3 cm², DI 0.35, LVSVI 33 mL/m²) and mild AR on echo 2/29/2024.  Schedule follow-up echo.    2. Nonrheumatic mitral valve regurgitation  Stable moderate MR on echo 2/29/2024.  Schedule follow-up echo.    3. Chronic diastolic heart failure (HCC)  Except for bilateral ankle edema, he does not appear grossly volume overloaded.  His edema is likely multifactorial.  I recommended continuing daily Bumex, leg elevation and daily use of knee-high 20-30 mmHg compression stockings.    4. Primary hypertension  Well-controlled.  Continue current medications.    5. Stage 3a chronic kidney disease (HCC)  Creatinine 1.45 and GFR 46 on labs 3/23/2025.       We will contact him with the results of his echo and any additional recommendations.  The patient has been instructed and agrees to call our office with any issues or other concerns related to their cardiac condition(s) and/or complaint(s).      CHIEF COMPLAINT  Here to reestablish cardiac care    HPI:    Naveen Alvarez is a 89 y.o. male referred by Dr. Franco to reestablish cardiac care.  He was previously followed by Dr. Morrison but was last seen on 7/28/2021.  Has a history of valvular heart disease and chronic HFpEF.  He was hospitalized 2/27/2024 - 3/6/2024 for generalized weakness and DAMIR and Demadex was switched to Bumex.  He saw Dr. Franco on 2/27/2025 and was experiencing lower extremity edema but he was not taking his Bumex consistently.  He was admitted to [facility] 3/22/2025 with generalized weakness, DAMIR, bilateral lower extremity weakness and ambulatory dysfunction. He improved with hydration and his declined admission to a skilled facility and outpatient PT was resumed.  He was discharged on 3/20/2025.  Creatinine returned to baseline.  He is taking Bumex consistently.  He states his swelling is improved but persist.  He

## 2025-03-31 ENCOUNTER — OFFICE VISIT (OUTPATIENT)
Age: 89
End: 2025-03-31
Payer: MEDICARE

## 2025-03-31 VITALS
DIASTOLIC BLOOD PRESSURE: 70 MMHG | SYSTOLIC BLOOD PRESSURE: 110 MMHG | OXYGEN SATURATION: 93 % | WEIGHT: 197 LBS | HEART RATE: 108 BPM | TEMPERATURE: 97 F | HEIGHT: 72 IN | BODY MASS INDEX: 26.68 KG/M2

## 2025-03-31 DIAGNOSIS — I50.32 CHRONIC DIASTOLIC HEART FAILURE (HCC): ICD-10-CM

## 2025-03-31 DIAGNOSIS — N18.31 STAGE 3A CHRONIC KIDNEY DISEASE (HCC): ICD-10-CM

## 2025-03-31 DIAGNOSIS — I34.0 NONRHEUMATIC MITRAL VALVE REGURGITATION: ICD-10-CM

## 2025-03-31 DIAGNOSIS — I35.0 NONRHEUMATIC AORTIC VALVE STENOSIS: Primary | ICD-10-CM

## 2025-03-31 DIAGNOSIS — I10 PRIMARY HYPERTENSION: ICD-10-CM

## 2025-03-31 PROCEDURE — 4004F PT TOBACCO SCREEN RCVD TLK: CPT | Performed by: INTERNAL MEDICINE

## 2025-03-31 PROCEDURE — 1123F ACP DISCUSS/DSCN MKR DOCD: CPT | Performed by: INTERNAL MEDICINE

## 2025-03-31 PROCEDURE — 1126F AMNT PAIN NOTED NONE PRSNT: CPT | Performed by: INTERNAL MEDICINE

## 2025-03-31 PROCEDURE — 93000 ELECTROCARDIOGRAM COMPLETE: CPT | Performed by: INTERNAL MEDICINE

## 2025-03-31 PROCEDURE — G8428 CUR MEDS NOT DOCUMENT: HCPCS | Performed by: INTERNAL MEDICINE

## 2025-03-31 PROCEDURE — 1111F DSCHRG MED/CURRENT MED MERGE: CPT | Performed by: INTERNAL MEDICINE

## 2025-03-31 PROCEDURE — G8419 CALC BMI OUT NRM PARAM NOF/U: HCPCS | Performed by: INTERNAL MEDICINE

## 2025-03-31 PROCEDURE — 99204 OFFICE O/P NEW MOD 45 MIN: CPT | Performed by: INTERNAL MEDICINE

## 2025-03-31 ASSESSMENT — PATIENT HEALTH QUESTIONNAIRE - PHQ9
SUM OF ALL RESPONSES TO PHQ QUESTIONS 1-9: 0
2. FEELING DOWN, DEPRESSED OR HOPELESS: NOT AT ALL
1. LITTLE INTEREST OR PLEASURE IN DOING THINGS: NOT AT ALL
SUM OF ALL RESPONSES TO PHQ QUESTIONS 1-9: 0

## 2025-03-31 NOTE — PATIENT INSTRUCTIONS
I have ordered an echo for reevaluation of your heart valves.  We will contact you with the results.

## 2025-03-31 NOTE — PROGRESS NOTES
Identified pt with two pt identifiers(name and ). Reviewed record in preparation for visit and have obtained necessary documentation.  Chief Complaint   Patient presents with    New Patient    Congestive Heart Failure    Valvular Heart Disease    Hypertension      /70 (BP Site: Left Upper Arm, Patient Position: Sitting, BP Cuff Size: Medium Adult)   Pulse (!) 108   Temp 97 °F (36.1 °C) (Temporal)   Ht 1.829 m (6')   Wt 89.4 kg (197 lb)   SpO2 93%   BMI 26.72 kg/m²       Medications reviewed/approved by provider.      Health Maintenance Review: Patient reminded of \"due or due soon\" health maintenance. I have asked the patient to contact his/her primary care provider (PCP) for follow-up on his/her health maintenance.    Coordination of Care Questionnaire:  :   1) Have you been to an emergency room, urgent care, or hospitalized since your last visit?  If yes, where when, and reason for visit? no       2. Have seen or consulted any other health care provider since your last visit?   If yes, where when, and reason for visit?  no      Patient is accompanied by spouse I have received verbal consent from Naveen Alvarez to discuss any/all medical information while they are present in the room.

## 2025-04-03 ENCOUNTER — OFFICE VISIT (OUTPATIENT)
Age: 89
End: 2025-04-03

## 2025-04-03 VITALS
RESPIRATION RATE: 18 BRPM | WEIGHT: 202.4 LBS | HEART RATE: 94 BPM | SYSTOLIC BLOOD PRESSURE: 107 MMHG | OXYGEN SATURATION: 97 % | BODY MASS INDEX: 27.41 KG/M2 | DIASTOLIC BLOOD PRESSURE: 61 MMHG | HEIGHT: 72 IN | TEMPERATURE: 100.5 F

## 2025-04-03 DIAGNOSIS — Z09 HOSPITAL DISCHARGE FOLLOW-UP: ICD-10-CM

## 2025-04-03 DIAGNOSIS — M48.061 SPINAL STENOSIS AT L4-L5 LEVEL: ICD-10-CM

## 2025-04-03 DIAGNOSIS — J41.1 MUCOPURULENT CHRONIC BRONCHITIS (HCC): Primary | ICD-10-CM

## 2025-04-03 NOTE — PROGRESS NOTES
Naveen Alvarez is a 89 y.o. male presenting for/with:    Chief Complaint   Patient presents with    Follow-up       Vitals:    04/03/25 1537   BP: 107/61   BP Site: Left Upper Arm   Patient Position: Sitting   BP Cuff Size: Medium Adult   Pulse: 94   Resp: 18   Temp: (!) 100.5 °F (38.1 °C)   TempSrc: Temporal   SpO2: 97%   Weight: 91.8 kg (202 lb 6.4 oz)   Height: 1.829 m (6')       Pain Scale: /10  Pain Location:     \"Have you been to the ER, urgent care clinic since your last visit?  Hospitalized since your last visit?\"    NO    “Have you seen or consulted any other health care providers outside of [facility] since your last visit?”    NO                 3/31/2025     2:10 PM   PHQ-9    Little interest or pleasure in doing things 0   Feeling down, depressed, or hopeless 0   PHQ-2 Score 0   PHQ-9 Total Score 0           3/31/2025     2:20 PM 2/27/2025     1:40 PM 3/13/2024    11:50 AM 6/8/2023    10:30 AM 9/23/2021    12:00 AM   [facility] AMB LEARNING ASSESSMENT   Primary Learner Patient Patient Patient Patient Patient   Primary Language ENGLISH ENGLISH ENGLISH ENGLISH ENGLISH   Learning Preference DEMONSTRATION DEMONSTRATION DEMONSTRATION DEMONSTRATION READING   Answered By PT patient patient self patient   Relationship to Learner SELF SELF SELF SELF SELF            4/3/2025     3:31 PM   Amb Fall Risk Assessment and TUG Test   Do you feel unsteady or are you worried about falling?  no   2 or more falls in past year? no   Fall with injury in past year? no           4/3/2025     3:00 PM 2/27/2025     1:00 PM 10/25/2024     1:00 PM 4/18/2024    10:00 AM 3/13/2024    11:00 AM 1/23/2024     9:00 AM 10/19/2023    10:00 AM   ADL ASSESSMENT   Feeding yourself No Help Needed No Help Needed No Help Needed No Help Needed No Help Needed No Help Needed No Help Needed   Getting from bed to chair No Help Needed No Help Needed No Help Needed No Help Needed No Help Needed No Help Needed No Help Needed   Getting dressed 
sense of well-being is reported, with the exception of persistent weakness in the legs. An incident at [facility] is recounted, where significant difficulty walking was experienced after traversing the store. The condition remained unchanged during the hospital stay, where confinement to bed rest was largely enforced. No issues with eating are noted, and no medication refills are required at this time. Continuation of physical therapy post-hospitalization has been advised, a recommendation he is open to.    Respiratory function appears stable, although a severe cough is experienced upon waking each morning, necessitating medication for relief. Consultation with Dr. Walker, a cardiologist, has resulted in a recommendation for an echocardiogram, scheduled for the upcoming Tuesday.           No Known Allergies    Outpatient Encounter Medications as of 4/3/2025   Medication Sig Dispense Refill    famotidine (PEPCID) 40 MG tablet TAKE 1/2 TABLET TWICE A DAY BY MOUTH 90 tablet 4    lansoprazole (PREVACID) 30 MG delayed release capsule TAKE 1 CAPSULE BY MOUTH DAILY (BEFORE BREAKFAST). 90 capsule 4    atorvastatin (LIPITOR) 40 MG tablet TAKE 1 TABLET BY MOUTH EVERY DAY 90 tablet 4    trospium (SANCTURA) 20 MG tablet TAKE 1 TABLET BY MOUTH TWICE A DAY BEFORE MEALS 180 tablet 2    potassium chloride (KLOR-CON) 10 MEQ extended release tablet TAKE 1 TABLET BY MOUTH EVERY DAY 90 tablet 4    bumetanide (BUMEX) 1 MG tablet TAKE 1 TABLET BY MOUTH EVERY DAY 90 tablet 1    fluticasone-salmeterol (ADVAIR DISKUS) 250-50 MCG/ACT AEPB diskus inhaler Indications: lungs TAKE 1 PUFF BY MOUTH TWICE A DAY 3 each 3    triamcinolone (KENALOG) 0.1 % cream Apply topically 2 times daily. 60 g 0    cyanocobalamin 100 MCG tablet Take 1 tablet by mouth daily       No facility-administered encounter medications on file as of 4/3/2025.         Past Medical History:   Diagnosis Date    Cataract 07/13/2017    Chronic diastolic heart failure (HCC)     CKD

## 2025-04-06 DIAGNOSIS — N18.31 STAGE 3A CHRONIC KIDNEY DISEASE (HCC): ICD-10-CM

## 2025-04-06 RX ORDER — BUMETANIDE 1 MG/1
1 TABLET ORAL DAILY
Qty: 90 TABLET | Refills: 1 | Status: SHIPPED | OUTPATIENT
Start: 2025-04-06

## 2025-04-08 ENCOUNTER — RESULTS FOLLOW-UP (OUTPATIENT)
Age: 89
End: 2025-04-08

## 2025-04-08 ENCOUNTER — HOSPITAL ENCOUNTER (OUTPATIENT)
Facility: HOSPITAL | Age: 89
Discharge: HOME | End: 2025-04-11
Attending: INTERNAL MEDICINE
Payer: MEDICARE

## 2025-04-08 DIAGNOSIS — I34.0 NONRHEUMATIC MITRAL VALVE REGURGITATION: ICD-10-CM

## 2025-04-08 DIAGNOSIS — I35.0 NONRHEUMATIC AORTIC VALVE STENOSIS: ICD-10-CM

## 2025-04-08 LAB
ECHO AO ASC DIAM: 3.6 CM
ECHO AO ROOT DIAM: 2.3 CM
ECHO AO SINUS VALSALVA DIAM: 3.2 CM
ECHO AV AREA PEAK VELOCITY: 0.9 CM2
ECHO AV AREA VTI: 0.9 CM2
ECHO AV MEAN GRADIENT: 28 MMHG
ECHO AV MEAN VELOCITY: 2.5 M/S
ECHO AV PEAK GRADIENT: 48 MMHG
ECHO AV PEAK VELOCITY: 3.5 M/S
ECHO AV VELOCITY RATIO: 0.34
ECHO AV VTI: 67.9 CM
ECHO BSA: 2.14 M2
ECHO EST RA PRESSURE: 3 MMHG
ECHO LA DIAMETER: 3.9 CM
ECHO LA MAJOR AXIS: 6.1 CM
ECHO LA MINOR AXIS: 3.6 CM
ECHO LA TO AORTIC ROOT RATIO: 1.7
ECHO LA VOL A-L A2C: 97 ML (ref 18–58)
ECHO LA VOL A-L A4C: 64 ML (ref 18–58)
ECHO LA VOL MOD A2C: 92 ML (ref 18–58)
ECHO LA VOL MOD A4C: 58 ML (ref 18–58)
ECHO LA VOLUME AREA LENGTH: 82 ML
ECHO LV E' LATERAL VELOCITY: 9.19 CM/S
ECHO LV E' SEPTAL VELOCITY: 5.94 CM/S
ECHO LV EF PHYSICIAN: 65 %
ECHO LV FRACTIONAL SHORTENING: 44 % (ref 28–44)
ECHO LV INTERNAL DIMENSION DIASTOLIC: 4.5 CM (ref 4.2–5.9)
ECHO LV INTERNAL DIMENSION SYSTOLIC: 2.5 CM
ECHO LV IVSD: 0.8 CM (ref 0.6–1)
ECHO LV MASS 2D: 123.1 G (ref 88–224)
ECHO LV POSTERIOR WALL DIASTOLIC: 0.9 CM (ref 0.6–1)
ECHO LV RELATIVE WALL THICKNESS RATIO: 0.4
ECHO LVOT AREA: 2.8 CM2
ECHO LVOT AV VTI INDEX: 0.32
ECHO LVOT DIAM: 1.9 CM
ECHO LVOT MEAN GRADIENT: 3 MMHG
ECHO LVOT PEAK GRADIENT: 6 MMHG
ECHO LVOT PEAK VELOCITY: 1.2 M/S
ECHO LVOT SV: 61.8 ML
ECHO LVOT VTI: 21.8 CM
ECHO MV A VELOCITY: 1.56 M/S
ECHO MV AREA PHT: 2.7 CM2
ECHO MV AREA VTI: 1.7 CM2
ECHO MV E DECELERATION TIME (DT): 232.5 MS
ECHO MV E VELOCITY: 1.02 M/S
ECHO MV E/A RATIO: 0.65
ECHO MV E/E' LATERAL: 11.1
ECHO MV E/E' RATIO (AVERAGED): 14.14
ECHO MV E/E' SEPTAL: 17.17
ECHO MV LVOT VTI INDEX: 1.67
ECHO MV MAX VELOCITY: 1.7 M/S
ECHO MV MEAN GRADIENT: 5 MMHG
ECHO MV MEAN VELOCITY: 1 M/S
ECHO MV PEAK GRADIENT: 12 MMHG
ECHO MV PRESSURE HALF TIME (PHT): 83 MS
ECHO MV VTI: 36.4 CM
ECHO PULMONARY ARTERY SYSTOLIC PRESSURE (PASP): 26 MMHG
ECHO PV MAX VELOCITY: 0.7 M/S
ECHO PV PEAK GRADIENT: 2 MMHG
ECHO RA AREA 4C: 15.3 CM2
ECHO RA VOLUME: 31 ML
ECHO RA VOLUME: 32 ML
ECHO RIGHT VENTRICULAR SYSTOLIC PRESSURE (RVSP): 26 MMHG
ECHO RV BASAL DIMENSION: 3.4 CM
ECHO RV FREE WALL PEAK S': 20.6 CM/S
ECHO RV TAPSE: 2.5 CM (ref 1.7–?)
ECHO TV REGURGITANT MAX VELOCITY: 2.39 M/S
ECHO TV REGURGITANT PEAK GRADIENT: 23 MMHG

## 2025-04-08 PROCEDURE — 93306 TTE W/DOPPLER COMPLETE: CPT

## 2025-04-08 NOTE — RESULT ENCOUNTER NOTE
The echo demonstrated moderate narrowing of the aortic valve not significantly changed from the previous study on 2/29/2024.  The heart function is normal.  There was less mitral valve leakiness on the current study.  There were otherwise no concerning findings.  Continue current therapy.  We will repeat the echo in 1 year.

## 2025-04-09 NOTE — LETTER
[employer], an artificial intelligence clinical decision support software, has identified that Your patient, MANSI TERESA (08/07/1935), meets the criteria for Moderate and/or Severe Aortic Stenosis based on the echo performed on 04/08/2025. Per* ACC/AHA's Valvular Heart disease guidelines, an intervention may be indicated. A referral requires a multifactorial decision outside the purview of the algorithm. For determining appropriate referral, please evaluate the patientâ€™s record.  If you find a referral is necessary and the patient does not already have a cardiologist, Xochilt Lozano, the [facility] Structural Heart Valve Coordinator will be happy to assist your office in connecting this patient with the [facility] multidisciplinary heart team for further evaluation. To do so, please either send an [email or web address] referral to Dr. Franklyn Pringle, an EPIC message to Xochilt Lozano or you can call [phone].  We appreciate your partnership in providing our patients the best cardiac care possible.  You are receiving this notification as part of a quality initiative using the power of artificial intelligence and the electronic health record to improve patient outcomes. Note: It is the clinicianâ€™s decision and ultimate responsibility whether to not refer if clinically indicated to do so. [employer] is intended for use by a qualified healthcare professional.

## 2025-04-28 ENCOUNTER — HOSPITAL ENCOUNTER (OUTPATIENT)
Facility: HOSPITAL | Age: 89
Setting detail: RECURRING SERIES
Discharge: HOME | End: 2025-05-01
Payer: MEDICARE

## 2025-04-28 ENCOUNTER — HOSPITAL ENCOUNTER (OUTPATIENT)
Facility: HOSPITAL | Age: 89
Setting detail: RECURRING SERIES
End: 2025-04-28
Payer: MEDICARE

## 2025-04-28 PROCEDURE — 97530 THERAPEUTIC ACTIVITIES: CPT

## 2025-04-28 PROCEDURE — 97161 PT EVAL LOW COMPLEX 20 MIN: CPT

## 2025-04-28 PROCEDURE — 97110 THERAPEUTIC EXERCISES: CPT

## 2025-04-28 NOTE — THERAPY EVALUATION
[facility]  [address] [place] [address]  Office [phone]  Fax [phone]       PHYSICAL THERAPY - MEDICARE EVALUATION/PLAN OF CARE NOTE (updated 3/23)      Date: 2025          Patient Name:  Naveen Alvarez :  1935   Medical   Diagnosis:  Spinal stenosis at L4-L5 level [M48.061] Treatment Diagnosis:  M62.81  GENERAL MUSCLE WEAKNESS, R26.81   Unsteadiness on feet, and R26.89   Abnormalities of gait and mobility    Referral Source:  Mohit Franco MD Insurance:  Payor: MEDICARE / Plan: MEDICARE PART A AND B / Product Type: *No Product type* /             Patient  verified yes     Visit #   Current  / Total 1 16     SUBJECTIVE    Pain Level (0-10 scale): 0  []constant []intermittent []improving []worsening []no change since onset     Any medication changes, allergies to medications, adverse drug reactions, diagnosis change, or new procedure performed?: [x] No    [] Yes (see summary sheet for update)  Medications: Verified on Patient Summary List     Subjective functional status/changes:    My body is stiff   Patient reports his legs are extremely weak, he has challenges getting out of a chair and off the toilet seat (elevated)  Patient reports challenges walking  Intermittent neck and shoulder pain L side    Patient seen for 1 OP visit 3/6/2025, then patient hospitalized due to not be able to walk- it resolved, and now patient is returning to skilled PT     Onset Date: years  Start of Care: 2025  PLOF: ambulates with a tripod rollator,drive  Comorbidities:arthritis  Living Situation: with spouse  Pt Goals: get out of a chair easier, walk better, strengthen legs    OBJECTIVE      Posture:  forward head and shoulders, stands with wide DONNA  Other Observations:  swelling bilateral lower legs/ankles (has an appt with cardiologist scheduled)  Gait and Functional Mobility:  ambulates independently with rollator, hips and knees

## 2025-05-01 ENCOUNTER — HOSPITAL ENCOUNTER (OUTPATIENT)
Facility: HOSPITAL | Age: 89
Setting detail: RECURRING SERIES
Discharge: HOME | End: 2025-05-04
Payer: MEDICARE

## 2025-05-01 PROCEDURE — 97530 THERAPEUTIC ACTIVITIES: CPT

## 2025-05-01 PROCEDURE — 97110 THERAPEUTIC EXERCISES: CPT

## 2025-05-01 NOTE — PROGRESS NOTES
balance, and proprioception in order to improve patient's ability to progress to PLOF and address remaining functional goals.  (see flow sheet as applicable)     Details if applicable:    Sit to stand 2x5 without UE from mat, elevated, working on standing balance  Working on upright posture and foot clearance with ambulation  Chair to chair transfers working on increasing foot clearance                  45 45    Total Total       [x]  Patient Education billed concurrently with other procedures   [x] Review HEP    [] Progressed/Changed HEP, detail:    [] Other detail:           Pain Level at end of session (0-10 scale): 0-3      Assessment   Patient continues to benefit from skilled PT, he shuffles his feet when ambulating.  Continues to benefit from safety awareness training.    Patient will continue to benefit from skilled PT / OT services to modify and progress therapeutic interventions, analyze and address functional mobility deficits, analyze and address ROM deficits, analyze and address strength deficits, and analyze and cue for proper movement patterns to address functional deficits and attain remaining goals.    Progress toward goals / Updated goals:  []  See Progress Note/Recertification    Working towards goals      PLAN  Yes  Continue plan of care  Re-Cert Due: 7/28/2025  [x]  Upgrade activities as tolerated  []  Discharge due to :  []  Other:      Katlin Salazar, PT       5/1/2025       8:24 AM

## 2025-05-05 ENCOUNTER — HOSPITAL ENCOUNTER (OUTPATIENT)
Facility: HOSPITAL | Age: 89
Setting detail: RECURRING SERIES
Discharge: HOME | End: 2025-05-08
Payer: MEDICARE

## 2025-05-05 PROCEDURE — 97530 THERAPEUTIC ACTIVITIES: CPT

## 2025-05-05 PROCEDURE — 97112 NEUROMUSCULAR REEDUCATION: CPT

## 2025-05-05 PROCEDURE — 97110 THERAPEUTIC EXERCISES: CPT

## 2025-05-07 ENCOUNTER — HOSPITAL ENCOUNTER (OUTPATIENT)
Facility: HOSPITAL | Age: 89
Setting detail: RECURRING SERIES
Discharge: HOME | End: 2025-05-10
Payer: MEDICARE

## 2025-05-07 PROCEDURE — 97110 THERAPEUTIC EXERCISES: CPT

## 2025-05-07 PROCEDURE — 97530 THERAPEUTIC ACTIVITIES: CPT

## 2025-05-07 NOTE — PROGRESS NOTES
(timed):  use of dynamic activities replicating functional movements to increase ROM, strength, coordination, balance, and proprioception in order to improve patient's ability to progress to PLOF and address remaining functional goals.  (see flow sheet as applicable)     Details if applicable:    Sit to stand 2x5 without UE from mat, elevated, working on standing balance  Working on upright posture and foot clearance with ambulation                    45 45    Total Total       [x]  Patient Education billed concurrently with other procedures   [x] Review HEP    [] Progressed/Changed HEP, detail:    [] Other detail:           Pain Level at end of session (0-10 scale): 0      Assessment   Patient continues to benefit from skilled PT, he shuffles his feet when ambulating.  Continues to benefit from safety awareness training.    Patient will continue to benefit from skilled PT / OT services to modify and progress therapeutic interventions, analyze and address functional mobility deficits, analyze and address ROM deficits, analyze and address strength deficits, and analyze and cue for proper movement patterns to address functional deficits and attain remaining goals.    Progress toward goals / Updated goals:  []  See Progress Note/Recertification    Working towards goals      PLAN  Yes  Continue plan of care  Re-Cert Due: 7/28/2025  [x]  Upgrade activities as tolerated  []  Discharge due to :  []  Other:      Katlin Salazar, PT       5/7/2025       2:39 PM

## 2025-05-12 ENCOUNTER — HOSPITAL ENCOUNTER (OUTPATIENT)
Facility: HOSPITAL | Age: 89
Setting detail: RECURRING SERIES
Discharge: HOME | End: 2025-05-15
Payer: MEDICARE

## 2025-05-12 PROCEDURE — 97530 THERAPEUTIC ACTIVITIES: CPT

## 2025-05-12 PROCEDURE — 97110 THERAPEUTIC EXERCISES: CPT

## 2025-05-12 NOTE — PROGRESS NOTES
PHYSICAL THERAPY - MEDICARE DAILY TREATMENT NOTE (updated 3/23)      Date: 2025          Patient Name:  aNveen Alvarez :  1935   Medical   Diagnosis:  Spinal stenosis at L4-L5 level [M48.061] Treatment Diagnosis:  M62.81  GENERAL MUSCLE WEAKNESS, R26.81   Unsteadiness on feet, and R26.89   Abnormalities of gait and mobility    Referral Source:  Mohit Franco MD Insurance:   Payor: MEDICARE / Plan: MEDICARE PART A AND B / Product Type: *No Product type* /                     Patient  verified yes     Visit #   Current  / Total 4 16   Time   In / Out 1400 1445   Total Treatment Time 45   Total Timed Codes 45   1:1 Treatment Time 45      [facility] Totals Reminder:  bill using total billable   min of TIMED therapeutic procedures and modalities.   8-22 min = 1 unit; 23-37 min = 2 units; 38-52 min = 3 units; 53-67 min = 4 units; 68-82 min = 5 units          SUBJECTIVE    Pain Level (0-10 scale): 0    Any medication changes, allergies to medications, adverse drug reactions, diagnosis change, or new procedure performed?: [x] No    [] Yes (see summary sheet for update)  Medications: Verified on Patient Summary List    Subjective functional status/changes:     My legs feel heavy the last couple of days    OBJECTIVE      Therapeutic Procedures:  Tx Min Billable or 1:1 Min (if diff from Tx Min) Procedure, Rationale, Specifics   35 35 01819 Therapeutic Exercise (timed):  increase ROM, strength, coordination, balance, and proprioception to improve patient's ability to progress to PLOF and address remaining functional goals. (see flow sheet as applicable)     Details if applicable:    Supine- small ex ball presses while hooklying and tightening abdominals 2x10  Supine SAQ 1# 3x10  Supine SLR 2x10  Focusing on the quad contraction  Supine with LE on peanut ball bridging 3x10  Supine hip adduction with red ball 30x  Nu step L 2 focusing on knee extension 10'  Sitting hip abduction with GTB 3x10   10 10 [id number]

## 2025-05-14 ENCOUNTER — APPOINTMENT (OUTPATIENT)
Facility: HOSPITAL | Age: 89
End: 2025-05-14
Payer: MEDICARE

## 2025-05-20 ENCOUNTER — HOSPITAL ENCOUNTER (OUTPATIENT)
Facility: HOSPITAL | Age: 89
Setting detail: RECURRING SERIES
Discharge: HOME | End: 2025-05-23
Payer: MEDICARE

## 2025-05-20 PROCEDURE — 97110 THERAPEUTIC EXERCISES: CPT

## 2025-05-20 PROCEDURE — 97530 THERAPEUTIC ACTIVITIES: CPT

## 2025-05-20 NOTE — PROGRESS NOTES
functional movements to increase ROM, strength, coordination, balance, and proprioception in order to improve patient's ability to progress to PLOF and address remaining functional goals.  (see flow sheet as applicable)     Details if applicable:      Sit to stand 2x5 without UE from mat, elevated, working on standing balance  Working on upright posture and foot clearance with ambulation                    45 45    Total Total       [x]  Patient Education billed concurrently with other procedures   [x] Review HEP    [] Progressed/Changed HEP, detail:    [] Other detail:           Pain Level at end of session (0-10 scale): 0      Assessment    Patient continues to benefit from skilled PT to increase his mobility. Verbal cues to increase upright posture with ambulation.  Patient will continue to benefit from skilled PT / OT services to modify and progress therapeutic interventions, analyze and address functional mobility deficits, analyze and address ROM deficits, analyze and address strength deficits, and analyze and cue for proper movement patterns to address functional deficits and attain remaining goals.    Progress toward goals / Updated goals:  []  See Progress Note/Recertification    Working towards goals      PLAN  Yes  Continue plan of care  Re-Cert Due: 7/28/2025  [x]  Upgrade activities as tolerated  []  Discharge due to :  []  Other:      Katlin Salazar, PT       5/20/2025       11:16 AM

## 2025-05-22 ENCOUNTER — HOSPITAL ENCOUNTER (OUTPATIENT)
Facility: HOSPITAL | Age: 89
Setting detail: RECURRING SERIES
Discharge: HOME | End: 2025-05-25
Payer: MEDICARE

## 2025-05-22 PROCEDURE — 97110 THERAPEUTIC EXERCISES: CPT

## 2025-05-22 PROCEDURE — 97530 THERAPEUTIC ACTIVITIES: CPT

## 2025-05-22 NOTE — PROGRESS NOTES
PHYSICAL THERAPY - MEDICARE DAILY TREATMENT NOTE (updated 3/23)      Date: 2025          Patient Name:  Naveen Alvarez :  1935   Medical   Diagnosis:  Spinal stenosis at L4-L5 level [M48.061] Treatment Diagnosis:  M62.81  GENERAL MUSCLE WEAKNESS, R26.81   Unsteadiness on feet, and R26.89   Abnormalities of gait and mobility    Referral Source:  Mohit Franco MD Insurance:   Payor: MEDICARE / Plan: MEDICARE PART A AND B / Product Type: *No Product type* /                     Patient  verified yes     Visit #   Current  / Total 6 16   Time   In / Out 1400 1445   Total Treatment Time 45   Total Timed Codes 45   1:1 Treatment Time 45      [facility] Totals Reminder:  bill using total billable   min of TIMED therapeutic procedures and modalities.   8-22 min = 1 unit; 23-37 min = 2 units; 38-52 min = 3 units; 53-67 min = 4 units; 68-82 min = 5 units          SUBJECTIVE    Pain Level (0-10 scale): 0    Any medication changes, allergies to medications, adverse drug reactions, diagnosis change, or new procedure performed?: [x] No    [] Yes (see summary sheet for update)  Medications: Verified on Patient Summary List    Subjective functional status/changes:     Patient reports he is very fatigued today, he performed increased activities yesterday    OBJECTIVE      Therapeutic Procedures:  Tx Min Billable or 1:1 Min (if diff from Tx Min) Procedure, Rationale, Specifics   35 35 [id number] Therapeutic Exercise (timed):  increase ROM, strength, coordination, balance, and proprioception to improve patient's ability to progress to PLOF and address remaining functional goals. (see flow sheet as applicable)     Details if applicable:    Nu step L 2 focusing on knee extension 10'  Standing hip flex in parallel bars toe taps forward with 1# 2x10 on black bolster  Standing hip abd in parallel bars toe taps with !# 10x on black bolster  Sitting ankle DF/inv with yellow theraball 30x  Sitting hip abduction with GTB 3x10,

## 2025-05-27 ENCOUNTER — HOSPITAL ENCOUNTER (OUTPATIENT)
Facility: HOSPITAL | Age: 89
Setting detail: RECURRING SERIES
Discharge: HOME | End: 2025-05-30
Payer: MEDICARE

## 2025-05-27 PROCEDURE — 97110 THERAPEUTIC EXERCISES: CPT

## 2025-05-27 PROCEDURE — 97530 THERAPEUTIC ACTIVITIES: CPT

## 2025-05-27 NOTE — PROGRESS NOTES
PHYSICAL THERAPY - MEDICARE DAILY TREATMENT NOTE (updated 3/23)      Date: 2025          Patient Name:  Naveen Alvarez :  1935   Medical   Diagnosis:  Spinal stenosis at L4-L5 level [M48.061] Treatment Diagnosis:  M62.81  GENERAL MUSCLE WEAKNESS, R26.81   Unsteadiness on feet, and R26.89   Abnormalities of gait and mobility    Referral Source:  Mohit Franco MD Insurance:   Payor: MEDICARE / Plan: MEDICARE PART A AND B / Product Type: *No Product type* /                     Patient  verified yes     Visit #   Current  / Total 7 16   Time   In / Out 1400 1445   Total Treatment Time 45   Total Timed Codes 45   1:1 Treatment Time 45      [facility] Totals Reminder:  bill using total billable   min of TIMED therapeutic procedures and modalities.   8-22 min = 1 unit; 23-37 min = 2 units; 38-52 min = 3 units; 53-67 min = 4 units; 68-82 min = 5 units          SUBJECTIVE    Pain Level (0-10 scale): 0    Any medication changes, allergies to medications, adverse drug reactions, diagnosis change, or new procedure performed?: [x] No    [] Yes (see summary sheet for update)  Medications: Verified on Patient Summary List    Subjective functional status/changes:     Patient reports he feels better than last night    OBJECTIVE      Therapeutic Procedures:  Tx Min Billable or 1:1 Min (if diff from Tx Min) Procedure, Rationale, Specifics   20 20 05410 Therapeutic Exercise (timed):  increase ROM, strength, coordination, balance, and proprioception to improve patient's ability to progress to PLOF and address remaining functional goals. (see flow sheet as applicable)     Details if applicable:    Nu step L 2 focusing on knee extension 10'  Sitting ankle DF/inv with yellow theraball 30x  Sitting hip abduction with GTB 3x10, sitting hip flexion GTB 2x10    -------------    Focusing on the quad contraction  Supine with LE on peanut ball bridging 3x10  Supine hip adduction with towel 30x     25 25 [id number] Therapeutic

## 2025-05-27 NOTE — PROGRESS NOTES
[facility]  [address] [place] [address]  Office [phone]  Fax [phone]   PHYSICAL THERAPY PROGRESS NOTE  Patient Name:  Naveen Alvarez :  1935   Treatment/Medical Diagnosis: Spinal stenosis at L4-L5 level [M48.061]   Referral Source:  Mohit Franco MD     Date of Initial Visit:  2025 Attended Visits:  6 Missed Visits:       SUMMARY OF TREATMENT/ASSESSMENT:  Patient being seen for gait, balance, and strengthening to improve safety with mobility.    CURRENT STATUS  Patient reports he feels he is improving with therapy.  He is ambulating with a rollator in the clinic independently. TUG score 33 seconds.  Patient working towards goals.      Short Term Goals: To be accomplished in 8 treatments   Patient will be independent in a HEP to improve his safety with mobility.  30 second sit to stand score will be 3x without UE to indicate improved strength and mobility.  TUG score will be 30 seconds to indicate decreased risk of falls.     Long Term Goals: To be accomplished in 16 treatments   Patient will report he is able to stand from the toilet at home without difficulty.  30 second sit to stand score will be 5x without UE to indicate improved strength and mobility.  Patient will score 27 seconds on the TUG score to indicate decreased risk of falls.     RECOMMENDATIONS  Patient continues to benefit from skilled PT 1-2 times/week up to 16 visits.        Katlin Salazar, PT       2025       1:59 PM    If you have any questions/comments please contact us directly at [phone].   Thank you for allowing us to assist in the care of your patient.

## 2025-05-29 ENCOUNTER — HOSPITAL ENCOUNTER (OUTPATIENT)
Facility: HOSPITAL | Age: 89
Setting detail: RECURRING SERIES
End: 2025-05-29
Payer: MEDICARE

## 2025-05-29 PROCEDURE — 97110 THERAPEUTIC EXERCISES: CPT

## 2025-05-29 PROCEDURE — 97530 THERAPEUTIC ACTIVITIES: CPT

## 2025-05-29 NOTE — PROGRESS NOTES
PHYSICAL THERAPY - MEDICARE DAILY TREATMENT NOTE (updated 3/23)      Date: 2025          Patient Name:  Naveen Alvarez :  1935   Medical   Diagnosis:  Spinal stenosis at L4-L5 level [M48.061] Treatment Diagnosis:  M62.81  GENERAL MUSCLE WEAKNESS, R26.81   Unsteadiness on feet, and R26.89   Abnormalities of gait and mobility    Referral Source:  Mohit Franco MD Insurance:   Payor: MEDICARE / Plan: MEDICARE PART A AND B / Product Type: *No Product type* /                     Patient  verified yes     Visit #   Current  / Total 7 16   Time   In / Out 1400 1445   Total Treatment Time 45   Total Timed Codes 45   1:1 Treatment Time 45      [facility] Totals Reminder:  bill using total billable   min of TIMED therapeutic procedures and modalities.   8-22 min = 1 unit; 23-37 min = 2 units; 38-52 min = 3 units; 53-67 min = 4 units; 68-82 min = 5 units          SUBJECTIVE    Pain Level (0-10 scale): 0    Any medication changes, allergies to medications, adverse drug reactions, diagnosis change, or new procedure performed?: [x] No    [] Yes (see summary sheet for update)  Medications: Verified on Patient Summary List    Subjective functional status/changes:     Patient reports he feels stronger    OBJECTIVE      Therapeutic Procedures:  Tx Min Billable or 1:1 Min (if diff from Tx Min) Procedure, Rationale, Specifics   20 20 17630 Therapeutic Exercise (timed):  increase ROM, strength, coordination, balance, and proprioception to improve patient's ability to progress to PLOF and address remaining functional goals. (see flow sheet as applicable)     Details if applicable:    Nu step L 2 focusing on knee extension 10'  Sitting ankle DF/inv with yellow theraball 30x  Sitting hip abduction with GTB 3x10, sitting hip flexion GTB 3x10    -------------    Focusing on the quad contraction  Supine with LE on peanut ball bridging 3x10  Supine hip adduction with towel 30x     25 25 23091 Therapeutic Activity (timed):

## 2025-06-02 ENCOUNTER — HOSPITAL ENCOUNTER (OUTPATIENT)
Facility: HOSPITAL | Age: 89
Setting detail: RECURRING SERIES
Discharge: HOME | End: 2025-06-05
Payer: MEDICARE

## 2025-06-02 PROCEDURE — 97530 THERAPEUTIC ACTIVITIES: CPT

## 2025-06-02 PROCEDURE — 97110 THERAPEUTIC EXERCISES: CPT

## 2025-06-02 NOTE — PROGRESS NOTES
PHYSICAL THERAPY - MEDICARE DAILY TREATMENT NOTE (updated 3/23)      Date: 2025          Patient Name:  Naveen Alvarez :  1935   Medical   Diagnosis:  Spinal stenosis at L4-L5 level [M48.061] Treatment Diagnosis:  M62.81  GENERAL MUSCLE WEAKNESS, R26.81   Unsteadiness on feet, and R26.89   Abnormalities of gait and mobility    Referral Source:  Mohit Franco MD Insurance:   Payor: MEDICARE / Plan: MEDICARE PART A AND B / Product Type: *No Product type* /                     Patient  verified yes     Visit #   Current  / Total 8 16   Time   In / Out 1445 1530   Total Treatment Time 45   Total Timed Codes 45   1:1 Treatment Time 45      [facility] Totals Reminder:  bill using total billable   min of TIMED therapeutic procedures and modalities.   8-22 min = 1 unit; 23-37 min = 2 units; 38-52 min = 3 units; 53-67 min = 4 units; 68-82 min = 5 units          SUBJECTIVE    Pain Level (0-10 scale): 0    Any medication changes, allergies to medications, adverse drug reactions, diagnosis change, or new procedure performed?: [x] No    [] Yes (see summary sheet for update)  Medications: Verified on Patient Summary List    Subjective functional status/changes:     Patient reports he feels stronger    OBJECTIVE      Therapeutic Procedures:  Tx Min Billable or 1:1 Min (if diff from Tx Min) Procedure, Rationale, Specifics   15 15 07022 Therapeutic Exercise (timed):  increase ROM, strength, coordination, balance, and proprioception to improve patient's ability to progress to PLOF and address remaining functional goals. (see flow sheet as applicable)     Details if applicable:    Nu step L 2 focusing on knee extension 10'  Sitting ankle DF/inv with red theraball    Sitting hip abduction+ marching  with GTB  x 3'  Focusing on the quad contraction  Supine with LE on peanut ball bridging 3x10  Supine hip adduction with towel 30x  Seated HSC with GTB x15 reps each         30 30 [id number] Therapeutic Activity (timed):  use of

## 2025-06-04 ENCOUNTER — HOSPITAL ENCOUNTER (OUTPATIENT)
Facility: HOSPITAL | Age: 89
Setting detail: RECURRING SERIES
Discharge: HOME | End: 2025-06-07
Payer: MEDICARE

## 2025-06-04 PROCEDURE — 97530 THERAPEUTIC ACTIVITIES: CPT

## 2025-06-04 PROCEDURE — 97110 THERAPEUTIC EXERCISES: CPT

## 2025-06-04 NOTE — PROGRESS NOTES
PHYSICAL THERAPY - MEDICARE DAILY TREATMENT NOTE (updated 3/23)      Date: 2025          Patient Name:  Naveen Alvarez :  1935   Medical   Diagnosis:  Spinal stenosis at L4-L5 level [M48.061] Treatment Diagnosis:  M62.81  GENERAL MUSCLE WEAKNESS, R26.81   Unsteadiness on feet, and R26.89   Abnormalities of gait and mobility    Referral Source:  Mohit Franco MD Insurance:   Payor: MEDICARE / Plan: MEDICARE PART A AND B / Product Type: *No Product type* /                     Patient  verified yes     Visit #   Current  / Total 9 16   Time   In / Out 1445 1530   Total Treatment Time 45   Total Timed Codes 45   1:1 Treatment Time 45      [facility] Totals Reminder:  bill using total billable   min of TIMED therapeutic procedures and modalities.   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 (see flow sheet as applicable)     Details if applicable:    Nu step L 2 focusing on knee extension 10'  Sitting ankle DF/inv with red theraball    Sitting hip abduction+ marching  with GTB  x 3'  Focusing on the quad contraction  Seated HSC with GTB x15 reps each         30 30 12805 Therapeutic Activity

## 2025-06-07 DIAGNOSIS — J44.9 CHRONIC OBSTRUCTIVE PULMONARY DISEASE, UNSPECIFIED (HCC): ICD-10-CM

## 2025-06-09 ENCOUNTER — HOSPITAL ENCOUNTER (OUTPATIENT)
Facility: HOSPITAL | Age: 89
Setting detail: RECURRING SERIES
Discharge: HOME | End: 2025-06-12
Payer: MEDICARE

## 2025-06-09 PROCEDURE — 97530 THERAPEUTIC ACTIVITIES: CPT

## 2025-06-09 PROCEDURE — 97110 THERAPEUTIC EXERCISES: CPT

## 2025-06-09 RX ORDER — FLUTICASONE PROPIONATE AND SALMETEROL 250; 50 UG/1; UG/1
POWDER RESPIRATORY (INHALATION)
Qty: 180 EACH | Refills: 3 | Status: SHIPPED | OUTPATIENT
Start: 2025-06-09

## 2025-06-09 NOTE — PROGRESS NOTES
functional movements to increase ROM, strength, coordination, balance, and proprioception in order to improve patient's ability to progress to PLOF and address remaining functional goals.  (see flow sheet as applicable)     Details if applicable:      Sit to stand 2x5 without UE, sitting on 2 blue cushions, working on balance  Working on upright posture and foot clearance with ambulation  Ambulation working on turning and changing directions and in and out of cones                    45 45    Total Total       [x]  Patient Education billed concurrently with other procedures   [x] Review HEP    [] Progressed/Changed HEP, detail:    [] Other detail:           Pain Level at end of session (0-10 scale): 0      Assessment    Patient continues to benefit from skilled PT to increase his mobility. Verbal cues to increase upright posture with ambulation.Introduced additional standing balance exercises while focusing on upright posture- toe taps.  Patient will continue to benefit from skilled PT / OT services to modify and progress therapeutic interventions, analyze and address functional mobility deficits, analyze and address ROM deficits, analyze and address strength deficits, and analyze and cue for proper movement patterns to address functional deficits and attain remaining goals.    Progress toward goals / Updated goals:  []  See Progress Note/Recertification    Working towards goals      PLAN  Yes  Continue plan of care  Re-Cert Due: 7/28/2025  [x]  Upgrade activities as tolerated  []  Discharge due to :  []  Other:      Katlin Salazar, PT       6/9/2025       9:09 AM

## 2025-06-11 ENCOUNTER — HOSPITAL ENCOUNTER (OUTPATIENT)
Facility: HOSPITAL | Age: 89
Setting detail: RECURRING SERIES
Discharge: HOME | End: 2025-06-14
Payer: MEDICARE

## 2025-06-11 PROCEDURE — 97110 THERAPEUTIC EXERCISES: CPT

## 2025-06-11 PROCEDURE — 97530 THERAPEUTIC ACTIVITIES: CPT

## 2025-06-11 NOTE — PROGRESS NOTES
activities replicating functional movements to increase ROM, strength, coordination, balance, and proprioception in order to improve patient's ability to progress to PLOF and address remaining functional goals.  (see flow sheet as applicable)     Details if applicable:      Sit to stand 2x5 with UE, sitting on 2 blue cushions, working on balance  Working on upright posture and foot clearance with ambulation  Ambulation -trial with RW to increase stability                    45 45    Total Total       [x]  Patient Education billed concurrently with other procedures   [x] Review HEP    [] Progressed/Changed HEP, detail:    [] Other detail:           Pain Level at end of session (0-10 scale): 0      Assessment    Patient continues to benefit from skilled PT to increase his mobility. Patient appeared fatigued today.  Motivated to perform exercises.  Patient had difficulty with foot clearance today.  Did a trial of a RW to increase safety and stability.  Patient felt it was helpful and will try using his RW at home if needed.  LE weaker than usual today functionally, he had challenges with the sit to stand from the blue cushions today  Patient will continue to benefit from skilled PT / OT services to modify and progress therapeutic interventions, analyze and address functional mobility deficits, analyze and address ROM deficits, analyze and address strength deficits, and analyze and cue for proper movement patterns to address functional deficits and attain remaining goals.    Progress toward goals / Updated goals:  []  See Progress Note/Recertification    Working towards goals      PLAN  Yes  Continue plan of care  Re-Cert Due: 7/28/2025  [x]  Upgrade activities as tolerated  []  Discharge due to :  []  Other:      Katlin Salazar, PT       6/11/2025       10:48 AM

## 2025-06-17 ENCOUNTER — HOSPITAL ENCOUNTER (OUTPATIENT)
Facility: HOSPITAL | Age: 89
Setting detail: RECURRING SERIES
Discharge: HOME | End: 2025-06-20
Payer: MEDICARE

## 2025-06-17 PROCEDURE — 97530 THERAPEUTIC ACTIVITIES: CPT

## 2025-06-17 PROCEDURE — 97110 THERAPEUTIC EXERCISES: CPT

## 2025-06-17 NOTE — PROGRESS NOTES
PHYSICAL THERAPY - MEDICARE DAILY TREATMENT NOTE (updated 3/23)      Date: 2025          Patient Name:  Naveen Alvarez :  1935   Medical   Diagnosis:  Spinal stenosis at L4-L5 level [M48.061] Treatment Diagnosis:  M62.81  GENERAL MUSCLE WEAKNESS, R26.81   Unsteadiness on feet, and R26.89   Abnormalities of gait and mobility    Referral Source:  Mohit Franco MD Insurance:   Payor: MEDICARE / Plan: MEDICARE PART A AND B / Product Type: *No Product type* /                     Patient  verified yes     Visit #   Current  / Total 12 16   Time   In / Out 1445 1530   Total Treatment Time 45   Total Timed Codes 45   1:1 Treatment Time 45      [facility] Totals Reminder:  bill using total billable   min of TIMED therapeutic procedures and modalities.   8-22 min = 1 unit; 23-37 min = 2 units; 38-52 min = 3 units; 53-67 min = 4 units; 68-82 min = 5 units          SUBJECTIVE    Pain Level (0-10 scale): 0    Any medication changes, allergies to medications, adverse drug reactions, diagnosis change, or new procedure performed?: [x] No    [] Yes (see summary sheet for update)  Medications: Verified on Patient Summary List    Subjective functional status/changes:     Legs continue to feel weak    OBJECTIVE      Therapeutic Procedures:  Tx Min Billable or 1:1 Min (if diff from Tx Min) Procedure, Rationale, Specifics   20 20 02822 Therapeutic Exercise (timed):  increase ROM, strength, coordination, balance, and proprioception to improve patient's ability to progress to PLOF and address remaining functional goals. (see flow sheet as applicable)     Details if applicable:    Nu step L 2 focusing on knee extension 10'    Hip abd/add machine L3, 3x10    Education upright posture when standing     25 25 87499 Therapeutic Activity (timed):  use of dynamic activities replicating functional movements to increase ROM, strength, coordination, balance, and proprioception in order to improve patient's ability to progress to

## 2025-06-19 ENCOUNTER — HOSPITAL ENCOUNTER (OUTPATIENT)
Facility: HOSPITAL | Age: 89
Setting detail: RECURRING SERIES
Discharge: HOME | End: 2025-06-22
Payer: MEDICARE

## 2025-06-19 PROCEDURE — 97530 THERAPEUTIC ACTIVITIES: CPT

## 2025-06-19 PROCEDURE — 97110 THERAPEUTIC EXERCISES: CPT

## 2025-06-19 NOTE — PROGRESS NOTES
PHYSICAL THERAPY - MEDICARE DAILY TREATMENT NOTE (updated 3/23)      Date: 2025          Patient Name:  Naveen Alvarez :  1935   Medical   Diagnosis:  Spinal stenosis at L4-L5 level [M48.061] Treatment Diagnosis:  M62.81  GENERAL MUSCLE WEAKNESS, R26.81   Unsteadiness on feet, and R26.89   Abnormalities of gait and mobility    Referral Source:  Mohit Franco MD Insurance:   Payor: MEDICARE / Plan: MEDICARE PART A AND B / Product Type: *No Product type* /                     Patient  verified yes     Visit #   Current  / Total 12 16   Time   In / Out 1445 1530   Total Treatment Time 45   Total Timed Codes 45   1:1 Treatment Time 45      [facility] Totals Reminder:  bill using total billable   min of TIMED therapeutic procedures and modalities.   8-22 min = 1 unit; 23-37 min = 2 units; 38-52 min = 3 units; 53-67 min = 4 units; 68-82 min = 5 units          SUBJECTIVE    Pain Level (0-10 scale): 0    Any medication changes, allergies to medications, adverse drug reactions, diagnosis change, or new procedure performed?: [x] No    [] Yes (see summary sheet for update)  Medications: Verified on Patient Summary List    Subjective functional status/changes:     Legs continue to feel weak    OBJECTIVE      Therapeutic Procedures:  Tx Min Billable or 1:1 Min (if diff from Tx Min) Procedure, Rationale, Specifics   15 15 81298 Therapeutic Exercise (timed):  increase ROM, strength, coordination, balance, and proprioception to improve patient's ability to progress to PLOF and address remaining functional goals. (see flow sheet as applicable)     Details if applicable:    Nu step L 2 focusing on knee extension 10'    Ankle DF with red weighted theraball, 3.3#, 3x10    Education upright posture when standing     30 30 00262 Therapeutic Activity (timed):  use of dynamic activities replicating functional movements to increase ROM, strength, coordination, balance, and proprioception in order to improve patient's

## 2025-06-22 NOTE — PROGRESS NOTES
Duration 30 minutes primarily education, review of imaging, labs and records.    Assessment & Plan  1. Leg weakness and spinal stenosis.  - Reports attending physical therapy twice a week, which helps but has not improved leg strength.  - Physical therapy sessions are ongoing.  - Discussed the potential benefits of massage therapy for thighs and calves to alleviate muscle tension and improve mobility.  - Recommended massage therapy for additional support.    2. Trapezius muscle spasm.  - Complains of pain at the base of the neck, identified as a spasm in the trapezius muscle.  - Physical exam reveals tightness in the trapezius muscle.  - Advised to consult Dr. Caballero for ultrasound and massage therapy.  - Recommended massage therapy to relieve the spasm.    3. Dizziness.  - Experiences occasional dizziness, potentially related to diuretic medication, Bumex.  - No specific pattern to dizziness episodes.  - Advised to adjust Bumex dosage to half a tablet or take it every other day to reduce dizziness.  - Ordered thyroid blood test to rule out any thyroid-related issues.    Follow-up  - Follow-up appointment scheduled for 08/2025.          Chief Complaint   Patient presents with    Diabetes    Fatigue         No orders of the defined types were placed in this encounter.      Mohit Franco MD, FACP      History of Present Illness  The patient presents for evaluation of leg weakness, neck pain, and dizziness.    She has been attending physical therapy sessions twice a week, which have provided some relief. However, she continues to experience significant leg weakness, to the extent that she feared her leg might give way during a shower this morning.    Over the past few weeks, she has developed pain at the base of her neck. She has previously consulted with Dr. Caballero regarding this issue but found the treatment unhelpful.    She reports occasional episodes of dizziness, which occur without any discernible pattern. These

## 2025-06-24 ENCOUNTER — HOSPITAL ENCOUNTER (OUTPATIENT)
Facility: HOSPITAL | Age: 89
Setting detail: RECURRING SERIES
Discharge: HOME | End: 2025-06-27
Payer: MEDICARE

## 2025-06-24 PROCEDURE — 97530 THERAPEUTIC ACTIVITIES: CPT

## 2025-06-24 PROCEDURE — 97110 THERAPEUTIC EXERCISES: CPT

## 2025-06-24 NOTE — PROGRESS NOTES
PHYSICAL THERAPY - MEDICARE DAILY TREATMENT NOTE (updated 3/23)      Date: 2025          Patient Name:  Naveen Alvarez :  1935   Medical   Diagnosis:  Spinal stenosis at L4-L5 level [M48.061] Treatment Diagnosis:  M62.81  GENERAL MUSCLE WEAKNESS, R26.81   Unsteadiness on feet, and R26.89   Abnormalities of gait and mobility    Referral Source:  Mohit Franco MD Insurance:   Payor: MEDICARE / Plan: MEDICARE PART A AND B / Product Type: *No Product type* /                     Patient  verified yes     Visit #   Current  / Total 15 16   Time   In / Out 1445 1530   Total Treatment Time 45   Total Timed Codes 45   1:1 Treatment Time 45      [facility] Totals Reminder:  bill using total billable   min of TIMED therapeutic procedures and modalities.   8-22 min = 1 unit; 23-37 min = 2 units; 38-52 min = 3 units; 53-67 min = 4 units; 68-82 min = 5 units          SUBJECTIVE    Pain Level (0-10 scale): 0    Any medication changes, allergies to medications, adverse drug reactions, diagnosis change, or new procedure performed?: [x] No    [] Yes (see summary sheet for update)  Medications: Verified on Patient Summary List    Subjective functional status/changes:     Patient reports the therapy helps improve his strength    OBJECTIVE      Therapeutic Procedures:  Tx Min Billable or 1:1 Min (if diff from Tx Min) Procedure, Rationale, Specifics   15 15 20158 Therapeutic Exercise (timed):  increase ROM, strength, coordination, balance, and proprioception to improve patient's ability to progress to PLOF and address remaining functional goals. (see flow sheet as applicable)     Details if applicable:    Nu step L 2 focusing on knee extension 10'    Ankle DF with red weighted theraball, 3.3#, 3x10    Education upright posture when standing     30 30 12917 Therapeutic Activity (timed):  use of dynamic activities replicating functional movements to increase ROM, strength, coordination, balance, and proprioception in

## 2025-06-26 ENCOUNTER — OFFICE VISIT (OUTPATIENT)
Age: 89
End: 2025-06-26
Payer: MEDICARE

## 2025-06-26 VITALS
BODY MASS INDEX: 27.09 KG/M2 | SYSTOLIC BLOOD PRESSURE: 103 MMHG | OXYGEN SATURATION: 96 % | DIASTOLIC BLOOD PRESSURE: 64 MMHG | TEMPERATURE: 97.8 F | HEIGHT: 72 IN | WEIGHT: 200 LBS | RESPIRATION RATE: 18 BRPM | HEART RATE: 97 BPM

## 2025-06-26 DIAGNOSIS — M48.061 SPINAL STENOSIS AT L4-L5 LEVEL: Primary | ICD-10-CM

## 2025-06-26 DIAGNOSIS — R42 DIZZINESS: ICD-10-CM

## 2025-06-26 PROCEDURE — G8419 CALC BMI OUT NRM PARAM NOF/U: HCPCS | Performed by: INTERNAL MEDICINE

## 2025-06-26 PROCEDURE — G8427 DOCREV CUR MEDS BY ELIG CLIN: HCPCS | Performed by: INTERNAL MEDICINE

## 2025-06-26 PROCEDURE — 1123F ACP DISCUSS/DSCN MKR DOCD: CPT | Performed by: INTERNAL MEDICINE

## 2025-06-26 PROCEDURE — 99213 OFFICE O/P EST LOW 20 MIN: CPT | Performed by: INTERNAL MEDICINE

## 2025-06-26 PROCEDURE — 1159F MED LIST DOCD IN RCRD: CPT | Performed by: INTERNAL MEDICINE

## 2025-06-26 PROCEDURE — 4004F PT TOBACCO SCREEN RCVD TLK: CPT | Performed by: INTERNAL MEDICINE

## 2025-06-26 NOTE — PROGRESS NOTES
Naveen Alvarez is a 89 y.o. male presenting for/with:    Chief Complaint   Patient presents with    Diabetes    Fatigue       Vitals:    06/26/25 1313   BP: 103/64   BP Site: Left Upper Arm   Patient Position: Sitting   BP Cuff Size: Medium Adult   Pulse: 97   Resp: 18   Temp: 97.8 °F (36.6 °C)   TempSrc: Temporal   SpO2: 96%   Weight: 90.7 kg (200 lb)   Height: 1.829 m (6')       Pain Scale: /10  Pain Location:     \"Have you been to the ER, urgent care clinic since your last visit?  Hospitalized since your last visit?\"    NO    “Have you seen or consulted any other health care providers outside of [facility] since your last visit?”    NO                 3/31/2025     2:10 PM   PHQ-9    Little interest or pleasure in doing things 0   Feeling down, depressed, or hopeless 0   PHQ-2 Score 0   PHQ-9 Total Score 0           3/31/2025     2:20 PM 2/27/2025     1:40 PM 3/13/2024    11:50 AM 6/8/2023    10:30 AM 9/23/2021    12:00 AM   [facility] AMB LEARNING ASSESSMENT   Primary Learner Patient Patient Patient Patient Patient   Primary Language ENGLISH ENGLISH ENGLISH ENGLISH ENGLISH   Learning Preference DEMONSTRATION DEMONSTRATION DEMONSTRATION DEMONSTRATION READING   Answered By PT patient patient self patient   Relationship to Learner SELF SELF SELF SELF SELF            4/3/2025     3:31 PM   Amb Fall Risk Assessment and TUG Test   Do you feel unsteady or are you worried about falling?  no   2 or more falls in past year? no   Fall with injury in past year? no           4/3/2025     3:00 PM 2/27/2025     1:00 PM 10/25/2024     1:00 PM 4/18/2024    10:00 AM 3/13/2024    11:00 AM 1/23/2024     9:00 AM 10/19/2023    10:00 AM   ADL ASSESSMENT   Feeding yourself No Help Needed No Help Needed No Help Needed No Help Needed No Help Needed No Help Needed No Help Needed   Getting from bed to chair No Help Needed No Help Needed No Help Needed No Help Needed No Help Needed No Help Needed No Help Needed   Getting dressed

## 2025-07-01 ENCOUNTER — HOSPITAL ENCOUNTER (OUTPATIENT)
Facility: HOSPITAL | Age: 89
Setting detail: RECURRING SERIES
Discharge: HOME | End: 2025-07-04
Payer: MEDICARE

## 2025-07-01 PROCEDURE — 97112 NEUROMUSCULAR REEDUCATION: CPT

## 2025-07-01 PROCEDURE — 97110 THERAPEUTIC EXERCISES: CPT

## 2025-07-01 PROCEDURE — 97530 THERAPEUTIC ACTIVITIES: CPT

## 2025-07-01 NOTE — PROGRESS NOTES
PHYSICAL THERAPY - MEDICARE DAILY TREATMENT NOTE (updated 3/23)      Date: 2025          Patient Name:  Naveen Alvarze :  1935   Medical   Diagnosis:  Spinal stenosis at L4-L5 level [M48.061] Treatment Diagnosis:  M62.81  GENERAL MUSCLE WEAKNESS, R26.81   Unsteadiness on feet, and R26.89   Abnormalities of gait and mobility    Referral Source:  Mohit Franco MD Insurance:   Payor: MEDICARE / Plan: MEDICARE PART A AND B / Product Type: *No Product type* /                     Patient  verified yes     Visit #   Current  / Total 16 24   Time   In / Out 1045 1130   Total Treatment Time 45   Total Timed Codes 45   1:1 Treatment Time 45      [facility] Totals Reminder:  bill using total billable   min of TIMED therapeutic procedures and modalities.   8-22 min = 1 unit; 23-37 min = 2 units; 38-52 min = 3 units; 53-67 min = 4 units; 68-82 min = 5 units          SUBJECTIVE    Pain Level (0-10 scale): 0    Any medication changes, allergies to medications, adverse drug reactions, diagnosis change, or new procedure performed?: [x] No    [] Yes (see summary sheet for update)  Medications: Verified on Patient Summary List    Subjective functional status/changes:     Patient reports the therapy helps improve his strength, he is having challenges with his balance.  Patient reports his legs are feeling weak this morning.     OBJECTIVE      Therapeutic Procedures:  Tx Min Billable or 1:1 Min (if diff from Tx Min) Procedure, Rationale, Specifics   10 10 38334 Therapeutic Exercise (timed):  increase ROM, strength, coordination, balance, and proprioception to improve patient's ability to progress to PLOF and address remaining functional goals. (see flow sheet as applicable)     Details if applicable:    Nu step L 2 focusing on knee extension 10'    Education upright posture when standing     20 20 35085 Therapeutic Activity (timed):  use of dynamic activities replicating functional movements to increase ROM, strength,

## 2025-07-03 ENCOUNTER — HOSPITAL ENCOUNTER (OUTPATIENT)
Facility: HOSPITAL | Age: 89
Setting detail: RECURRING SERIES
Discharge: HOME | End: 2025-07-06
Payer: MEDICARE

## 2025-07-03 PROCEDURE — 97530 THERAPEUTIC ACTIVITIES: CPT

## 2025-07-03 PROCEDURE — 97110 THERAPEUTIC EXERCISES: CPT

## 2025-07-03 NOTE — PROGRESS NOTES
PHYSICAL THERAPY - MEDICARE DAILY TREATMENT NOTE (updated 3/23)      Date: 7/3/2025          Patient Name:  Naveen Alvarez :  1935   Medical   Diagnosis:  Spinal stenosis at L4-L5 level [M48.061] Treatment Diagnosis:  M54.59  OTHER LOWER BACK PAIN    Referral Source:  Mohit Franco MD Insurance:   Payor: MEDICARE / Plan: MEDICARE PART A AND B / Product Type: *No Product type* /                     Patient  verified yes     Visit #   Current  / Total 17 24   Time   In / Out 10:45 11:30   Total Treatment Time 45   Total Timed Codes 45   1:1 Treatment Time 45      [facility] Totals Reminder:  bill using total billable   min of TIMED therapeutic procedures and modalities.   8-22 min = 1 unit; 23-37 min = 2 units; 38-52 min = 3 units; 53-67 min = 4 units; 68-82 min = 5 units        .episode  SUBJECTIVE    Pain Level (0-10 scale): no pain     Any medication changes, allergies to medications, adverse drug reactions, diagnosis change, or new procedure performed?: [x] No    [] Yes (see summary sheet for update)  Medications: Verified on Patient Summary List    Subjective functional status/changes:     I am doing well    OBJECTIVE      Therapeutic Procedures:  Tx Min Billable or 1:1 Min (if diff from Tx Min) Procedure, Rationale, Specifics   25 25 63997 Therapeutic Exercise (timed):  increase ROM, strength, coordination, balance, and proprioception to improve patient's ability to progress to PLOF and address remaining functional goals. (see flow sheet as applicable)     Details if applicable:  Nu step L 2 focusing on knee extension 10'  Yordan LE exercises in standing with UE support hip abd/add, hamstring curls  Yordan LE in sitting for ankle pumps, knee ext, marching x 20 reps  Education upright posture when standing      20 20 63594 Therapeutic Activity (timed):  use of dynamic activities replicating functional movements to increase ROM, strength, coordination, balance, and proprioception in order to improve

## 2025-07-08 ENCOUNTER — HOSPITAL ENCOUNTER (OUTPATIENT)
Facility: HOSPITAL | Age: 89
Setting detail: RECURRING SERIES
Discharge: HOME | End: 2025-07-11
Payer: MEDICARE

## 2025-07-08 PROCEDURE — 97530 THERAPEUTIC ACTIVITIES: CPT

## 2025-07-08 PROCEDURE — 97110 THERAPEUTIC EXERCISES: CPT

## 2025-07-08 NOTE — PROGRESS NOTES
PHYSICAL THERAPY - MEDICARE DAILY TREATMENT NOTE (updated 3/23)      Date: 2025          Patient Name:  Naveen Alvarez :  1935   Medical   Diagnosis:  Spinal stenosis at L4-L5 level [M48.061] Treatment Diagnosis:  M54.59  OTHER LOWER BACK PAIN    Referral Source:  Mohit Franco MD Insurance:   Payor: MEDICARE / Plan: MEDICARE PART A AND B / Product Type: *No Product type* /                     Patient  verified yes     Visit #   Current  / Total 18 24   Time   In / Out 1445 1530   Total Treatment Time 45   Total Timed Codes 45   1:1 Treatment Time 45      [facility] Totals Reminder:  bill using total billable   min of TIMED therapeutic procedures and modalities.   8-22 min = 1 unit; 23-37 min = 2 units; 38-52 min = 3 units; 53-67 min = 4 units; 68-82 min = 5 units        .episode  SUBJECTIVE    Pain Level (0-10 scale): no pain     Any medication changes, allergies to medications, adverse drug reactions, diagnosis change, or new procedure performed?: [x] No    [] Yes (see summary sheet for update)  Medications: Verified on Patient Summary List    Subjective functional status/changes:     \"I have difficulty walking\"    OBJECTIVE      Therapeutic Procedures:  Tx Min Billable or 1:1 Min (if diff from Tx Min) Procedure, Rationale, Specifics   30 30 01806 Therapeutic Exercise (timed):  increase ROM, strength, coordination, balance, and proprioception to improve patient's ability to progress to PLOF and address remaining functional goals. (see flow sheet as applicable)     Details if applicable:    Nu step L 2 focusing on knee extension 10'  Yordan LE exercises in standing with UE support hip abd/add, hamstring curls and hip ext 2x10 leaning over table  Yordan LE in sitting for ankle pumps, knee ext, marching x 20 reps 2#  Education upright posture when standing      15 15 55654 Therapeutic Activity (timed):  use of dynamic activities replicating functional movements to increase ROM, strength, coordination,

## 2025-07-10 ENCOUNTER — HOSPITAL ENCOUNTER (OUTPATIENT)
Facility: HOSPITAL | Age: 89
Setting detail: RECURRING SERIES
Discharge: HOME | End: 2025-07-13
Payer: MEDICARE

## 2025-07-10 PROCEDURE — 97530 THERAPEUTIC ACTIVITIES: CPT

## 2025-07-10 PROCEDURE — 97110 THERAPEUTIC EXERCISES: CPT

## 2025-07-10 NOTE — PROGRESS NOTES
PHYSICAL THERAPY - MEDICARE DAILY TREATMENT NOTE (updated 3/23)      Date: 7/10/2025          Patient Name:  Naveen Alvarez :  1935   Medical   Diagnosis:  Spinal stenosis at L4-L5 level [M48.061] Treatment Diagnosis:  M54.59  OTHER LOWER BACK PAIN    Referral Source:  Mohit Franco MD Insurance:   Payor: MEDICARE / Plan: MEDICARE PART A AND B / Product Type: *No Product type* /                     Patient  verified yes     Visit #   Current  / Total 19 24   Time   In / Out 1400 1445   Total Treatment Time 45   Total Timed Codes 45   1:1 Treatment Time 45      [facility] Totals Reminder:  bill using total billable   min of TIMED therapeutic procedures and modalities.   8-22 min = 1 unit; 23-37 min = 2 units; 38-52 min = 3 units; 53-67 min = 4 units; 68-82 min = 5 units          SUBJECTIVE    Pain Level (0-10 scale): no pain     Any medication changes, allergies to medications, adverse drug reactions, diagnosis change, or new procedure performed?: [x] No    [] Yes (see summary sheet for update)  Medications: Verified on Patient Summary List    Subjective functional status/changes:     \"I am ok\"    OBJECTIVE      Therapeutic Procedures:  Tx Min Billable or 1:1 Min (if diff from Tx Min) Procedure, Rationale, Specifics   30 30 54106 Therapeutic Exercise (timed):  increase ROM, strength, coordination, balance, and proprioception to improve patient's ability to progress to PLOF and address remaining functional goals. (see flow sheet as applicable)     Details if applicable:    Nu step L 2 focusing on knee extension 10'  Yordan LE exercises leaning over table hamstring curls and hip ext 2x10 2#  Yordan LE in sitting for ankle pumps, knee ext, marching x 20 reps 2#  Red ball ankle DF , inversion 3.3#   sitting hip adduction 30x   15 15 47831 Therapeutic Activity (timed):  use of dynamic activities replicating functional movements to increase ROM, strength, coordination, balance, and proprioception in order to improve

## 2025-07-22 ENCOUNTER — HOSPITAL ENCOUNTER (OUTPATIENT)
Facility: HOSPITAL | Age: 89
Setting detail: RECURRING SERIES
Discharge: HOME | End: 2025-07-25
Payer: MEDICARE

## 2025-07-22 PROCEDURE — 97530 THERAPEUTIC ACTIVITIES: CPT

## 2025-07-22 PROCEDURE — 97110 THERAPEUTIC EXERCISES: CPT

## 2025-07-22 NOTE — PROGRESS NOTES
and proprioception in order to improve patient's ability to progress to PLOF and address remaining functional goals.  (see flow sheet as applicable)     Details if applicable:    Sit to stand 5x with UE, really working on forward displacement with nose over toes to get up   Working on upright posture and foot clearance with ambulation  Using red ball hold in front of him for back ext with soft red ball behind lower thoracic/upper lumbar 2x10 (he is limited in nithin shlds for no over head exercises)                          45 45    Total Total         [x]  Patient Education billed concurrently with other procedures   [x] Review HEP    [] Progressed/Changed HEP, detail:    [] Other detail:         Other Objective/Functional Measures  Patient is doing well and works very hard during therapy     Pain Level at end of session (0-10 scale): 0/10      Assessment   Patient is getting better with sit to stand, posture with his walker continues to need addressing in PT .  Continues to benefit from strengthening and gait training.  Will transition to maintenance exercise program after 4 additional visits.  Patient will continue to benefit from skilled PT / OT services to modify and progress therapeutic interventions, analyze and address strength deficits, analyze and modify for postural abnormalities, and analyze and address imbalance/dizziness to address functional deficits and attain remaining goals.    Progress toward goals / Updated goals:  []  See Progress Note/Recertification    Progress towards goals       PLAN  YES  Continue plan of care  Re-Cert Due: 7/28/25  [x]  Upgrade activities as tolerated  []  Discharge due to :  []  Other:      Katlin Salazar, PT       7/22/2025       1:15 PM

## 2025-07-24 ENCOUNTER — HOSPITAL ENCOUNTER (OUTPATIENT)
Facility: HOSPITAL | Age: 89
Setting detail: RECURRING SERIES
Discharge: HOME | End: 2025-07-27
Payer: MEDICARE

## 2025-07-24 PROCEDURE — 97530 THERAPEUTIC ACTIVITIES: CPT

## 2025-07-24 PROCEDURE — 97110 THERAPEUTIC EXERCISES: CPT

## 2025-07-24 NOTE — THERAPY RECERTIFICATION
[facility]  [address] [place] [address]  Office [phone]  Fax [phone]   CONTINUED PLAN OF CARE/RECERTIFICATION FOR PHYSICAL THERAPY          Patient Name:              Naveen Alvarez :  1935   Treatment/Medical Diagnosis:  Spinal stenosis at L4-L5 level [M48.061]   Onset Date:  Refer to evaluation    Referral Source:  Mohit Franco MD Start of Care (SOC):  2025   Prior Hospitalization:  See Medical History Provider #:  NPI      Prior Level of Function (PLOF):  Refer to eval   Comorbidities:  Refer to eval   Medications:  Verified on Patient Summary List   Visits from SOC:  21 Missed Visits:       CURRENT STATUS  Patient reports he feels he is improving with therapy.  He is ambulating with a rollator in the clinic independently. TUG score 30 seconds.  Patient working towards goals.  He is also working on balance training.    Short Term Goals: To be accomplished in 8 treatments   Patient will be independent in a HEP to improve his safety with mobility.MET  30 second sit to stand score will be 3x without UE to indicate improved strength and mobility.  TUG score will be 30 seconds to indicate decreased risk of falls.MET     Long Term Goals: To be accomplished in 16 treatments   Patient will report he is able to stand from the toilet at home without difficulty.  30 second sit to stand score will be 5x without UE to indicate improved strength and mobility.  Patient will score 27 seconds on the TUG score to indicate decreased risk of falls.    Key Functional Changes/Progress: improved functional gait  Problem List: pain affecting function, decrease ROM, decrease strength, impaired gait/balance, and decrease transfer abilities    Treatment Plan may include any combination of the followin Therapeutic Exercise, 99554 Neuromuscular Re-Education, 24506 Manual Therapy, 53517 Therapeutic Activity, and 91267 Gait

## 2025-07-24 NOTE — PROGRESS NOTES
PHYSICAL THERAPY - MEDICARE DAILY TREATMENT NOTE (updated 3/23)      Date: 2025          Patient Name:  Naveen Alvarez :  1935   Medical   Diagnosis:  Spinal stenosis at L4-L5 level [M48.061] Treatment Diagnosis:  M54.59  OTHER LOWER BACK PAIN    Referral Source:  Mohit Franco MD Insurance:   Payor: MEDICARE / Plan: MEDICARE PART A AND B / Product Type: *No Product type* /                     Patient  verified yes     Visit #   Current  / Total 21 24   Time   In / Out 1315 1400   Total Treatment Time 45   Total Timed Codes 45   1:1 Treatment Time 45      [facility] Totals Reminder:  bill using total billable   min of TIMED therapeutic procedures and modalities.   8-22 min = 1 unit; 23-37 min = 2 units; 38-52 min = 3 units; 53-67 min = 4 units; 68-82 min = 5 units          SUBJECTIVE    Pain Level (0-10 scale): no pain     Any medication changes, allergies to medications, adverse drug reactions, diagnosis change, or new procedure performed?: [x] No    [] Yes (see summary sheet for update)  Medications: Verified on Patient Summary List    Subjective functional status/changes:     \"I am ok\"    OBJECTIVE      Therapeutic Procedures:  Tx Min Billable or 1:1 Min (if diff from Tx Min) Procedure, Rationale, Specifics   30 30 92227 Therapeutic Exercise (timed):  increase ROM, strength, coordination, balance, and proprioception to improve patient's ability to progress to PLOF and address remaining functional goals. (see flow sheet as applicable)     Details if applicable:    Nu step L 2 focusing on knee extension 15'  Hip abd/add L 4 3'  Yordan LE in sitting for ankle pumps, knee ext, marching x 20 reps   Red ball ankle DF , inversion 3.3#   15 15 [id number] Therapeutic Activity (timed):  use of dynamic activities replicating functional movements to increase ROM, strength, coordination, balance, and proprioception in order to improve patient's ability to progress to PLOF and address remaining functional goals.

## 2025-07-29 ENCOUNTER — HOSPITAL ENCOUNTER (OUTPATIENT)
Facility: HOSPITAL | Age: 89
Setting detail: RECURRING SERIES
Discharge: HOME | End: 2025-08-01
Payer: MEDICARE

## 2025-07-29 PROCEDURE — 97110 THERAPEUTIC EXERCISES: CPT

## 2025-07-31 ENCOUNTER — HOSPITAL ENCOUNTER (OUTPATIENT)
Facility: HOSPITAL | Age: 89
Setting detail: RECURRING SERIES
End: 2025-07-31
Payer: MEDICARE

## 2025-07-31 PROCEDURE — 97110 THERAPEUTIC EXERCISES: CPT

## 2025-07-31 NOTE — PROGRESS NOTES
PHYSICAL THERAPY - MEDICARE DAILY TREATMENT NOTE (updated 3/23)      Date: 2025          Patient Name:  Naveen Alvarez :  1935   Medical   Diagnosis:  Spinal stenosis at L4-L5 level [M48.061] Treatment Diagnosis:  M54.59  OTHER LOWER BACK PAIN    Referral Source:  Mohit Franco MD Insurance:   Payor: MEDICARE / Plan: MEDICARE PART A AND B / Product Type: *No Product type* /                     Patient  verified yes     Visit #   Current  / Total 23 24   Time   In / Out 1315 1400   Total Treatment Time 45   Total Timed Codes 45   1:1 Treatment Time 45      [facility] Totals Reminder:  bill using total billable   min of TIMED therapeutic procedures and modalities.   8-22 min = 1 unit; 23-37 min = 2 units; 38-52 min = 3 units; 53-67 min = 4 units; 68-82 min = 5 units          SUBJECTIVE    Pain Level (0-10 scale): no pain     Any medication changes, allergies to medications, adverse drug reactions, diagnosis change, or new procedure performed?: [x] No    [] Yes (see summary sheet for update)  Medications: Verified on Patient Summary List    Subjective functional status/changes:     Today will be patient's last skilled PT visit, he will transition    OBJECTIVE      Therapeutic Procedures:  Tx Min Billable or 1:1 Min (if diff from Tx Min) Procedure, Rationale, Specifics   45 23 03810 Therapeutic Exercise (timed):  increase ROM, strength, coordination, balance, and proprioception to improve patient's ability to progress to PLOF and address remaining functional goals. (see flow sheet as applicable)     Details if applicable:    Nu step L 2 focusing on knee extension 15'  Hip abd/add L 4 3'  Yordan LE in sitting for ankle pumps, knee ext, marching x 20 reps 2#  Sitting thoracic ext with soft red ball behind lumbar  Sitting hip add with red ball 3x10  Sitting elbow flexion and extension 3# 2x10  Sit to stand transfers  Gait focusing on upright posture  Standing on foam toe taps while trying to balance

## 2025-07-31 NOTE — THERAPY DISCHARGE
[facility]  [address] [place] [address]  Office [phone]  Fax [phone]   DISCHARGE SUMMARY  Patient Name: Naveen Alvarez : 1935   Treatment/Medical Diagnosis: Spinal stenosis at L4-L5 level [M48.061]   Referral Source: Mohit Franco MD     Date of Initial Visit: 2025 Attended Visits: 23 Missed Visits:      SUMMARY OF TREATMENT  Patient seen for balance, gait training, and strengthening.    CURRENT STATUS  Patient reports he feels he is improving with therapy.  He is ambulating with a rollator in the clinic independently. TUG score 30 seconds.  Patient working towards goals.  He is also working on balance training.    Short Term Goals: To be accomplished in 8 treatments   Patient will be independent in a HEP to improve his safety with mobility.MET  30 second sit to stand score will be 3x without UE to indicate improved strength and mobility.  TUG score will be 30 seconds to indicate decreased risk of falls.MET     Long Term Goals: To be accomplished in 16 treatments   Patient will report he is able to stand from the toilet at home without difficulty.  30 second sit to stand score will be 5x without UE to indicate improved strength and mobility.  Patient will score 27 seconds on the TUG score to indicate decreased risk of falls.      RECOMMENDATIONS  Discontinue therapy. Progressing towards or have reached established goals.Patient to participate in the maintenance exercise program.        Katlin Salazar, PT       2025       11:40 AM    If you have any questions/comments please contact us directly at [phone].   Thank you for allowing us to assist in the care of your patient.

## 2025-08-28 ENCOUNTER — OFFICE VISIT (OUTPATIENT)
Age: 89
End: 2025-08-28
Payer: MEDICARE

## 2025-08-28 VITALS
WEIGHT: 201.6 LBS | HEART RATE: 92 BPM | RESPIRATION RATE: 16 BRPM | SYSTOLIC BLOOD PRESSURE: 136 MMHG | OXYGEN SATURATION: 95 % | HEIGHT: 72 IN | DIASTOLIC BLOOD PRESSURE: 78 MMHG | BODY MASS INDEX: 27.3 KG/M2 | TEMPERATURE: 97.8 F

## 2025-08-28 DIAGNOSIS — I10 ESSENTIAL HYPERTENSION: ICD-10-CM

## 2025-08-28 DIAGNOSIS — N18.31 STAGE 3A CHRONIC KIDNEY DISEASE (HCC): ICD-10-CM

## 2025-08-28 DIAGNOSIS — M48.061 SPINAL STENOSIS AT L4-L5 LEVEL: Primary | ICD-10-CM

## 2025-08-28 DIAGNOSIS — J41.1 MUCOPURULENT CHRONIC BRONCHITIS (HCC): ICD-10-CM

## 2025-08-28 PROCEDURE — G8419 CALC BMI OUT NRM PARAM NOF/U: HCPCS | Performed by: INTERNAL MEDICINE

## 2025-08-28 PROCEDURE — 1126F AMNT PAIN NOTED NONE PRSNT: CPT | Performed by: INTERNAL MEDICINE

## 2025-08-28 PROCEDURE — 1123F ACP DISCUSS/DSCN MKR DOCD: CPT | Performed by: INTERNAL MEDICINE

## 2025-08-28 PROCEDURE — G8428 CUR MEDS NOT DOCUMENT: HCPCS | Performed by: INTERNAL MEDICINE

## 2025-08-28 PROCEDURE — 99214 OFFICE O/P EST MOD 30 MIN: CPT | Performed by: INTERNAL MEDICINE

## 2025-08-28 PROCEDURE — 4004F PT TOBACCO SCREEN RCVD TLK: CPT | Performed by: INTERNAL MEDICINE

## 2025-08-28 PROCEDURE — 3023F SPIROM DOC REV: CPT | Performed by: INTERNAL MEDICINE

## 2025-08-29 LAB
ANION GAP SERPL CALC-SCNC: 12 MMOL/L (ref 2–14)
BUN SERPL-MCNC: 21 MG/DL (ref 8–23)
BUN/CREAT SERPL: 15 (ref 12–20)
CALCIUM SERPL-MCNC: 9.5 MG/DL (ref 8.2–9.6)
CHLORIDE SERPL-SCNC: 103 MMOL/L (ref 98–107)
CO2 SERPL-SCNC: 27 MMOL/L (ref 20–29)
CREAT SERPL-MCNC: 1.43 MG/DL (ref 0.7–1.2)
GLUCOSE SERPL-MCNC: 117 MG/DL (ref 65–100)
POTASSIUM SERPL-SCNC: 4.1 MMOL/L (ref 3.5–5.1)
SODIUM SERPL-SCNC: 142 MMOL/L (ref 136–145)
T4 FREE SERPL-MCNC: 1 NG/DL (ref 0.9–1.6)
TSH, 3RD GENERATION: 1.02 UIU/ML (ref 0.27–4.2)

## 2025-09-01 RX ORDER — TROSPIUM CHLORIDE 20 MG/1
40 TABLET, FILM COATED ORAL
Qty: 180 TABLET | Refills: 2 | Status: SHIPPED | OUTPATIENT
Start: 2025-09-01